# Patient Record
Sex: FEMALE | Race: WHITE | Employment: OTHER | URBAN - METROPOLITAN AREA
[De-identification: names, ages, dates, MRNs, and addresses within clinical notes are randomized per-mention and may not be internally consistent; named-entity substitution may affect disease eponyms.]

---

## 2018-05-21 ENCOUNTER — HOSPITAL ENCOUNTER (OUTPATIENT)
Dept: MAMMOGRAPHY | Age: 73
Discharge: HOME | End: 2018-05-21
Attending: FAMILY MEDICINE
Payer: MEDICARE

## 2018-05-21 DIAGNOSIS — Z78.0 MENOPAUSE: ICD-10-CM

## 2018-05-21 DIAGNOSIS — Z13.820 SCREENING FOR OSTEOPOROSIS: ICD-10-CM

## 2018-05-21 PROCEDURE — 77080 DXA BONE DENSITY AXIAL: CPT

## 2018-10-01 RX ORDER — TRAZODONE HYDROCHLORIDE 50 MG/1
TABLET ORAL
Qty: 180 TAB | Refills: 1 | Status: SHIPPED | OUTPATIENT
Start: 2018-10-01 | End: 2018-12-26 | Stop reason: SDUPTHER

## 2018-12-07 RX ORDER — GLIPIZIDE 5 MG/1
TABLET ORAL 2 TIMES DAILY
COMMUNITY
End: 2018-12-07 | Stop reason: SDUPTHER

## 2018-12-07 NOTE — TELEPHONE ENCOUNTER
Patient called again regarding medication refills. It seems refill request was sent to Dr. JAY Man [facility] but has not been approved and signed yet. Patient will not have medication as of tomorrow and wants to get it filled.     She has appt scheduled for 12/26/18 with Dr. JAY Man [facility]

## 2018-12-10 RX ORDER — GLIPIZIDE 5 MG/1
5 TABLET ORAL 2 TIMES DAILY
Qty: 60 TAB | Refills: 0 | Status: SHIPPED | OUTPATIENT
Start: 2018-12-10 | End: 2018-12-26 | Stop reason: SDUPTHER

## 2018-12-26 ENCOUNTER — OFFICE VISIT (OUTPATIENT)
Dept: FAMILY MEDICINE CLINIC | Age: 73
End: 2018-12-26

## 2018-12-26 VITALS
WEIGHT: 199 LBS | BODY MASS INDEX: 36.62 KG/M2 | TEMPERATURE: 98.7 F | HEIGHT: 62 IN | HEART RATE: 60 BPM | OXYGEN SATURATION: 96 % | RESPIRATION RATE: 16 BRPM | SYSTOLIC BLOOD PRESSURE: 120 MMHG | DIASTOLIC BLOOD PRESSURE: 60 MMHG

## 2018-12-26 DIAGNOSIS — E11.9 TYPE 2 DIABETES MELLITUS WITHOUT COMPLICATION, WITHOUT LONG-TERM CURRENT USE OF INSULIN (HCC): Primary | ICD-10-CM

## 2018-12-26 RX ORDER — LEVOTHYROXINE SODIUM 100 UG/1
100 TABLET ORAL
Qty: 90 TAB | Refills: 3 | Status: SHIPPED | OUTPATIENT
Start: 2018-12-26 | End: 2019-10-08 | Stop reason: SDUPTHER

## 2018-12-26 RX ORDER — SIMVASTATIN 10 MG/1
10 TABLET, FILM COATED ORAL
Qty: 90 TAB | Refills: 3 | Status: SHIPPED | OUTPATIENT
Start: 2018-12-26 | End: 2019-10-08 | Stop reason: SDUPTHER

## 2018-12-26 RX ORDER — LOSARTAN POTASSIUM 50 MG/1
TABLET ORAL
Refills: 1 | COMMUNITY
Start: 2018-10-31 | End: 2018-12-26 | Stop reason: SDUPTHER

## 2018-12-26 RX ORDER — SERTRALINE HYDROCHLORIDE 100 MG/1
TABLET, FILM COATED ORAL
Refills: 1 | COMMUNITY
Start: 2018-12-04 | End: 2018-12-26 | Stop reason: SDUPTHER

## 2018-12-26 RX ORDER — LEVOTHYROXINE SODIUM 100 UG/1
TABLET ORAL
COMMUNITY
Start: 2018-01-16 | End: 2018-12-26 | Stop reason: SDUPTHER

## 2018-12-26 RX ORDER — LOSARTAN POTASSIUM 50 MG/1
TABLET ORAL
Qty: 90 TAB | Refills: 3 | Status: SHIPPED | OUTPATIENT
Start: 2018-12-26 | End: 2019-10-08 | Stop reason: SDUPTHER

## 2018-12-26 RX ORDER — SERTRALINE HYDROCHLORIDE 50 MG/1
50 TABLET, FILM COATED ORAL
Qty: 90 TAB | Refills: 3 | Status: SHIPPED | OUTPATIENT
Start: 2018-12-26 | End: 2019-03-24 | Stop reason: SDUPTHER

## 2018-12-26 RX ORDER — GLIPIZIDE 5 MG/1
5 TABLET ORAL 2 TIMES DAILY
Qty: 180 TAB | Refills: 3 | Status: SHIPPED | OUTPATIENT
Start: 2018-12-26 | End: 2019-02-13 | Stop reason: CLARIF

## 2018-12-26 RX ORDER — TRAZODONE HYDROCHLORIDE 100 MG/1
100 TABLET ORAL
Qty: 90 TAB | Refills: 3 | Status: SHIPPED | OUTPATIENT
Start: 2018-12-26 | End: 2019-03-22 | Stop reason: SDUPTHER

## 2018-12-26 RX ORDER — ATENOLOL 25 MG/1
25 TABLET ORAL
Qty: 90 TAB | Refills: 3 | Status: SHIPPED | OUTPATIENT
Start: 2018-12-26 | End: 2019-10-08 | Stop reason: SDUPTHER

## 2018-12-26 RX ORDER — CYCLOBENZAPRINE HCL 10 MG
10 TABLET ORAL
COMMUNITY
Start: 2018-02-21 | End: 2018-12-26

## 2018-12-26 RX ORDER — ASPIRIN 81 MG/1
81 TABLET ORAL DAILY
COMMUNITY

## 2018-12-26 NOTE — PROGRESS NOTES
Identified pt with two pt identifiers(name and ). Chief Complaint   Patient presents with    Follow-up     diabetes        Health Maintenance Due   Topic    Hepatitis C Screening     LIPID PANEL Q1     FOOT EXAM Q1     MICROALBUMIN Q1     EYE EXAM RETINAL OR DILATED     DTaP/Tdap/Td series (1 - Tdap)    Shingrix Vaccine Age 50> (1 of 2)    BREAST CANCER SCRN MAMMOGRAM     FOBT Q 1 YEAR AGE 50-75     GLAUCOMA SCREENING Q2Y     Pneumococcal 65+ Low/Medium Risk (1 of 2 - PCV13)    HEMOGLOBIN A1C Q6M     MEDICARE YEARLY EXAM     Influenza Age 5 to Adult        Wt Readings from Last 3 Encounters:   18 199 lb (90.3 kg)   14 210 lb (95.3 kg)   13 209 lb (94.8 kg)     Temp Readings from Last 3 Encounters:   18 98.7 °F (37.1 °C) (Oral)   14 98.3 °F (36.8 °C)   13 98.8 °F (37.1 °C)     BP Readings from Last 3 Encounters:   18 120/60   14 108/53   13 128/60     Pulse Readings from Last 3 Encounters:   18 60   14 72   13 67         Learning Assessment:  :     No flowsheet data found. Depression Screening:  :     No flowsheet data found. Fall Risk Assessment:  :     No flowsheet data found. Abuse Screening:  :     No flowsheet data found. Coordination of Care Questionnaire:  :     1) Have you been to an emergency room, urgent care clinic since your last visit? no   Hospitalized since your last visit? no             2) Have you seen or consulted any other health care providers outside of [address] [place] [address] since your last visit? no  (Include any pap smears or colon screenings in this section.)    3) Do you have an Advance Directive on file? no  Are you interested in receiving information about Advance Directives? no    Patient is accompanied by self I have received verbal consent from Justine Narvaez to discuss any/all medical information while they are present in the room.     Reviewed record in preparation for visit and have obtained necessary documentation. Medication reconciliation up to date and corrected with patient at this time.

## 2019-02-09 LAB
BUN SERPL-MCNC: 13 MG/DL (ref 8–27)
BUN/CREAT SERPL: 13 (ref 12–28)
CALCIUM SERPL-MCNC: 9.7 MG/DL (ref 8.7–10.3)
CHLORIDE SERPL-SCNC: 100 MMOL/L (ref 96–106)
CO2 SERPL-SCNC: 23 MMOL/L (ref 20–29)
CREAT SERPL-MCNC: 1 MG/DL (ref 0.57–1)
EST. AVERAGE GLUCOSE BLD GHB EST-MCNC: 255 MG/DL
GLUCOSE SERPL-MCNC: 260 MG/DL (ref 65–99)
HBA1C MFR BLD: 10.5 % (ref 4.8–5.6)
POTASSIUM SERPL-SCNC: 4.4 MMOL/L (ref 3.5–5.2)
SODIUM SERPL-SCNC: 138 MMOL/L (ref 134–144)

## 2019-02-13 ENCOUNTER — OFFICE VISIT (OUTPATIENT)
Dept: FAMILY MEDICINE CLINIC | Age: 74
End: 2019-02-13

## 2019-02-13 VITALS
RESPIRATION RATE: 16 BRPM | HEIGHT: 62 IN | TEMPERATURE: 98.6 F | BODY MASS INDEX: 36.34 KG/M2 | OXYGEN SATURATION: 98 % | WEIGHT: 197.5 LBS | SYSTOLIC BLOOD PRESSURE: 109 MMHG | HEART RATE: 59 BPM | DIASTOLIC BLOOD PRESSURE: 68 MMHG

## 2019-02-13 DIAGNOSIS — E11.9 TYPE 2 DIABETES MELLITUS WITHOUT COMPLICATION, WITHOUT LONG-TERM CURRENT USE OF INSULIN (HCC): Primary | ICD-10-CM

## 2019-02-13 DIAGNOSIS — R42 DIZZINESS: ICD-10-CM

## 2019-02-13 DIAGNOSIS — Z29.8 ALTITUDE SICKNESS PROPHYLAXIS: ICD-10-CM

## 2019-02-13 PROBLEM — E66.01 SEVERE OBESITY (HCC): Status: ACTIVE | Noted: 2019-02-13

## 2019-02-13 RX ORDER — GLIPIZIDE 10 MG/1
10 TABLET, FILM COATED, EXTENDED RELEASE ORAL 2 TIMES DAILY
Qty: 180 TAB | Refills: 1 | Status: SHIPPED | OUTPATIENT
Start: 2019-02-13 | End: 2019-08-12 | Stop reason: SDUPTHER

## 2019-02-13 RX ORDER — MECLIZINE HCL 12.5 MG 12.5 MG/1
25 TABLET ORAL
Qty: 20 TAB | Refills: 0 | Status: SHIPPED | OUTPATIENT
Start: 2019-02-13 | End: 2019-02-23

## 2019-02-13 RX ORDER — ACETAZOLAMIDE 125 MG/1
125 TABLET ORAL 2 TIMES DAILY
Qty: 30 TAB | Refills: 0 | Status: SHIPPED | OUTPATIENT
Start: 2019-02-13 | End: 2019-03-15

## 2019-02-13 NOTE — PATIENT INSTRUCTIONS
Learning About Diabetes Food Guidelines  Your Care Instructions    Meal planning is important to manage diabetes. It helps keep your blood sugar at a target level (which you set with your doctor). You don't have to eat special foods. You can eat what your family eats, including sweets once in a while. But you do have to pay attention to how often you eat and how much you eat of certain foods. You may want to work with a dietitian or a certified diabetes educator (CDE) to help you plan meals and snacks. A dietitian or CDE can also help you lose weight if that is one of your goals. What should you know about eating carbs? Managing the amount of carbohydrate (carbs) you eat is an important part of healthy meals when you have diabetes. Carbohydrate is found in many foods. · Learn which foods have carbs. And learn the amounts of carbs in different foods. ? Bread, cereal, pasta, and rice have about 15 grams of carbs in a serving. A serving is 1 slice of bread (1 ounce), ½ cup of cooked cereal, or 1/3 cup of cooked pasta or rice. ? Fruits have 15 grams of carbs in a serving. A serving is 1 small fresh fruit, such as an apple or orange; ½ of a banana; ½ cup of cooked or canned fruit; ½ cup of fruit juice; 1 cup of melon or raspberries; or 2 tablespoons of dried fruit. ? Milk and no-sugar-added yogurt have 15 grams of carbs in a serving. A serving is 1 cup of milk or 2/3 cup of no-sugar-added yogurt. ? Starchy vegetables have 15 grams of carbs in a serving. A serving is ½ cup of mashed potatoes or sweet potato; 1 cup winter squash; ½ of a small baked potato; ½ cup of cooked beans; or ½ cup cooked corn or green peas. · Learn how much carbs to eat each day and at each meal. A dietitian or CDE can teach you how to keep track of the amount of carbs you eat. This is called carbohydrate counting. · If you are not sure how to count carbohydrate grams, use the Plate Method to plan meals.  It is a good, quick way to make sure that you have a balanced meal. It also helps you spread carbs throughout the day. ? Divide your plate by types of foods. Put non-starchy vegetables on half the plate, meat or other protein food on one-quarter of the plate, and a grain or starchy vegetable in the final quarter of the plate. To this you can add a small piece of fruit and 1 cup of milk or yogurt, depending on how many carbs you are supposed to eat at a meal.  · Try to eat about the same amount of carbs at each meal. Do not \"save up\" your daily allowance of carbs to eat at one meal.  · Proteins have very little or no carbs per serving. Examples of proteins are beef, chicken, turkey, fish, eggs, tofu, cheese, cottage cheese, and peanut butter. A serving size of meat is 3 ounces, which is about the size of a deck of cards. Examples of meat substitute serving sizes (equal to 1 ounce of meat) are 1/4 cup of cottage cheese, 1 egg, 1 tablespoon of peanut butter, and ½ cup of tofu. How can you eat out and still eat healthy? · Learn to estimate the serving sizes of foods that have carbohydrate. If you measure food at home, it will be easier to estimate the amount in a serving of restaurant food. · If the meal you order has too much carbohydrate (such as potatoes, corn, or baked beans), ask to have a low-carbohydrate food instead. Ask for a salad or green vegetables. · If you use insulin, check your blood sugar before and after eating out to help you plan how much to eat in the future. · If you eat more carbohydrate at a meal than you had planned, take a walk or do other exercise. This will help lower your blood sugar. What else should you know? · Limit saturated fat, such as the fat from meat and dairy products. This is a healthy choice because people who have diabetes are at higher risk of heart disease. So choose lean cuts of meat and nonfat or low-fat dairy products.  Use olive or canola oil instead of butter or shortening when cooking. · Don't skip meals. Your blood sugar may drop too low if you skip meals and take insulin or certain medicines for diabetes. · Check with your doctor before you drink alcohol. Alcohol can cause your blood sugar to drop too low. Alcohol can also cause a bad reaction if you take certain diabetes medicines. Follow-up care is a key part of your treatment and safety. Be sure to make and go to all appointments, and call your doctor if you are having problems. It's also a good idea to know your test results and keep a list of the medicines you take. Where can you learn more? Go to http://zachery-tate.info/. Enter I960 in the search box to learn more about \"Learning About Diabetes Food Guidelines. \"  Current as of: July 25, 2018  Content Version: 11.9  © 2710-8616 [employer]. Care instructions adapted under license by [employer] (which disclaims liability or warranty for this information). If you have questions about a medical condition or this instruction, always ask your healthcare professional. [address] any warranty or liability for your use of this information. Learning About Meal Planning for Diabetes  Why plan your meals? Meal planning can be a key part of managing diabetes. Planning meals and snacks with the right balance of carbohydrate, protein, and fat can help you keep your blood sugar at the target level you set with your doctor. You don't have to eat special foods. You can eat what your family eats, including sweets once in a while. But you do have to pay attention to how often you eat and how much you eat of certain foods. You may want to work with a dietitian or a certified diabetes educator. He or she can give you tips and meal ideas and can answer your questions about meal planning. This health professional can also help you reach a healthy weight if that is one of your goals. What plan is right for you?   Your dietitian or diabetes educator may suggest that you start with the plate format or carbohydrate counting. The plate format  The plate format is a simple way to help you manage how you eat. You plan meals by learning how much space each food should take on a plate. Using the plate format helps you spread carbohydrate throughout the day. It can make it easier to keep your blood sugar level within your target range. It also helps you see if you're eating healthy portion sizes. To use the plate format, you put non-starchy vegetables on half your plate. Add meat or meat substitutes on one-quarter of the plate. Put a grain or starchy vegetable (such as brown rice or a potato) on the final quarter of the plate. You can add a small piece of fruit and some low-fat or fat-free milk or yogurt, depending on your carbohydrate goal for each meal.  Here are some tips for using the plate format:  · Make sure that you are not using an oversized plate. A 9-inch plate is best. Many restaurants use larger plates. · Get used to using the plate format at home. Then you can use it when you eat out. · Write down your questions about using the plate format. Talk to your doctor, a dietitian, or a diabetes educator about your concerns. Carbohydrate counting  With carbohydrate counting, you plan meals based on the amount of carbohydrate in each food. Carbohydrate raises blood sugar higher and more quickly than any other nutrient. It is found in desserts, breads and cereals, and fruit. It's also found in starchy vegetables such as potatoes and corn, grains such as rice and pasta, and milk and yogurt. Spreading carbohydrate throughout the day helps keep your blood sugar levels within your target range. Your daily amount depends on several things, including your weight, how active you are, which diabetes medicines you take, and what your goals are for your blood sugar levels.  A registered dietitian or diabetes educator can help you plan how much carbohydrate to include in each meal and snack. A guideline for your daily amount of carbohydrate is:  · 45 to 60 grams at each meal. That's about the same as 3 to 4 carbohydrate servings. · 15 to 20 grams at each snack. That's about the same as 1 carbohydrate serving. The Nutrition Facts label on packaged foods tells you how much carbohydrate is in a serving of the food. First, look at the serving size on the food label. Is that the amount you eat in a serving? All of the nutrition information on a food label is based on that serving size. So if you eat more or less than that, you'll need to adjust the other numbers. Total carbohydrate is the next thing you need to look for on the label. If you count carbohydrate servings, one serving of carbohydrate is 15 grams. For foods that don't come with labels, such as fresh fruits and vegetables, you'll need a guide that lists carbohydrate in these foods. Ask your doctor, dietitian, or diabetes educator about books or other nutrition guides you can use. If you take insulin, you need to know how many grams of carbohydrate are in a meal. This lets you know how much rapid-acting insulin to take before you eat. If you use an insulin pump, you get a constant rate of insulin during the day. So the pump must be programmed at meals to give you extra insulin to cover the rise in blood sugar after meals. When you know how much carbohydrate you will eat, you can take the right amount of insulin. Or, if you always use the same amount of insulin, you need to make sure that you eat the same amount of carbohydrate at meals. If you need more help to understand carbohydrate counting and food labels, ask your doctor, dietitian, or diabetes educator. How do you get started with meal planning? Here are some tips to get started:  · Plan your meals a week at a time. Don't forget to include snacks too. · Use cookbooks or online recipes to plan several main meals.  Plan some quick meals for busy nights. You also can double some recipes that freeze well. Then you can save half for other busy nights when you don't have time to cook. · Make sure you have the ingredients you need for your recipes. If you're running low on basic items, put these items on your shopping list too. · List foods that you use to make breakfasts, lunches, and snacks. List plenty of fruits and vegetables. · Post this list on the refrigerator. Add to it as you think of more things you need. · Take the list to the store to do your weekly shopping. Follow-up care is a key part of your treatment and safety. Be sure to make and go to all appointments, and call your doctor if you are having problems. It's also a good idea to know your test results and keep a list of the medicines you take. Where can you learn more? Go to http://zachery-tate.info/. Shayy Peters in the search box to learn more about \"Learning About Meal Planning for Diabetes. \"  Current as of: July 25, 2018  Content Version: 11.9  © 8895-6949 [employer]. Care instructions adapted under license by [employer] (which disclaims liability or warranty for this information). If you have questions about a medical condition or this instruction, always ask your healthcare professional. [address] any warranty or liability for your use of this information.

## 2019-02-13 NOTE — PROGRESS NOTES
Assessment/Plan:     Diagnoses and all orders for this visit:    1. Type 2 diabetes mellitus without complication, without long-term current use of insulin (HCC)  -     glipiZIDE SR (GLUCOTROL XL) 10 mg CR tablet; Take 1 Tab by mouth two (2) times a day. - Worsening, refill of glipizide at correct dose, additional med names given to check with insurance to see what is covered. A1c is due again at end of March and she will return then. 2. Altitude sickness prophylaxis  -     acetaZOLAMIDE (DIAMOX) 125 mg tablet; Take 1 Tab by mouth two (2) times a day for 30 days. 3. Dizziness  -     meclizine (ANTIVERT) 12.5 mg tablet; Take 2 Tabs by mouth three (3) times daily as needed for up to 10 days. Follow-up Disposition:  Return in about 1 month (around 3/13/2019) for Follow Up. Discussed expected course/resolution/complications of diagnosis in detail with patient.    Medication risks/benefits/costs/interactions/alternatives discussed with patient.    Pt was given after visit summary which includes diagnoses, current medications & vitals. Pt expressed understanding with the diagnosis and plan        Subjective:      Asya Mckenzie is a 72 y.o. female who presents for had concerns including Follow-up (Diabetes ( running high) ). Here today for diabetes. Had some mix up and miscommunication about her glipizide dose. She is supposed to be taking 20mg a day of glipizide and was only taking 10mg daily. Last A1c was 10.5. She had been on 2 different cruises. States she has lost about 20lbs intentionally. Is unable to take metformin due to severe stomach pain. Has taken Janumet but was unable to tolerate that. Traveling to [facility] in a couple of weeks and it was recommended that she get Diamox for prevention of altitude sickness. She also need meclizine to prevent dizziness from the cruise boat.     Current Outpatient Medications   Medication Sig Dispense Refill    glipiZIDE SR (GLUCOTROL XL) 10 mg CR tablet Take 1 Tab by mouth two (2) times a day. 180 Tab 1    acetaZOLAMIDE (DIAMOX) 125 mg tablet Take 1 Tab by mouth two (2) times a day for 30 days. 30 Tab 0    meclizine (ANTIVERT) 12.5 mg tablet Take 2 Tabs by mouth three (3) times daily as needed for up to 10 days. 20 Tab 0    aspirin delayed-release 81 mg tablet Take  by mouth daily.  losartan (COZAAR) 50 mg tablet TAKE 1 TABLET BY MOUTH EVERY DAY. 90 Tab 3    levothyroxine (SYNTHROID) 100 mcg tablet Take 1 Tab by mouth Daily (before breakfast). 90 Tab 3    traZODone (DESYREL) 100 mg tablet Take 1 Tab by mouth nightly. 90 Tab 3    sertraline (ZOLOFT) 50 mg tablet Take 1 Tab by mouth nightly. 90 Tab 3    atenolol (TENORMIN) 25 mg tablet Take 1 Tab by mouth nightly. 90 Tab 3    simvastatin (ZOCOR) 10 mg tablet Take 1 Tab by mouth nightly. 90 Tab 3    mometasone-formoterol (DULERA) 200-5 mcg/actuation HFA inhaler Take 2 Puffs by inhalation two (2) times a day.  fluticasone (FLONASE) 50 mcg/actuation nasal spray 2 Sprays by Both Nostrils route daily.  albuterol (PROAIR HFA) 90 mcg/actuation inhaler Take 2 Puffs by inhalation every four (4) hours as needed for Wheezing.  Cetirizine (ZYRTEC) 10 mg cap Take 1 Tab by mouth daily.  multivitamins-minerals-lutein (CENTRUM SILVER) tab Take 2 Tabs by mouth daily.  montelukast (SINGULAIR) 10 mg tablet Take 10 mg by mouth nightly.  GUAIFENESIN (MUCINEX PO) Take 1 Tab by mouth two (2) times a day.  glucosamine-chondroit-vit c-mn (GLUCOSAMINE CHONDROITIN MAXSTR) 500-400 mg capsule Take 1 Cap by mouth daily.  OMEPRAZOLE MAGNESIUM (PRILOSEC OTC PO) Take 1 Tab by mouth daily.  CALCIUM CARB/VIT D3/MINERALS (CALCIUM-VITAMIN D PO) Take 1 Tab by mouth daily. Allergies   Allergen Reactions    Latex Rash    Prednisone Anaphylaxis     Throat swelling. Can take methylpredisone po or IV without problems.     Augmentin [Amoxicillin-Pot Clavulanate] Nausea and Vomiting    Biaxin [Clarithromycin] Nausea and Vomiting    Parafon Forte Dsc [Chlorzoxazone] Nausea and Vomiting       ROS:   Review of Systems   Constitutional: Negative for fever. Respiratory: Negative for cough and shortness of breath. Cardiovascular: Negative for chest pain, palpitations and leg swelling. Genitourinary: Negative for dysuria, frequency and urgency. Neurological: Negative for dizziness. Endo/Heme/Allergies: Negative for polydipsia. Objective:     Visit Vitals  /68 (BP 1 Location: Right arm, BP Patient Position: Sitting)   Pulse (!) 59   Temp 98.6 °F (37 °C) (Oral)   Resp 16   Ht 5' 2\" (1.575 m)   Wt 197 lb 8 oz (89.6 kg)   SpO2 98%   BMI 36.12 kg/m²       Vitals and Nurse Documentation reviewed. Physical Exam   Constitutional: She is well-developed, well-nourished, and in no distress. No distress. HENT:   Head: Normocephalic and atraumatic. Cardiovascular: Normal rate, regular rhythm and normal heart sounds. Exam reveals no gallop and no friction rub. No murmur heard. Pulmonary/Chest: Effort normal and breath sounds normal. No respiratory distress. She has no wheezes. She has no rales. Neurological: She is alert. Skin: She is not diaphoretic.    Psychiatric: Affect normal.       Results for orders placed or performed in visit on 57/99/01   METABOLIC PANEL, BASIC   Result Value Ref Range    Glucose 260 (H) 65 - 99 mg/dL    BUN 13 8 - 27 mg/dL    Creatinine 1.00 0.57 - 1.00 mg/dL    GFR est non-AA 57 (L) >59 mL/min/1.73    GFR est AA 65 >59 mL/min/1.73    BUN/Creatinine ratio 13 12 - 28    Sodium 138 134 - 144 mmol/L    Potassium 4.4 3.5 - 5.2 mmol/L    Chloride 100 96 - 106 mmol/L    CO2 23 20 - 29 mmol/L    Calcium 9.7 8.7 - 10.3 mg/dL   HEMOGLOBIN A1C WITH EAG   Result Value Ref Range    Hemoglobin A1c 10.5 (H) 4.8 - 5.6 %    Estimated average glucose 255 mg/dL

## 2019-02-13 NOTE — PROGRESS NOTES
Carlos Aragon is a 72 y.o. female      Chief Complaint   Patient presents with    Follow-up     Diabetes ( running high)          1. Have you been to the ER, urgent care clinic since your last visit? Hospitalized since your last visit? No      2. Have you seen or consulted any other health care providers outside of the [address] [place] [address] since your last visit? Include any pap smears or colon screening.   No

## 2019-03-24 RX ORDER — TRAZODONE HYDROCHLORIDE 100 MG/1
100 TABLET ORAL
Qty: 90 TAB | Refills: 1 | Status: SHIPPED | OUTPATIENT
Start: 2019-03-24 | End: 2019-09-20 | Stop reason: SDUPTHER

## 2019-03-24 RX ORDER — SERTRALINE HYDROCHLORIDE 50 MG/1
50 TABLET, FILM COATED ORAL DAILY
Qty: 90 TAB | Refills: 1 | Status: SHIPPED | OUTPATIENT
Start: 2019-03-24 | End: 2019-09-04 | Stop reason: SDUPTHER

## 2019-08-12 DIAGNOSIS — E11.9 TYPE 2 DIABETES MELLITUS WITHOUT COMPLICATION, WITHOUT LONG-TERM CURRENT USE OF INSULIN (HCC): ICD-10-CM

## 2019-08-12 RX ORDER — GLIPIZIDE 10 MG/1
TABLET, FILM COATED, EXTENDED RELEASE ORAL
Qty: 180 TAB | Refills: 1 | Status: SHIPPED | OUTPATIENT
Start: 2019-08-12 | End: 2020-02-14 | Stop reason: SDUPTHER

## 2019-09-04 RX ORDER — SERTRALINE HYDROCHLORIDE 50 MG/1
TABLET, FILM COATED ORAL
Qty: 90 TAB | Refills: 1 | Status: SHIPPED | OUTPATIENT
Start: 2019-09-04 | End: 2020-09-04 | Stop reason: SDUPTHER

## 2019-09-20 RX ORDER — TRAZODONE HYDROCHLORIDE 100 MG/1
TABLET ORAL
Qty: 90 TAB | Refills: 1 | Status: SHIPPED | OUTPATIENT
Start: 2019-09-20 | End: 2020-03-24 | Stop reason: SDUPTHER

## 2019-10-08 ENCOUNTER — OFFICE VISIT (OUTPATIENT)
Dept: FAMILY MEDICINE CLINIC | Age: 74
End: 2019-10-08

## 2019-10-08 VITALS
TEMPERATURE: 98.1 F | WEIGHT: 201 LBS | SYSTOLIC BLOOD PRESSURE: 135 MMHG | RESPIRATION RATE: 17 BRPM | HEART RATE: 64 BPM | BODY MASS INDEX: 36.99 KG/M2 | DIASTOLIC BLOOD PRESSURE: 75 MMHG | OXYGEN SATURATION: 94 % | HEIGHT: 62 IN

## 2019-10-08 DIAGNOSIS — Z23 ENCOUNTER FOR IMMUNIZATION: ICD-10-CM

## 2019-10-08 DIAGNOSIS — Z13.39 SCREENING FOR ALCOHOLISM: ICD-10-CM

## 2019-10-08 DIAGNOSIS — Z13.31 SCREENING FOR DEPRESSION: ICD-10-CM

## 2019-10-08 DIAGNOSIS — E03.9 ACQUIRED HYPOTHYROIDISM: ICD-10-CM

## 2019-10-08 DIAGNOSIS — E66.01 SEVERE OBESITY (HCC): ICD-10-CM

## 2019-10-08 DIAGNOSIS — E11.21 TYPE 2 DIABETES WITH NEPHROPATHY (HCC): ICD-10-CM

## 2019-10-08 DIAGNOSIS — Z00.00 MEDICARE ANNUAL WELLNESS VISIT, SUBSEQUENT: Primary | ICD-10-CM

## 2019-10-08 DIAGNOSIS — I10 ESSENTIAL HYPERTENSION: ICD-10-CM

## 2019-10-08 DIAGNOSIS — E11.9 ENCOUNTER FOR DIABETIC FOOT EXAM (HCC): ICD-10-CM

## 2019-10-08 RX ORDER — SIMVASTATIN 10 MG/1
10 TABLET, FILM COATED ORAL
Qty: 90 TAB | Refills: 3 | Status: SHIPPED | OUTPATIENT
Start: 2019-10-08 | End: 2020-09-14 | Stop reason: SDUPTHER

## 2019-10-08 RX ORDER — LEVOTHYROXINE SODIUM 100 UG/1
100 TABLET ORAL
Qty: 90 TAB | Refills: 3 | Status: SHIPPED | OUTPATIENT
Start: 2019-10-08 | End: 2020-11-23

## 2019-10-08 RX ORDER — ATENOLOL 25 MG/1
25 TABLET ORAL
Qty: 90 TAB | Refills: 3 | Status: SHIPPED | OUTPATIENT
Start: 2019-10-08 | End: 2020-05-30

## 2019-10-08 RX ORDER — LOSARTAN POTASSIUM 50 MG/1
TABLET ORAL
Qty: 90 TAB | Refills: 1 | Status: SHIPPED | OUTPATIENT
Start: 2019-10-08 | End: 2020-09-04 | Stop reason: SDUPTHER

## 2019-10-08 NOTE — PROGRESS NOTES
This is the Subsequent Medicare Annual Wellness Exam, performed 12 months or more after the Initial AWV or the last Subsequent AWV    I have reviewed the patient's medical history in detail and updated the computerized patient record. History   Here today for medicare wellness. Had mammogram in May 2019. Colonoscopy is due in 2022. Needs Pneumo 13. Needs shingrix. Just had a steroid injection in the back and feels blood sugar is very high. Sees dentist every 6 months. Last vision exam was this year. Diabetes  Last A1c was in Feb 2019 was 10.5. Checks BG at home and gets 200's. Takes 10mg glipizide BID. Unable to take metformin due to messing up her stomach very bad and     HTN  BP is 135/75 today. Does not check BP at home. Takes losartan 50mg and atenolol 25mg as directed with no reported side effects. Denies CP, SOB, palpitations, or leg swelling. Review of Systems   Constitutional: Negative for chills, fever and weight loss. Eyes: Negative for blurred vision. Respiratory: Negative for cough and shortness of breath. Cardiovascular: Negative for chest pain, palpitations and leg swelling. Gastrointestinal: Negative for constipation, nausea and vomiting. Genitourinary: Negative for dysuria and frequency. Musculoskeletal: Negative for joint pain. Skin: Negative for rash. Neurological: Negative for dizziness and headaches. Endo/Heme/Allergies: Negative for polydipsia. Psychiatric/Behavioral: Negative for substance abuse and suicidal ideas. The patient does not have insomnia.         Past Medical History:   Diagnosis Date    Anxiety     Arthritis     Asthma     Chronic pain     back,right hip    Depression     Diabetes (HCC)     type 2    GERD (gastroesophageal reflux disease)     Hypercholesteremia     Hypertension     Hypothyroid     Nausea & vomiting     Reflux     Sleep apnea     wears dental appliance- Somnident    Thyroid disease     Unspecified adverse effect of anesthesia     delayed awakening      Past Surgical History:   Procedure Laterality Date    HX COLONOSCOPY  2012    normal repeat 2022, diverticulosis of sigmoid colon, Dr. Ghada Santo HX HEENT  1/08/14    bilateral cataract surgery1/8/14    HX KNEE REPLACEMENT      right    HX OTHER SURGICAL  2007    cardiac cath- No CAD found    NEUROLOGICAL PROCEDURE UNLISTED  1999    vascular brain surgery     Current Outpatient Medications   Medication Sig Dispense Refill    folic acid/multivit-min/lutein (CENTRUM SILVER PO) Take  by mouth.  varicella-zoster recombinant, PF, (SHINGRIX, PF,) 50 mcg/0.5 mL susr injection 0.5mL by IntraMUSCular route once now and then repeat in 2-6 months 0.5 mL 1    simvastatin (ZOCOR) 10 mg tablet Take 1 Tab by mouth nightly. 90 Tab 3    losartan (COZAAR) 50 mg tablet TAKE 1 TABLET BY MOUTH EVERY DAY. 90 Tab 1    levothyroxine (SYNTHROID) 100 mcg tablet Take 1 Tab by mouth Daily (before breakfast). 90 Tab 3    atenolol (TENORMIN) 25 mg tablet Take 1 Tab by mouth nightly. 90 Tab 3    glucose blood VI test strips (ASCENSIA AUTODISC VI, ONE TOUCH ULTRA TEST VI) strip E11.6  Test fasting blood glucose daily 100 Strip 3    traZODone (DESYREL) 100 mg tablet TAKE 1 TABLET BY MOUTH EVERY DAY AT NIGHT 90 Tab 1    sertraline (ZOLOFT) 50 mg tablet TAKE 1 TABLET BY MOUTH EVERY DAY 90 Tab 1    glipiZIDE SR (GLUCOTROL XL) 10 mg CR tablet TAKE 1 TABLET BY MOUTH TWICE A  Tab 1    aspirin delayed-release 81 mg tablet Take  by mouth daily.  mometasone-formoterol (DULERA) 200-5 mcg/actuation HFA inhaler Take 2 Puffs by inhalation two (2) times a day.  fluticasone (FLONASE) 50 mcg/actuation nasal spray 2 Sprays by Both Nostrils route daily.  albuterol (PROAIR HFA) 90 mcg/actuation inhaler Take 2 Puffs by inhalation every four (4) hours as needed for Wheezing.  Cetirizine (ZYRTEC) 10 mg cap Take 1 Tab by mouth daily.       multivitamins-minerals-lutein (CENTRUM SILVER) tab Take 2 Tabs by mouth daily.  montelukast (SINGULAIR) 10 mg tablet Take 10 mg by mouth nightly.  GUAIFENESIN (MUCINEX PO) Take 1 Tab by mouth two (2) times a day.  glucosamine-chondroit-vit c-mn (GLUCOSAMINE CHONDROITIN MAXSTR) 500-400 mg capsule Take 1 Cap by mouth daily.  OMEPRAZOLE MAGNESIUM (PRILOSEC OTC PO) Take 1 Tab by mouth daily.  CALCIUM CARB/VIT D3/MINERALS (CALCIUM-VITAMIN D PO) Take 1 Tab by mouth daily. Allergies   Allergen Reactions    Latex Rash    Prednisone Anaphylaxis     Throat swelling. Can take methylpredisone po or IV without problems.     Augmentin [Amoxicillin-Pot Clavulanate] Nausea and Vomiting    Biaxin [Clarithromycin] Nausea and Vomiting    Metformin Diarrhea    Parafon Forte Dsc [Chlorzoxazone] Nausea and Vomiting     Family History   Problem Relation Age of Onset    Elevated Lipids Mother     Dementia Mother     Osteoporosis Mother     Lung Disease Father         copd    Delayed Awakening Father     Post-op Nausea/Vomiting Father     Hypertension Sister     Diabetes Sister         type 2    Breast Cancer Maternal Aunt      Social History     Tobacco Use    Smoking status: Never Smoker    Smokeless tobacco: Never Used   Substance Use Topics    Alcohol use: No     Frequency: Never     Comment: 2x year     Patient Active Problem List   Diagnosis Code    Dry cough R05    Pneumonia, organism unspecified(486) J18.9    Diabetes mellitus ([facility] [address] E11.9    Unspecified essential hypertension I10    Fatigue R53.83    Hypothyroidism E03.9    Obstructive sleep apnea (adult) (pediatric) G47.33    Esophageal reflux K21.9    Severe obesity (HCC) E66.01    Type 2 diabetes with nephropathy (HCC) E11.21       Depression Risk Factor Screening:     3 most recent PHQ Screens 10/8/2019   Little interest or pleasure in doing things Not at all   Feeling down, depressed, irritable, or hopeless Not at all   Total Score PHQ 2 0     Alcohol Risk Factor Screening: You do not drink alcohol or very rarely. Functional Ability and Level of Safety:   Hearing Loss  Hearing is good. Activities of Daily Living  The home contains: handrails and grab bars  Patient does total self care    Fall Risk  Fall Risk Assessment, last 12 mths 10/8/2019   Able to walk? Yes   Fall in past 12 months? No       Abuse Screen  Patient is not abused    Cognitive Screening   Evaluation of Cognitive Function:  Has your family/caregiver stated any concerns about your memory: no  Normal, Clock Drawing test    Physical Exam   Constitutional: She is well-developed, well-nourished, and in no distress. HENT:   Head: Normocephalic and atraumatic. Right Ear: Tympanic membrane normal.   Left Ear: Tympanic membrane normal.   Nose: Nose normal.   Mouth/Throat: Oropharynx is clear and moist. Normal dentition. Eyes: Pupils are equal, round, and reactive to light. EOM are normal. Right eye exhibits no discharge. Left eye exhibits no discharge. Right conjunctiva is not injected. Left conjunctiva is not injected. Neck: Neck supple. Carotid bruit is not present. No thyroid mass and no thyromegaly present. Cardiovascular: Normal rate, regular rhythm, S1 normal and S2 normal. Exam reveals no gallop and no friction rub. No murmur heard. Pulses:       Dorsalis pedis pulses are 2+ on the right side, and 2+ on the left side. Posterior tibial pulses are 2+ on the right side, and 2+ on the left side. Pulmonary/Chest: Breath sounds normal.   Abdominal: Normal appearance and bowel sounds are normal. She exhibits no distension and no mass. There is no hepatosplenomegaly. There is no tenderness. Musculoskeletal: Normal range of motion. Lymphadenopathy:     She has no cervical adenopathy. Neurological: She is alert. She has normal sensation and normal strength. Gait normal. Gait normal.   Skin: Skin is warm, dry and intact. No rash noted.    Psychiatric: Mood and affect normal. Diabetic foot exam performed by Ricarda Rodriguez NP     Measurement  Response Nurse Comment Physician Comment   Monofilament  R - 6/6  L - 6/6     Pulse DP R - present  L - present     Pulse TP R - present  L - present     Structural deformity R - None  L - None     Skin Integrity / Deformity R - None  L - None        Reviewed by:           Patient Care Team   Patient Care Team:  Sharmin Perry MD as PCP - General (Family Practice)    Assessment/Plan   Education and counseling provided:  Are appropriate based on today's review and evaluation  Pneumococcal Vaccine  Influenza Vaccine  Screening Mammography    Diagnoses and all orders for this visit:    1. Medicare annual wellness visit, subsequent  -     varicella-zoster recombinant, PF, (SHINGRIX, PF,) 50 mcg/0.5 mL susr injection; 0.5mL by IntraMUSCular route once now and then repeat in 2-6 months    2. Type 2 diabetes with nephropathy (HCC)  -     HEMOGLOBIN A1C WITH EAG  -     CBC WITH AUTOMATED DIFF  -     LIPID PANEL  -     simvastatin (ZOCOR) 10 mg tablet; Take 1 Tab by mouth nightly.  -     glucose blood VI test strips (ASCENSIA AUTODISC VI, ONE TOUCH ULTRA TEST VI) strip; E11.6  Test fasting blood glucose daily  - Presumed stable, labs today, continue current therapy, follow up based on labs    3. Severe obesity ([address]  - Work on diet and exercise with healthy food choices and increasing activity    4. Acquired hypothyroidism  -     TSH 3RD GENERATION  -     levothyroxine (SYNTHROID) 100 mcg tablet; Take 1 Tab by mouth Daily (before breakfast). - Presumed stable, labs today    5. Screening for alcoholism  -     MD ANNUAL ALCOHOL SCREEN 15 MIN    6. Screening for depression  -     DEPRESSION SCREEN ANNUAL    7. Encounter for immunization  -     pneumococcal 13 jatin conj dip (PREVNAR 13, PF,) 0.5 mL syrg injection; 0.5 mL by IntraMUSCular route once for 1 dose.   -     ADMIN INFLUENZA VIRUS VAC  -     INFLUENZA VACCINE INACTIVATED (IIV), SUBUNIT, ADJUVANTED, IM    8. Essential hypertension  -     MICROALBUMIN, UR, RAND W/ MICROALB/CREAT RATIO  -     METABOLIC PANEL, COMPREHENSIVE  -     losartan (COZAAR) 50 mg tablet; TAKE 1 TABLET BY MOUTH EVERY DAY.  -     atenolol (TENORMIN) 25 mg tablet; Take 1 Tab by mouth nightly. - Presumed stable, labs today, refills today    9.  Encounter for diabetic foot exam [facility])        Health Maintenance Due   Topic Date Due    Hepatitis C Screening  1945    LIPID PANEL Q1  1945    FOOT EXAM Q1  08/16/1957    MICROALBUMIN Q1  08/16/1957    Shingrix Vaccine Age 50> (1 of 2) 08/16/1997    Pneumococcal 65+ years (1 of 2 - PCV13) 08/16/2012    HEMOGLOBIN A1C Q6M  08/08/2019

## 2019-10-08 NOTE — PROGRESS NOTES
King Ramos is a 69 y.o. female    Chief Complaint   Patient presents with    Complete Physical     1. Have you been to the ER, urgent care clinic since your last visit? Hospitalized since your last visit? No    2. Have you seen or consulted any other health care providers outside of the [address] [place] [address] since your last visit? Include any pap smears or colon screening. No       Last mammogram 5/20/2019  Last Colonoscopy 2017  Last physical 12/26/2018  Last influenza 12/2018    Visit Vitals  /75 (BP 1 Location: Right arm, BP Patient Position: Sitting)   Pulse 64   Temp 98.1 °F (36.7 °C) (Oral)   Resp 17   Ht 5' 2\" (1.575 m)   Wt 201 lb (91.2 kg)   SpO2 94%   BMI 36.76 kg/m²     Immunization/s administered 10/8/2019 by David Mane LPN with patient's consent. Patient tolerated procedure well. No reactions noted.

## 2019-10-08 NOTE — PATIENT INSTRUCTIONS
Medicare Wellness Visit, Female The best way to live healthy is to have a lifestyle where you eat a well-balanced diet, exercise regularly, limit alcohol use, and quit all forms of tobacco/nicotine, if applicable. Regular preventive services are another way to keep healthy. Preventive services (vaccines, screening tests, monitoring & exams) can help personalize your care plan, which helps you manage your own care. Screening tests can find health problems at the earliest stages, when they are easiest to treat. [facility] follows the current, evidence-based guidelines published by the [facility] ([facility]) when recommending preventive services for our patients. Because we follow these guidelines, sometimes recommendations change over time as research supports it. (For example, mammograms used to be recommended annually. Even though Medicare will still pay for an annual mammogram, the newer guidelines recommend a mammogram every two years for women of average risk.) Of course, you and your doctor may decide to screen more often for some diseases, based on your risk and your health status. Preventive services for you include: - Medicare offers their members a free annual wellness visit, which is time for you and your primary care provider to discuss and plan for your preventive service needs. Take advantage of this benefit every year! 
-All adults over the age of 72 should receive the recommended pneumonia vaccines. Current USPSTF guidelines recommend a series of two vaccines for the best pneumonia protection.  
-All adults should have a flu vaccine yearly and a tetanus vaccine every 10 years. All adults age 61 and older should receive a shingles vaccine once in their lifetime.   
-A bone mass density test is recommended when a woman turns 65 to screen for osteoporosis. This test is only recommended one time, as a screening. Some providers will use this same test as a disease monitoring tool if you already have osteoporosis. -All adults age 38-68 who are overweight should have a diabetes screening test once every three years.  
-Other screening tests and preventive services for persons with diabetes include: an eye exam to screen for diabetic retinopathy, a kidney function test, a foot exam, and stricter control over your cholesterol.  
-Cardiovascular screening for adults with routine risk involves an electrocardiogram (ECG) at intervals determined by your doctor.  
-Colorectal cancer screenings should be done for adults age 54-65 with no increased risk factors for colorectal cancer. There are a number of acceptable methods of screening for this type of cancer. Each test has its own benefits and drawbacks. Discuss with your doctor what is most appropriate for you during your annual wellness visit. The different tests include: colonoscopy (considered the best screening method), a fecal occult blood test, a fecal DNA test, and sigmoidoscopy. -Breast cancer screenings are recommended every other year for women of normal risk, age 54-69. 
-Cervical cancer screenings for women over age 72 are only recommended with certain risk factors.  
-All adults born between [facility] should be screened once for Hepatitis C. Here is a list of your current Health Maintenance items (your personalized list of preventive services) with a due date: 
Health Maintenance Due Topic Date Due  
 Hepatitis C Test  1945  Cholesterol Test   1945  Diabetic Foot Care  08/16/1957  Albumin Urine Test  08/16/1957  
 DTaP/Tdap/Td  (1 - Tdap) 08/16/1968  Shingles Vaccine (1 of 2) 08/16/1997  Pneumococcal Vaccine (1 of 2 - PCV13) 08/16/2012 [facility] Annual Well Visit  03/15/2018  Flu Vaccine  08/01/2019  Hemoglobin A1C    08/08/2019

## 2019-11-05 LAB
ALBUMIN SERPL-MCNC: 4.4 G/DL (ref 3.5–4.8)
ALBUMIN/CREAT UR: 18 MG/G CREAT (ref 0–30)
ALBUMIN/GLOB SERPL: 1.9 {RATIO} (ref 1.2–2.2)
ALP SERPL-CCNC: 81 IU/L (ref 39–117)
ALT SERPL-CCNC: 43 IU/L (ref 0–32)
AST SERPL-CCNC: 26 IU/L (ref 0–40)
BASOPHILS # BLD AUTO: 0.1 X10E3/UL (ref 0–0.2)
BASOPHILS NFR BLD AUTO: 1 %
BILIRUB SERPL-MCNC: 0.5 MG/DL (ref 0–1.2)
BUN SERPL-MCNC: 11 MG/DL (ref 8–27)
BUN/CREAT SERPL: 11 (ref 12–28)
CALCIUM SERPL-MCNC: 10.3 MG/DL (ref 8.7–10.3)
CHLORIDE SERPL-SCNC: 102 MMOL/L (ref 96–106)
CHOLEST SERPL-MCNC: 172 MG/DL (ref 100–199)
CO2 SERPL-SCNC: 23 MMOL/L (ref 20–29)
CREAT SERPL-MCNC: 0.99 MG/DL (ref 0.57–1)
CREAT UR-MCNC: 214.7 MG/DL
EOSINOPHIL # BLD AUTO: 0.3 X10E3/UL (ref 0–0.4)
EOSINOPHIL NFR BLD AUTO: 3 %
ERYTHROCYTE [DISTWIDTH] IN BLOOD BY AUTOMATED COUNT: 12.3 % (ref 12.3–15.4)
EST. AVERAGE GLUCOSE BLD GHB EST-MCNC: 252 MG/DL
GLOBULIN SER CALC-MCNC: 2.3 G/DL (ref 1.5–4.5)
GLUCOSE SERPL-MCNC: 200 MG/DL (ref 65–99)
HBA1C MFR BLD: 10.4 % (ref 4.8–5.6)
HCT VFR BLD AUTO: 45.4 % (ref 34–46.6)
HDLC SERPL-MCNC: 59 MG/DL
HGB BLD-MCNC: 14.7 G/DL (ref 11.1–15.9)
IMM GRANULOCYTES # BLD AUTO: 0 X10E3/UL (ref 0–0.1)
IMM GRANULOCYTES NFR BLD AUTO: 0 %
LDLC SERPL CALC-MCNC: 89 MG/DL (ref 0–99)
LYMPHOCYTES # BLD AUTO: 3.1 X10E3/UL (ref 0.7–3.1)
LYMPHOCYTES NFR BLD AUTO: 39 %
MCH RBC QN AUTO: 28.6 PG (ref 26.6–33)
MCHC RBC AUTO-ENTMCNC: 32.4 G/DL (ref 31.5–35.7)
MCV RBC AUTO: 88 FL (ref 79–97)
MICROALBUMIN UR-MCNC: 38.7 UG/ML
MONOCYTES # BLD AUTO: 0.6 X10E3/UL (ref 0.1–0.9)
MONOCYTES NFR BLD AUTO: 8 %
NEUTROPHILS # BLD AUTO: 3.8 X10E3/UL (ref 1.4–7)
NEUTROPHILS NFR BLD AUTO: 49 %
PLATELET # BLD AUTO: 275 X10E3/UL (ref 150–450)
POTASSIUM SERPL-SCNC: 4.2 MMOL/L (ref 3.5–5.2)
PROT SERPL-MCNC: 6.7 G/DL (ref 6–8.5)
RBC # BLD AUTO: 5.14 X10E6/UL (ref 3.77–5.28)
SODIUM SERPL-SCNC: 143 MMOL/L (ref 134–144)
TRIGL SERPL-MCNC: 118 MG/DL (ref 0–149)
TSH SERPL DL<=0.005 MIU/L-ACNC: 3.57 UIU/ML (ref 0.45–4.5)
VLDLC SERPL CALC-MCNC: 24 MG/DL (ref 5–40)
WBC # BLD AUTO: 7.9 X10E3/UL (ref 3.4–10.8)

## 2019-11-05 NOTE — PROGRESS NOTES
Called pt, she did not answer, left v/m asking pt to call office and scheduled an appt for follow up.

## 2020-02-14 DIAGNOSIS — E11.9 TYPE 2 DIABETES MELLITUS WITHOUT COMPLICATION, WITHOUT LONG-TERM CURRENT USE OF INSULIN (HCC): ICD-10-CM

## 2020-02-14 NOTE — TELEPHONE ENCOUNTER
Pt states that she is out of the Rx and pharmacy stated they have been sending refill.       No encounters in pt file for refill     Would like Rx to filled at [facility] pharmacy on [address]

## 2020-02-15 RX ORDER — GLIPIZIDE 10 MG/1
TABLET, FILM COATED, EXTENDED RELEASE ORAL
Qty: 180 TAB | Refills: 1 | Status: SHIPPED | OUTPATIENT
Start: 2020-02-15 | End: 2020-05-30

## 2020-03-25 RX ORDER — TRAZODONE HYDROCHLORIDE 100 MG/1
TABLET ORAL
Qty: 90 TAB | Refills: 1 | Status: SHIPPED | OUTPATIENT
Start: 2020-03-25 | End: 2020-09-14 | Stop reason: SDUPTHER

## 2020-05-04 ENCOUNTER — OFFICE VISIT (OUTPATIENT)
Dept: PRIMARY CARE CLINIC | Age: 75
End: 2020-05-04

## 2020-05-04 VITALS — RESPIRATION RATE: 16 BRPM | OXYGEN SATURATION: 95 % | HEART RATE: 53 BPM | TEMPERATURE: 98.2 F

## 2020-05-04 DIAGNOSIS — R50.9 FEBRILE ILLNESS: Primary | ICD-10-CM

## 2020-05-05 ENCOUNTER — TELEPHONE (OUTPATIENT)
Dept: FAMILY MEDICINE CLINIC | Age: 75
End: 2020-05-05

## 2020-05-05 NOTE — PROGRESS NOTES
Spoke with pt, notified of positive covid. Informed pt that she needs to self quarantine for 2weeks.  Pt verbalized understanding

## 2020-05-05 NOTE — TELEPHONE ENCOUNTER
----- Message from Carlos Tarango sent at 5/5/2020 11:47 AM EDT -----  Regarding: Dr Ascencion Lopez: [phone]  General Message/Vendor Calls    Caller's first and last name:Les/      Reason for call: call to state wife had the Covid-19 test. Will get results on Wednesday. Would like to have something recommended for the aches his wife is experiencing. Please advise.       Callback required yes/no and why:yes      Best contact number(s):[phone]      Details to clarify the request:      Carlos Tarango

## 2020-05-06 ENCOUNTER — VIRTUAL VISIT (OUTPATIENT)
Dept: FAMILY MEDICINE CLINIC | Age: 75
End: 2020-05-06

## 2020-05-06 DIAGNOSIS — U07.1 COVID-19: Primary | ICD-10-CM

## 2020-05-06 LAB
SARS-COV-2, NAA: DETECTED
SPECIMEN STATUS REPORT, ROLRST: NORMAL

## 2020-05-06 RX ORDER — AZITHROMYCIN 250 MG/1
TABLET, FILM COATED ORAL
Qty: 6 TAB | Refills: 0 | Status: ON HOLD | OUTPATIENT
Start: 2020-05-06 | End: 2020-05-11

## 2020-05-06 RX ORDER — HYDROXYCHLOROQUINE SULFATE 200 MG/1
TABLET, FILM COATED ORAL
Qty: 12 TAB | Refills: 0 | Status: ON HOLD | OUTPATIENT
Start: 2020-05-06 | End: 2020-05-11

## 2020-05-06 NOTE — PROGRESS NOTES
Brian Hager is a 69 y.o. female evaluated via telephone on 5/6/2020. Consent:  She and/or health care decision maker is aware that she may receive a bill for this telephone service, depending on her insurance coverage, and has provided verbal consent to proceed: Yes      Documentation:  I communicated with the patient and/or health care decision maker about COVID + test.   Details of this discussion including any medical advice provided:     Patient is asthmatic  Found to be + for COVID on test done 48 hours ago. No current rx for COVID  Running fever and has cough. Aches a lot but no dyspnea   also positive. Assessment and Plan:    1. COVID-19  I have advised that given age, history of asthma, and ongoing symptoms, I would advise treatment. We discussed risk of meds which I believe to be low with recent study that shows no deaths attributable to cardiac interaction of the meds. Knows to quarantine. FU in one week. To ER if dyspneic and getting worse.  also to be treated  - azithromycin (ZITHROMAX) 250 mg tablet; Take 2 tablets today, then take 1 tablet daily  Dispense: 6 Tab; Refill: 0  - hydroxychloroquine (PLAQUENIL) 200 mg tablet; Take two by mouth twice a day today, then one by mouth twice daily. Dispense: 12 Tab; Refill: 0      Follow-up and Dispositions    · Return in about 1 week (around 5/13/2020) for Medication follow up, Asthma/COPD follow up. I affirm this is a Patient Initiated Episode with a Patient who has not had a related appointment within my department in the past 7 days or scheduled within the next 24 hours. Total Time: minutes: 11-20 minutes    Note: not billable if this call serves to triage the patient into an appointment for the relevant concern    The patient was at home and I was in office for this phone visit.     Wanda Willis MD

## 2020-05-10 ENCOUNTER — APPOINTMENT (OUTPATIENT)
Dept: GENERAL RADIOLOGY | Age: 75
DRG: 177 | End: 2020-05-10
Attending: EMERGENCY MEDICINE
Payer: MEDICARE

## 2020-05-10 ENCOUNTER — HOSPITAL ENCOUNTER (INPATIENT)
Age: 75
LOS: 20 days | Discharge: HOME HEALTH | DRG: 177 | End: 2020-05-30
Attending: EMERGENCY MEDICINE | Admitting: INTERNAL MEDICINE
Payer: MEDICARE

## 2020-05-10 DIAGNOSIS — R09.02 HYPOXIA: ICD-10-CM

## 2020-05-10 DIAGNOSIS — U07.1 ACUTE RESPIRATORY DISEASE DUE TO COVID-19 VIRUS: Primary | ICD-10-CM

## 2020-05-10 DIAGNOSIS — Z71.89 DNR (DO NOT RESUSCITATE) DISCUSSION: ICD-10-CM

## 2020-05-10 DIAGNOSIS — Z71.89 ADVANCED CARE PLANNING/COUNSELING DISCUSSION: ICD-10-CM

## 2020-05-10 DIAGNOSIS — E87.6 HYPOKALEMIA: ICD-10-CM

## 2020-05-10 DIAGNOSIS — J96.01 ACUTE RESPIRATORY FAILURE WITH HYPOXIA (HCC): ICD-10-CM

## 2020-05-10 DIAGNOSIS — R06.03 RESPIRATORY DISTRESS: ICD-10-CM

## 2020-05-10 DIAGNOSIS — Z71.89 GOALS OF CARE, COUNSELING/DISCUSSION: ICD-10-CM

## 2020-05-10 DIAGNOSIS — J06.9 ACUTE RESPIRATORY DISEASE DUE TO COVID-19 VIRUS: Primary | ICD-10-CM

## 2020-05-10 PROBLEM — J96.91 RESPIRATORY FAILURE WITH HYPOXIA (HCC): Status: ACTIVE | Noted: 2020-05-10

## 2020-05-10 LAB
ALBUMIN SERPL-MCNC: 2.8 G/DL (ref 3.5–5)
ALBUMIN/GLOB SERPL: 0.7 {RATIO} (ref 1.1–2.2)
ALP SERPL-CCNC: 91 U/L (ref 45–117)
ALT SERPL-CCNC: 40 U/L (ref 12–78)
ANION GAP SERPL CALC-SCNC: 12 MMOL/L (ref 5–15)
ARTERIAL PATENCY WRIST A: YES
AST SERPL-CCNC: 43 U/L (ref 15–37)
BASE EXCESS BLDA CALC-SCNC: 4.6 MMOL/L
BASOPHILS # BLD: 0 K/UL (ref 0–0.1)
BASOPHILS NFR BLD: 0 % (ref 0–1)
BDY SITE: ABNORMAL
BILIRUB SERPL-MCNC: 0.6 MG/DL (ref 0.2–1)
BREATHS.SPONTANEOUS ON VENT: 10
BUN SERPL-MCNC: 13 MG/DL (ref 6–20)
BUN/CREAT SERPL: 14 (ref 12–20)
CALCIUM SERPL-MCNC: 8.7 MG/DL (ref 8.5–10.1)
CHLORIDE SERPL-SCNC: 101 MMOL/L (ref 97–108)
CO2 SERPL-SCNC: 21 MMOL/L (ref 21–32)
COMMENT, HOLDF: NORMAL
CREAT SERPL-MCNC: 0.93 MG/DL (ref 0.55–1.02)
DIFFERENTIAL METHOD BLD: ABNORMAL
EOSINOPHIL # BLD: 0 K/UL (ref 0–0.4)
EOSINOPHIL NFR BLD: 0 % (ref 0–7)
ERYTHROCYTE [DISTWIDTH] IN BLOOD BY AUTOMATED COUNT: 12.4 % (ref 11.5–14.5)
GAS FLOW.O2 O2 DELIVERY SYS: 4 L/MIN
GLOBULIN SER CALC-MCNC: 4.3 G/DL (ref 2–4)
GLUCOSE SERPL-MCNC: 289 MG/DL (ref 65–100)
HCO3 BLDA-SCNC: 19 MMOL/L (ref 22–26)
HCT VFR BLD AUTO: 40.9 % (ref 35–47)
HGB BLD-MCNC: 14 G/DL (ref 11.5–16)
IMM GRANULOCYTES # BLD AUTO: 0.1 K/UL (ref 0–0.04)
IMM GRANULOCYTES NFR BLD AUTO: 1 % (ref 0–0.5)
INR PPP: 1 (ref 0.9–1.1)
LACTATE SERPL-SCNC: 1.8 MMOL/L (ref 0.4–2)
LYMPHOCYTES # BLD: 1.1 K/UL (ref 0.8–3.5)
LYMPHOCYTES NFR BLD: 12 % (ref 12–49)
MAGNESIUM SERPL-MCNC: 1.6 MG/DL (ref 1.6–2.4)
MCH RBC QN AUTO: 28.7 PG (ref 26–34)
MCHC RBC AUTO-ENTMCNC: 34.2 G/DL (ref 30–36.5)
MCV RBC AUTO: 83.8 FL (ref 80–99)
MONOCYTES # BLD: 0.6 K/UL (ref 0–1)
MONOCYTES NFR BLD: 7 % (ref 5–13)
NEUTS SEG # BLD: 7.4 K/UL (ref 1.8–8)
NEUTS SEG NFR BLD: 80 % (ref 32–75)
NRBC # BLD: 0 K/UL (ref 0–0.01)
NRBC BLD-RTO: 0 PER 100 WBC
PCO2 BLDA: 31 MMHG (ref 35–45)
PH BLDA: 7.41 [PH] (ref 7.35–7.45)
PLATELET # BLD AUTO: 239 K/UL (ref 150–400)
PMV BLD AUTO: 10.7 FL (ref 8.9–12.9)
PO2 BLDA: 64 MMHG (ref 80–100)
POTASSIUM SERPL-SCNC: 2.7 MMOL/L (ref 3.5–5.1)
PROCALCITONIN SERPL-MCNC: 0.13 NG/ML
PROT SERPL-MCNC: 7.1 G/DL (ref 6.4–8.2)
PROTHROMBIN TIME: 10.6 SEC (ref 9–11.1)
RBC # BLD AUTO: 4.88 M/UL (ref 3.8–5.2)
SAMPLES BEING HELD,HOLD: NORMAL
SAO2 % BLD: 93 % (ref 92–97)
SAO2% DEVICE SAO2% SENSOR NAME: ABNORMAL
SODIUM SERPL-SCNC: 134 MMOL/L (ref 136–145)
SPECIMEN SITE: ABNORMAL
TROPONIN I SERPL-MCNC: <0.05 NG/ML
WBC # BLD AUTO: 9.1 K/UL (ref 3.6–11)

## 2020-05-10 PROCEDURE — 36600 WITHDRAWAL OF ARTERIAL BLOOD: CPT

## 2020-05-10 PROCEDURE — 84484 ASSAY OF TROPONIN QUANT: CPT

## 2020-05-10 PROCEDURE — 84145 PROCALCITONIN (PCT): CPT

## 2020-05-10 PROCEDURE — 93005 ELECTROCARDIOGRAM TRACING: CPT

## 2020-05-10 PROCEDURE — 83735 ASSAY OF MAGNESIUM: CPT

## 2020-05-10 PROCEDURE — 71045 X-RAY EXAM CHEST 1 VIEW: CPT

## 2020-05-10 PROCEDURE — 85610 PROTHROMBIN TIME: CPT

## 2020-05-10 PROCEDURE — 83605 ASSAY OF LACTIC ACID: CPT

## 2020-05-10 PROCEDURE — 74011250637 HC RX REV CODE- 250/637: Performed by: EMERGENCY MEDICINE

## 2020-05-10 PROCEDURE — 36415 COLL VENOUS BLD VENIPUNCTURE: CPT

## 2020-05-10 PROCEDURE — 82803 BLOOD GASES ANY COMBINATION: CPT

## 2020-05-10 PROCEDURE — 85025 COMPLETE CBC W/AUTO DIFF WBC: CPT

## 2020-05-10 PROCEDURE — 65660000000 HC RM CCU STEPDOWN

## 2020-05-10 PROCEDURE — 99285 EMERGENCY DEPT VISIT HI MDM: CPT

## 2020-05-10 PROCEDURE — 74011250636 HC RX REV CODE- 250/636: Performed by: EMERGENCY MEDICINE

## 2020-05-10 PROCEDURE — 87040 BLOOD CULTURE FOR BACTERIA: CPT

## 2020-05-10 PROCEDURE — 80053 COMPREHEN METABOLIC PANEL: CPT

## 2020-05-10 RX ORDER — ZINC SULFATE 50(220)MG
1 CAPSULE ORAL DAILY
Status: DISCONTINUED | OUTPATIENT
Start: 2020-05-10 | End: 2020-05-11

## 2020-05-10 RX ORDER — POTASSIUM CHLORIDE 7.45 MG/ML
10 INJECTION INTRAVENOUS
Status: DISCONTINUED | OUTPATIENT
Start: 2020-05-10 | End: 2020-05-11

## 2020-05-10 RX ORDER — ASCORBIC ACID 500 MG
500 TABLET ORAL 2 TIMES DAILY
Status: DISCONTINUED | OUTPATIENT
Start: 2020-05-10 | End: 2020-05-11

## 2020-05-10 RX ORDER — ACETAMINOPHEN 325 MG/1
650 TABLET ORAL
Status: DISCONTINUED | OUTPATIENT
Start: 2020-05-10 | End: 2020-05-11

## 2020-05-10 RX ORDER — POTASSIUM CHLORIDE 750 MG/1
40 TABLET, FILM COATED, EXTENDED RELEASE ORAL
Status: COMPLETED | OUTPATIENT
Start: 2020-05-10 | End: 2020-05-10

## 2020-05-10 RX ORDER — SODIUM CHLORIDE 0.9 % (FLUSH) 0.9 %
5-10 SYRINGE (ML) INJECTION AS NEEDED
Status: DISCONTINUED | OUTPATIENT
Start: 2020-05-10 | End: 2020-05-30 | Stop reason: HOSPADM

## 2020-05-10 RX ADMIN — SODIUM CHLORIDE 1000 ML: 900 INJECTION, SOLUTION INTRAVENOUS at 23:21

## 2020-05-10 RX ADMIN — SODIUM CHLORIDE 1000 ML: 900 INJECTION, SOLUTION INTRAVENOUS at 23:16

## 2020-05-10 RX ADMIN — POTASSIUM CHLORIDE 40 MEQ: 750 TABLET, FILM COATED, EXTENDED RELEASE ORAL at 23:16

## 2020-05-10 RX ADMIN — SODIUM CHLORIDE 586 ML: 900 INJECTION, SOLUTION INTRAVENOUS at 22:48

## 2020-05-10 RX ADMIN — POTASSIUM CHLORIDE 10 MEQ: 10 INJECTION, SOLUTION INTRAVENOUS at 23:16

## 2020-05-11 LAB
ALBUMIN SERPL-MCNC: 2.4 G/DL (ref 3.5–5)
ALBUMIN/GLOB SERPL: 0.6 {RATIO} (ref 1.1–2.2)
ALP SERPL-CCNC: 79 U/L (ref 45–117)
ALT SERPL-CCNC: 35 U/L (ref 12–78)
ANION GAP SERPL CALC-SCNC: 9 MMOL/L (ref 5–15)
AST SERPL-CCNC: 37 U/L (ref 15–37)
ATRIAL RATE: 72 BPM
B PERT DNA SPEC QL NAA+PROBE: NOT DETECTED
BASOPHILS # BLD: 0 K/UL (ref 0–0.1)
BASOPHILS NFR BLD: 0 % (ref 0–1)
BILIRUB SERPL-MCNC: 0.4 MG/DL (ref 0.2–1)
BORDETELLA PARAPERTUSSIS PCR, BORPAR: NOT DETECTED
BUN SERPL-MCNC: 10 MG/DL (ref 6–20)
BUN/CREAT SERPL: 12 (ref 12–20)
C PNEUM DNA SPEC QL NAA+PROBE: NOT DETECTED
CALCIUM SERPL-MCNC: 7.4 MG/DL (ref 8.5–10.1)
CALCULATED P AXIS, ECG09: 85 DEGREES
CALCULATED R AXIS, ECG10: -56 DEGREES
CALCULATED T AXIS, ECG11: -72 DEGREES
CHLORIDE SERPL-SCNC: 111 MMOL/L (ref 97–108)
CO2 SERPL-SCNC: 20 MMOL/L (ref 21–32)
CREAT SERPL-MCNC: 0.81 MG/DL (ref 0.55–1.02)
CRP SERPL HS-MCNC: >9.5 MG/L
D DIMER PPP FEU-MCNC: 0.68 MG/L FEU (ref 0–0.65)
DIAGNOSIS, 93000: NORMAL
DIFFERENTIAL METHOD BLD: ABNORMAL
EOSINOPHIL # BLD: 0 K/UL (ref 0–0.4)
EOSINOPHIL NFR BLD: 0 % (ref 0–7)
ERYTHROCYTE [DISTWIDTH] IN BLOOD BY AUTOMATED COUNT: 12.4 % (ref 11.5–14.5)
EST. AVERAGE GLUCOSE BLD GHB EST-MCNC: 329 MG/DL
FERRITIN SERPL-MCNC: 328 NG/ML (ref 8–252)
FLUAV H1 2009 PAND RNA SPEC QL NAA+PROBE: NOT DETECTED
FLUAV H1 RNA SPEC QL NAA+PROBE: NOT DETECTED
FLUAV H3 RNA SPEC QL NAA+PROBE: NOT DETECTED
FLUAV SUBTYP SPEC NAA+PROBE: NOT DETECTED
FLUBV RNA SPEC QL NAA+PROBE: NOT DETECTED
GLOBULIN SER CALC-MCNC: 3.8 G/DL (ref 2–4)
GLUCOSE BLD STRIP.AUTO-MCNC: 174 MG/DL (ref 65–100)
GLUCOSE BLD STRIP.AUTO-MCNC: 183 MG/DL (ref 65–100)
GLUCOSE BLD STRIP.AUTO-MCNC: 196 MG/DL (ref 65–100)
GLUCOSE BLD STRIP.AUTO-MCNC: 216 MG/DL (ref 65–100)
GLUCOSE BLD STRIP.AUTO-MCNC: 269 MG/DL (ref 65–100)
GLUCOSE SERPL-MCNC: 192 MG/DL (ref 65–100)
HADV DNA SPEC QL NAA+PROBE: NOT DETECTED
HBA1C MFR BLD: 13.1 % (ref 4–5.6)
HCOV 229E RNA SPEC QL NAA+PROBE: NOT DETECTED
HCOV HKU1 RNA SPEC QL NAA+PROBE: NOT DETECTED
HCOV NL63 RNA SPEC QL NAA+PROBE: NOT DETECTED
HCOV OC43 RNA SPEC QL NAA+PROBE: NOT DETECTED
HCT VFR BLD AUTO: 38.9 % (ref 35–47)
HGB BLD-MCNC: 13.1 G/DL (ref 11.5–16)
HMPV RNA SPEC QL NAA+PROBE: NOT DETECTED
HPIV1 RNA SPEC QL NAA+PROBE: NOT DETECTED
HPIV2 RNA SPEC QL NAA+PROBE: NOT DETECTED
HPIV3 RNA SPEC QL NAA+PROBE: NOT DETECTED
HPIV4 RNA SPEC QL NAA+PROBE: NOT DETECTED
IMM GRANULOCYTES # BLD AUTO: 0.1 K/UL (ref 0–0.04)
IMM GRANULOCYTES NFR BLD AUTO: 1 % (ref 0–0.5)
INR PPP: 1 (ref 0.9–1.1)
LDH SERPL L TO P-CCNC: 441 U/L (ref 81–246)
LYMPHOCYTES # BLD: 1.5 K/UL (ref 0.8–3.5)
LYMPHOCYTES NFR BLD: 21 % (ref 12–49)
M PNEUMO DNA SPEC QL NAA+PROBE: NOT DETECTED
MCH RBC QN AUTO: 28.5 PG (ref 26–34)
MCHC RBC AUTO-ENTMCNC: 33.7 G/DL (ref 30–36.5)
MCV RBC AUTO: 84.7 FL (ref 80–99)
MONOCYTES # BLD: 0.4 K/UL (ref 0–1)
MONOCYTES NFR BLD: 6 % (ref 5–13)
NEUTS SEG # BLD: 5.2 K/UL (ref 1.8–8)
NEUTS SEG NFR BLD: 72 % (ref 32–75)
NRBC # BLD: 0 K/UL (ref 0–0.01)
NRBC BLD-RTO: 0 PER 100 WBC
P-R INTERVAL, ECG05: 136 MS
PLATELET # BLD AUTO: 236 K/UL (ref 150–400)
PMV BLD AUTO: 10.7 FL (ref 8.9–12.9)
POTASSIUM SERPL-SCNC: 3.1 MMOL/L (ref 3.5–5.1)
PROT SERPL-MCNC: 6.2 G/DL (ref 6.4–8.2)
PROTHROMBIN TIME: 10.8 SEC (ref 9–11.1)
Q-T INTERVAL, ECG07: 452 MS
QRS DURATION, ECG06: 98 MS
QTC CALCULATION (BEZET), ECG08: 494 MS
RBC # BLD AUTO: 4.59 M/UL (ref 3.8–5.2)
RSV RNA SPEC QL NAA+PROBE: NOT DETECTED
RV+EV RNA SPEC QL NAA+PROBE: NOT DETECTED
SERVICE CMNT-IMP: ABNORMAL
SODIUM SERPL-SCNC: 140 MMOL/L (ref 136–145)
VENTRICULAR RATE, ECG03: 72 BPM
WBC # BLD AUTO: 7.2 K/UL (ref 3.6–11)

## 2020-05-11 PROCEDURE — 85379 FIBRIN DEGRADATION QUANT: CPT

## 2020-05-11 PROCEDURE — 65660000000 HC RM CCU STEPDOWN

## 2020-05-11 PROCEDURE — 83036 HEMOGLOBIN GLYCOSYLATED A1C: CPT

## 2020-05-11 PROCEDURE — 74011636637 HC RX REV CODE- 636/637: Performed by: FAMILY MEDICINE

## 2020-05-11 PROCEDURE — 85610 PROTHROMBIN TIME: CPT

## 2020-05-11 PROCEDURE — 94640 AIRWAY INHALATION TREATMENT: CPT

## 2020-05-11 PROCEDURE — 83615 LACTATE (LD) (LDH) ENZYME: CPT

## 2020-05-11 PROCEDURE — 74011250637 HC RX REV CODE- 250/637: Performed by: STUDENT IN AN ORGANIZED HEALTH CARE EDUCATION/TRAINING PROGRAM

## 2020-05-11 PROCEDURE — 85025 COMPLETE CBC W/AUTO DIFF WBC: CPT

## 2020-05-11 PROCEDURE — 74011250636 HC RX REV CODE- 250/636: Performed by: EMERGENCY MEDICINE

## 2020-05-11 PROCEDURE — C9113 INJ PANTOPRAZOLE SODIUM, VIA: HCPCS | Performed by: STUDENT IN AN ORGANIZED HEALTH CARE EDUCATION/TRAINING PROGRAM

## 2020-05-11 PROCEDURE — 36415 COLL VENOUS BLD VENIPUNCTURE: CPT

## 2020-05-11 PROCEDURE — 82728 ASSAY OF FERRITIN: CPT

## 2020-05-11 PROCEDURE — 86141 C-REACTIVE PROTEIN HS: CPT

## 2020-05-11 PROCEDURE — 74011000250 HC RX REV CODE- 250: Performed by: STUDENT IN AN ORGANIZED HEALTH CARE EDUCATION/TRAINING PROGRAM

## 2020-05-11 PROCEDURE — 74011000258 HC RX REV CODE- 258: Performed by: INTERNAL MEDICINE

## 2020-05-11 PROCEDURE — 74011250636 HC RX REV CODE- 250/636: Performed by: STUDENT IN AN ORGANIZED HEALTH CARE EDUCATION/TRAINING PROGRAM

## 2020-05-11 PROCEDURE — 83520 IMMUNOASSAY QUANT NOS NONAB: CPT

## 2020-05-11 PROCEDURE — 77030027138 HC INCENT SPIROMETER -A

## 2020-05-11 PROCEDURE — 77010033678 HC OXYGEN DAILY

## 2020-05-11 PROCEDURE — 80053 COMPREHEN METABOLIC PANEL: CPT

## 2020-05-11 PROCEDURE — 74011250636 HC RX REV CODE- 250/636: Performed by: INTERNAL MEDICINE

## 2020-05-11 PROCEDURE — 74011250637 HC RX REV CODE- 250/637: Performed by: INTERNAL MEDICINE

## 2020-05-11 PROCEDURE — 82962 GLUCOSE BLOOD TEST: CPT

## 2020-05-11 PROCEDURE — 0100U RESPIRATORY PANEL,PCR,NASOPHARYNGEAL: CPT

## 2020-05-11 RX ORDER — ATORVASTATIN CALCIUM 20 MG/1
10 TABLET, FILM COATED ORAL DAILY
Status: DISCONTINUED | OUTPATIENT
Start: 2020-05-11 | End: 2020-05-14

## 2020-05-11 RX ORDER — ASPIRIN 81 MG/1
81 TABLET ORAL DAILY
Status: DISCONTINUED | OUTPATIENT
Start: 2020-05-11 | End: 2020-05-30 | Stop reason: HOSPADM

## 2020-05-11 RX ORDER — ATENOLOL 50 MG/1
25 TABLET ORAL
Status: DISCONTINUED | OUTPATIENT
Start: 2020-05-11 | End: 2020-05-20

## 2020-05-11 RX ORDER — SODIUM CHLORIDE 9 MG/ML
75 INJECTION, SOLUTION INTRAVENOUS CONTINUOUS
Status: DISCONTINUED | OUTPATIENT
Start: 2020-05-11 | End: 2020-05-13

## 2020-05-11 RX ORDER — ALBUTEROL SULFATE 90 UG/1
2 AEROSOL, METERED RESPIRATORY (INHALATION)
Status: DISCONTINUED | OUTPATIENT
Start: 2020-05-11 | End: 2020-05-30 | Stop reason: HOSPADM

## 2020-05-11 RX ORDER — ASCORBIC ACID 500 MG
500 TABLET ORAL 2 TIMES DAILY
Status: DISCONTINUED | OUTPATIENT
Start: 2020-05-11 | End: 2020-05-30 | Stop reason: HOSPADM

## 2020-05-11 RX ORDER — ALBUTEROL SULFATE 0.83 MG/ML
2.5 SOLUTION RESPIRATORY (INHALATION)
Status: DISCONTINUED | OUTPATIENT
Start: 2020-05-11 | End: 2020-05-11

## 2020-05-11 RX ORDER — FLUTICASONE PROPIONATE 50 MCG
2 SPRAY, SUSPENSION (ML) NASAL
Status: DISCONTINUED | OUTPATIENT
Start: 2020-05-11 | End: 2020-05-30 | Stop reason: HOSPADM

## 2020-05-11 RX ORDER — SODIUM CHLORIDE 0.9 % (FLUSH) 0.9 %
5-40 SYRINGE (ML) INJECTION AS NEEDED
Status: DISCONTINUED | OUTPATIENT
Start: 2020-05-11 | End: 2020-05-30 | Stop reason: HOSPADM

## 2020-05-11 RX ORDER — DEXTROSE MONOHYDRATE 100 MG/ML
0-250 INJECTION, SOLUTION INTRAVENOUS AS NEEDED
Status: DISCONTINUED | OUTPATIENT
Start: 2020-05-11 | End: 2020-05-19

## 2020-05-11 RX ORDER — ENOXAPARIN SODIUM 100 MG/ML
40 INJECTION SUBCUTANEOUS EVERY 24 HOURS
Status: DISCONTINUED | OUTPATIENT
Start: 2020-05-11 | End: 2020-05-30 | Stop reason: HOSPADM

## 2020-05-11 RX ORDER — ZINC SULFATE 50(220)MG
1 CAPSULE ORAL DAILY
Status: DISCONTINUED | OUTPATIENT
Start: 2020-05-11 | End: 2020-05-15

## 2020-05-11 RX ORDER — GUAIFENESIN 600 MG/1
600 TABLET, EXTENDED RELEASE ORAL 2 TIMES DAILY
COMMUNITY

## 2020-05-11 RX ORDER — POTASSIUM CHLORIDE 750 MG/1
30 TABLET, FILM COATED, EXTENDED RELEASE ORAL
Status: COMPLETED | OUTPATIENT
Start: 2020-05-11 | End: 2020-05-11

## 2020-05-11 RX ORDER — INSULIN LISPRO 100 [IU]/ML
INJECTION, SOLUTION INTRAVENOUS; SUBCUTANEOUS
Status: DISCONTINUED | OUTPATIENT
Start: 2020-05-11 | End: 2020-05-19

## 2020-05-11 RX ORDER — IPRATROPIUM BROMIDE AND ALBUTEROL SULFATE 2.5; .5 MG/3ML; MG/3ML
3 SOLUTION RESPIRATORY (INHALATION)
Status: DISCONTINUED | OUTPATIENT
Start: 2020-05-11 | End: 2020-05-11

## 2020-05-11 RX ORDER — ACETAMINOPHEN 325 MG/1
650 TABLET ORAL
Status: DISCONTINUED | OUTPATIENT
Start: 2020-05-11 | End: 2020-05-12

## 2020-05-11 RX ORDER — MAGNESIUM SULFATE 100 %
4 CRYSTALS MISCELLANEOUS AS NEEDED
Status: DISCONTINUED | OUTPATIENT
Start: 2020-05-11 | End: 2020-05-11 | Stop reason: SDUPTHER

## 2020-05-11 RX ORDER — THERA TABS 400 MCG
1 TAB ORAL DAILY
Status: DISCONTINUED | OUTPATIENT
Start: 2020-05-11 | End: 2020-05-30 | Stop reason: HOSPADM

## 2020-05-11 RX ORDER — FERROUS SULFATE, DRIED 160(50) MG
1 TABLET, EXTENDED RELEASE ORAL DAILY
COMMUNITY

## 2020-05-11 RX ORDER — SERTRALINE HYDROCHLORIDE 50 MG/1
50 TABLET, FILM COATED ORAL DAILY
Status: DISCONTINUED | OUTPATIENT
Start: 2020-05-11 | End: 2020-05-30 | Stop reason: HOSPADM

## 2020-05-11 RX ORDER — MAGNESIUM SULFATE 100 %
4 CRYSTALS MISCELLANEOUS AS NEEDED
Status: DISCONTINUED | OUTPATIENT
Start: 2020-05-11 | End: 2020-05-19

## 2020-05-11 RX ORDER — DEXTROSE MONOHYDRATE 100 MG/ML
0-250 INJECTION, SOLUTION INTRAVENOUS AS NEEDED
Status: DISCONTINUED | OUTPATIENT
Start: 2020-05-11 | End: 2020-05-11 | Stop reason: SDUPTHER

## 2020-05-11 RX ORDER — TRAZODONE HYDROCHLORIDE 100 MG/1
100 TABLET ORAL
Status: DISCONTINUED | OUTPATIENT
Start: 2020-05-11 | End: 2020-05-30 | Stop reason: HOSPADM

## 2020-05-11 RX ORDER — OMEPRAZOLE 20 MG/1
20 CAPSULE, DELAYED RELEASE ORAL DAILY
COMMUNITY

## 2020-05-11 RX ORDER — CETIRIZINE HCL 10 MG
10 TABLET ORAL DAILY
Status: DISCONTINUED | OUTPATIENT
Start: 2020-05-11 | End: 2020-05-30 | Stop reason: HOSPADM

## 2020-05-11 RX ORDER — POTASSIUM CHLORIDE 750 MG/1
40 TABLET, FILM COATED, EXTENDED RELEASE ORAL
Status: COMPLETED | OUTPATIENT
Start: 2020-05-11 | End: 2020-05-11

## 2020-05-11 RX ORDER — FERROUS SULFATE, DRIED 160(50) MG
1 TABLET, EXTENDED RELEASE ORAL DAILY
Status: DISCONTINUED | OUTPATIENT
Start: 2020-05-11 | End: 2020-05-30 | Stop reason: HOSPADM

## 2020-05-11 RX ORDER — PANTOPRAZOLE SODIUM 40 MG/1
40 TABLET, DELAYED RELEASE ORAL
Status: DISCONTINUED | OUTPATIENT
Start: 2020-05-12 | End: 2020-05-30 | Stop reason: HOSPADM

## 2020-05-11 RX ORDER — MONTELUKAST SODIUM 10 MG/1
10 TABLET ORAL
Status: DISCONTINUED | OUTPATIENT
Start: 2020-05-11 | End: 2020-05-30 | Stop reason: HOSPADM

## 2020-05-11 RX ORDER — SODIUM CHLORIDE 0.9 % (FLUSH) 0.9 %
5-40 SYRINGE (ML) INJECTION EVERY 8 HOURS
Status: DISCONTINUED | OUTPATIENT
Start: 2020-05-11 | End: 2020-05-30 | Stop reason: HOSPADM

## 2020-05-11 RX ORDER — LEVOTHYROXINE SODIUM 100 UG/1
100 TABLET ORAL
Status: DISCONTINUED | OUTPATIENT
Start: 2020-05-11 | End: 2020-05-30 | Stop reason: HOSPADM

## 2020-05-11 RX ORDER — FLUTICASONE FUROATE AND VILANTEROL 200; 25 UG/1; UG/1
1 POWDER RESPIRATORY (INHALATION) DAILY
Status: DISCONTINUED | OUTPATIENT
Start: 2020-05-11 | End: 2020-05-30 | Stop reason: HOSPADM

## 2020-05-11 RX ORDER — INSULIN LISPRO 100 [IU]/ML
INJECTION, SOLUTION INTRAVENOUS; SUBCUTANEOUS EVERY 6 HOURS
Status: DISCONTINUED | OUTPATIENT
Start: 2020-05-11 | End: 2020-05-11

## 2020-05-11 RX ORDER — INSULIN LISPRO 100 [IU]/ML
INJECTION, SOLUTION INTRAVENOUS; SUBCUTANEOUS
Status: DISCONTINUED | OUTPATIENT
Start: 2020-05-11 | End: 2020-05-11 | Stop reason: SDUPTHER

## 2020-05-11 RX ORDER — INSULIN LISPRO 100 [IU]/ML
4 INJECTION, SOLUTION INTRAVENOUS; SUBCUTANEOUS
Status: DISCONTINUED | OUTPATIENT
Start: 2020-05-11 | End: 2020-05-13

## 2020-05-11 RX ORDER — INSULIN GLARGINE 100 [IU]/ML
0.2 INJECTION, SOLUTION SUBCUTANEOUS DAILY
Status: DISCONTINUED | OUTPATIENT
Start: 2020-05-11 | End: 2020-05-11

## 2020-05-11 RX ORDER — LOSARTAN POTASSIUM 50 MG/1
50 TABLET ORAL DAILY
Status: DISCONTINUED | OUTPATIENT
Start: 2020-05-11 | End: 2020-05-30 | Stop reason: HOSPADM

## 2020-05-11 RX ORDER — ONDANSETRON 2 MG/ML
4 INJECTION INTRAMUSCULAR; INTRAVENOUS
Status: DISCONTINUED | OUTPATIENT
Start: 2020-05-11 | End: 2020-05-30 | Stop reason: HOSPADM

## 2020-05-11 RX ADMIN — INSULIN LISPRO 2 UNITS: 100 INJECTION, SOLUTION INTRAVENOUS; SUBCUTANEOUS at 07:47

## 2020-05-11 RX ADMIN — IPRATROPIUM BROMIDE AND ALBUTEROL 1 PUFF: 20; 100 SPRAY, METERED RESPIRATORY (INHALATION) at 20:27

## 2020-05-11 RX ADMIN — INSULIN LISPRO 4 UNITS: 100 INJECTION, SOLUTION INTRAVENOUS; SUBCUTANEOUS at 07:47

## 2020-05-11 RX ADMIN — ATENOLOL 25 MG: 50 TABLET ORAL at 21:11

## 2020-05-11 RX ADMIN — LOSARTAN POTASSIUM 50 MG: 50 TABLET, FILM COATED ORAL at 08:10

## 2020-05-11 RX ADMIN — POTASSIUM CHLORIDE 30 MEQ: 750 TABLET, FILM COATED, EXTENDED RELEASE ORAL at 02:06

## 2020-05-11 RX ADMIN — THERA TABS 1 TABLET: TAB at 08:10

## 2020-05-11 RX ADMIN — INSULIN LISPRO 4 UNITS: 100 INJECTION, SOLUTION INTRAVENOUS; SUBCUTANEOUS at 11:26

## 2020-05-11 RX ADMIN — ZINC SULFATE 220 MG (50 MG) CAPSULE 1 CAPSULE: CAPSULE at 21:11

## 2020-05-11 RX ADMIN — OXYCODONE HYDROCHLORIDE AND ACETAMINOPHEN 500 MG: 500 TABLET ORAL at 21:11

## 2020-05-11 RX ADMIN — SODIUM CHLORIDE 40 MG: 9 INJECTION INTRAMUSCULAR; INTRAVENOUS; SUBCUTANEOUS at 08:11

## 2020-05-11 RX ADMIN — ATORVASTATIN CALCIUM 10 MG: 20 TABLET, FILM COATED ORAL at 08:10

## 2020-05-11 RX ADMIN — TRAZODONE HYDROCHLORIDE 100 MG: 100 TABLET ORAL at 02:05

## 2020-05-11 RX ADMIN — OYSTER SHELL CALCIUM WITH VITAMIN D 1 TABLET: 500; 200 TABLET, FILM COATED ORAL at 08:10

## 2020-05-11 RX ADMIN — ASPIRIN 81 MG: 81 TABLET, COATED ORAL at 08:10

## 2020-05-11 RX ADMIN — ACETAMINOPHEN 650 MG: 325 TABLET ORAL at 16:28

## 2020-05-11 RX ADMIN — Medication 10 ML: at 06:00

## 2020-05-11 RX ADMIN — ENOXAPARIN SODIUM 40 MG: 40 INJECTION SUBCUTANEOUS at 01:12

## 2020-05-11 RX ADMIN — IPRATROPIUM BROMIDE AND ALBUTEROL 1 PUFF: 20; 100 SPRAY, METERED RESPIRATORY (INHALATION) at 15:16

## 2020-05-11 RX ADMIN — MONTELUKAST SODIUM 10 MG: 10 TABLET, FILM COATED ORAL at 21:11

## 2020-05-11 RX ADMIN — OXYCODONE HYDROCHLORIDE AND ACETAMINOPHEN 500 MG: 500 TABLET ORAL at 02:07

## 2020-05-11 RX ADMIN — AZITHROMYCIN 500 MG: 500 INJECTION, POWDER, LYOPHILIZED, FOR SOLUTION INTRAVENOUS at 01:10

## 2020-05-11 RX ADMIN — INSULIN LISPRO 3 UNITS: 100 INJECTION, SOLUTION INTRAVENOUS; SUBCUTANEOUS at 11:27

## 2020-05-11 RX ADMIN — OXYCODONE HYDROCHLORIDE AND ACETAMINOPHEN 500 MG: 500 TABLET ORAL at 08:10

## 2020-05-11 RX ADMIN — TRAZODONE HYDROCHLORIDE 100 MG: 100 TABLET ORAL at 21:11

## 2020-05-11 RX ADMIN — INSULIN LISPRO 4 UNITS: 100 INJECTION, SOLUTION INTRAVENOUS; SUBCUTANEOUS at 16:28

## 2020-05-11 RX ADMIN — CEFTRIAXONE SODIUM 1 G: 1 INJECTION, POWDER, FOR SOLUTION INTRAMUSCULAR; INTRAVENOUS at 00:53

## 2020-05-11 RX ADMIN — IPRATROPIUM BROMIDE AND ALBUTEROL 1 PUFF: 20; 100 SPRAY, METERED RESPIRATORY (INHALATION) at 01:30

## 2020-05-11 RX ADMIN — ACETAMINOPHEN 650 MG: 325 TABLET ORAL at 21:12

## 2020-05-11 RX ADMIN — SERTRALINE HYDROCHLORIDE 50 MG: 50 TABLET ORAL at 08:10

## 2020-05-11 RX ADMIN — ATENOLOL 25 MG: 50 TABLET ORAL at 02:05

## 2020-05-11 RX ADMIN — ZINC SULFATE 220 MG (50 MG) CAPSULE 1 CAPSULE: CAPSULE at 02:07

## 2020-05-11 RX ADMIN — Medication 10 ML: at 14:00

## 2020-05-11 RX ADMIN — SODIUM CHLORIDE 75 ML/HR: 900 INJECTION, SOLUTION INTRAVENOUS at 00:54

## 2020-05-11 RX ADMIN — Medication 10 ML: at 01:00

## 2020-05-11 RX ADMIN — MONTELUKAST SODIUM 10 MG: 10 TABLET, FILM COATED ORAL at 02:04

## 2020-05-11 RX ADMIN — LEVOTHYROXINE SODIUM 100 MCG: 0.1 TABLET ORAL at 08:10

## 2020-05-11 RX ADMIN — IPRATROPIUM BROMIDE AND ALBUTEROL 1 PUFF: 20; 100 SPRAY, METERED RESPIRATORY (INHALATION) at 07:47

## 2020-05-11 RX ADMIN — INSULIN LISPRO 2 UNITS: 100 INJECTION, SOLUTION INTRAVENOUS; SUBCUTANEOUS at 16:29

## 2020-05-11 RX ADMIN — INSULIN LISPRO 3 UNITS: 100 INJECTION, SOLUTION INTRAVENOUS; SUBCUTANEOUS at 02:05

## 2020-05-11 RX ADMIN — CETIRIZINE HYDROCHLORIDE 10 MG: 10 TABLET, FILM COATED ORAL at 08:10

## 2020-05-11 RX ADMIN — Medication 10 ML: at 21:11

## 2020-05-11 RX ADMIN — ENOXAPARIN SODIUM 40 MG: 40 INJECTION SUBCUTANEOUS at 21:00

## 2020-05-11 RX ADMIN — POTASSIUM CHLORIDE 40 MEQ: 750 TABLET, FILM COATED, EXTENDED RELEASE ORAL at 08:10

## 2020-05-11 NOTE — ED NOTES
TRANSFER - OUT REPORT:    Verbal report given to Dinora Francis RN(name) on Darren Low  being transferred to [facility]unit) for routine progression of care       Report consisted of patients Situation, Background, Assessment and   Recommendations(SBAR). Information from the following report(s) SBAR, Kardex, ED Summary, MAR, Recent Results and Cardiac Rhythm NSR was reviewed with the receiving nurse. Lines:   Peripheral IV 05/10/20 Left Antecubital (Active)   Site Assessment Clean, dry, & intact 5/10/2020 10:36 PM   Phlebitis Assessment 0 5/10/2020 10:36 PM   Infiltration Assessment 0 5/10/2020 10:36 PM   Dressing Status Clean, dry, & intact 5/10/2020 10:36 PM   Hub Color/Line Status Pink 5/10/2020 10:36 PM   Action Taken Dressing reinforced 5/10/2020 10:36 PM        Opportunity for questions and clarification was provided.       Patient transported with:   Monitor  O2 @ 5 liters  Registered Nurse

## 2020-05-11 NOTE — PROGRESS NOTES
Problem: Falls - Risk of  Goal: *Absence of Falls  Description: Document Clydene Bone Fall Risk and appropriate interventions in the flowsheet.   Outcome: Progressing Towards Goal  Note: Fall Risk Interventions:  Mobility Interventions: Assess mobility with egress test, Communicate number of staff needed for ambulation/transfer, Patient to call before getting OOB, Utilize walker, cane, or other assistive device, Utilize gait belt for transfers/ambulation         Medication Interventions: Assess postural VS orthostatic hypotension, Evaluate medications/consider consulting pharmacy, Patient to call before getting OOB, Teach patient to arise slowly, Utilize gait belt for transfers/ambulation

## 2020-05-11 NOTE — PROGRESS NOTES
[facility]   Senior Resident Admission Note    CC: worsening SOB     HPI:  Johanne Boles is a 69 y.o. female who presents to the ER complaining of worsening shortness of breath. Pt tested positive for COVID-19 on 5/4/20 and has been self-isolating at home with  who is also positive. Pt reports fatigue, myalgias, and HA started on Friday 5/1. Diarrhea started on Monday. Fever has been intermittent. Pt is now s/p 5 day course of azithromycin and plaquenil. Pt reports one of her children brought her pulse ox and had readings in the 60s today and with worsening shortness of breath decided to come to ED.        5/11/2020    Vital Signs  Blood pressure 128/54, pulse 72, temperature 100.2 °F (37.9 °C), resp. rate 20, height 5' 4\" (1.626 m), weight 190 lb (86.2 kg), SpO2 93 %. Physical Exam  Constitutional:       General: She is not in acute distress. Appearance: Normal appearance. She is not toxic-appearing. Cardiovascular:      Rate and Rhythm: Normal rate and regular rhythm. Pulmonary:      Effort: Tachypnea present. No accessory muscle usage, respiratory distress or retractions. Breath sounds: Examination of the right-upper field reveals rales. Examination of the right-middle field reveals rales. Rales present. Abdominal:      General: Abdomen is flat. Palpations: Abdomen is soft. Tenderness: There is no abdominal tenderness. Skin:     General: Skin is warm and dry. Capillary Refill: Capillary refill takes less than 2 seconds. Neurological:      General: No focal deficit present. Mental Status: She is alert and oriented to person, place, and time.        Recent Labs     05/10/20  2209   WBC 9.1   HGB 14.0   HCT 40.9      *   K 2.7*      CO2 21   *   BUN 13   CREA 0.93   CA 8.7   MG 1.6   ALB 2.8*   TBILI 0.6   SGOT 43*   ALT 40   INR 1.0     Imaging  Xr Chest Port    Result Date: 5/10/2020  IMPRESSION: Opacification in the lower lobes bilaterally, left greater than right. A/P: Pt is a 69 y.o. female admitted + for COVID-19 with worsening sxs     AHRF: 2/2 to COVID-19, s/p azithromycin and plaquenil outpt. Procal 0.13 will hold off for now adding abx, if sxs worsen would consider.   - supplemental O2 as needed   - f/u COVID labs   - D-dimer pending, will start prophylactic Lovenox for now   - zinc and Vit C therapy  - pulm consult, possible discussion about IL-6 inhibitor therapy? Hypokalemia: likely 2/2 diarrhea   - replete prn     I agree with remaining assessment and plan as documented in Dr. Jennifer Rebolledo note.       Pt discussed with Dr. Vanessa Curran (on-call attending physician)    Geovanna Benitez MD  Family Medicine Resident

## 2020-05-11 NOTE — PROGRESS NOTES
Nutrition Assessment:    RECOMMENDATIONS/INTERVENTION(S):   1. Continue CHO consistent diet. 2. Recommend Glucerna x1/d (220 kcal, 10 gm protein) and Gelatein x1/d (90 kcal, 20 gm protein) to promote adequate PO intake. 3. Monitor BG, PO intakes, GI function, labs, and weight trends. ASSESSMENT:   5/11: 68 y/o F admitted with respiratory failure with hypoxia. MST screen >2: recently lost weight without trying, unsure weight loss. PMH - DM, primary hypertension, obesity, depression. COVID-19 +. Charting remotely, spoke with pt over phone. Pt reports poor appetite 2/2 not feeling well and was able to consume <50% breakfast per report. Pt has been experiencing poor appetite/ intake x1 week. Hgb A1c 13.1 (5/11). BG/, 269, 183, 192; on insulin regimen. Pt typically with good appetite, consuming x3 meals/d without frequent snacking.  lb. BMI 32.87 c/w obesity. Pt reports  lb, experiencing recent 10 lb weight loss. Pt denies n/v. C/o diarrhea. LBM 5/11 - loose. Skin without PI. Pt agreeable to trial ONS to promote adequate PO intakes. Labs - K+ 3.1 L, Ca 7.4 L, Hgb A1c 13.1 (5/11). Meds -  NS 75 ml/hr, ascorbic acid, calcium-vitamin D, insulin lispro, levothyroxine, pantoprazole, multivitamin, zinc sulfate, KCl. Diet Order: Consistent carb  % Eaten:  No data found. Pertinent Medications: [x] Reviewed    Labs: [x] Reviewed    Anthropometrics: Height: 5' 4\" (162.6 cm) Weight: 86.7 kg (191 lb 2.2 oz)    IBW (%IBW):   ( ) UBW (%UBW):   (  %)      BMI: Body mass index is 32.81 kg/m². This BMI is indicative of:   [] Underweight    [] Normal    [] Overweight    [x]  Obesity    []  Extreme Obesity (BMI>40)  Estimated Nutrition Needs (Based on): 9050 Kcals/day(REE 1362 x 1.3) , 85 g(69 - 87 gm (0.8 - 1.0 gm/kg)) Protein  Carbohydrate:  At Least 130 g/day  Fluids: 1771 mL/day (1 ml/kcal)    Last BM: 5/11 - loose   [x]Active     []Hyperactive  []Hypoactive       [] Absent   BS  Skin: No PI [] Intact   [] Incision  [] Breakdown   [] DTI   [] Tears/Excoriation/Abrasion  []Edema [] Other:    Wt Readings from Last 30 Encounters:   05/11/20 86.7 kg (191 lb 2.2 oz)   10/08/19 91.2 kg (201 lb)   02/13/19 89.6 kg (197 lb 8 oz)   12/26/18 90.3 kg (199 lb)   01/20/14 95.3 kg (210 lb)   12/20/13 94.8 kg (209 lb)   11/20/13 95.3 kg (210 lb)   11/13/11 84.8 kg (187 lb)      NUTRITION DIAGNOSES:   Problem:  Inadequate energy intake     Etiology: related to decreased ability to consume sufficient energy     Signs/Symptoms: as evidenced by pt reports poor appetite with <50% meal intake and untintentional 10 lb weight loss      NUTRITION INTERVENTIONS:  Meals/Snacks: General/healthful diet   Supplements: Commercial supplement              GOAL:   PO intakes >50% + ONS while maintaining BG <180 mg/dL within 3-5 days     Cultural, Hindu, or Ethnic Dietary Needs: None     EDUCATION & DISCHARGE NEEDS:    [x] None Identified   [] Identified and Education Provided/Documented   [] Identified and Pt declined/was not appropriate      [x] Interdisciplinary Care Plan Reviewed/Documented    [x] Discharge Needs:    CHO consistent diet    [] No Nutrition Related Discharge Needs    NUTRITION RISK:   Pt Is At Nutrition Risk  [x]     No Nutrition Risk Identified  []       PT SEEN FOR:    []  MD Consult: []Calorie Count      []Diabetic Diet Education        []Diet Education     []Electrolyte Management     []General Nutrition Management and Supplements     []Management of Tube Feeding     []TPN Recommendations    [x]  RN Referral:  [x]MST score >=2     []Enteral/Parenteral Nutrition PTA     []Pregnant: Gestational DM or Multigestation                 [] Pressure Ulcer    []  Low BMI      []  Length of Stay       [] Dysphagia Diet         [] Ventilator  []  Follow-up     Previous Recommendations:   [] Implemented          [] Not Implemented          [x] Not Applicable    Previous Goal:   [] Met              [] Progressing Towards Goal              [] Not Progressing Towards Goal   [x] Not Applicable            Eben Amin, [address] [facility]  Pager [phone]  Phone [phone]

## 2020-05-11 NOTE — PROGRESS NOTES
[facility]   Daily Progress Note    Date: 5/11/2020  PCP: Jacoby Muniz MD     [address] [facility] Res[facility] Attending Addendum:  I saw and evaluated the patient on the day of the encounter with Dr. Guerline Charles, performing the key elements of the service. I discussed the findings, assessment and plan with the resident and agree with the resident's findings and plan as documented in the resident's note. Continue to monitor QTC, <500 this AM.  Will touch base with Pulm with regards to Actemra. Will readdress code status with patient today. Larisa Foster MD, FAAFP, CAQSM, RMSK      Assessment/Plan:     Valery Patten is a 69 y.o. female with history of HTN, type 2DM, GERD, hypothyroidism, depression, asthma, and hyperlipidemia who is admitted for [facility] 2/2 COVID 23.    24 hour event: No acute events. Care transferred from hospitalist service     [facility] 2/2 COVID 19: COVID+ on 5/4, worsening SOB on admission and new O2 requirement (RA baseline), CXR with opacification in lower lobes b/l L>R, ABG pH 7.41, pCO2 31, pO2 64, O2 sat 93, bicarb 19, LA 1.8, S/p outpatient tx of azithro and plaquenil ending 5/10  - RVP, D dimer, ferritin, CRP, IL6, LD  - Blood culture  - CTX and azithro started by hospitalist on admission, low threshold to DC Abx as procal 0.13  - Consult pulm, appreciate rec's  - Start Zinc and Vit C  - Supplemental O2 as needed. Currently on 4L NC    Hypokalemia: likely 2/2 diarrhea (pt reports episodes for >1week). K 2.7 on admission.  Receiving a total of 70meq this morning  - Will replete prn    Type 2 DM: HgA1C 10.4 in 11/2019  - Hold home glipizide  - Repeat HgA1C  - Lantus 17U daily (0.2U/kg) and Lispro 4U TID   - ISS    Asthma  - Continue home Ventolin, patient has brought in her home inhaler  - Continue Singulair 10mg qhs    HTN  - Continue home BP meds of atenolol 25mg qhs, losartan 50mg daily    Hypothyroid: TSH 3.570 in 11/2019  - Continue home synthroid 100mcg daily    Depression  - Continue Zoloft 50mg dialy and trazadone 100mg qhs to help with sleep    GERD  - Will substitute home prilosec for protonix daily    Hyperlipidemia: Lipid panel , , HDL 59, LDL 89 in 11/2019  - Will substitute home Zocor 10mg daily with lipitor 10mg daily      FEN/GI: Diabetic diet  Activity: Ambulate with assistance  DVT prophylaxis: Lovenox  GI prophylaxis: Protonix daily  Disposition: Home    CODE STATUS: Full code     Pt discussed with Dr. Elodia Jernigan, attending physician    Primo Ness, DO   Family Medicine Resident        Subjective  Patient appears comfortable on 4L NC upon entering room. No new complaints at this time. Reports SOB improved with NC. Inpatient Medications  Current Facility-Administered Medications   Medication Dose Route Frequency    sodium chloride (NS) flush 5-10 mL  5-10 mL IntraVENous PRN    sodium chloride 0.9 % bolus infusion 586 mL  586 mL IntraVENous ONCE    potassium chloride 10 mEq in 100 ml IVPB  10 mEq IntraVENous Q1H    acetaminophen (TYLENOL) tablet 650 mg  650 mg Oral Q4H PRN    ascorbic acid (vitamin C) (VITAMIN C) tablet 500 mg  500 mg Oral BID    zinc sulfate (ZINCATE) 220 (50) mg capsule 1 Cap  1 Cap Oral DAILY     Current Outpatient Medications   Medication Sig    azithromycin (ZITHROMAX) 250 mg tablet Take 2 tablets today, then take 1 tablet daily    hydroxychloroquine (PLAQUENIL) 200 mg tablet Take two by mouth twice a day today, then one by mouth twice daily.  traZODone (DESYREL) 100 mg tablet TAKE 1 TABLET BY MOUTH EVERY DAY AT NIGHT    glipiZIDE SR (GLUCOTROL XL) 10 mg CR tablet TAKE 1 TABLET BY MOUTH TWICE A DAY    folic acid/multivit-min/lutein (CENTRUM SILVER PO) Take  by mouth.  varicella-zoster recombinant, PF, (SHINGRIX, PF,) 50 mcg/0.5 mL susr injection 0.5mL by IntraMUSCular route once now and then repeat in 2-6 months    simvastatin (ZOCOR) 10 mg tablet Take 1 Tab by mouth nightly.     losartan (COZAAR) 50 mg tablet TAKE 1 TABLET BY MOUTH EVERY DAY.  levothyroxine (SYNTHROID) 100 mcg tablet Take 1 Tab by mouth Daily (before breakfast).  atenolol (TENORMIN) 25 mg tablet Take 1 Tab by mouth nightly.  glucose blood VI test strips (ASCENSIA AUTODISC VI, ONE TOUCH ULTRA TEST VI) strip E11.6  Test fasting blood glucose daily    sertraline (ZOLOFT) 50 mg tablet TAKE 1 TABLET BY MOUTH EVERY DAY    aspirin delayed-release 81 mg tablet Take  by mouth daily.  mometasone-formoterol (DULERA) 200-5 mcg/actuation HFA inhaler Take 2 Puffs by inhalation two (2) times a day.  fluticasone (FLONASE) 50 mcg/actuation nasal spray 2 Sprays by Both Nostrils route daily.  albuterol (PROAIR HFA) 90 mcg/actuation inhaler Take 2 Puffs by inhalation every four (4) hours as needed for Wheezing.  Cetirizine (ZYRTEC) 10 mg cap Take 1 Tab by mouth daily.  multivitamins-minerals-lutein (CENTRUM SILVER) tab Take 2 Tabs by mouth daily.  montelukast (SINGULAIR) 10 mg tablet Take 10 mg by mouth nightly.  GUAIFENESIN (MUCINEX PO) Take 1 Tab by mouth two (2) times a day.  glucosamine-chondroit-vit c-mn (GLUCOSAMINE CHONDROITIN MAXSTR) 500-400 mg capsule Take 1 Cap by mouth daily.  OMEPRAZOLE MAGNESIUM (PRILOSEC OTC PO) Take 1 Tab by mouth daily.  CALCIUM CARB/VIT D3/MINERALS (CALCIUM-VITAMIN D PO) Take 1 Tab by mouth daily. Allergies  Allergies   Allergen Reactions    Latex Rash    Prednisone Anaphylaxis     Throat swelling. Can take methylpredisone po or IV without problems.     Augmentin [Amoxicillin-Pot Clavulanate] Nausea and Vomiting    Biaxin [Clarithromycin] Nausea and Vomiting    Metformin Diarrhea    Parafon Forte Dsc [Chlorzoxazone] Nausea and Vomiting       Objective  Vitals:  Visit Vitals  /54   Pulse 72   Temp 100.2 °F (37.9 °C)   Resp 20   Ht 5' 4\" (1.626 m)   Wt 190 lb (86.2 kg)   SpO2 93%   BMI 32.61 kg/m²      Temp (24hrs), Av.1 °F (37.8 °C), Min:99.9 °F (37.7 °C), Max:100.2 °F (37.9 °C)     O2 Flow Rate (L/min): 4 l/min   O2 Device: Nasal cannula    I/O:  No intake or output data in the 24 hours ending 05/11/20 0010  Last 3 shifts:    No intake/output data recorded. Physical Exam  Constitutional:       General: She is not in acute distress. Appearance: Normal appearance. HENT:      Head: Normocephalic and atraumatic. Cardiovascular:      Rate and Rhythm: Normal rate and regular rhythm. Heart sounds: No murmur. No gallop. Pulmonary:      Breath sounds: No wheezing. Comments: Tachypneic, slight increased work of breathing, no accessory muscle use/ intercostal retractions  + Basilar crackles R>L  Abdominal:      General: Abdomen is flat. There is no distension. Skin:     General: Skin is warm and dry. Neurological:      General: No focal deficit present. Mental Status: She is alert.    Psychiatric:         Mood and Affect: Mood normal.         Behavior: Behavior normal.     PE per Dr. Jt Ornelas, PGY2       Labs and Data:    Recent Labs     05/10/20  2209   WBC 9.1   HGB 14.0   HCT 40.9        Recent Labs     05/10/20  2209   *   K 2.7*      CO2 21   *   BUN 13   CREA 0.93   CA 8.7   MG 1.6   ALB 2.8*   SGOT 43*   ALT 40   INR 1.0       Imaging/procedures:   CXR- opacification in the lower lobes b/l, L>R      Signed by: Dale Gamez DO  Resident, Family Medicine  05/11/20

## 2020-05-11 NOTE — PROGRESS NOTES
Assisted as Preceptor to Primary RN Samuel Jones    0400: Lab specimen collection, pt with incontinent of episode of diarrhea. Assisted pt to clean up, with continued with 3 more diarrhea occurences. Provided pt with ginger ale per request. No other needs at this time. Primary nurse made aware.

## 2020-05-11 NOTE — PROGRESS NOTES
Goals of Care discussion:    I had an extensive discussion with the pt about her current wishes and goals of care. Mrs. Leisa Jenkins is Tamsen Maha whom she has been  to for 38 years (in October). She was born in [place], [place] and Mariel Arechiga is a [place] native. Although the pt has been at home since March, Mariel Arechiga has been leaving the home to go to the store and unfortunately contracted COVID. As of right now he is doing well at home. The pt remains very hopeful for a full resolution of her symptoms. When discussing the possibility of worsening of her clinical status she wishes that all restorative measures be taken. Although not happy about the idea of a ventilator, she would like that intervention if it is required. We discussed the poor prognosis that comes along with mechanical ventilation and she expressed understanding. In regards to cardiac arrest, she would wish to pursue CPR and full ACLS measures. Throughout the discussion she is aware that she is not near CPR or mechanical ventilation but that she could be quite rapidly. She is open to re-visitation of this topic if her clinical status worsens. I offered reassurance and support that no matter what happens we will help take care of her and at the same time respect her autonomy.      Willem Madsen MD

## 2020-05-11 NOTE — PROGRESS NOTES
[facility]: MED RECONCILIATION    Information obtained from: Patient over the phone (ICU room 14). Patient had list of her meds. Allergies: Latex; Prednisone; Augmentin [amoxicillin-pot clavulanate]; Biaxin [clarithromycin]; Metformin; and Parafon forte dsc [chlorzoxazone]    Comments:   Last time patient took her meds at home was yesterday morning. Patient finished 5 days course of Zithromax and Hydroxychloroquine yesterday. Prior to Admission Medications:     Medication Documentation Review Audit       Reviewed by David Harris PHARMD (Pharmacist) on 05/11/20 at 0752      Medication Sig Documenting Provider Last Dose Status Taking? albuterol (PROAIR HFA) 90 mcg/actuation inhaler Take 2 Puffs by inhalation every four (4) hours as needed for Wheezing. Provider, Historical  Active Yes              aspirin delayed-release 81 mg tablet Take 81 mg by mouth daily. Provider, Historical  Active Yes   atenolol (TENORMIN) 25 mg tablet Take 1 Tab by mouth nightly. Anthony Bender NP  Active Yes   calcium-vitamin D (OYSTER SHELL) 500 mg(1,250mg) -200 unit per tablet Take 1 Tab by mouth daily. Provider, Historical  Active Yes   Cetirizine (ZYRTEC) 10 mg cap Take 1 Tab by mouth every evening. Provider, Historical  Active Yes              fluticasone (FLONASE) 50 mcg/actuation nasal spray 2 Sprays by Both Nostrils route daily as needed for Rhinitis or Allergies. Provider, Historical  Active Yes              glipiZIDE SR (GLUCOTROL XL) 10 mg CR tablet TAKE 1 TABLET BY MOUTH TWICE A DAY Bernadette Bender NP  Active Yes   glucosamine-chondroit-vit c-mn (GLUCOSAMINE CHONDROITIN MAXSTR) 500-400 mg capsule Take 1 Cap by mouth daily. Other, MD [place]  Active Yes              guaiFENesin ER (Mucinex) 600 mg ER tablet Take 600 mg by mouth two (2) times a day. Provider, Historical  Active Yes   levothyroxine (SYNTHROID) 100 mcg tablet Take 1 Tab by mouth Daily (before breakfast).  Carrie Scherer NP  Active Yes losartan (COZAAR) 50 mg tablet TAKE 1 TABLET BY MOUTH EVERY DAY. Erin Bender NP  Active Yes   multivitamins-minerals-lutein (CENTRUM SILVER) tab Take 1 Tab by mouth daily. Provider, Historical  Active Yes   omeprazole (PRILOSEC) 20 mg capsule Take 20 mg by mouth daily. Provider, Historical  Active Yes   sertraline (ZOLOFT) 50 mg tablet TAKE 1 TABLET BY MOUTH EVERY DAY Bernadette Bender NP  Active Yes   simvastatin (ZOCOR) 10 mg tablet Take 1 Tab by mouth nightly.  Erin Bender NP  Active Yes   traZODone (DESYREL) 100 mg tablet TAKE 1 TABLET BY MOUTH EVERY DAY AT NIGHT Erin Bender NP  Active Yes   varicella-zoster recombinant, PF, (SHINGRIX, PF,) 50 mcg/0.5 mL susr injection 0.5mL by IntraMUSCular route once now and then repeat in 2-6 months Erin Bender NP Never had it Active No                    [address] [facility] [place], PHARMD   Contact: [phone]

## 2020-05-11 NOTE — PROGRESS NOTES
Bedside and Verbal shift change report given to [address] [place] [address] (oncoming nurse) by Guido Zhang RN (offgoing nurse). Report included the following information SBAR, Kardex, ED Summary, Procedure Summary, Intake/Output, MAR, Recent Results, Med Rec Status, Cardiac Rhythm NSR and Alarm Parameters . Primary Nurse Fer Sigala and Elder Stanley [facility] RN performed a dual skin assessment on this patient No impairment noted  Anoop score is 19  0700- Received patient resting in bed, easily awoken. AAOx4, able to make needs known. Patient has known history of sleep apnea- noted to drop oxygen saturations 88%-89%, but rebounding well to 92%. Respirations even easy unlabored. No SOB noted at rest, strong non productive cough. Lung sounds clear diminished at bases. Abdomen soft nontender. Episodes of incontinence throughout night shift. No edema noted, +pulses noted. Bed locked in lowest position, call bell in reach. NS @ 75 running per order. 18- Dr. Ata Lagunas verbally aware of pulmonary consult. Blood Glucose 183.  0800- Incontinence of urine and bowel noted- changed/cleaned up in bed, tolerated well, O2 saturation did drop to 85%, but rebounded well.  0900- VSS.  0930- Update provided to  and family practice called into room and spoke to patient. 1000- VSS at this time. No changes noted. Pt tolerating nasal cannula. 1035- Pt encouraged by Dr. Ata Lagunas to try to move and sleep prone or on her side. 1120- Pt incontient of urine and stool- changed, pt having incontinence with coughing. Tolerating PO well, drinking fluids. Pt moving in bed, wants to try proning after lunch. Low grade 99.0, will provide with incentive. 1135- Noted when patient is laying sidelying her oxygen saturations are 95% on 6L pm via nasal cannula, tolerating well. 1200- VSS, no changes noted. 1240- Pt set up with lunch, drinking glucerna supplement drink and tolerated well.  1300- No changes noted. VSS.  Patient had all of supplement, was not in the mood for rest of lunch. 1400- Pt still tolerating nasal cannula; No changes noted at this time. 1420- Pt tolerating nasal cannula. 1500- No changes noted. Patient laying on left side, oxygen saturations noted to be 95% on 5L nasal cannula. 1600- Patient's temperature 101- to provide with tylenol. 1630- Pt cleaned up in bed- incontinent of urine and stool, new gown and pad provided. Encouraging use of incentive spirometer and drinking fluids. 1640- Pt tolerating PO fluids well, sitting up utilizing incentive. 1700- VSS. 1755- Pt set up with dinner, provided with gelatin supplement, soup, new water, diet ginger ale. Tolerating PO better, has more of an appetite than earlier. Temp 99.5  1800- VSS. No changes noted. 65- Patient's  provided with update. Pt remains tolerating dinner well. 1900- Bedside and Verbal shift change report given to Martine Goetz RN (oncoming nurse) by Keyla Moise (offgoing nurse). Report included the following information SBAR, Kardex, ED Summary, Procedure Summary, Intake/Output, MAR, Recent Results, Cardiac Rhythm NSR and Alarm Parameters .

## 2020-05-11 NOTE — H&P
[facility] [place] [facility]  HISTORY AND PHYSICAL    Name:  Betsey Topete  MR#:  [id number]  :  1945  ACCOUNT #:  [de-identified]    DATE OF SERVICE:  05/10/2020    PRIMARY CARE PHYSICIAN:  Dr. Dayo Harrington. CHIEF COMPLAINT:  Shortness of breath. HISTORY OF PRESENT ILLNESS: 74-year-old female with a past medical history significant for type 2 diabetes mellitus, hypothyroidism, obstructive sleep apnea, Asthma, Arthritis, diabetic peripheral neuropathy, primary hypertension, gastroesophageal reflux disease and recently diagnosed COVID-19 infection on 2020 via her primary care physician's office, was brought to the emergency room from home for an approximately 24-hour history of worsening shortness of breath and low oxygen saturation. Patient stated that on being diagnosed with the infection on 2020, was given a prescription for Zithromax and hydroxychloroquine by her primary care physician which she completed on this day of emergency room presentation of 05/10/2020. However, patient stated that on awakening on the morning of emergency room presentation, felt  persistently short of breath with oxygen saturation recorded in the 70s and 80s only. Patient denied associated headaches, dizziness, visual deficits, chest pains, palpitations, sore throat, cough, fevers, chills, abdominal pain, bladder or bowel irregularities. PAST MEDICAL HISTORY:  1. Type 2 diabetes mellitus. 2.  Hypothyroidism. 3.  Primary hypertension. 4.  Diabetic peripheral neuropathy. 5.  Obstructive sleep apnea. 6.  Obesity. 7.  Asthma. 8.  Depression. 9.  Chronic pain. PAST SURGICAL HISTORY:  1. Knee replacement. 2.  Bilateral cataract surgery. 3.  Left heart catheterization. 4.  Colonoscopy. HOME MEDICATIONS:  1. Albuterol inhaler 2 puffs every 4 hours as-needed for wheezing. 2.  Aspirin delayed-release 81 mg p.o. daily. 3.  Atenolol 25 mg 1 tablet p.o. nightly.   4.  Azithromycin 250 mg tablets. Completed course. 5.  Calcium carbonate vitamin D3 one tablet p.o. daily. 6.  Zyrtec 10 mg p.o. daily. 7.  Flonase 2 sprays to both nostrils daily. 8.  Centrum Silver 1 tablet p.o. daily. 9.  Glipizide slow-release 10 mg 1 tablet p.o. twice a day. 10.  Glucosamine chondroitin 1 capsule p.o. daily. 11.  Mucinex 1 tablet p.o. twice a day. 12.  Hydroxychloroquine 200 mg tablets. Course completed. 13.  Levothyroxine 100 mcg p.o. daily. 14.  Losartan 50 mg 1 tablet p.o. every day. 15.  Dulera 2 puffs inhaler twice a day. 16.  Montelukast 10 mg p.o. nightly. 17.  Multivitamins with minerals 2 tablets p.o. daily. 18.  Omeprazole 1 tablet p.o. daily. 19.  Zoloft 50 mg 1 tablet p.o. every day. 20.  Simvastatin 10 mg p.o. nightly. 21.  Trazodone 100 mg p.o. every day at night. ALLERGIES:  1. LASIX. 2.  PREDNISONE. 3.  AUGMENTIN. 4.  Biaxin. 5.  METFORMIN. 6.  CHLORZOXAZONE. SOCIAL HISTORY:  Significant for being . Lives at home with her . Is independent of activities and instrumental activities of daily living. Is a never smoker. Denied alcohol use disorder. Denied any recent travels or sick contacts. FAMILY HISTORY:  Reviewed from the old records and positive for breast cancer in the maternal aunt, dementia in the mother, type 2 diabetes mellitus in a sister, primary hypertension in a sister, mother with osteoporosis. REVIEW OF SYSTEMS:  A complete system review conducted essentially negative, otherwise as outlined in the history of the presenting illness. PHYSICAL EXAMINATION:  GENERAL:  The patient was awake, alert, appeared mildly distressed, however, was able to speak in complete sentences. VITAL SIGNS:  Blood pressure of 140/66, heart rate 73, respiratory rate 18, temperature of 99.9 degrees Fahrenheit, saturating 91-94% on 4 liters nasal cannula oxygen. HEAD:  Normocephalic. Pupils equal and reactive to light without any nystagmus.   ENT:  Ear, nose, throat clear. NECK:  No jugular venous distention or carotid bruits. CARDIOVASCULAR:  S1, S2 present. RESPIRATORY:  Lungs with decreased air entry bilaterally, more pronounced at both bases with the left breath sounds being diminished more that the right breath sounds. No crepitations or rhonchi appreciated. GI:  Bowel sounds present. The abdomen is soft, nontender. No rebound, no guarding. GENITOURINARY:  No suprapubic or flank tenderness. MUSCULOSKELETAL:  No asymmetry of the extremities. About a 2+ bipedal edema. SKIN:  No rashes, petechiae, ecchymoses. CNS:  The patient was awake, alert; oriented to time, date, place, person. LABORATORY DATA/RADIOGRAPHIC FINDINGS:      Chest x-ray with opacification in the lower lobes bilaterally, left greater than right. 12-lead EKG personally reviewed with sinus rhythm at 74 per minute. BMP with sodium 134, potassium 2.7, magnesium 1.6, Chloride 101, Bicarb 21, BUN 13, Cr 0.93, Calcium 8.7, Albumin 2.8, Glucose 289. Trop <0.05. Procalcitonin 0.13     CBC with a WBC of 9.1, hemoglobin 14, hematocrit 40.9, platelet count of 864, MCV of 83.8, 80% polys, 12% lymphocytes. Arterial blood gas with a pH of 7.41, pCO2 of 31, a PaO2 of 64, bicarb of 19, saturation of 93% and that was done on FiO2 of 4 liters nasal cannula. ASSESSMENT/PLAN:  1. Respiratory:  Acute hypoxemic respiratory failure secondary to bilateral COVID-19 positive pneumonia. History of asthma and sleep apnea. FiO2 to maintain saturation at least 94%, metered dose inhaler bronchodilators as needed, vitamin C, zinc, atorvastatin. For followup imaging and pulmonary consult. 2.  Infectious disease:  Recently diagnosed SARS-CoV-2 infection on 05/04/2020. No definite sepsis. Maximum temperature of 99.9 degrees Fahrenheit, left shift, no lymphopenia, completed a course of oral Zithromax and hydroxychloroquine in a week leading up to the emergency room presentation.  Empiric Ceftriaxone and IV Azithromycin. As-needed infectious disease consult. 3.  Endocrine: Uncontrolled  Type 2 diabetes mellitus with complications of Hyperglycemia. A1c check. Accu-Chek monitoring. Basal and sliding scale insulin. Diabetic teaching. Hypothyroidism. Continue Synthroid. 4. Electrolytes: Probable multifactorial Hyponatremia present on admission. Severe Hypokalemia with a normomagnesemia. Repletion with follow up level. 5.  CNS:  Diabetic peripheral neuropathy. 6.  Gastroenterology:  History of gastroesophageal reflux disease. 7.  Musculoskeletal:  Arthritis. 8.  Analgesia:  Chronic back pain. 9.  Psychiatry:  Depression without any current behavioral disturbances. 10.  Prophylaxis for deep venous thrombosis with Lovenox. 11.  Directives:  Full code with an extremely guarded prognosis. At increased risk of decompensation. Discussed with patient. In summary, a 79-year-old female with a past medical history significant for primary hypertension, hypothyroidism, type 2 diabetes mellitus, sleep apnea, asthma, gastroesophageal reflux disease, chronic back pain, arthritis and depression with a recently diagnosed COVID-19 infection, is being admitted to the inpatient level for acute hypoxemic respiratory failure secondary to COVID-19 pneumonia necessitating ongoing close monitoring and management  with respiratory therapies, anti-infective therapies, low threshold for intubation and pulmonary consult. The entire admission plans discussed in detail with the patient. All questions and concerns were addressed. Patient's functional status prior to admission significant for  patient being able to ambulate unassisted. Total time for admission 50 minutes, of which at least 50% of the time was spent in plan of care and counseling of patient.       MD TRINA [email or web address]  D:  05/10/2020 23:00  T:  05/11/2020 0:42  JOB #:  [id number]

## 2020-05-11 NOTE — CONSULTS
[facility]  Pulmonary, Critical Care, and Sleep Medicine    Initial Patient Consult    Name: Lamonte West MRN: [id number]   : 1945 Hospital: [facility]   Date: 2020        IMPRESSION:   · COVID-19 pneumonia with respiratory failure  · Mild intermittent asthma  · Chronic back pain  · DM  · HTN      RECOMMENDATIONS:   · Cont w supplemental oxygen. Adjust as needed  · Follow inflammatory markers; at risk for cytokine storm  · Encouraged pt to lie prone at least 4 hours per day  · Has completed hydroxychloroquine/azithro  · Agree with empiric abx, may be able to de-escalate in a few days  · Scheduled combivent MDI     Subjective: This patient has been seen and evaluated at the request of Dr. Madeline Thompson for [place] 19 pneumonia and resp failure. Patient is a 69 y.o. female diagnosed one week ago with Covid 19. Pt with fevers, diarrhea, cough, and sob. She has been isolating at home but has had work done within the home and believes that she may have gotten an infection from one of the workers there.  has also become infected. Pt states that her symptoms have remained stable to slightly improved over the past few days but has noted that her oxygen sats have been falling and presented to ED for evaluation. Fevers seem to have resolved. Main symptoms are now diarrhea, sob, and cough. Pt w h/o asthma which by description appears to be mild intermittent and is followed by Dr Tena Ashley in [place]. Controlled with montelukast and (rare) albuterol MDI.     Never smoker    Currently on 6 lpm w sats of 90%      Past Medical History:   Diagnosis Date    Anxiety     Arthritis     Asthma     Chronic pain     back,right hip    Depression     Diabetes ([facility] [address]     type 2    GERD (gastroesophageal reflux disease)     Hypercholesteremia     Hypertension     Hypothyroid     Nausea & vomiting     Reflux     Sleep apnea     wears dental appliance- Somnident    Thyroid disease     Unspecified adverse effect of anesthesia     delayed awakening      Past Surgical History:   Procedure Laterality Date    HX COLONOSCOPY  2012    normal repeat 2022, diverticulosis of sigmoid colon, Dr. Orourke Gone HX HEENT  1/08/14    bilateral cataract surgery1/8/14    HX KNEE REPLACEMENT      right    HX OTHER SURGICAL  2007    cardiac cath- No CAD found    NEUROLOGICAL PROCEDURE UNLISTED  1999    vascular brain surgery      Prior to Admission medications    Medication Sig Start Date End Date Taking? Authorizing Provider   guaiFENesin ER (Mucinex) 600 mg ER tablet Take 600 mg by mouth two (2) times a day. Yes Provider, Historical   omeprazole (PRILOSEC) 20 mg capsule Take 20 mg by mouth daily. Yes Provider, Historical   calcium-vitamin D (OYSTER SHELL) 500 mg(1,250mg) -200 unit per tablet Take 1 Tab by mouth daily. Yes Provider, Historical   traZODone (DESYREL) 100 mg tablet TAKE 1 TABLET BY MOUTH EVERY DAY AT NIGHT 3/25/20  Yes Balbir Bender NP   glipiZIDE SR (GLUCOTROL XL) 10 mg CR tablet TAKE 1 TABLET BY MOUTH TWICE A DAY 2/15/20  Yes Balbir Bender NP   simvastatin (ZOCOR) 10 mg tablet Take 1 Tab by mouth nightly. 10/8/19  Yes Balbir Bender NP   losartan (COZAAR) 50 mg tablet TAKE 1 TABLET BY MOUTH EVERY DAY. 10/8/19  Yes Balbir Bender NP   levothyroxine (SYNTHROID) 100 mcg tablet Take 1 Tab by mouth Daily (before breakfast). 10/8/19  Yes Balbir Bender NP   atenolol (TENORMIN) 25 mg tablet Take 1 Tab by mouth nightly. 10/8/19  Yes Balbir Bender NP   sertraline (ZOLOFT) 50 mg tablet TAKE 1 TABLET BY MOUTH EVERY DAY 9/4/19  Yes Balbir Bender NP   aspirin delayed-release 81 mg tablet Take 81 mg by mouth daily. Yes Provider, Historical   fluticasone (FLONASE) 50 mcg/actuation nasal spray 2 Sprays by Both Nostrils route daily as needed for Rhinitis or Allergies.    Yes Provider, Historical   albuterol (PROAIR HFA) 90 mcg/actuation inhaler Take 2 Puffs by inhalation every four (4) hours as needed for Wheezing. Yes Provider, Historical   Cetirizine (ZYRTEC) 10 mg cap Take 1 Tab by mouth every evening. Yes Provider, Historical   multivitamins-minerals-lutein (CENTRUM SILVER) tab Take 1 Tab by mouth daily. Yes Provider, Historical   glucosamine-chondroit-vit c-mn (GLUCOSAMINE CHONDROITIN MAXSTR) 500-400 mg capsule Take 1 Cap by mouth daily. Yes Other, MD [place]   varicella-zoster recombinant, PF, (SHINGRIX, PF,) 50 mcg/0.5 mL susr injection 0.5mL by IntraMUSCular route once now and then repeat in 2-6 months 10/8/19   Anisa Dopp, NP     Allergies   Allergen Reactions    Latex Rash    Prednisone Anaphylaxis     Throat swelling. Can take methylpredisone po or IV without problems.     Augmentin [Amoxicillin-Pot Clavulanate] Nausea and Vomiting    Biaxin [Clarithromycin] Nausea and Vomiting    Metformin Diarrhea    Parafon Forte Dsc [Chlorzoxazone] Nausea and Vomiting      Social History     Tobacco Use    Smoking status: Never Smoker    Smokeless tobacco: Never Used   Substance Use Topics    Alcohol use: No     Frequency: Never     Comment: 2x year      Family History   Problem Relation Age of Onset    Elevated Lipids Mother     Dementia Mother     Osteoporosis Mother     Lung Disease Father         copd    Delayed Awakening Father     Post-op Nausea/Vomiting Father     Hypertension Sister     Diabetes Sister         type 2    Breast Cancer Maternal Aunt         Current Facility-Administered Medications   Medication Dose Route Frequency    sodium chloride (NS) flush 5-40 mL  5-40 mL IntraVENous Q8H    0.9% sodium chloride infusion  75 mL/hr IntraVENous CONTINUOUS    enoxaparin (LOVENOX) injection 40 mg  40 mg SubCUTAneous Q24H    zinc sulfate (ZINCATE) 220 (50) mg capsule 1 Cap  1 Cap Oral DAILY    ascorbic acid (vitamin C) (VITAMIN C) tablet 500 mg  500 mg Oral BID    aspirin delayed-release tablet 81 mg  81 mg Oral DAILY  atenoloL (TENORMIN) tablet 25 mg  25 mg Oral QHS    calcium-vitamin D (OS-TRISTEN) 500 mg-200 unit tablet  1 Tab Oral DAILY    cetirizine (ZYRTEC) tablet 10 mg  10 mg Oral DAILY    therapeutic multivitamin (THERAGRAN) tablet 1 Tab  1 Tab Oral DAILY    levothyroxine (SYNTHROID) tablet 100 mcg  100 mcg Oral ACB    losartan (COZAAR) tablet 50 mg  50 mg Oral DAILY    montelukast (SINGULAIR) tablet 10 mg  10 mg Oral QHS    sertraline (ZOLOFT) tablet 50 mg  50 mg Oral DAILY    traZODone (DESYREL) tablet 100 mg  100 mg Oral QHS    insulin lispro (HUMALOG) injection 4 Units  4 Units SubCUTAneous TIDAC    pantoprazole (PROTONIX) 40 mg in 0.9% sodium chloride 10 mL injection  40 mg IntraVENous DAILY    atorvastatin (LIPITOR) tablet 10 mg  10 mg Oral DAILY    ipratropium-albuterol (COMBIVENT RESPIMAT) 20 mcg-100 mcg inhalation spray  1 Puff Inhalation Q6H RT    fluticasone-vilanterol (BREO ELLIPTA) 200mcg-25mcg/puff  1 Puff Inhalation DAILY    insulin lispro (HUMALOG) injection   SubCUTAneous AC&HS       Review of Systems:  A comprehensive review of systems was negative except for that written in the HPI. Objective:   Vital Signs:    Visit Vitals  /64   Pulse 83   Temp 98.5 °F (36.9 °C)   Resp 18   Ht 5' 4\" (1.626 m)   Wt 86.7 kg (191 lb 2.2 oz)   SpO2 (!) 88%   BMI 32.81 kg/m²       O2 Device: Nasal cannula   O2 Flow Rate (L/min): 5 l/min   Temp (24hrs), Av.3 °F (37.4 °C), Min:98.5 °F (36.9 °C), Max:100.2 °F (37.9 °C)       Intake/Output:   Last shift:      701 - 1900  In: 270 [P.O.:120; I.V.:150]  Out: -   Last 3 shifts: 1901 -  07  In: 857.5 [I.V.:857.5]  Out: -     Intake/Output Summary (Last 24 hours) at 2020 0944  Last data filed at 2020 0900  Gross per 24 hour   Intake 1127.5 ml   Output    Net 1127.5 ml      Physical Exam:   General:  Alert, cooperative, mild distress, appears stated age. Head:  Normocephalic, without obvious abnormality, atraumatic. Eyes:  Conjunctivae/corneas clear. PERRL, EOMs intact. Nose: Nares normal. Septum midline. Mucosa normal. No drainage or sinus tenderness. Throat: Lips, mucosa, and tongue normal. Teeth and gums normal.   Neck: Supple, symmetrical, trachea midline, no adenopathy, thyroid: no enlargment/tenderness/nodules, no carotid bruit and no JVD. Back:   Symmetric, no curvature. ROM normal.   Lungs:   Clear anteriorly, post basilar crackles   Chest wall:  No tenderness or deformity. Heart:  Regular rate and rhythm, S1, S2 normal, no murmur, click, rub or gallop. Abdomen:   Soft, non-tender. Bowel sounds normal. No masses,  No organomegaly. Extremities: Extremities normal, atraumatic, no cyanosis or edema. Pulses: 2+ and symmetric all extremities.    Skin: Skin color, texture, turgor normal. No rashes or lesions   Lymph nodes: Cervical, supraclavicular, and axillary nodes normal.   Neurologic: Grossly nonfocal     Data review:     Recent Results (from the past 24 hour(s))   BLOOD GAS, ARTERIAL    Collection Time: 05/10/20 10:07 PM   Result Value Ref Range    pH 7.41 7.35 - 7.45      PCO2 31 (L) 35.0 - 45.0 mmHg    PO2 64 (L) 80 - 100 mmHg    O2 SAT 93 92 - 97 %    BICARBONATE 19 (L) 22 - 26 mmol/L    BASE EXCESS 4.6 mmol/L    O2 METHOD NASAL O2      O2 FLOW RATE 4 L/min    SPONTANEOUS RATE 10      Sample source ARTERIAL      SITE RIGHT RADIAL      ANGÉLICA'S TEST YES     PROCALCITONIN    Collection Time: 05/10/20 10:09 PM   Result Value Ref Range    Procalcitonin 0.13 ng/mL   CBC WITH AUTOMATED DIFF    Collection Time: 05/10/20 10:09 PM   Result Value Ref Range    WBC 9.1 3.6 - 11.0 K/uL    RBC 4.88 3.80 - 5.20 M/uL    HGB 14.0 11.5 - 16.0 g/dL    HCT 40.9 35.0 - 47.0 %    MCV 83.8 80.0 - 99.0 FL    MCH 28.7 26.0 - 34.0 PG    MCHC 34.2 30.0 - 36.5 g/dL    RDW 12.4 11.5 - 14.5 %    PLATELET 887 380 - 481 K/uL    MPV 10.7 8.9 - 12.9 FL    NRBC 0.0 0  WBC    ABSOLUTE NRBC 0.00 0.00 - 0.01 K/uL    NEUTROPHILS 80 (H) 32 - 75 %    LYMPHOCYTES 12 12 - 49 %    MONOCYTES 7 5 - 13 %    EOSINOPHILS 0 0 - 7 %    BASOPHILS 0 0 - 1 %    IMMATURE GRANULOCYTES 1 (H) 0.0 - 0.5 %    ABS. NEUTROPHILS 7.4 1.8 - 8.0 K/UL    ABS. LYMPHOCYTES 1.1 0.8 - 3.5 K/UL    ABS. MONOCYTES 0.6 0.0 - 1.0 K/UL    ABS. EOSINOPHILS 0.0 0.0 - 0.4 K/UL    ABS. BASOPHILS 0.0 0.0 - 0.1 K/UL    ABS. IMM. GRANS. 0.1 (H) 0.00 - 0.04 K/UL    DF AUTOMATED     METABOLIC PANEL, COMPREHENSIVE    Collection Time: 05/10/20 10:09 PM   Result Value Ref Range    Sodium 134 (L) 136 - 145 mmol/L    Potassium 2.7 (LL) 3.5 - 5.1 mmol/L    Chloride 101 97 - 108 mmol/L    CO2 21 21 - 32 mmol/L    Anion gap 12 5 - 15 mmol/L    Glucose 289 (H) 65 - 100 mg/dL    BUN 13 6 - 20 MG/DL    Creatinine 0.93 0.55 - 1.02 MG/DL    BUN/Creatinine ratio 14 12 - 20      GFR est AA >60 >60 ml/min/1.73m2    GFR est non-AA 59 (L) >60 ml/min/1.73m2    Calcium 8.7 8.5 - 10.1 MG/DL    Bilirubin, total 0.6 0.2 - 1.0 MG/DL    ALT (SGPT) 40 12 - 78 U/L    AST (SGOT) 43 (H) 15 - 37 U/L    Alk. phosphatase 91 45 - 117 U/L    Protein, total 7.1 6.4 - 8.2 g/dL    Albumin 2.8 (L) 3.5 - 5.0 g/dL    Globulin 4.3 (H) 2.0 - 4.0 g/dL    A-G Ratio 0.7 (L) 1.1 - 2.2     MAGNESIUM    Collection Time: 05/10/20 10:09 PM   Result Value Ref Range    Magnesium 1.6 1.6 - 2.4 mg/dL   PROTHROMBIN TIME + INR    Collection Time: 05/10/20 10:09 PM   Result Value Ref Range    INR 1.0 0.9 - 1.1      Prothrombin time 10.6 9.0 - 11.1 sec   TROPONIN I    Collection Time: 05/10/20 10:09 PM   Result Value Ref Range    Troponin-I, Qt. <0.05 <0.05 ng/mL   SAMPLES BEING HELD    Collection Time: 05/10/20 10:09 PM   Result Value Ref Range    SAMPLES BEING HELD 1sst,1grey on ice     COMMENT        Add-on orders for these samples will be processed based on acceptable specimen integrity and analyte stability, which may vary by analyte.    CULTURE, BLOOD    Collection Time: 05/10/20 10:09 PM   Result Value Ref Range    Special Requests: NO SPECIAL REQUESTS      Culture result: NO GROWTH AFTER 6 HOURS     LACTIC ACID    Collection Time: 05/10/20 10:09 PM   Result Value Ref Range    Lactic acid 1.8 0.4 - 2.0 MMOL/L   EKG, 12 LEAD, INITIAL    Collection Time: 05/10/20 10:16 PM   Result Value Ref Range    Ventricular Rate 72 BPM    Atrial Rate 72 BPM    P-R Interval 136 ms    QRS Duration 98 ms    Q-T Interval 452 ms    QTC Calculation (Bezet) 494 ms    Calculated P Axis 85 degrees    Calculated R Axis -56 degrees    Calculated T Axis -72 degrees    Diagnosis       Normal sinus rhythm with sinus arrhythmia  Left anterior fascicular block  Left ventricular hypertrophy with repolarization abnormality  Anteroseptal infarct (cited on or before 20-DEC-2013)  Abnormal ECG  When compared with ECG of 20-DEC-2013 10:39,  Significant changes have occurred     FERRITIN    Collection Time: 05/11/20 12:50 AM   Result Value Ref Range    Ferritin 328 (H) 8 - 252 NG/ML   LD    Collection Time: 05/11/20 12:50 AM   Result Value Ref Range     (H) 81 - 246 U/L   CRP, HIGH SENSITIVITY    Collection Time: 05/11/20 12:50 AM   Result Value Ref Range    CRP, High sensitivity >9.5 mg/L   D DIMER    Collection Time: 05/11/20 12:51 AM   Result Value Ref Range    D-dimer 0.68 (H) 0.00 - 0.65 mg/L FEU   GLUCOSE, POC    Collection Time: 05/11/20  1:16 AM   Result Value Ref Range    Glucose (POC) 269 (H) 65 - 100 mg/dL    Performed by Junior Esquivel    HEMOGLOBIN A1C WITH EAG    Collection Time: 05/11/20  2:31 AM   Result Value Ref Range    Hemoglobin A1c 13.1 (H) 4.0 - 5.6 %    Est. average glucose 329 mg/dL   PROTHROMBIN TIME + INR    Collection Time: 05/11/20  4:04 AM   Result Value Ref Range    INR 1.0 0.9 - 1.1      Prothrombin time 10.8 9.0 - 31.1 sec   METABOLIC PANEL, COMPREHENSIVE    Collection Time: 05/11/20  4:04 AM   Result Value Ref Range    Sodium 140 136 - 145 mmol/L    Potassium 3.1 (L) 3.5 - 5.1 mmol/L    Chloride 111 (H) 97 - 108 mmol/L    CO2 20 (L) 21 - 32 mmol/L    Anion gap 9 5 - 15 mmol/L    Glucose 192 (H) 65 - 100 mg/dL    BUN 10 6 - 20 MG/DL    Creatinine 0.81 0.55 - 1.02 MG/DL    BUN/Creatinine ratio 12 12 - 20      GFR est AA >60 >60 ml/min/1.73m2    GFR est non-AA >60 >60 ml/min/1.73m2    Calcium 7.4 (L) 8.5 - 10.1 MG/DL    Bilirubin, total 0.4 0.2 - 1.0 MG/DL    ALT (SGPT) 35 12 - 78 U/L    AST (SGOT) 37 15 - 37 U/L    Alk. phosphatase 79 45 - 117 U/L    Protein, total 6.2 (L) 6.4 - 8.2 g/dL    Albumin 2.4 (L) 3.5 - 5.0 g/dL    Globulin 3.8 2.0 - 4.0 g/dL    A-G Ratio 0.6 (L) 1.1 - 2.2     CBC WITH AUTOMATED DIFF    Collection Time: 05/11/20  4:04 AM   Result Value Ref Range    WBC 7.2 3.6 - 11.0 K/uL    RBC 4.59 3.80 - 5.20 M/uL    HGB 13.1 11.5 - 16.0 g/dL    HCT 38.9 35.0 - 47.0 %    MCV 84.7 80.0 - 99.0 FL    MCH 28.5 26.0 - 34.0 PG    MCHC 33.7 30.0 - 36.5 g/dL    RDW 12.4 11.5 - 14.5 %    PLATELET 312 561 - 110 K/uL    MPV 10.7 8.9 - 12.9 FL    NRBC 0.0 0  WBC    ABSOLUTE NRBC 0.00 0.00 - 0.01 K/uL    NEUTROPHILS 72 32 - 75 %    LYMPHOCYTES 21 12 - 49 %    MONOCYTES 6 5 - 13 %    EOSINOPHILS 0 0 - 7 %    BASOPHILS 0 0 - 1 %    IMMATURE GRANULOCYTES 1 (H) 0.0 - 0.5 %    ABS. NEUTROPHILS 5.2 1.8 - 8.0 K/UL    ABS. LYMPHOCYTES 1.5 0.8 - 3.5 K/UL    ABS. MONOCYTES 0.4 0.0 - 1.0 K/UL    ABS. EOSINOPHILS 0.0 0.0 - 0.4 K/UL    ABS. BASOPHILS 0.0 0.0 - 0.1 K/UL    ABS. IMM.  GRANS. 0.1 (H) 0.00 - 0.04 K/UL    DF AUTOMATED     GLUCOSE, POC    Collection Time: 05/11/20  7:40 AM   Result Value Ref Range    Glucose (POC) 183 (H) 65 - 100 mg/dL    Performed by Norma Nava        Imaging:  I have personally reviewed the patients radiographs and have reviewed the reports:  Mild patchy infiltrates in lung bases        Jennifer Nicole MD

## 2020-05-11 NOTE — PROGRESS NOTES
[facility] Dosing Services: IV to PO Conversion - td    The pharmacist has determined that this patient meets P & T approved criteria for conversion from IV to oral therapy for the following medication:Pantoprazole    The pharmacist has written the following order for the patient: Patient taking PO medications, diabetic diet ordered. Pantoprazole adjusted to 40 mg PO ACB. The pharmacist will continue to monitor the patient's status and advise the physician if conversion back to IV therapy is recommended.     Signed DEBRA Mccullough Contact information:  [phone]

## 2020-05-11 NOTE — PROGRESS NOTES
Problem: Falls - Risk of  Goal: *Absence of Falls  Description: Document Christopher Carmen Fall Risk and appropriate interventions in the flowsheet.   Outcome: Progressing Towards Goal  Variance Patient Condition  Impact: Moderate  Note: Fall Risk Interventions:  Mobility Interventions: Assess mobility with egress test, Communicate number of staff needed for ambulation/transfer, Patient to call before getting OOB, Utilize walker, cane, or other assistive device         Medication Interventions: Assess postural VS orthostatic hypotension, Evaluate medications/consider consulting pharmacy, Patient to call before getting OOB, Teach patient to arise slowly, Utilize gait belt for transfers/ambulation                   Problem: Patient Education: Go to Patient Education Activity  Goal: Patient/Family Education  Outcome: Progressing Towards Goal     Problem: Patient Education: Go to Patient Education Activity  Goal: Patient/Family Education  Outcome: Progressing Towards Goal     Problem: Pneumonia: Day 1  Goal: Off Pathway (Use only if patient is Off Pathway)  Outcome: Progressing Towards Goal  Goal: Activity/Safety  Outcome: Progressing Towards Goal  Goal: Consults, if ordered  Outcome: Progressing Towards Goal  Goal: Diagnostic Test/Procedures  Outcome: Progressing Towards Goal  Goal: Nutrition/Diet  Outcome: Progressing Towards Goal  Goal: Medications  Outcome: Progressing Towards Goal  Goal: Respiratory  Outcome: Progressing Towards Goal  Goal: Treatments/Interventions/Procedures  Outcome: Progressing Towards Goal  Goal: Psychosocial  Outcome: Progressing Towards Goal  Goal: *Oxygen saturation within defined limits  Outcome: Progressing Towards Goal  Goal: *Influenza vaccine administered (October-March)  Outcome: Progressing Towards Goal  Goal: *Pneumoccocal vaccine administered  Outcome: Progressing Towards Goal  Goal: *Hemodynamically stable  Outcome: Progressing Towards Goal  Goal: *Demonstrates progressive activity  Outcome: Progressing Towards Goal  Goal: *Tolerating diet  Outcome: Progressing Towards Goal     Problem: Diabetes Self-Management  Goal: *Disease process and treatment process  Description: Define diabetes and identify own type of diabetes; list 3 options for treating diabetes. Outcome: Progressing Towards Goal  Goal: *Incorporating nutritional management into lifestyle  Description: Describe effect of type, amount and timing of food on blood glucose; list 3 methods for planning meals. Outcome: Progressing Towards Goal  Goal: *Incorporating physical activity into lifestyle  Description: State effect of exercise on blood glucose levels. Outcome: Progressing Towards Goal  Goal: *Developing strategies to promote health/change behavior  Description: Define the ABC's of diabetes; identify appropriate screenings, schedule and personal plan for screenings. Outcome: Progressing Towards Goal  Goal: *Using medications safely  Description: State effect of diabetes medications on diabetes; name diabetes medication taking, action and side effects. Outcome: Progressing Towards Goal  Goal: *Monitoring blood glucose, interpreting and using results  Description: Identify recommended blood glucose targets  and personal targets. Outcome: Progressing Towards Goal  Goal: *Prevention, detection, treatment of acute complications  Description: List symptoms of hyper- and hypoglycemia; describe how to treat low blood sugar and actions for lowering  high blood glucose level. Outcome: Progressing Towards Goal  Goal: *Prevention, detection and treatment of chronic complications  Description: Define the natural course of diabetes and describe the relationship of blood glucose levels to long term complications of diabetes.   Outcome: Progressing Towards Goal  Goal: *Developing strategies to address psychosocial issues  Description: Describe feelings about living with diabetes; identify support needed and support network  Outcome: Progressing Towards Goal  Goal: *Insulin pump training  Outcome: Progressing Towards Goal  Goal: *Sick day guidelines  Outcome: Progressing Towards Goal  Goal: *Patient Specific Goal (EDIT GOAL, INSERT TEXT)  Outcome: Progressing Towards Goal     Problem: Patient Education: Go to Patient Education Activity  Goal: Patient/Family Education  Outcome: Progressing Towards Goal     Problem: Risk for Spread of Infection  Goal: Prevent transmission of infectious organism to others  Description: Prevent the transmission of infectious organisms to other patients, staff members, and visitors.   Outcome: Progressing Towards Goal     Problem: Patient Education:  Go to Education Activity  Goal: Patient/Family Education  Outcome: Progressing Towards Goal

## 2020-05-11 NOTE — ED PROVIDER NOTES
42-year-old female presenting to the ER with worsening shortness of breath and confirmed COVID-19 positive. Patient symptoms started earlier in the week with headache and myalgias with weakness. Patient went to a drive-through clinic and was swabbed and is positive for [address]. Patient's physician started them on azithromycin and hydroxychloroquine. Patient finished prescriptions today. Patient states that throughout the day today has been having worsening shortness of breath and checking her oxygen at home lowest got was in the 60s. Patient oxygen saturation 86% on room air here requiring submental oxygen. Patient also reports episodes of diarrhea but no abdominal pain no dysuria. No chest pain. Patient is feeling fatigued and lightheaded.            Past Medical History:   Diagnosis Date    Anxiety     Arthritis     Asthma     Chronic pain     back,right hip    Depression     Diabetes (HCC)     type 2    GERD (gastroesophageal reflux disease)     Hypercholesteremia     Hypertension     Hypothyroid     Nausea & vomiting     Reflux     Sleep apnea     wears dental appliance- Somnident    Thyroid disease     Unspecified adverse effect of anesthesia     delayed awakening       Past Surgical History:   Procedure Laterality Date    HX COLONOSCOPY  2012    normal repeat 2022, diverticulosis of sigmoid colon, Dr. Bean Shah    HX HEENT  1/08/14    bilateral cataract surgery1/8/14    HX KNEE REPLACEMENT      right    HX OTHER SURGICAL  2007    cardiac cath- No CAD found    NEUROLOGICAL PROCEDURE UNLISTED  1999    vascular brain surgery         Family History:   Problem Relation Age of Onset    Elevated Lipids Mother     Dementia Mother     Osteoporosis Mother     Lung Disease Father         copd    Delayed Awakening Father     Post-op Nausea/Vomiting Father     Hypertension Sister     Diabetes Sister         type 2    Breast Cancer Maternal Aunt        Social History     Socioeconomic History    Marital status:      Spouse name: Not on file    Number of children: Not on file    Years of education: Not on file    Highest education level: Not on file   Occupational History    Not on file   Social Needs    Financial resource strain: Not on file    Food insecurity     Worry: Not on file     Inability: Not on file    Transportation needs     Medical: Not on file     Non-medical: Not on file   Tobacco Use    Smoking status: Never Smoker    Smokeless tobacco: Never Used   Substance and Sexual Activity    Alcohol use: No     Frequency: Never     Comment: 2x year    Drug use: No    Sexual activity: Yes     Partners: Male   Lifestyle    Physical activity     Days per week: Not on file     Minutes per session: Not on file    Stress: Not on file   Relationships    Social connections     Talks on phone: Not on file     Gets together: Not on file     Attends Orthodoxy service: Not on file     Active member of club or organization: Not on file     Attends meetings of clubs or organizations: Not on file     Relationship status: Not on file    Intimate partner violence     Fear of current or ex partner: Not on file     Emotionally abused: Not on file     Physically abused: Not on file     Forced sexual activity: Not on file   Other Topics Concern    Not on file   Social History Narrative    Not on file         ALLERGIES: Latex; Prednisone; Augmentin [amoxicillin-pot clavulanate]; Biaxin [clarithromycin]; Metformin; and Parafon forte dsc [chlorzoxazone]    Review of Systems   Constitutional: Positive for appetite change, chills, fatigue and fever. HENT: Negative for congestion and sore throat. Eyes: Negative for pain. Respiratory: Positive for cough and shortness of breath. Cardiovascular: Negative for chest pain. Gastrointestinal: Positive for diarrhea. Negative for abdominal pain, nausea and vomiting. Genitourinary: Negative for dysuria and flank pain.    Musculoskeletal: Negative for back pain and neck pain. Skin: Negative for rash. Neurological: Negative for dizziness and headaches. Vitals:    05/10/20 2111 05/10/20 2113   BP: 140/66    Pulse: 73    Resp: 18    Temp: 99.9 °F (37.7 °C)    SpO2: (!) 86% 94%   Weight: 86.2 kg (190 lb)    Height: 5' 4\" (1.626 m)             Physical Exam  Constitutional:       Appearance: She is well-developed. She is ill-appearing. Interventions: Nasal cannula in place. HENT:      Head: Normocephalic. Nose: Nose normal.      Mouth/Throat:      Pharynx: Oropharynx is clear. Eyes:      Conjunctiva/sclera: Conjunctivae normal.   Neck:      Musculoskeletal: Normal range of motion and neck supple. Cardiovascular:      Rate and Rhythm: Normal rate and regular rhythm. Pulmonary:      Effort: Pulmonary effort is normal. Tachypnea present. Breath sounds: Rhonchi present. Abdominal:      General: Bowel sounds are normal.      Palpations: Abdomen is soft. Tenderness: There is no abdominal tenderness. Musculoskeletal: Normal range of motion. Skin:     General: Skin is warm. Capillary Refill: Capillary refill takes less than 2 seconds. Findings: No rash. Neurological:      Mental Status: She is alert and oriented to person, place, and time. Comments: No gross motor or sensory deficits          MDM  Number of Diagnoses or Management Options  Acute respiratory disease due to COVID-19 virus:   Hypokalemia:   Hypoxia:   Respiratory distress:   Diagnosis management comments: Jud Naranjo was evaluated in the Emergency Department on 5/10/2020 for the symptoms described in the history of present illness. He/she was evaluated in the context of the global COVID-19 pandemic, which necessitated consideration that the patient might be at risk for infection with the SARS-CoV-2 virus that causes COVID-19.  Institutional protocols and algorithms that pertain to the evaluation of patients at risk for COVID-19 are in a state of rapid change based on information released by regulatory bodies including the CDC and federal and state organizations. These policies and algorithms were followed during the patient's care in the ED. Surrogate Decision Maker (Who do you want to make decisions for you in the event you are not able to?): Pt's   [facility] Info: [phone]  Ventilation (Do you want to be intubated and mechanically ventilated?):  Yes  CPR (Do you want chest compressions and electricity in an attempt to restart your heart?): Yes         Amount and/or Complexity of Data Reviewed  Clinical lab tests: reviewed  Tests in the radiology section of CPT®: reviewed      ED Course as of May 10 2302   Sun May 10, 2020   2126 O2 Sat (%)(!): 86 % [ZD]   2245 EKG: Normal sinus rhythm rate of 72 bpm with LVH criteria and left anterior fascicular block. Q wave in lead V1 lead II lead III and aVF. No ST elevation or depressions.   EKG interpreted by Regis Mohamud MD      [ZD]   5347 Potassium(!!): 2.7 [ZD]      ED Course User Index  [ZD] Kina Armendariz MD       CRITICAL CARE (ASAP ONLY)  Performed by: Kina Armendariz MD  Authorized by: Kina Armendariz MD     Critical care provider statement:     Critical care time was exclusive of:  Separately billable procedures and treating other patients    Critical care was necessary to treat or prevent imminent or life-threatening deterioration of the following conditions:  Respiratory failure    Critical care was time spent personally by me on the following activities:  Blood draw for specimens, development of treatment plan with patient or surrogate, discussions with consultants, evaluation of patient's response to treatment, examination of patient, interpretation of cardiac output measurements, obtaining history from patient or surrogate, review of old charts, re-evaluation of patient's condition, pulse oximetry, ordering and review of radiographic studies, ordering and review of laboratory studies and ordering and performing treatments and interventions    I assumed direction of critical care for this patient from another provider in my specialty: no            Hospitalist Perfect Serve for Admission  9:42 PM    ED Room Number: ER09/09  Patient Name and age:  Martha Triplett 69 y.o.  female  Working Diagnosis:   1. Acute respiratory disease due to COVID-19 virus    2. Hypoxia    3.  Respiratory distress        COVID-19 Suspicion:  yes    Code Status:  Full Code  Readmission: no  Isolation Requirements:  yes  Recommended Level of Care:  step down  Department:[facility] ED - [phone]  Other:

## 2020-05-11 NOTE — PROGRESS NOTES
Spiritual Care Assessment/Progress Note  [address]      NAME: Kathie Goff      MRN: [id number]  AGE: 69 y.o. SEX: female  Nondenominational Affiliation: Sabianist   Language: English     5/11/2020     Total Time (in minutes): 5     Spiritual Assessment begun in [facility] 3 INTENSIVE CARE through conversation with:         [x]Patient        [] Family    [] Friend(s)        Reason for Consult: Initial/Spiritual assessment, critical care     Spiritual beliefs: (Please include comment if needed)     [] Identifies with a juan tradition:         [] Supported by a juan community:            [] Claims no spiritual orientation:           [] Seeking spiritual identity:                [] Adheres to an individual form of spirituality:           [x] Not able to assess:                           Identified resources for coping:      [] Prayer                               [] Music                  [] Guided Imagery     [] Family/friends                 [] Pet visits     [] Devotional reading                         [x] Unknown     [] Other:                                             Interventions offered during this visit: (See comments for more details)    Patient Interventions: Other (comment)(Unable to assess)           Plan of Care:     [] Support spiritual and/or cultural needs    [] Support AMD and/or advance care planning process      [] Support grieving process   [] Coordinate Rites and/or Rituals    [] Coordination with community clergy   [] No spiritual needs identified at this time   [] Detailed Plan of Care below (See Comments)  [] Make referral to Music Therapy  [] Make referral to Pet Therapy     [] Make referral to Addiction services  [] Make referral to [facility]  [] Make referral to Spiritual Care Partner  [] No future visits requested        [x] Follow up visits as needed     Comments:  visited Mrs. Judy Aragon for a spiritual care initial spiritual assessment in the ICU. Mrs. Judy Aragon was lying in bed and appeared to be resting comfortably. Collaborated with her RN who confirmed that Mrs. Alvarez Rosa would be able to speak to the  once awake.  will follow up as able and/or needed  [employer]. Ines Coffman.      Paging Service: [phone])

## 2020-05-11 NOTE — PROGRESS NOTES
Reason for Admission:   +COVID 19,worsening SOB with acute respiratory failure,acute heart failure,hypokalemia,diarrhea                   RUR Score:          16%           Plan for utilizing home health: To be determined       PCP: First and Last name:  Dr Azael Almendarez and Dr Kaitlynn Smith   Name of Practice: [address] [place] [address]   Are you a current patient: Yes/No: yes   Approximate date of last visit: 5/6/20                    Current Advanced Directive/Advance Care Plan:                        Full code,no AMD on file  Transition of Care Plan:                    Pt was admitted to [facility] with worsening SOB. At home,pt is not on oxygen @baseline. Currently,pt is on 5 to 6 liters oxygen. Past medical history includes asthma,diabetes,GERD,HTN,depression,and hyperlipidemia.  is Chino Beal. His numbers are [phone] and [phone]. I attempted to contact pt by phone to complete the ACP discussion and to complete the initial assessment but pt.'s phone is ringing busy. I will follow-up in the am to complete both assessments.     Gian Tejada

## 2020-05-11 NOTE — PROGRESS NOTES
Verbal shift change report given to Naga Loera RN (oncoming nurse) by Rodo Pina RN (ER nurse). Report included the following information SBAR, Kardex, ED Summary, Intake/Output, MAR and Cardiac Rhythm sinus rhythm. Primary Nurse Aminta Neely and Elayne Meng RN performed a dual skin assessment on this patient No impairment noted. Anoop score is 20. Patient arrived on the unit after MN. Patient is  Alert, oriented x4. On 5L NC. Patient was swabbed for COVID on Monday, May 4 with positive results coming in on May 5. Patient reports increasing shortness of breath and dyspnea with exertion. Patient states that she has had limited movement due to feeling weak as well as a decreased appetite. Patient reports occasional use of a cane when ambulating. Patient states that she lives with her  who is active in her care. She also reports a strong social support with family and Judaism support. Patient has a strong nonproductive cough. + pulses. Cap refill <3 seconds. No edema noted. Patient has + warmth, movement, sensation, and pulses to all extremities. Sinus rhythm on telemetry. Blood sugar on arrival to ICU was 269 and patient was covered with 3 units of lispro. Patient is swallowing without difficulty and has a diabetic diet ordered. Over night, patient had an episode of multiple loose stools and was cleaned and changed. Morning labs sent. Patient resting quietly in between nursing tasks. Verbal shift change report given to Rodrigo Lynch RN (oncoming nurse) by Naga Loera RN (offgoing nurse). Report included the following information SBAR, Kardex, ED Summary, Intake/Output, MAR, Recent Results and Cardiac Rhythm sinus rhythm.

## 2020-05-11 NOTE — ACP (ADVANCE CARE PLANNING)
Advance Care Planning     Advance Care Planning Activator (Inpatient)  Conversation Note      Date of ACP Conversation: 05/12/20    Conversation Conducted with:   Miguel James with capacity to make decisions  ACP Activator: Ruben Miller             \"Who would you like to name as your primary health care decision-maker? \"    Name: Chino Beal Relationship: Phone number: [phone]  Roberta Waldron this person be reached easily ? yes  \"Who would you like to name as your back-up decision maker? \"   Reji Quintanilla Relationship: daughter Phone number: [phone]  Roberta Waldron this person be reached easily? \" yes      Care Preferences    Ventilation: \"If you were in your present state of health and suddenly became very ill and were unable to breathe on your own, what would your preference be about the use of a ventilator (breathing machine) if it were available to you? \"  Pt stated she would want to be intubated    If patient would desire the use of a ventilator (breathing machine), answer \"yes/no: Pt answered \"Yes\"  \"If your health worsens and it becomes clear that your chance of recovery is unlikely, what would your preference be about the use of a ventilator (breathing machine) if it were available to you? \"     Would the patient desire the use of a ventilator (breathing machine)? {YES/NO:YES      Resuscitation  \"CPR works best to restart the heart when there is a sudden event, like a heart attack, in someone who is otherwise healthy. Unfortunately, CPR does not typically restart the heart for people who have serious health conditions or who are very sick. \"    \"In the event your heart stopped as a result of an underlying serious health condition, would you want attempts to be made to restart your heart (answer \"yes\" for attempt to resuscitate) or would you prefer a natural death (answer \"no\" for do not attempt to resuscitate)? \" {Yes/No-Pt states \"Yes\"        [] Yes  [x] No   Educated Patient / Skip Smith regarding differences between Advance Directives and portable DNR orders.     Length of ACP Conversation in minutes:      Conversation Outcomes:  [x] ACP discussion completed  [] Existing advance directive reviewed with patient; no changes to patient's previously recorded wishes     [] New Advance Directive completed   [] Portable Do Not Rescitate prepared for Provider review and signature  [] POLST/POST/MOLST/MOST prepared for Provider review and signature      Follow-up plan:    [] Schedule follow-up conversation to continue planning  [x] Referred individual to Provider for additional questions/concerns   [] Advised patient/agent/surrogate to review completed ACP document and update if needed with changes in condition, patient preferences or care setting     [x] This note routed to one or more involved healthcare providers     Liseth Miller

## 2020-05-12 ENCOUNTER — APPOINTMENT (OUTPATIENT)
Dept: GENERAL RADIOLOGY | Age: 75
DRG: 177 | End: 2020-05-12
Attending: STUDENT IN AN ORGANIZED HEALTH CARE EDUCATION/TRAINING PROGRAM
Payer: MEDICARE

## 2020-05-12 LAB
ALBUMIN SERPL-MCNC: 2.1 G/DL (ref 3.5–5)
ALBUMIN/GLOB SERPL: 0.6 {RATIO} (ref 1.1–2.2)
ALP SERPL-CCNC: 71 U/L (ref 45–117)
ALT SERPL-CCNC: 29 U/L (ref 12–78)
ANION GAP SERPL CALC-SCNC: 9 MMOL/L (ref 5–15)
AST SERPL-CCNC: 29 U/L (ref 15–37)
BACTERIA SPEC CULT: NORMAL
BACTERIA SPEC CULT: NORMAL
BASOPHILS # BLD: 0 K/UL (ref 0–0.1)
BASOPHILS NFR BLD: 0 % (ref 0–1)
BILIRUB SERPL-MCNC: 0.6 MG/DL (ref 0.2–1)
BUN SERPL-MCNC: 8 MG/DL (ref 6–20)
BUN/CREAT SERPL: 11 (ref 12–20)
CALCIUM SERPL-MCNC: 8 MG/DL (ref 8.5–10.1)
CHLORIDE SERPL-SCNC: 112 MMOL/L (ref 97–108)
CO2 SERPL-SCNC: 20 MMOL/L (ref 21–32)
COHGB MFR BLD: 3.4 % (ref 1–2)
CREAT SERPL-MCNC: 0.76 MG/DL (ref 0.55–1.02)
CRP SERPL-MCNC: 19.6 MG/DL (ref 0–0.6)
D DIMER PPP FEU-MCNC: 1.81 MG/L FEU (ref 0–0.65)
DIFFERENTIAL METHOD BLD: ABNORMAL
EOSINOPHIL # BLD: 0.1 K/UL (ref 0–0.4)
EOSINOPHIL NFR BLD: 1 % (ref 0–7)
ERYTHROCYTE [DISTWIDTH] IN BLOOD BY AUTOMATED COUNT: 12.6 % (ref 11.5–14.5)
FERRITIN SERPL-MCNC: 282 NG/ML (ref 26–388)
GLOBULIN SER CALC-MCNC: 3.6 G/DL (ref 2–4)
GLUCOSE BLD STRIP.AUTO-MCNC: 138 MG/DL (ref 65–100)
GLUCOSE BLD STRIP.AUTO-MCNC: 149 MG/DL (ref 65–100)
GLUCOSE BLD STRIP.AUTO-MCNC: 171 MG/DL (ref 65–100)
GLUCOSE BLD STRIP.AUTO-MCNC: 200 MG/DL (ref 65–100)
GLUCOSE SERPL-MCNC: 227 MG/DL (ref 65–100)
HCT VFR BLD AUTO: 36 % (ref 35–47)
HGB BLD OXIMETRY-MCNC: 12.7 G/DL (ref 14–17)
HGB BLD-MCNC: 11.8 G/DL (ref 11.5–16)
IMM GRANULOCYTES # BLD AUTO: 0.1 K/UL (ref 0–0.04)
IMM GRANULOCYTES NFR BLD AUTO: 1 % (ref 0–0.5)
INR PPP: 1.1 (ref 0.9–1.1)
LDH SERPL L TO P-CCNC: 390 U/L (ref 81–246)
LYMPHOCYTES # BLD: 0.8 K/UL (ref 0.8–3.5)
LYMPHOCYTES NFR BLD: 12 % (ref 12–49)
MCH RBC QN AUTO: 28.5 PG (ref 26–34)
MCHC RBC AUTO-ENTMCNC: 32.8 G/DL (ref 30–36.5)
MCV RBC AUTO: 87 FL (ref 80–99)
METHGB MFR BLD: 0.3 % (ref 0–1.4)
MONOCYTES # BLD: 0.5 K/UL (ref 0–1)
MONOCYTES NFR BLD: 7 % (ref 5–13)
NEUTS SEG # BLD: 5.1 K/UL (ref 1.8–8)
NEUTS SEG NFR BLD: 79 % (ref 32–75)
NRBC # BLD: 0 K/UL (ref 0–0.01)
NRBC BLD-RTO: 0 PER 100 WBC
OXYHGB MFR BLD: 95.3 % (ref 94–97)
PLATELET # BLD AUTO: 233 K/UL (ref 150–400)
PMV BLD AUTO: 10.6 FL (ref 8.9–12.9)
POTASSIUM SERPL-SCNC: 3.4 MMOL/L (ref 3.5–5.1)
PROCALCITONIN SERPL-MCNC: 0.29 NG/ML
PROT SERPL-MCNC: 5.7 G/DL (ref 6.4–8.2)
PROTHROMBIN TIME: 11.4 SEC (ref 9–11.1)
RBC # BLD AUTO: 4.14 M/UL (ref 3.8–5.2)
RBC MORPH BLD: ABNORMAL
SAO2 % BLD: 99 % (ref 95–99)
SERVICE CMNT-IMP: ABNORMAL
SERVICE CMNT-IMP: NORMAL
SODIUM SERPL-SCNC: 141 MMOL/L (ref 136–145)
WBC # BLD AUTO: 6.6 K/UL (ref 3.6–11)

## 2020-05-12 PROCEDURE — 74011000258 HC RX REV CODE- 258: Performed by: STUDENT IN AN ORGANIZED HEALTH CARE EDUCATION/TRAINING PROGRAM

## 2020-05-12 PROCEDURE — 74011250637 HC RX REV CODE- 250/637: Performed by: STUDENT IN AN ORGANIZED HEALTH CARE EDUCATION/TRAINING PROGRAM

## 2020-05-12 PROCEDURE — 65660000000 HC RM CCU STEPDOWN

## 2020-05-12 PROCEDURE — 74011250636 HC RX REV CODE- 250/636: Performed by: INTERNAL MEDICINE

## 2020-05-12 PROCEDURE — 71045 X-RAY EXAM CHEST 1 VIEW: CPT

## 2020-05-12 PROCEDURE — 94640 AIRWAY INHALATION TREATMENT: CPT

## 2020-05-12 PROCEDURE — 83615 LACTATE (LD) (LDH) ENZYME: CPT

## 2020-05-12 PROCEDURE — 86140 C-REACTIVE PROTEIN: CPT

## 2020-05-12 PROCEDURE — 85379 FIBRIN DEGRADATION QUANT: CPT

## 2020-05-12 PROCEDURE — 74011250637 HC RX REV CODE- 250/637: Performed by: INTERNAL MEDICINE

## 2020-05-12 PROCEDURE — 80053 COMPREHEN METABOLIC PANEL: CPT

## 2020-05-12 PROCEDURE — 77030038269 HC DRN EXT URIN PURWCK BARD -A

## 2020-05-12 PROCEDURE — 82962 GLUCOSE BLOOD TEST: CPT

## 2020-05-12 PROCEDURE — 74011636637 HC RX REV CODE- 636/637: Performed by: FAMILY MEDICINE

## 2020-05-12 PROCEDURE — 85610 PROTHROMBIN TIME: CPT

## 2020-05-12 PROCEDURE — 82375 ASSAY CARBOXYHB QUANT: CPT

## 2020-05-12 PROCEDURE — 74011636637 HC RX REV CODE- 636/637: Performed by: STUDENT IN AN ORGANIZED HEALTH CARE EDUCATION/TRAINING PROGRAM

## 2020-05-12 PROCEDURE — 36415 COLL VENOUS BLD VENIPUNCTURE: CPT

## 2020-05-12 PROCEDURE — 82728 ASSAY OF FERRITIN: CPT

## 2020-05-12 PROCEDURE — 74011250636 HC RX REV CODE- 250/636: Performed by: STUDENT IN AN ORGANIZED HEALTH CARE EDUCATION/TRAINING PROGRAM

## 2020-05-12 PROCEDURE — 85025 COMPLETE CBC W/AUTO DIFF WBC: CPT

## 2020-05-12 PROCEDURE — 84145 PROCALCITONIN (PCT): CPT

## 2020-05-12 PROCEDURE — 77030012794 HC KT NSL CANN/HGH TRAN -A

## 2020-05-12 PROCEDURE — 77010033678 HC OXYGEN DAILY

## 2020-05-12 RX ORDER — POTASSIUM CHLORIDE 750 MG/1
20 TABLET, FILM COATED, EXTENDED RELEASE ORAL
Status: COMPLETED | OUTPATIENT
Start: 2020-05-12 | End: 2020-05-12

## 2020-05-12 RX ORDER — FUROSEMIDE 10 MG/ML
20 INJECTION INTRAMUSCULAR; INTRAVENOUS ONCE
Status: COMPLETED | OUTPATIENT
Start: 2020-05-12 | End: 2020-05-12

## 2020-05-12 RX ORDER — ACETAMINOPHEN 325 MG/1
650 TABLET ORAL
Status: DISCONTINUED | OUTPATIENT
Start: 2020-05-12 | End: 2020-05-30 | Stop reason: HOSPADM

## 2020-05-12 RX ORDER — INSULIN GLARGINE 100 [IU]/ML
12 INJECTION, SOLUTION SUBCUTANEOUS DAILY
Status: DISCONTINUED | OUTPATIENT
Start: 2020-05-12 | End: 2020-05-13

## 2020-05-12 RX ADMIN — FUROSEMIDE 20 MG: 10 INJECTION, SOLUTION INTRAMUSCULAR; INTRAVENOUS at 10:37

## 2020-05-12 RX ADMIN — FLUTICASONE FUROATE AND VILANTEROL TRIFENATATE 1 PUFF: 200; 25 POWDER RESPIRATORY (INHALATION) at 10:09

## 2020-05-12 RX ADMIN — ZINC SULFATE 220 MG (50 MG) CAPSULE 1 CAPSULE: CAPSULE at 21:09

## 2020-05-12 RX ADMIN — CETIRIZINE HYDROCHLORIDE 10 MG: 10 TABLET, FILM COATED ORAL at 08:01

## 2020-05-12 RX ADMIN — INSULIN GLARGINE 12 UNITS: 100 INJECTION, SOLUTION SUBCUTANEOUS at 10:36

## 2020-05-12 RX ADMIN — DOXYCYCLINE 100 MG: 100 INJECTION, POWDER, LYOPHILIZED, FOR SOLUTION INTRAVENOUS at 10:00

## 2020-05-12 RX ADMIN — INSULIN LISPRO 3 UNITS: 100 INJECTION, SOLUTION INTRAVENOUS; SUBCUTANEOUS at 12:30

## 2020-05-12 RX ADMIN — OYSTER SHELL CALCIUM WITH VITAMIN D 1 TABLET: 500; 200 TABLET, FILM COATED ORAL at 08:02

## 2020-05-12 RX ADMIN — Medication 10 ML: at 05:06

## 2020-05-12 RX ADMIN — ACETAMINOPHEN 650 MG: 325 TABLET ORAL at 13:06

## 2020-05-12 RX ADMIN — MONTELUKAST SODIUM 10 MG: 10 TABLET, FILM COATED ORAL at 21:09

## 2020-05-12 RX ADMIN — IPRATROPIUM BROMIDE AND ALBUTEROL 1 PUFF: 20; 100 SPRAY, METERED RESPIRATORY (INHALATION) at 20:11

## 2020-05-12 RX ADMIN — LOSARTAN POTASSIUM 50 MG: 50 TABLET, FILM COATED ORAL at 08:02

## 2020-05-12 RX ADMIN — IPRATROPIUM BROMIDE AND ALBUTEROL 1 PUFF: 20; 100 SPRAY, METERED RESPIRATORY (INHALATION) at 13:15

## 2020-05-12 RX ADMIN — IPRATROPIUM BROMIDE AND ALBUTEROL 1 PUFF: 20; 100 SPRAY, METERED RESPIRATORY (INHALATION) at 02:00

## 2020-05-12 RX ADMIN — Medication 10 ML: at 22:00

## 2020-05-12 RX ADMIN — DOXYCYCLINE 100 MG: 100 INJECTION, POWDER, LYOPHILIZED, FOR SOLUTION INTRAVENOUS at 21:51

## 2020-05-12 RX ADMIN — ASPIRIN 81 MG: 81 TABLET, COATED ORAL at 08:03

## 2020-05-12 RX ADMIN — OXYCODONE HYDROCHLORIDE AND ACETAMINOPHEN 500 MG: 500 TABLET ORAL at 21:09

## 2020-05-12 RX ADMIN — INSULIN LISPRO 2 UNITS: 100 INJECTION, SOLUTION INTRAVENOUS; SUBCUTANEOUS at 17:13

## 2020-05-12 RX ADMIN — OXYCODONE HYDROCHLORIDE AND ACETAMINOPHEN 500 MG: 500 TABLET ORAL at 08:03

## 2020-05-12 RX ADMIN — Medication 10 ML: at 13:13

## 2020-05-12 RX ADMIN — SERTRALINE HYDROCHLORIDE 50 MG: 50 TABLET ORAL at 08:02

## 2020-05-12 RX ADMIN — ACETAMINOPHEN 650 MG: 325 TABLET ORAL at 21:13

## 2020-05-12 RX ADMIN — IPRATROPIUM BROMIDE AND ALBUTEROL 1 PUFF: 20; 100 SPRAY, METERED RESPIRATORY (INHALATION) at 07:59

## 2020-05-12 RX ADMIN — LEVOTHYROXINE SODIUM 100 MCG: 0.1 TABLET ORAL at 08:02

## 2020-05-12 RX ADMIN — POTASSIUM CHLORIDE 20 MEQ: 750 TABLET, FILM COATED, EXTENDED RELEASE ORAL at 08:02

## 2020-05-12 RX ADMIN — ENOXAPARIN SODIUM 40 MG: 40 INJECTION SUBCUTANEOUS at 21:12

## 2020-05-12 RX ADMIN — PANTOPRAZOLE SODIUM 40 MG: 40 TABLET, DELAYED RELEASE ORAL at 08:03

## 2020-05-12 RX ADMIN — THERA TABS 1 TABLET: TAB at 08:02

## 2020-05-12 RX ADMIN — TRAZODONE HYDROCHLORIDE 100 MG: 100 TABLET ORAL at 21:09

## 2020-05-12 RX ADMIN — ATORVASTATIN CALCIUM 10 MG: 20 TABLET, FILM COATED ORAL at 08:03

## 2020-05-12 RX ADMIN — ATENOLOL 25 MG: 50 TABLET ORAL at 21:09

## 2020-05-12 NOTE — PROGRESS NOTES
Bedside and Verbal shift change report given to Paresh Gonzales (oncoming nurse) by Roly Platt RN (offgoing nurse). Report included the following information SBAR, Kardex, ED Summary and Recent Results. 2100 - Full assessment complete. No complaints of pain at this time. Temp 101.3 PRN tylenol given   Neuro: A+Ox4   Resp - 5LNC, coarse and diminished   GI/: Incontinent   IVF: NS @ 75    0000 - Reassessment complete. No changes noted    0300 - Reassessment complete. No complaints at this time. Bedside and Verbal shift change report given to Krystal Marcos (oncoming nurse) by Yahaira Muniz RN (offgoing nurse). Report included the following information SBAR, Kardex, ED Summary and Recent Results.

## 2020-05-12 NOTE — PROGRESS NOTES
Investigational therapy documentation and INOPulse protocol for RT    -- Request received for investigational therapy with INOPulse under Intermediate Emergency Access Program with IRB approval: # [id number]  -- Criteria met: COVID positive pneumonia with hypoxia on supplemental O2 between 2-10 L/min by nasal cannula: on 6 L/min  -- Signed informed consent on chart verified- obtained by Dr. Sameer Foster for RT-MIAH placed- per protocol   -- Treatment with INOPulse at 20 ppm (125 mcg dose programmed) for 8-24 hours/day for 3-14 days as indicated   -- Goals of therapy: minimum of 3 days, approved for use for 14 days: do not discontinue until 72 hours of therapy are complete (antiviral effect)   -- After starting Miah from Day 0, start weaning down O2 on EVERY SHIFT as tolerated for SpO2 90% on 2 L/min nasal cannula O2  -- Stop Miah when at least 72 hours of treatment are complete and patient is on 2 L/min of O2   -- Advise patient to rest/sleep in prone position as much as able   -- Since the connecting tubing is short, it is OK for patient to switch to longer only O2 NC to use the bathroom etc. But otherwise should be using Miah as much as able   -- MetHb measurement daily  -- Optimize volume status with diuretics as indicated guided by daily NT ProBNP  -- Patient will be followed by [facility] ([phone]) while on Mami Escoto MD

## 2020-05-12 NOTE — PROGRESS NOTES
MIAH STARTED @111 [facility]  DEVICE ID #[id number]  LOT# [id number]  CARTRIDGE ID #[id number]  [facility] [place]

## 2020-05-12 NOTE — PROGRESS NOTES
Assisted patient for primary RN- patient cleaned up in bed, placed purwick on patient, new bedpads placed, and new gown provided. Pt then sat up and assisted with food tray, tolerating well. [address] repositioned after patient had episode of urine that did not suction with purwick, also urinated well in bedpan. Linen changed in bed, pericare provided, and lotion placed to perineum, purwick repositioned.  Primary RN made aware

## 2020-05-12 NOTE — PROGRESS NOTES
Spiritual Care Assessment/Progress Note  [address]      NAME: Jud Naranjo      MRN: [id number]  AGE: 69 y.o. SEX: female  Mandaen Affiliation: Confucianist   Language: English     5/12/2020     Total Time (in minutes): 10     Spiritual Assessment begun in [facility] INTENSIVE CARE through conversation with:         [x]Patient        [] Family    [] Friend(s)        Reason for Consult: Initial/Spiritual assessment, critical care     Spiritual beliefs: (Please include comment if needed)     [] Identifies with a juan tradition:         [] Supported by a juan community:            [] Claims no spiritual orientation:           [] Seeking spiritual identity:                [] Adheres to an individual form of spirituality:           [x] Not able to assess:                           Identified resources for coping:      [] Prayer                               [] Music                  [] Guided Imagery     [] Family/friends                 [] Pet visits     [] Devotional reading                         [x] Unknown     [] Other:                                              Interventions offered during this visit: (See comments for more details)    Patient Interventions: Other (comment)(Unable to assess)           Plan of Care:     [] Support spiritual and/or cultural needs    [] Support AMD and/or advance care planning process      [] Support grieving process   [] Coordinate Rites and/or Rituals    [] Coordination with community clergy   [] No spiritual needs identified at this time   [] Detailed Plan of Care below (See Comments)  [] Make referral to Music Therapy  [] Make referral to Pet Therapy     [] Make referral to Addiction services  [] Make referral to [facility]  [] Make referral to Spiritual Care Partner  [] No future visits requested        [x] Follow up visits as needed     Comments:  visited Mrs. Ashley Lao for an initial spiritual assessment in the ICU. Mrs. Ashley Lao is on droplet plus precautions and was lying in bed and appeared to be resting comfortably. Chaplains will follow up as able and/or needed  [employer]. Barney Modi.      Paging Service: [phone])

## 2020-05-12 NOTE — PROGRESS NOTES
Readmission risk score is 16%. making pt low risk in the future for a future readmission. Pt is Covid 19 positive.  also tested positive and is in [facility]. I I talked with pt today to complete the ACP discussion. It is in yesterday's progress note section. 2.As pt became quite dyspneic during our discussion,I contacted her  Polina Rivero with pt.'s consent to complete the admission questions . 3. Pt lives with her  in their two story home. 4.Pt has a few steps into their home . 5.Pt stays on the first floor. She has recliners and a master bedroom downstairs. 6.Pt typically cooks and is independent in all aspects. 7.Outpatient pulmonologist is Dr Dean Mary. 6.  is hopeful for pt to return home with home health if needed. 9.At this time,it is too soon to know if pt will benefit from home health or reab. 10.I will continue to follow pt for discharge needs. 11. states pt is a retired  who has [employer]blue shield and medicare. I will request [facility] nurse to research this for pt as she is listed as self-pay and is not. 12.Pt has her ear plugs.  had asked. 13.I will continue to follow pt for discharge needs.     Malorie Hurtado

## 2020-05-12 NOTE — PROGRESS NOTES
[address] [facility] RESIDENCY PROGRAM  PROGRESS NOTE     5/12/2020  PCP: Marvin Parson MD     [id number] [facility] Medicine Residency Attending Addendum:  I saw and evaluated the patient on the day of the encounter with Dr. Marleny Kaur, performing the key elements of the service. I discussed the findings, assessment and plan with the resident and agree with the resident's findings and plan as documented in the resident's note. Started Doxy and will check procalcitonin. BCx NTD.   Caitlin and proning started. Appreciate Pulmonology and Palliative assistance and recommendations. Proning as well. Seema Kaplan MD, FAAFP, CAQSM, RMSK      Assessment/Plan:     Jud Naranjo is a 69 y.o. female with history of HTN, type 2DM, GERD, hypothyroidism, depression, asthma, and hyperlipidemia who is admitted for AHRF 2/2 COVID 19.     Overnight events: Increased WoB, required 7L NC. No more diarrhea     AHRF 2/2 COVID 19: COVID+ on 5/4, worsening SOB on admission and new O2 requirement (RA baseline), CXR with opacification in lower lobes b/l L>R, ABG pH 7.41, pCO2 31, pO2 64, O2 sat 93, bicarb 19, LA 1.8, S/p outpatient tx of azithro and plaquenil ending 5/10  - Follow COVID inflammatory markers, Worsened from yesterday  - Blood culture NG to date  - Adding Doxy for empiric coverage. No azithro or levaquin due to prolonged qtc. - Pulmonology following, appreciate rec's     - Will discuss use of Actemra and concern for Abx  - Zinc, Vit C, Lipitor  - Supplemental O2 as needed.      Hypokalemia: IMPROVED. Likely 2/2 diarrhea (pt reports episodes for >1week). K 2.7 on admission.   - Repletion prn     Type 2 DM: HgA1C 10.4 in 11/2019.  This admission A1C 13.1.   - Hold home glipizide  - Lantus 12U and Lispro 4U TID   - ISS     Asthma  - Continue home Ventolin, patient has brought in her home inhaler  - Continue Singulair 10mg qhs     HTN  - Continue home BP meds of atenolol 25mg qhs, losartan 50mg daily     Hypothyroid: TSH 3.570 in 2019  - Continue home synthroid 100mcg daily     Depression  - Continue Zoloft 50mg dialy and trazadone 100mg qhs to help with sleep     GERD  - Will substitute home prilosec for protonix daily     Hyperlipidemia: Lipid panel , , HDL 59, LDL 89 in 2019  - Will substitute home Zocor 10mg daily with lipitor 10mg daily      Alexandra Levins discussed with Dr. Rebecca Meier. Subjective:   Pt was seen through door window. Febrile. Hemodynamically stable. With labored breathing.      Objective:   Physical examination  Patient Vitals for the past 24 hrs:   Temp Pulse Resp BP SpO2   20 0500  69 27 (!) 108/39 92 %   20 0400  70 28 140/70 93 %   20 0300 (!) 102 °F (38.9 °C) 75 30 132/57 95 %   20 0200  77 (!) 35 133/41 98 %   20 0100  74 30 127/48 97 %   20 0001  68 29  94 %   20 0000    134/54    20 2300 99.5 °F (37.5 °C) 69 (!) 38 120/56 91 %   20 2254  68      20 2208  72 19 108/49 (!) 87 %   20 2100 (!) 101.3 °F (38.5 °C) 78 (!) 32 128/61 97 %   20 2000  73 29 123/46 97 %   20 1900  80 (!) 40 122/54 90 %   20 1800  76 (!) 35 (!) 102/17 91 %   20 1756 99.5 °F (37.5 °C)       20 1700  82 21 139/68 93 %   20 1600 (!) 101 °F (38.3 °C) 76 29 148/57 95 %   20 1545     94 %   20 1500  80 29 116/47 94 %   20 1400  82 30 117/56 90 %   20 1300  84 27 120/57 90 %   20 1200  70 23 134/55 90 %   20 1140 99 °F (37.2 °C) 73 24 149/70 95 %   20 1100 99 °F (37.2 °C) 73 (!) 38 149/70 92 %   20 1000  79 24 142/58 90 %   20 0900  83 18 140/64 (!) 88 %   20 0800 98.5 °F (36.9 °C) 74 25 156/80 (!) 88 %   20 0743 98.5 °F (36.9 °C)       20 0700  67 25 137/59 90 %   20 0600  62 (!) 31 125/67 91 %      Temp (24hrs), Av.8 °F (37.7 °C), Min:98.5 °F (36.9 °C), Max:102 °F (38.9 °C)     O2 Flow Rate (L/min): 5 l/min   O2 Device: Nasal cannula    Date 05/11/20 0700 - 05/12/20 0659 05/12/20 0700 - 05/13/20 0659   Shift 4988-04141859 1900-0659 24 Hour Total 7826-0069 4293-1495 24 Hour Total   INTAKE   P.O. 970  970        P. O. 970  970      I. V.(mL/kg/hr) 900(0.9) 75 975        Volume (0.9% sodium chloride infusion) 900 75 975      Shift Total(mL/kg) 1870(21.6) 75(0.9) 1945(22.4)      OUTPUT   Urine(mL/kg/hr)           Urine Occurrence(s) 3 x 1 x 4 x      Stool           Stool Occurrence(s) 3 x  3 x      Shift Total(mL/kg)         NET 1870 75 1945      Weight (kg) 86.7 86.7 86.7 86.7 86.7 86.7     Pt is currently a COVID infected pt. I did not go in the room to decrease exposure and conserve PE. The attending physician will exam in the pt. Talked on the phone. General:   Alert, cooperative, mild respiratory distress     Data Review:     CBC:  Recent Labs     05/12/20  0431 05/11/20  0404 05/10/20  2209   WBC 6.6 7.2 9.1   HGB 11.8 13.1 14.0   HCT 36.0 38.9 40.9    474 455     Metabolic Panel:  Recent Labs     05/12/20  0431 05/12/20  0326 05/11/20  0404 05/10/20  2209   NA  --  141 140 134*   K  --  3.4* 3.1* 2.7*   CL  --  112* 111* 101   CO2  --  20* 20* 21   BUN  --  8 10 13   CREA  --  0.76 0.81 0.93   GLU  --  227* 192* 289*   CA  --  8.0* 7.4* 8.7   MG  --   --   --  1.6   ALB  --  2.1* 2.4* 2.8*   TBILI  --  0.6 0.4 0.6   SGOT  --  29 37 43*   ALT  --  29 35 40   INR 1.1  --  1.0 1.0     Micro:  Lab Results   Component Value Date/Time    Culture result: MRSA NOT PRESENT AT 13 HOURS 05/11/2020 12:35 AM    Culture result: NO GROWTH AFTER 6 HOURS 05/10/2020 10:09 PM    Culture result: NO GROWTH 2 DAYS 12/20/2013 11:12 AM     Imaging:  Xr Chest Port    Result Date: 5/10/2020  EXAM: XR CHEST PORT INDICATION: sob, +COVID COMPARISON: 2013 FINDINGS: A portable AP radiograph of the chest was obtained at 2145 hours. The patient is on a cardiac monitor.  There is opacification in the lower lobes bilaterally, left greater than right. The cardiac and mediastinal contours and pulmonary vascularity are normal.  The bones and soft tissues are grossly within normal limits. IMPRESSION: Opacification in the lower lobes bilaterally, left greater than right.     Medications reviewed  Current Facility-Administered Medications   Medication Dose Route Frequency    sodium chloride (NS) flush 5-40 mL  5-40 mL IntraVENous Q8H    sodium chloride (NS) flush 5-40 mL  5-40 mL IntraVENous PRN    0.9% sodium chloride infusion  75 mL/hr IntraVENous CONTINUOUS    acetaminophen (TYLENOL) tablet 650 mg  650 mg Oral Q4H PRN    ondansetron (ZOFRAN) injection 4 mg  4 mg IntraVENous Q4H PRN    enoxaparin (LOVENOX) injection 40 mg  40 mg SubCUTAneous Q24H    zinc sulfate (ZINCATE) 220 (50) mg capsule 1 Cap  1 Cap Oral DAILY    ascorbic acid (vitamin C) (VITAMIN C) tablet 500 mg  500 mg Oral BID    aspirin delayed-release tablet 81 mg  81 mg Oral DAILY    atenoloL (TENORMIN) tablet 25 mg  25 mg Oral QHS    calcium-vitamin D (OS-TRISTEN) 500 mg-200 unit tablet  1 Tab Oral DAILY    cetirizine (ZYRTEC) tablet 10 mg  10 mg Oral DAILY    fluticasone propionate (FLONASE) 50 mcg/actuation nasal spray 2 Spray  2 Spray Both Nostrils DAILY PRN    therapeutic multivitamin (THERAGRAN) tablet 1 Tab  1 Tab Oral DAILY    levothyroxine (SYNTHROID) tablet 100 mcg  100 mcg Oral ACB    losartan (COZAAR) tablet 50 mg  50 mg Oral DAILY    montelukast (SINGULAIR) tablet 10 mg  10 mg Oral QHS    sertraline (ZOLOFT) tablet 50 mg  50 mg Oral DAILY    traZODone (DESYREL) tablet 100 mg  100 mg Oral QHS    glucose chewable tablet 16 g  4 Tab Oral PRN    glucagon (GLUCAGEN) injection 1 mg  1 mg IntraMUSCular PRN    dextrose 10% infusion 0-250 mL  0-250 mL IntraVENous PRN    insulin lispro (HUMALOG) injection 4 Units  4 Units SubCUTAneous TIDAC    atorvastatin (LIPITOR) tablet 10 mg  10 mg Oral DAILY    ipratropium-albuterol (COMBIVENT RESPIMAT) 20 mcg-100 mcg inhalation spray  1 Puff Inhalation Q6H RT    fluticasone-vilanterol (BREO ELLIPTA) 200mcg-25mcg/puff  1 Puff Inhalation DAILY    albuterol (PROVENTIL HFA, VENTOLIN HFA, PROAIR HFA) inhaler 2 Puff  2 Puff Inhalation Q4H PRN    insulin lispro (HUMALOG) injection   SubCUTAneous AC&HS    pantoprazole (PROTONIX) tablet 40 mg  40 mg Oral ACB    sodium chloride (NS) flush 5-10 mL  5-10 mL IntraVENous PRN         Signed:   Dorothy David MD   Resident, Family Medicine      Attending note: Attending note to follow. .. 98

## 2020-05-12 NOTE — PROGRESS NOTES
[address] Medicine Residency  Resident Note in Brief  ----------------------------------------    S: Patient is positive for COVID19. Chart reviewed and nursing communicated to with Dr. Uday Jenkins (PGY-3). Patient is doing well without complaint of SOB. O:  Visit Vitals  /54   Pulse 68   Temp 99.5 °F (37.5 °C)   Resp 29   Ht 5' 4\" (1.626 m)   Wt 191 lb 2.2 oz (86.7 kg)   SpO2 94%   BMI 32.81 kg/m²       PE: Deferred    A/P  Bill Armstrong is a 69 y.o. female with history of HTN, type 2DM, GERD, hypothyroidism, depression, asthma, and hyperlipidemia who is admitted for AHRF 2/2 COVID 19. Currently she is comfortable on 5L. No respiratory distress noted  -Bcx NG6H  -Continue Vit C and Zinc  -Continue supplemental O2 prn  -Will continue to monitor patient for signs of respiratory distress      Please see full daily progress note for complete plan.   Madiha Campbell, DO  9:17 AM

## 2020-05-12 NOTE — PROGRESS NOTES
Bedside and Verbal shift change report given to Pat Gautam RN (oncoming nurse) by Janae Rivers RN (offgoing nurse). Report included the following information SBAR, Kardex, MAR and Cardiac Rhythm NSR.     0800 - 0900: Pt resting quietly in bed. No complaints of pain. On 6 liters O2. Sats 90%. Call light in reach. Encouraged patient to call for assistance. Pt blood sugar was 149. Spoke with Dr. Urbano Mendes of [facility]. Orders to hold Lispro and to give Lantus. 1230: Blood sugar 200. Pt refused scheduled Lispro of 4 units. Corrective Insulin, 3 units of lispro given. 1630: Pt without complaints. Blood sugar 171. Pt refused schedule Lispro 4 units. Corrective insulin given. Bedside and Verbal shift change report given to SIERRA Zhu (oncoming nurse) by Pat Gautam RN (offgoing nurse). Report included the following information SBAR, Kardex, MAR and Cardiac Rhythm NSR.

## 2020-05-13 LAB
ALBUMIN SERPL-MCNC: 2 G/DL (ref 3.5–5)
ALBUMIN/GLOB SERPL: 0.5 {RATIO} (ref 1.1–2.2)
ALP SERPL-CCNC: 72 U/L (ref 45–117)
ALT SERPL-CCNC: 26 U/L (ref 12–78)
ANION GAP SERPL CALC-SCNC: 8 MMOL/L (ref 5–15)
AST SERPL-CCNC: 33 U/L (ref 15–37)
BASOPHILS # BLD: 0 K/UL (ref 0–0.1)
BASOPHILS NFR BLD: 0 % (ref 0–1)
BILIRUB SERPL-MCNC: 0.5 MG/DL (ref 0.2–1)
BNP SERPL-MCNC: 1223 PG/ML
BUN SERPL-MCNC: 11 MG/DL (ref 6–20)
BUN/CREAT SERPL: 15 (ref 12–20)
CALCIUM SERPL-MCNC: 8 MG/DL (ref 8.5–10.1)
CHLORIDE SERPL-SCNC: 109 MMOL/L (ref 97–108)
CO2 SERPL-SCNC: 25 MMOL/L (ref 21–32)
COHGB MFR BLD: 0.5 % (ref 1–2)
CREAT SERPL-MCNC: 0.74 MG/DL (ref 0.55–1.02)
CRP SERPL-MCNC: 27.6 MG/DL (ref 0–0.6)
D DIMER PPP FEU-MCNC: 2.3 MG/L FEU (ref 0–0.65)
DIFFERENTIAL METHOD BLD: ABNORMAL
EOSINOPHIL # BLD: 0.2 K/UL (ref 0–0.4)
EOSINOPHIL NFR BLD: 2 % (ref 0–7)
ERYTHROCYTE [DISTWIDTH] IN BLOOD BY AUTOMATED COUNT: 12.5 % (ref 11.5–14.5)
FERRITIN SERPL-MCNC: 317 NG/ML (ref 26–388)
GLOBULIN SER CALC-MCNC: 4 G/DL (ref 2–4)
GLUCOSE BLD STRIP.AUTO-MCNC: 111 MG/DL (ref 65–100)
GLUCOSE BLD STRIP.AUTO-MCNC: 187 MG/DL (ref 65–100)
GLUCOSE BLD STRIP.AUTO-MCNC: 196 MG/DL (ref 65–100)
GLUCOSE BLD STRIP.AUTO-MCNC: 205 MG/DL (ref 65–100)
GLUCOSE SERPL-MCNC: 98 MG/DL (ref 65–100)
HCT VFR BLD AUTO: 35 % (ref 35–47)
HGB BLD OXIMETRY-MCNC: 12 G/DL (ref 14–17)
HGB BLD-MCNC: 11.7 G/DL (ref 11.5–16)
IMM GRANULOCYTES # BLD AUTO: 0.1 K/UL (ref 0–0.04)
IMM GRANULOCYTES NFR BLD AUTO: 1 % (ref 0–0.5)
INR PPP: 1.1 (ref 0.9–1.1)
LDH SERPL L TO P-CCNC: 394 U/L (ref 81–246)
LYMPHOCYTES # BLD: 1.1 K/UL (ref 0.8–3.5)
LYMPHOCYTES NFR BLD: 14 % (ref 12–49)
MAGNESIUM SERPL-MCNC: 1.5 MG/DL (ref 1.6–2.4)
MCH RBC QN AUTO: 29 PG (ref 26–34)
MCHC RBC AUTO-ENTMCNC: 33.4 G/DL (ref 30–36.5)
MCV RBC AUTO: 86.8 FL (ref 80–99)
METHGB MFR BLD: 0.3 % (ref 0–1.4)
MONOCYTES # BLD: 0.6 K/UL (ref 0–1)
MONOCYTES NFR BLD: 7 % (ref 5–13)
NEUTS SEG # BLD: 5.9 K/UL (ref 1.8–8)
NEUTS SEG NFR BLD: 76 % (ref 32–75)
NRBC # BLD: 0 K/UL (ref 0–0.01)
NRBC BLD-RTO: 0 PER 100 WBC
OXYHGB MFR BLD: 96 % (ref 94–97)
PLATELET # BLD AUTO: 280 K/UL (ref 150–400)
PMV BLD AUTO: 10.2 FL (ref 8.9–12.9)
POTASSIUM SERPL-SCNC: 3.4 MMOL/L (ref 3.5–5.1)
PROT SERPL-MCNC: 6 G/DL (ref 6.4–8.2)
PROTHROMBIN TIME: 11.6 SEC (ref 9–11.1)
RBC # BLD AUTO: 4.03 M/UL (ref 3.8–5.2)
SAO2 % BLD: 97 % (ref 95–99)
SERVICE CMNT-IMP: ABNORMAL
SODIUM SERPL-SCNC: 142 MMOL/L (ref 136–145)
WBC # BLD AUTO: 7.9 K/UL (ref 3.6–11)

## 2020-05-13 PROCEDURE — 83615 LACTATE (LD) (LDH) ENZYME: CPT

## 2020-05-13 PROCEDURE — 74011636637 HC RX REV CODE- 636/637: Performed by: FAMILY MEDICINE

## 2020-05-13 PROCEDURE — 74011250637 HC RX REV CODE- 250/637: Performed by: STUDENT IN AN ORGANIZED HEALTH CARE EDUCATION/TRAINING PROGRAM

## 2020-05-13 PROCEDURE — 83880 ASSAY OF NATRIURETIC PEPTIDE: CPT

## 2020-05-13 PROCEDURE — 74011250636 HC RX REV CODE- 250/636: Performed by: STUDENT IN AN ORGANIZED HEALTH CARE EDUCATION/TRAINING PROGRAM

## 2020-05-13 PROCEDURE — 82375 ASSAY CARBOXYHB QUANT: CPT

## 2020-05-13 PROCEDURE — 77010033711 HC HIGH FLOW OXYGEN

## 2020-05-13 PROCEDURE — 82962 GLUCOSE BLOOD TEST: CPT

## 2020-05-13 PROCEDURE — 77030038269 HC DRN EXT URIN PURWCK BARD -A

## 2020-05-13 PROCEDURE — 74011250636 HC RX REV CODE- 250/636: Performed by: INTERNAL MEDICINE

## 2020-05-13 PROCEDURE — 94664 DEMO&/EVAL PT USE INHALER: CPT

## 2020-05-13 PROCEDURE — 74011000258 HC RX REV CODE- 258: Performed by: STUDENT IN AN ORGANIZED HEALTH CARE EDUCATION/TRAINING PROGRAM

## 2020-05-13 PROCEDURE — 82728 ASSAY OF FERRITIN: CPT

## 2020-05-13 PROCEDURE — 80053 COMPREHEN METABOLIC PANEL: CPT

## 2020-05-13 PROCEDURE — 85379 FIBRIN DEGRADATION QUANT: CPT

## 2020-05-13 PROCEDURE — 83735 ASSAY OF MAGNESIUM: CPT

## 2020-05-13 PROCEDURE — 87040 BLOOD CULTURE FOR BACTERIA: CPT

## 2020-05-13 PROCEDURE — 74011250637 HC RX REV CODE- 250/637: Performed by: INTERNAL MEDICINE

## 2020-05-13 PROCEDURE — 85025 COMPLETE CBC W/AUTO DIFF WBC: CPT

## 2020-05-13 PROCEDURE — 77010033678 HC OXYGEN DAILY

## 2020-05-13 PROCEDURE — 74011636637 HC RX REV CODE- 636/637: Performed by: STUDENT IN AN ORGANIZED HEALTH CARE EDUCATION/TRAINING PROGRAM

## 2020-05-13 PROCEDURE — 85610 PROTHROMBIN TIME: CPT

## 2020-05-13 PROCEDURE — 36415 COLL VENOUS BLD VENIPUNCTURE: CPT

## 2020-05-13 PROCEDURE — 65660000000 HC RM CCU STEPDOWN

## 2020-05-13 PROCEDURE — 94640 AIRWAY INHALATION TREATMENT: CPT

## 2020-05-13 PROCEDURE — 86140 C-REACTIVE PROTEIN: CPT

## 2020-05-13 RX ORDER — POTASSIUM CHLORIDE 750 MG/1
20 TABLET, FILM COATED, EXTENDED RELEASE ORAL
Status: COMPLETED | OUTPATIENT
Start: 2020-05-13 | End: 2020-05-13

## 2020-05-13 RX ORDER — FUROSEMIDE 10 MG/ML
40 INJECTION INTRAMUSCULAR; INTRAVENOUS ONCE
Status: COMPLETED | OUTPATIENT
Start: 2020-05-13 | End: 2020-05-13

## 2020-05-13 RX ORDER — INSULIN GLARGINE 100 [IU]/ML
8 INJECTION, SOLUTION SUBCUTANEOUS DAILY
Status: DISCONTINUED | OUTPATIENT
Start: 2020-05-13 | End: 2020-05-15

## 2020-05-13 RX ADMIN — Medication 10 ML: at 21:03

## 2020-05-13 RX ADMIN — MONTELUKAST SODIUM 10 MG: 10 TABLET, FILM COATED ORAL at 21:02

## 2020-05-13 RX ADMIN — SODIUM CHLORIDE 75 ML/HR: 900 INJECTION, SOLUTION INTRAVENOUS at 00:25

## 2020-05-13 RX ADMIN — INSULIN LISPRO 3 UNITS: 100 INJECTION, SOLUTION INTRAVENOUS; SUBCUTANEOUS at 16:04

## 2020-05-13 RX ADMIN — PANTOPRAZOLE SODIUM 40 MG: 40 TABLET, DELAYED RELEASE ORAL at 06:33

## 2020-05-13 RX ADMIN — ATENOLOL 25 MG: 50 TABLET ORAL at 21:01

## 2020-05-13 RX ADMIN — IPRATROPIUM BROMIDE AND ALBUTEROL 1 PUFF: 20; 100 SPRAY, METERED RESPIRATORY (INHALATION) at 07:33

## 2020-05-13 RX ADMIN — IPRATROPIUM BROMIDE AND ALBUTEROL 1 PUFF: 20; 100 SPRAY, METERED RESPIRATORY (INHALATION) at 01:02

## 2020-05-13 RX ADMIN — POTASSIUM BICARBONATE 20 MEQ: 782 TABLET, EFFERVESCENT ORAL at 08:14

## 2020-05-13 RX ADMIN — OYSTER SHELL CALCIUM WITH VITAMIN D 1 TABLET: 500; 200 TABLET, FILM COATED ORAL at 08:15

## 2020-05-13 RX ADMIN — DOXYCYCLINE 100 MG: 100 INJECTION, POWDER, LYOPHILIZED, FOR SOLUTION INTRAVENOUS at 11:39

## 2020-05-13 RX ADMIN — IPRATROPIUM BROMIDE AND ALBUTEROL 1 PUFF: 20; 100 SPRAY, METERED RESPIRATORY (INHALATION) at 19:56

## 2020-05-13 RX ADMIN — Medication 10 ML: at 14:16

## 2020-05-13 RX ADMIN — ENOXAPARIN SODIUM 40 MG: 40 INJECTION SUBCUTANEOUS at 21:02

## 2020-05-13 RX ADMIN — IPRATROPIUM BROMIDE AND ALBUTEROL 1 PUFF: 20; 100 SPRAY, METERED RESPIRATORY (INHALATION) at 15:25

## 2020-05-13 RX ADMIN — THERA TABS 1 TABLET: TAB at 08:15

## 2020-05-13 RX ADMIN — ASPIRIN 81 MG: 81 TABLET, COATED ORAL at 08:14

## 2020-05-13 RX ADMIN — TRAZODONE HYDROCHLORIDE 100 MG: 100 TABLET ORAL at 21:03

## 2020-05-13 RX ADMIN — POTASSIUM CHLORIDE 20 MEQ: 750 TABLET, FILM COATED, EXTENDED RELEASE ORAL at 21:02

## 2020-05-13 RX ADMIN — OXYCODONE HYDROCHLORIDE AND ACETAMINOPHEN 500 MG: 500 TABLET ORAL at 21:01

## 2020-05-13 RX ADMIN — INSULIN GLARGINE 8 UNITS: 100 INJECTION, SOLUTION SUBCUTANEOUS at 08:16

## 2020-05-13 RX ADMIN — ZINC SULFATE 220 MG (50 MG) CAPSULE 1 CAPSULE: CAPSULE at 21:00

## 2020-05-13 RX ADMIN — Medication 10 ML: at 06:34

## 2020-05-13 RX ADMIN — POTASSIUM CHLORIDE 20 MEQ: 750 TABLET, FILM COATED, EXTENDED RELEASE ORAL at 21:00

## 2020-05-13 RX ADMIN — CETIRIZINE HYDROCHLORIDE 10 MG: 10 TABLET, FILM COATED ORAL at 08:14

## 2020-05-13 RX ADMIN — DOXYCYCLINE 100 MG: 100 INJECTION, POWDER, LYOPHILIZED, FOR SOLUTION INTRAVENOUS at 21:02

## 2020-05-13 RX ADMIN — INSULIN LISPRO 2 UNITS: 100 INJECTION, SOLUTION INTRAVENOUS; SUBCUTANEOUS at 11:03

## 2020-05-13 RX ADMIN — LEVOTHYROXINE SODIUM 100 MCG: 0.1 TABLET ORAL at 06:33

## 2020-05-13 RX ADMIN — SERTRALINE HYDROCHLORIDE 50 MG: 50 TABLET ORAL at 08:15

## 2020-05-13 RX ADMIN — FLUTICASONE FUROATE AND VILANTEROL TRIFENATATE 1 PUFF: 200; 25 POWDER RESPIRATORY (INHALATION) at 07:33

## 2020-05-13 RX ADMIN — FUROSEMIDE 40 MG: 10 INJECTION, SOLUTION INTRAMUSCULAR; INTRAVENOUS at 10:55

## 2020-05-13 RX ADMIN — OXYCODONE HYDROCHLORIDE AND ACETAMINOPHEN 500 MG: 500 TABLET ORAL at 08:14

## 2020-05-13 RX ADMIN — ATORVASTATIN CALCIUM 10 MG: 20 TABLET, FILM COATED ORAL at 08:15

## 2020-05-13 RX ADMIN — LOSARTAN POTASSIUM 50 MG: 50 TABLET, FILM COATED ORAL at 08:15

## 2020-05-13 NOTE — PROGRESS NOTES
[address] [facility] PROGRAM  PROGRESS NOTE     5/13/2020  PCP: Ruthie Villalpando MD     [address] [facility] Medicine Residency Attending Addendum:  I saw and evaluated the patient on the day of the encounter with Dr. Darvin Plaza, performing the jones elements of the service. I discussed the findings, assessment and plan with the resident and agree with the resident's findings and plan as documented in the resident's note. O2 demands increasing, though she appears comfortable at bedside. NO per Pulm and will continue abx for now. Tolerating PO, so MIVF may not be needed. Mariia Sargent MD, FAAFP, CAQSM, RMSK      Assessment/Plan:     Armando Peña is a 69 y.o. female with history of HTN, type 2DM, GERD, hypothyroidism, depression, asthma, and hyperlipidemia who is admitted for AHRF 2/2 COVID 19.     Overnight events: Increased WoB, required 8L NC. Decreased PO intake     AHRF 2/2 COVID 19: COVID+ on 5/4, worsening SOB on admission and new O2 requirement (RA baseline), CXR with opacification in lower lobes b/l L>R, ABG pH 7.41, pCO2 31, pO2 64, O2 sat 93, bicarb 19, LA 1.8, S/p outpatient tx of azithro and plaquenil ending 5/10  - Follow COVID inflammatory markers, Worsened from yesterday  - Blood culture NG to date  - Continue Doxy for empiric coverage (Started 5/12). No azithro or levaquin due to prolonged qtc. - Pulmonology following, appreciate rec's- Clinical trial of pulsed Caitlin  - Zinc, Vit C, Lipitor  - Supplemental O2 as needed.      Hypokalemia: IMPROVED. Likely 2/2 diarrhea (pt reports episodes for >1week). K 2.7 on admission.   - Repletion prn     Type 2 DM: HgA1C 10.4 in 11/2019. This admission A1C 13.1. Decreased PO intake.    - Hold home glipizide  - Lantus 8U and SSI ACHS (Stop scheduled mealtime lispro)     Asthma  - Continue home Ventolin, patient has brought in her home inhaler  - Continue Singulair 10mg qhs     HTN  - Continue home BP meds of atenolol 25mg qhs, losartan 50mg daily     Hypothyroid: TSH 3.570 in 2019  - Continue home synthroid 100mcg daily     Depression  - Continue Zoloft 50mg dialy and trazadone 100mg qhs to help with sleep     GERD  - Will substitute home prilosec for protonix daily     Hyperlipidemia: Lipid panel , , HDL 59, LDL 89 in 2019  - Will substitute home Zocor 10mg daily with lipitor 10mg daily      Rachna Arora discussed with Dr. Kathy Zaidi. Subjective:   Pt was seen through door window and spoke on the phone. Currently afebrile, but febrile overnight. Feels like her breathing is doing better. She has decreased PO intake but has enjoyed the chocolate mousse and bananas.       Objective:   Physical examination  Patient Vitals for the past 24 hrs:   Temp Pulse Resp BP SpO2   20 0600  76 (!) 35 140/53 95 %   20 0515  75 (!) 36 133/59 95 %   20 0430 99.8 °F (37.7 °C) 82 30 130/52 94 %   20 0300  78 14 117/51 95 %   20 0200  73 (!) 32 116/58 94 %   20 0100  71 25 113/52 94 %   20 0000 (!) 100.9 °F (38.3 °C) 70 18 113/49 95 %   20 2333  71      20 2300  72 27 99/79 (!) 89 %   20 2222  73 (!) 32 114/54 94 %   20 2100  86 27 137/62 90 %   20 2000 (!) 101.6 °F (38.7 °C) 84 (!) 35 133/64 97 %   20 1900  82 (!) 46 141/66 96 %   20 1800  68 30 128/62 95 %   20 1700 99.5 °F (37.5 °C) 75 23 105/55 94 %   20 1600  78 26 116/69 92 %   20 1502  88      05 1500  85 25 117/50 92 %   20 1442     93 %   20 1400  88 25 109/47 93 %   20 1300  80 25 (!) 110/34 98 %   20 1200  77 29 (!) 124/96 91 %   20 1009     94 %   20 1000  75 27 148/72 95 %   20 0900  72 18 142/73 95 %   20 0800 100 °F (37.8 °C) 79 (!) 33 127/50 92 %   20 0701  72      20 0700  72 (!) 33 130/56 94 %      Temp (24hrs), Av.4 °F (38 °C), Min:99.5 °F (37.5 °C), Max:101.6 °F (38.7 °C) O2 Flow Rate (L/min): 8 l/min   O2 Device: Nasal cannula    Date 05/12/20 0700 - 05/13/20 0659 05/13/20 0700 - 05/14/20 0659   Shift 0700-1859 1900-0659 24 Hour Total 4809-4248 5445-5132 24 Hour Total   INTAKE   P.O. 600 150 750        P. O. 600 150 750      I. V.(mL/kg/hr)  900 900        Volume (0.9% sodium chloride infusion)  900 900      Shift Total(mL/kg) 600(6.9) 1050(11.8) 1650(18.6)      OUTPUT   Urine(mL/kg/hr) 800(0.8) 300 1100        Urine Voided 650  650        Urine Occurrence(s) 2 x  2 x        Urine Output (mL) (External Female Catheter 05/12/20) 150 300 450      Shift Total(mL/kg) 800(9.2) 300(3.4) 1100(12.4)      NET -200 750 550      Weight (kg) 86.7 88.7 88.7 88.7 88.7 88.7     Pt is currently a COVID infected pt. I did not go in the room to decrease exposure and conserve PE. The attending physician will exam in the pt. Talked on the phone. General:   Alert, cooperative, mild respiratory distress     Data Review:     CBC:  Recent Labs     05/13/20 0429 05/12/20  0431 05/11/20  0404   WBC 7.9 6.6 7.2   HGB 11.7 11.8 13.1   HCT 35.0 36.0 38.9    596 828     Metabolic Panel:  Recent Labs     05/13/20  0429 05/12/20  0431 05/12/20  0326 05/11/20  0404 05/10/20  2209     --  141 140 134*   K 3.4*  --  3.4* 3.1* 2.7*   *  --  112* 111* 101   CO2 25  --  20* 20* 21   BUN 11  --  8 10 13   CREA 0.74  --  0.76 0.81 0.93   GLU 98  --  227* 192* 289*   CA 8.0*  --  8.0* 7.4* 8.7   MG  --   --   --   --  1.6   ALB 2.0*  --  2.1* 2.4* 2.8*   TBILI 0.5  --  0.6 0.4 0.6   SGOT 33  --  29 37 43*   ALT 26  --  29 35 40   INR 1.1 1.1  --  1.0 1.0     Micro:  Lab Results   Component Value Date/Time    Culture result: NO GROWTH AFTER 1 HOUR 05/13/2020 04:29 AM    Culture result:  05/11/2020 12:35 AM     MRSA NOT PRESENT. Apparent Staphylococus aureus (not MRSA noted).     Culture result:  05/11/2020 12:35 AM         Screening of patient nares for MRSA is for surveillance purposes and, if positive, to facilitate isolation considerations in high risk settings. It is not intended for automatic decolonization interventions per se as regimens are not sufficiently effective to warrant routine use. Imaging:  Xr Chest Port    Result Date: 5/10/2020  EXAM: XR CHEST PORT INDICATION: sob, +COVID COMPARISON: 2013 FINDINGS: A portable AP radiograph of the chest was obtained at 2145 hours. The patient is on a cardiac monitor. There is opacification in the lower lobes bilaterally, left greater than right. The cardiac and mediastinal contours and pulmonary vascularity are normal.  The bones and soft tissues are grossly within normal limits. IMPRESSION: Opacification in the lower lobes bilaterally, left greater than right.     Medications reviewed  Current Facility-Administered Medications   Medication Dose Route Frequency    insulin glargine (LANTUS) injection 8 Units  8 Units SubCUTAneous DAILY    potassium bicarb-citric acid (EFFER-K) tablet 20 mEq  20 mEq Oral BID    doxycycline (VIBRAMYCIN) 100 mg in 0.9% sodium chloride (MBP/ADV) 100 mL  100 mg IntraVENous Q12H    acetaminophen (TYLENOL) tablet 650 mg  650 mg Oral Q6H PRN    sodium chloride (NS) flush 5-40 mL  5-40 mL IntraVENous Q8H    sodium chloride (NS) flush 5-40 mL  5-40 mL IntraVENous PRN    0.9% sodium chloride infusion  75 mL/hr IntraVENous CONTINUOUS    ondansetron (ZOFRAN) injection 4 mg  4 mg IntraVENous Q4H PRN    enoxaparin (LOVENOX) injection 40 mg  40 mg SubCUTAneous Q24H    zinc sulfate (ZINCATE) 220 (50) mg capsule 1 Cap  1 Cap Oral DAILY    ascorbic acid (vitamin C) (VITAMIN C) tablet 500 mg  500 mg Oral BID    aspirin delayed-release tablet 81 mg  81 mg Oral DAILY    atenoloL (TENORMIN) tablet 25 mg  25 mg Oral QHS    calcium-vitamin D (OS-TRISTEN) 500 mg-200 unit tablet  1 Tab Oral DAILY    cetirizine (ZYRTEC) tablet 10 mg  10 mg Oral DAILY    fluticasone propionate (FLONASE) 50 mcg/actuation nasal spray 2 Spray  2 Spray Both Nostrils DAILY PRN    therapeutic multivitamin (THERAGRAN) tablet 1 Tab  1 Tab Oral DAILY    levothyroxine (SYNTHROID) tablet 100 mcg  100 mcg Oral ACB    losartan (COZAAR) tablet 50 mg  50 mg Oral DAILY    montelukast (SINGULAIR) tablet 10 mg  10 mg Oral QHS    sertraline (ZOLOFT) tablet 50 mg  50 mg Oral DAILY    traZODone (DESYREL) tablet 100 mg  100 mg Oral QHS    glucose chewable tablet 16 g  4 Tab Oral PRN    glucagon (GLUCAGEN) injection 1 mg  1 mg IntraMUSCular PRN    dextrose 10% infusion 0-250 mL  0-250 mL IntraVENous PRN    insulin lispro (HUMALOG) injection 4 Units  4 Units SubCUTAneous TIDAC    atorvastatin (LIPITOR) tablet 10 mg  10 mg Oral DAILY    ipratropium-albuterol (COMBIVENT RESPIMAT) 20 mcg-100 mcg inhalation spray  1 Puff Inhalation Q6H RT    fluticasone-vilanterol (BREO ELLIPTA) 200mcg-25mcg/puff  1 Puff Inhalation DAILY    albuterol (PROVENTIL HFA, VENTOLIN HFA, PROAIR HFA) inhaler 2 Puff  2 Puff Inhalation Q4H PRN    insulin lispro (HUMALOG) injection   SubCUTAneous AC&HS    pantoprazole (PROTONIX) tablet 40 mg  40 mg Oral ACB    sodium chloride (NS) flush 5-10 mL  5-10 mL IntraVENous PRN         Signed:   Robert Francis MD   Resident, Family Medicine      Attending note: Attending note to follow. ..

## 2020-05-13 NOTE — PROGRESS NOTES
Spiritual Care Assessment/Progress Note  [address]      NAME: Alexandra Jacobsen      MRN: [id number]  AGE: 69 y.o. SEX: female  [employer]ar[employer] Affiliation: Advent   Language: English     5/13/2020     Total Time (in minutes): 5     Spiritual Assessment begun in [facility] INTENSIVE CARE through conversation with:         [x]Patient        [] Family    [] Friend(s)        Reason for Consult: Initial/Spiritual assessment, patient floor     Spiritual beliefs: (Please include comment if needed)     [] Identifies with a juan tradition:         [] Supported by a juan community:            [] Claims no spiritual orientation:           [] Seeking spiritual identity:                [] Adheres to an individual form of spirituality:           [x] Not able to assess:                           Identified resources for coping:      [] Prayer                               [] Music                  [] Guided Imagery     [] Family/friends                 [] Pet visits     [] Devotional reading                         [x] Unknown     [] Other:                                                Interventions offered during this visit: (See comments for more details)    Patient Interventions: Other (comment)(Attempted)           Plan of Care:     [] Support spiritual and/or cultural needs    [] Support AMD and/or advance care planning process      [] Support grieving process   [] Coordinate Rites and/or Rituals    [] Coordination with community clergy   [] No spiritual needs identified at this time   [] Detailed Plan of Care below (See Comments)  [] Make referral to Music Therapy  [] Make referral to Pet Therapy     [] Make referral to Addiction services  [] Make referral to [facility]  [] Make referral to Spiritual Care Partner  [] No future visits requested        [x] Follow up visits as needed     Comments: attempted palliative care initial spiritual assessment in the ICU.   observed pt, Miss Juan Hickman sitting in her recliner and was sleeping. Her nurse was in another room. Spiritual care will continue to follow up as needed . Visited by: Mehul Murphy.   To page : [phone])

## 2020-05-13 NOTE — PROGRESS NOTES
1900: Bedside and Verbal shift change report given to Nicola Brown and Jacob Martin (oncoming nurse) by Morteza Helms (offgoing nurse). Report included the following information SBAR, Kardex, ED Summary, Intake/Output, MAR, Recent Results and Cardiac Rhythm NSR. Primary Nurse Jean Lin and Naeem Floyd RN, RN performed a dual skin assessment on this patient No impairment noted    2000: Shift  Assessment completed            Mental Status:  patient completely alert and oriented. Moves all extremities. Neuro intact. Respiratory: crackly, coarse lung sounds with exp wheezing noted. On 6l midflow nc, with labored breathing at times. Cardiac: bp stable. s1 s2, palpable pulses, skin is warm. GI/: continent, purwick in place draining clear yellow urine. Patient has active bowel sounds but is nauseated at times with poor appetite. IV Drips:    2100: patient has fever of 101.6-given 2 tylenols. Repositioned patient in bed. Patient refusing cold compresses at the moment. Both peripheral ivs infiltrated, placed a new #20g rt ac. Good blood return in iv.     2200: rechecked temp again. 101.8 patient resting in bed.     2300: patient resting in bed. 0000: Reassessment completed. No changes from previous assessment. Patient resting in bed. On 8l of midlow, sating 96%. 0100: resting in bed     0200: resting in bed     0300: resting in bed    0400: Reassessment completed. No changes from previous assessment. Gunner labs and sent for processing. Bathed patient and changed linens. Patient has no complaints currently. On 8 L NC sating well.     0500: patient resting in bed.     0600: Gave 0730 meds, patient provided water. Discussed care with a doctor this am, updated him on current assessment of patient. 0700: Bedside and Verbal shift change report given to [address] (oncoming nurse) by Jacob Martin and Nicola Brown RN (offgoing nurse).  Report included the following information SBAR, Kardex, ED Summary, Intake/Output, MAR, Cardiac Rhythm NSR and Alarm Parameters .

## 2020-05-13 NOTE — CONSULTS
[facility]  Pulmonary, Critical Care, and Sleep Medicine    Initial Patient Consult    Name: Armando Peña MRN: [id number]   : 1945 Hospital: [facility]   Date: 2020        IMPRESSION:   · COVID-19 pneumonia with respiratory failure:  Pt at risk for cytokine storm  · Mild intermittent asthma  · Chronic back pain  · DM  · HTN      RECOMMENDATIONS:   · Cont w supplemental oxygen. Needs gradually increasing  · iNOPulse started   · Will give more lasix today and stop IVF  · Follow inflammatory markers; at risk for cytokine storm. IL-6 pending. If her markers cont to trend up, will consider actemra. · Check quant gold  · Encouraged pt to lie prone at least 4 hours per day and to use inc tommy q 1 hr w/a  · OOB to chair  · Has completed hydroxychloroquine/azithro  · Agree with empiric abx, may be able to de-escalate in a few days  · Scheduled combivent MDI    Discussed the use of pulsed Caitlin with pt. She understands that this is an experimental therapy with possible benefit in COVID-19 but that its efficacy is unknown. She is agreeable to proceed with the device. Pt with worsening by some parameters. Needs to work on pulm toilet. At high risk for deterioration and will need cont'd close observation. 35 min spent in CC mgmt     Subjective: This patient has been seen and evaluated at the request of Dr. Brionna Burgos for [place] 19 pneumonia and resp failure. Patient is a 69 y.o. female diagnosed one week ago with Covid 19. Pt with fevers, diarrhea, cough, and sob. She has been isolating at home but has had work done within the home and believes that she may have gotten an infection from one of the workers there.  has also become infected. Pt states that her symptoms have remained stable to slightly improved over the past few days but has noted that her oxygen sats have been falling and presented to ED for evaluation. Fevers seem to have resolved. Main symptoms are now diarrhea, sob, and cough. Pt w h/o asthma which by description appears to be mild intermittent and is followed by Dr Leola Mix in [place]. Controlled with montelukast and (rare) albuterol MDI. Never smoker    Currently on 6 lpm w sats of 90%    Interval history:  Tm 101.6. Feels about the same perhaps slightly better though O2 need up to 9 lpm.  Not getting oob. Past Medical History:   Diagnosis Date    Anxiety     Arthritis     Asthma     Chronic pain     back,right hip    Depression     Diabetes ([address]     type 2    GERD (gastroesophageal reflux disease)     Hypercholesteremia     Hypertension     Hypothyroid     Nausea & vomiting     Reflux     Sleep apnea     wears dental appliance- Somnident    Thyroid disease     Unspecified adverse effect of anesthesia     delayed awakening      Past Surgical History:   Procedure Laterality Date    HX COLONOSCOPY  2012    normal repeat 2022, diverticulosis of sigmoid colon, Dr. Robins Ket HX HEENT  1/08/14    bilateral cataract surgery1/8/14    HX KNEE REPLACEMENT      right    HX OTHER SURGICAL  2007    cardiac cath- No CAD found    NEUROLOGICAL PROCEDURE UNLISTED  1999    vascular brain surgery      Prior to Admission medications    Medication Sig Start Date End Date Taking? Authorizing Provider   guaiFENesin ER (Mucinex) 600 mg ER tablet Take 600 mg by mouth two (2) times a day. Yes Provider, Historical   omeprazole (PRILOSEC) 20 mg capsule Take 20 mg by mouth daily. Yes Provider, Historical   calcium-vitamin D (OYSTER SHELL) 500 mg(1,250mg) -200 unit per tablet Take 1 Tab by mouth daily. Yes Provider, Historical   traZODone (DESYREL) 100 mg tablet TAKE 1 TABLET BY MOUTH EVERY DAY AT NIGHT 3/25/20  Yes Randi Bender, NP   glipiZIDE SR (GLUCOTROL XL) 10 mg CR tablet TAKE 1 TABLET BY MOUTH TWICE A DAY 2/15/20  Yes Randi Bender NP   simvastatin (ZOCOR) 10 mg tablet Take 1 Tab by mouth nightly.  10/8/19 Yes Steff Bender NP   losartan (COZAAR) 50 mg tablet TAKE 1 TABLET BY MOUTH EVERY DAY. 10/8/19  Yes Steff Bender NP   levothyroxine (SYNTHROID) 100 mcg tablet Take 1 Tab by mouth Daily (before breakfast). 10/8/19  Yes Steff Bender NP   atenolol (TENORMIN) 25 mg tablet Take 1 Tab by mouth nightly. 10/8/19  Yes Steff Bender NP   sertraline (ZOLOFT) 50 mg tablet TAKE 1 TABLET BY MOUTH EVERY DAY 9/4/19  Yes Steff Bender NP   aspirin delayed-release 81 mg tablet Take 81 mg by mouth daily. Yes Provider, Historical   fluticasone (FLONASE) 50 mcg/actuation nasal spray 2 Sprays by Both Nostrils route daily as needed for Rhinitis or Allergies. Yes Provider, Historical   albuterol (PROAIR HFA) 90 mcg/actuation inhaler Take 2 Puffs by inhalation every four (4) hours as needed for Wheezing. Yes Provider, Historical   Cetirizine (ZYRTEC) 10 mg cap Take 1 Tab by mouth every evening. Yes Provider, Historical   multivitamins-minerals-lutein (CENTRUM SILVER) tab Take 1 Tab by mouth daily. Yes Provider, Historical   glucosamine-chondroit-vit c-mn (GLUCOSAMINE CHONDROITIN MAXSTR) 500-400 mg capsule Take 1 Cap by mouth daily. Yes Other, MD [place]   varicella-zoster recombinant, PF, (SHINGRIX, PF,) 50 mcg/0.5 mL susr injection 0.5mL by IntraMUSCular route once now and then repeat in 2-6 months 10/8/19   Latisha Denton NP     Allergies   Allergen Reactions    Latex Rash    Prednisone Anaphylaxis     Throat swelling. Can take methylpredisone po or IV without problems.     Augmentin [Amoxicillin-Pot Clavulanate] Nausea and Vomiting    Biaxin [Clarithromycin] Nausea and Vomiting    Metformin Diarrhea    Parafon Forte Dsc [Chlorzoxazone] Nausea and Vomiting      Social History     Tobacco Use    Smoking status: Never Smoker    Smokeless tobacco: Never Used   Substance Use Topics    Alcohol use: No     Frequency: Never     Comment: 2x year      Family History   Problem Relation Age of Onset    Elevated Lipids Mother     Dementia Mother     Osteoporosis Mother     Lung Disease Father         copd    Delayed Awakening Father     Post-op Nausea/Vomiting Father     Hypertension Sister     Diabetes Sister         type 2    Breast Cancer Maternal Aunt         Current Facility-Administered Medications   Medication Dose Route Frequency    insulin glargine (LANTUS) injection 8 Units  8 Units SubCUTAneous DAILY    potassium bicarb-citric acid (EFFER-K) tablet 20 mEq  20 mEq Oral BID    doxycycline (VIBRAMYCIN) 100 mg in 0.9% sodium chloride (MBP/ADV) 100 mL  100 mg IntraVENous Q12H    sodium chloride (NS) flush 5-40 mL  5-40 mL IntraVENous Q8H    0.9% sodium chloride infusion  75 mL/hr IntraVENous CONTINUOUS    enoxaparin (LOVENOX) injection 40 mg  40 mg SubCUTAneous Q24H    zinc sulfate (ZINCATE) 220 (50) mg capsule 1 Cap  1 Cap Oral DAILY    ascorbic acid (vitamin C) (VITAMIN C) tablet 500 mg  500 mg Oral BID    aspirin delayed-release tablet 81 mg  81 mg Oral DAILY    atenoloL (TENORMIN) tablet 25 mg  25 mg Oral QHS    calcium-vitamin D (OS-TRISTEN) 500 mg-200 unit tablet  1 Tab Oral DAILY    cetirizine (ZYRTEC) tablet 10 mg  10 mg Oral DAILY    therapeutic multivitamin (THERAGRAN) tablet 1 Tab  1 Tab Oral DAILY    levothyroxine (SYNTHROID) tablet 100 mcg  100 mcg Oral ACB    losartan (COZAAR) tablet 50 mg  50 mg Oral DAILY    montelukast (SINGULAIR) tablet 10 mg  10 mg Oral QHS    sertraline (ZOLOFT) tablet 50 mg  50 mg Oral DAILY    traZODone (DESYREL) tablet 100 mg  100 mg Oral QHS    atorvastatin (LIPITOR) tablet 10 mg  10 mg Oral DAILY    ipratropium-albuterol (COMBIVENT RESPIMAT) 20 mcg-100 mcg inhalation spray  1 Puff Inhalation Q6H RT    fluticasone-vilanterol (BREO ELLIPTA) 200mcg-25mcg/puff  1 Puff Inhalation DAILY    insulin lispro (HUMALOG) injection   SubCUTAneous AC&HS    pantoprazole (PROTONIX) tablet 40 mg  40 mg Oral ACB       Review of Systems:  A comprehensive review of systems was negative except for that written in the HPI. Objective:   Vital Signs:    Visit Vitals  /57   Pulse 79   Temp 99.4 °F (37.4 °C)   Resp 30   Ht 5' 4\" (1.626 m)   Wt 88.7 kg (195 lb 8.8 oz)   SpO2 95%   BMI 33.57 kg/m²       O2 Device: Nasal cannula(Nitric)   O2 Flow Rate (L/min): 9 l/min   Temp (24hrs), Av.2 °F (37.9 °C), Min:99.4 °F (37.4 °C), Max:101.6 °F (38.7 °C)       Intake/Output:   Last shift:      No intake/output data recorded. Last 3 shifts:  1901 -  0700  In: 9758 [P.O.:750; I.V.:900]  Out: 1100 [Urine:1100]    Intake/Output Summary (Last 24 hours) at 2020 0836  Last data filed at 2020 0600  Gross per 24 hour   Intake 1650 ml   Output 1100 ml   Net 550 ml      Physical Exam:   General:  Alert, cooperative, mild distress, appears stated age. Head:  Normocephalic, without obvious abnormality, atraumatic. Eyes:  Conjunctivae/corneas clear. PERRL, EOMs intact. Nose: Nares normal. Septum midline. Mucosa normal. No drainage or sinus tenderness. Throat: Lips, mucosa, and tongue normal. Teeth and gums normal.   Neck: Supple, symmetrical, trachea midline, no adenopathy, thyroid: no enlargment/tenderness/nodules, no carotid bruit and no JVD. Back:   Symmetric, no curvature. ROM normal.   Lungs:   Clear anteriorly, post basilar crackles   Chest wall:  No tenderness or deformity. Heart:  Regular rate and rhythm, S1, S2 normal, no murmur, click, rub or gallop. Abdomen:   Soft, non-tender. Bowel sounds normal. No masses,  No organomegaly. Extremities: Extremities normal, atraumatic, no cyanosis or edema. Pulses: 2+ and symmetric all extremities.    Skin: Skin color, texture, turgor normal. No rashes or lesions   Lymph nodes: Cervical, supraclavicular, and axillary nodes normal.   Neurologic: Grossly nonfocal     Data review:     Recent Results (from the past 24 hour(s))   PROCALCITONIN    Collection Time: 20 10:55 AM   Result Value Ref Range    Procalcitonin 0.29 ng/mL   CARBOXY HEMOGLOBIN    Collection Time: 05/12/20 11:03 AM   Result Value Ref Range    Carboxy-Hgb 3.4 (H) 1 - 2 %    Methemoglobin 0.3 0 - 1.4 %    tHb 12.7 (L) 14 - 17 g/dL    Oxyhemoglobin 95.3 94 - 97 %    O2 SATURATION 99 95 - 99 %   GLUCOSE, POC    Collection Time: 05/12/20 11:37 AM   Result Value Ref Range    Glucose (POC) 200 (H) 65 - 100 mg/dL    Performed by Natalie Ramos    GLUCOSE, POC    Collection Time: 05/12/20  5:04 PM   Result Value Ref Range    Glucose (POC) 171 (H) 65 - 100 mg/dL    Performed by Natalie Ramos    GLUCOSE, POC    Collection Time: 05/12/20  9:10 PM   Result Value Ref Range    Glucose (POC) 138 (H) 65 - 100 mg/dL    Performed by Nohemy Ocampo    PROTHROMBIN TIME + INR    Collection Time: 05/13/20  4:29 AM   Result Value Ref Range    INR 1.1 0.9 - 1.1      Prothrombin time 11.6 (H) 9.0 - 11.1 sec   NT-PRO BNP    Collection Time: 05/13/20  4:29 AM   Result Value Ref Range    NT pro-BNP 1,223 (H) <125 PG/ML   LD    Collection Time: 05/13/20  4:29 AM   Result Value Ref Range     (H) 81 - 246 U/L   C REACTIVE PROTEIN, QT    Collection Time: 05/13/20  4:29 AM   Result Value Ref Range    C-Reactive protein 27.60 (H) 0.00 - 0.60 mg/dL   FERRITIN    Collection Time: 05/13/20  4:29 AM   Result Value Ref Range    Ferritin 317 26 - 388 NG/ML   D DIMER    Collection Time: 05/13/20  4:29 AM   Result Value Ref Range    D-dimer 2.30 (H) 0.00 - 0.65 mg/L FEU   CBC WITH AUTOMATED DIFF    Collection Time: 05/13/20  4:29 AM   Result Value Ref Range    WBC 7.9 3.6 - 11.0 K/uL    RBC 4.03 3.80 - 5.20 M/uL    HGB 11.7 11.5 - 16.0 g/dL    HCT 35.0 35.0 - 47.0 %    MCV 86.8 80.0 - 99.0 FL    MCH 29.0 26.0 - 34.0 PG    MCHC 33.4 30.0 - 36.5 g/dL    RDW 12.5 11.5 - 14.5 %    PLATELET 503 354 - 473 K/uL    MPV 10.2 8.9 - 12.9 FL    NRBC 0.0 0  WBC    ABSOLUTE NRBC 0.00 0.00 - 0.01 K/uL    NEUTROPHILS 76 (H) 32 - 75 %    LYMPHOCYTES 14 12 - 49 % MONOCYTES 7 5 - 13 %    EOSINOPHILS 2 0 - 7 %    BASOPHILS 0 0 - 1 %    IMMATURE GRANULOCYTES 1 (H) 0.0 - 0.5 %    ABS. NEUTROPHILS 5.9 1.8 - 8.0 K/UL    ABS. LYMPHOCYTES 1.1 0.8 - 3.5 K/UL    ABS. MONOCYTES 0.6 0.0 - 1.0 K/UL    ABS. EOSINOPHILS 0.2 0.0 - 0.4 K/UL    ABS. BASOPHILS 0.0 0.0 - 0.1 K/UL    ABS. IMM. GRANS. 0.1 (H) 0.00 - 0.04 K/UL    DF AUTOMATED     METABOLIC PANEL, COMPREHENSIVE    Collection Time: 05/13/20  4:29 AM   Result Value Ref Range    Sodium 142 136 - 145 mmol/L    Potassium 3.4 (L) 3.5 - 5.1 mmol/L    Chloride 109 (H) 97 - 108 mmol/L    CO2 25 21 - 32 mmol/L    Anion gap 8 5 - 15 mmol/L    Glucose 98 65 - 100 mg/dL    BUN 11 6 - 20 MG/DL    Creatinine 0.74 0.55 - 1.02 MG/DL    BUN/Creatinine ratio 15 12 - 20      GFR est AA >60 >60 ml/min/1.73m2    GFR est non-AA >60 >60 ml/min/1.73m2    Calcium 8.0 (L) 8.5 - 10.1 MG/DL    Bilirubin, total 0.5 0.2 - 1.0 MG/DL    ALT (SGPT) 26 12 - 78 U/L    AST (SGOT) 33 15 - 37 U/L    Alk.  phosphatase 72 45 - 117 U/L    Protein, total 6.0 (L) 6.4 - 8.2 g/dL    Albumin 2.0 (L) 3.5 - 5.0 g/dL    Globulin 4.0 2.0 - 4.0 g/dL    A-G Ratio 0.5 (L) 1.1 - 2.2     CULTURE, BLOOD    Collection Time: 05/13/20  4:29 AM   Result Value Ref Range    Special Requests: NO SPECIAL REQUESTS      Culture result: NO GROWTH AFTER 1 HOUR     MAGNESIUM    Collection Time: 05/13/20  4:29 AM   Result Value Ref Range    Magnesium 1.5 (L) 1.6 - 2.4 mg/dL   CARBOXY HEMOGLOBIN    Collection Time: 05/13/20  4:35 AM   Result Value Ref Range    Carboxy-Hgb 0.5 (L) 1 - 2 %    Methemoglobin 0.3 0 - 1.4 %    tHb 12 (L) 14 - 17 g/dL    Oxyhemoglobin 96 94 - 97 %    O2 SATURATION 97 95 - 99 %   GLUCOSE, POC    Collection Time: 05/13/20  8:18 AM   Result Value Ref Range    Glucose (POC) 111 (H) 65 - 100 mg/dL    Performed by Olive Melvin        Imaging:  I have personally reviewed the patients radiographs and have reviewed the reports:  Mild patchy infiltrates in lung bases        Jennifer Chantal Piña MD

## 2020-05-13 NOTE — PROGRESS NOTES
Patients Nitric changed @11:30. Patient on 10 lpm. Sating 92% HR of 81.   Lot  # [id number]  Cartridge Identifier [id number]  Cannula Lot # [id number]

## 2020-05-13 NOTE — PROGRESS NOTES
Nitric machine checked this morning two bars of medication left. Will changed when necessary. Patient on 9 lpm. Sating 95% HR 81. BS are diminished. Breo & Combivent given 1 puff each. Non-productive congested cough. Will check back.

## 2020-05-13 NOTE — PROGRESS NOTES
Nitric checked 4 bars of medication left. Running on 10 lpm Patient sating 93% HR of 91. BS diminished.

## 2020-05-13 NOTE — PROGRESS NOTES
0700: Verbal shift change report given to Sujatha Hodge RN (oncoming nurse) by Chase Nixon RN (offgoing nurse). Report included the following information SBAR, Kardex, Intake/Output and MAR.     0830: RN assessment completed. Dr. Tony Rose bedside to assess patient. 1000: Notified Dr. Tony Rsoe about increasing O2 to 10L.     1030: Patient up to the chair. 1130: Reassessment completed. 1400: Patient back to bed.    1600: Reassessment completed    1900: Bedside and Verbal shift change report given to [facility], RN (oncoming nurse) by Sujatha Hodge RN (offgoing nurse). Report included the following information SBAR, Kardex, Intake/Output and MAR.

## 2020-05-14 LAB
ALBUMIN SERPL-MCNC: 2.1 G/DL (ref 3.5–5)
ALBUMIN/GLOB SERPL: 0.5 {RATIO} (ref 1.1–2.2)
ALP SERPL-CCNC: 82 U/L (ref 45–117)
ALT SERPL-CCNC: 27 U/L (ref 12–78)
ANION GAP SERPL CALC-SCNC: 7 MMOL/L (ref 5–15)
ARTERIAL PATENCY WRIST A: YES
AST SERPL-CCNC: 35 U/L (ref 15–37)
BASE DEFICIT BLDA-SCNC: 0.3 MMOL/L
BASOPHILS # BLD: 0 K/UL (ref 0–0.1)
BASOPHILS NFR BLD: 0 % (ref 0–1)
BDY SITE: ABNORMAL
BILIRUB SERPL-MCNC: 0.6 MG/DL (ref 0.2–1)
BNP SERPL-MCNC: 491 PG/ML
BUN SERPL-MCNC: 12 MG/DL (ref 6–20)
BUN/CREAT SERPL: 17 (ref 12–20)
CALCIUM SERPL-MCNC: 8.3 MG/DL (ref 8.5–10.1)
CHLORIDE SERPL-SCNC: 106 MMOL/L (ref 97–108)
CO2 SERPL-SCNC: 24 MMOL/L (ref 21–32)
COMMENT, HOLDF: NORMAL
CREAT SERPL-MCNC: 0.71 MG/DL (ref 0.55–1.02)
CRP SERPL-MCNC: 22 MG/DL (ref 0–0.6)
D DIMER PPP FEU-MCNC: 2.59 MG/L FEU (ref 0–0.65)
DIFFERENTIAL METHOD BLD: ABNORMAL
EOSINOPHIL # BLD: 0.2 K/UL (ref 0–0.4)
EOSINOPHIL NFR BLD: 3 % (ref 0–7)
ERYTHROCYTE [DISTWIDTH] IN BLOOD BY AUTOMATED COUNT: 12.4 % (ref 11.5–14.5)
FERRITIN SERPL-MCNC: 343 NG/ML (ref 26–388)
GAS FLOW.O2 O2 DELIVERY SYS: 10 L/MIN
GLOBULIN SER CALC-MCNC: 4.3 G/DL (ref 2–4)
GLUCOSE BLD STRIP.AUTO-MCNC: 167 MG/DL (ref 65–100)
GLUCOSE BLD STRIP.AUTO-MCNC: 168 MG/DL (ref 65–100)
GLUCOSE BLD STRIP.AUTO-MCNC: 227 MG/DL (ref 65–100)
GLUCOSE BLD STRIP.AUTO-MCNC: 231 MG/DL (ref 65–100)
GLUCOSE BLD STRIP.AUTO-MCNC: 231 MG/DL (ref 65–100)
GLUCOSE SERPL-MCNC: 181 MG/DL (ref 65–100)
HCO3 BLDA-SCNC: 23 MMOL/L (ref 22–26)
HCT VFR BLD AUTO: 36.8 % (ref 35–47)
HGB BLD-MCNC: 12.1 G/DL (ref 11.5–16)
IMM GRANULOCYTES # BLD AUTO: 0.1 K/UL (ref 0–0.04)
IMM GRANULOCYTES NFR BLD AUTO: 2 % (ref 0–0.5)
INR PPP: 1.1 (ref 0.9–1.1)
LDH SERPL L TO P-CCNC: 424 U/L (ref 81–246)
LYMPHOCYTES # BLD: 1 K/UL (ref 0.8–3.5)
LYMPHOCYTES NFR BLD: 13 % (ref 12–49)
MAGNESIUM SERPL-MCNC: 1.6 MG/DL (ref 1.6–2.4)
MCH RBC QN AUTO: 28.3 PG (ref 26–34)
MCHC RBC AUTO-ENTMCNC: 32.9 G/DL (ref 30–36.5)
MCV RBC AUTO: 86.2 FL (ref 80–99)
MONOCYTES # BLD: 0.5 K/UL (ref 0–1)
MONOCYTES NFR BLD: 7 % (ref 5–13)
NEUTS SEG # BLD: 5.7 K/UL (ref 1.8–8)
NEUTS SEG NFR BLD: 75 % (ref 32–75)
NRBC # BLD: 0 K/UL (ref 0–0.01)
NRBC BLD-RTO: 0 PER 100 WBC
PCO2 BLDA: 33 MMHG (ref 35–45)
PH BLDA: 7.45 [PH] (ref 7.35–7.45)
PLATELET # BLD AUTO: 319 K/UL (ref 150–400)
PMV BLD AUTO: 10.5 FL (ref 8.9–12.9)
PO2 BLDA: 56 MMHG (ref 80–100)
POTASSIUM SERPL-SCNC: 3.3 MMOL/L (ref 3.5–5.1)
PROT SERPL-MCNC: 6.4 G/DL (ref 6.4–8.2)
PROTHROMBIN TIME: 11.4 SEC (ref 9–11.1)
RBC # BLD AUTO: 4.27 M/UL (ref 3.8–5.2)
SAMPLES BEING HELD,HOLD: NORMAL
SAO2 % BLD: 91 % (ref 92–97)
SAO2% DEVICE SAO2% SENSOR NAME: ABNORMAL
SERVICE CMNT-IMP: ABNORMAL
SODIUM SERPL-SCNC: 137 MMOL/L (ref 136–145)
SPECIMEN SITE: ABNORMAL
WBC # BLD AUTO: 7.6 K/UL (ref 3.6–11)

## 2020-05-14 PROCEDURE — 77030027138 HC INCENT SPIROMETER -A

## 2020-05-14 PROCEDURE — 86140 C-REACTIVE PROTEIN: CPT

## 2020-05-14 PROCEDURE — 77010033678 HC OXYGEN DAILY

## 2020-05-14 PROCEDURE — 74011250637 HC RX REV CODE- 250/637: Performed by: STUDENT IN AN ORGANIZED HEALTH CARE EDUCATION/TRAINING PROGRAM

## 2020-05-14 PROCEDURE — 82803 BLOOD GASES ANY COMBINATION: CPT

## 2020-05-14 PROCEDURE — 83615 LACTATE (LD) (LDH) ENZYME: CPT

## 2020-05-14 PROCEDURE — 74011250637 HC RX REV CODE- 250/637: Performed by: INTERNAL MEDICINE

## 2020-05-14 PROCEDURE — 74011250636 HC RX REV CODE- 250/636: Performed by: INTERNAL MEDICINE

## 2020-05-14 PROCEDURE — 74011636637 HC RX REV CODE- 636/637: Performed by: STUDENT IN AN ORGANIZED HEALTH CARE EDUCATION/TRAINING PROGRAM

## 2020-05-14 PROCEDURE — 74011636637 HC RX REV CODE- 636/637: Performed by: FAMILY MEDICINE

## 2020-05-14 PROCEDURE — 85610 PROTHROMBIN TIME: CPT

## 2020-05-14 PROCEDURE — 94640 AIRWAY INHALATION TREATMENT: CPT

## 2020-05-14 PROCEDURE — 74011250636 HC RX REV CODE- 250/636: Performed by: STUDENT IN AN ORGANIZED HEALTH CARE EDUCATION/TRAINING PROGRAM

## 2020-05-14 PROCEDURE — 36415 COLL VENOUS BLD VENIPUNCTURE: CPT

## 2020-05-14 PROCEDURE — 82375 ASSAY CARBOXYHB QUANT: CPT

## 2020-05-14 PROCEDURE — 86480 TB TEST CELL IMMUN MEASURE: CPT

## 2020-05-14 PROCEDURE — 85025 COMPLETE CBC W/AUTO DIFF WBC: CPT

## 2020-05-14 PROCEDURE — 65660000000 HC RM CCU STEPDOWN

## 2020-05-14 PROCEDURE — 36600 WITHDRAWAL OF ARTERIAL BLOOD: CPT

## 2020-05-14 PROCEDURE — 74011000258 HC RX REV CODE- 258: Performed by: STUDENT IN AN ORGANIZED HEALTH CARE EDUCATION/TRAINING PROGRAM

## 2020-05-14 PROCEDURE — 83735 ASSAY OF MAGNESIUM: CPT

## 2020-05-14 PROCEDURE — 82962 GLUCOSE BLOOD TEST: CPT

## 2020-05-14 PROCEDURE — 80053 COMPREHEN METABOLIC PANEL: CPT

## 2020-05-14 PROCEDURE — 83880 ASSAY OF NATRIURETIC PEPTIDE: CPT

## 2020-05-14 PROCEDURE — 85379 FIBRIN DEGRADATION QUANT: CPT

## 2020-05-14 PROCEDURE — 82728 ASSAY OF FERRITIN: CPT

## 2020-05-14 RX ORDER — POTASSIUM CHLORIDE 750 MG/1
20 TABLET, FILM COATED, EXTENDED RELEASE ORAL
Status: COMPLETED | OUTPATIENT
Start: 2020-05-14 | End: 2020-05-14

## 2020-05-14 RX ORDER — ATORVASTATIN CALCIUM 20 MG/1
20 TABLET, FILM COATED ORAL DAILY
Status: DISCONTINUED | OUTPATIENT
Start: 2020-05-14 | End: 2020-05-16

## 2020-05-14 RX ADMIN — SERTRALINE HYDROCHLORIDE 50 MG: 50 TABLET ORAL at 08:52

## 2020-05-14 RX ADMIN — CETIRIZINE HYDROCHLORIDE 10 MG: 10 TABLET, FILM COATED ORAL at 08:52

## 2020-05-14 RX ADMIN — POTASSIUM CHLORIDE 20 MEQ: 750 TABLET, FILM COATED, EXTENDED RELEASE ORAL at 08:52

## 2020-05-14 RX ADMIN — DOXYCYCLINE 100 MG: 100 INJECTION, POWDER, LYOPHILIZED, FOR SOLUTION INTRAVENOUS at 11:00

## 2020-05-14 RX ADMIN — PANTOPRAZOLE SODIUM 40 MG: 40 TABLET, DELAYED RELEASE ORAL at 08:52

## 2020-05-14 RX ADMIN — OXYCODONE HYDROCHLORIDE AND ACETAMINOPHEN 500 MG: 500 TABLET ORAL at 08:52

## 2020-05-14 RX ADMIN — INSULIN LISPRO 2 UNITS: 100 INJECTION, SOLUTION INTRAVENOUS; SUBCUTANEOUS at 11:08

## 2020-05-14 RX ADMIN — IPRATROPIUM BROMIDE AND ALBUTEROL 1 PUFF: 20; 100 SPRAY, METERED RESPIRATORY (INHALATION) at 01:09

## 2020-05-14 RX ADMIN — LOSARTAN POTASSIUM 50 MG: 50 TABLET, FILM COATED ORAL at 08:52

## 2020-05-14 RX ADMIN — INSULIN LISPRO 3 UNITS: 100 INJECTION, SOLUTION INTRAVENOUS; SUBCUTANEOUS at 18:01

## 2020-05-14 RX ADMIN — ACETAMINOPHEN 650 MG: 325 TABLET ORAL at 19:16

## 2020-05-14 RX ADMIN — MONTELUKAST SODIUM 10 MG: 10 TABLET, FILM COATED ORAL at 21:23

## 2020-05-14 RX ADMIN — ALBUTEROL SULFATE 2 PUFF: 90 AEROSOL, METERED RESPIRATORY (INHALATION) at 22:27

## 2020-05-14 RX ADMIN — INSULIN LISPRO 2 UNITS: 100 INJECTION, SOLUTION INTRAVENOUS; SUBCUTANEOUS at 21:24

## 2020-05-14 RX ADMIN — ENOXAPARIN SODIUM 40 MG: 40 INJECTION SUBCUTANEOUS at 21:21

## 2020-05-14 RX ADMIN — ZINC SULFATE 220 MG (50 MG) CAPSULE 1 CAPSULE: CAPSULE at 21:23

## 2020-05-14 RX ADMIN — INSULIN GLARGINE 8 UNITS: 100 INJECTION, SOLUTION SUBCUTANEOUS at 08:52

## 2020-05-14 RX ADMIN — ACETAMINOPHEN 650 MG: 325 TABLET ORAL at 04:40

## 2020-05-14 RX ADMIN — OYSTER SHELL CALCIUM WITH VITAMIN D 1 TABLET: 500; 200 TABLET, FILM COATED ORAL at 08:52

## 2020-05-14 RX ADMIN — DOXYCYCLINE 100 MG: 100 INJECTION, POWDER, LYOPHILIZED, FOR SOLUTION INTRAVENOUS at 21:23

## 2020-05-14 RX ADMIN — OXYCODONE HYDROCHLORIDE AND ACETAMINOPHEN 500 MG: 500 TABLET ORAL at 21:23

## 2020-05-14 RX ADMIN — TRAZODONE HYDROCHLORIDE 100 MG: 100 TABLET ORAL at 21:23

## 2020-05-14 RX ADMIN — ASPIRIN 81 MG: 81 TABLET, COATED ORAL at 08:52

## 2020-05-14 RX ADMIN — FLUTICASONE FUROATE AND VILANTEROL TRIFENATATE 1 PUFF: 200; 25 POWDER RESPIRATORY (INHALATION) at 08:00

## 2020-05-14 RX ADMIN — INSULIN LISPRO 2 UNITS: 100 INJECTION, SOLUTION INTRAVENOUS; SUBCUTANEOUS at 08:55

## 2020-05-14 RX ADMIN — THERA TABS 1 TABLET: TAB at 08:52

## 2020-05-14 RX ADMIN — Medication 10 ML: at 07:31

## 2020-05-14 RX ADMIN — ATORVASTATIN CALCIUM 20 MG: 20 TABLET, FILM COATED ORAL at 08:52

## 2020-05-14 RX ADMIN — Medication 10 ML: at 15:06

## 2020-05-14 RX ADMIN — LEVOTHYROXINE SODIUM 100 MCG: 0.1 TABLET ORAL at 08:52

## 2020-05-14 RX ADMIN — Medication 10 ML: at 21:21

## 2020-05-14 RX ADMIN — ATENOLOL 25 MG: 50 TABLET ORAL at 21:29

## 2020-05-14 NOTE — PROGRESS NOTES
[id number] PROGRAM  PROGRESS NOTE     5/14/2020  PCP: Bud Mohs, MD     [address] Medicine Residency Attending Addendum:  I saw and evaluated the patient on the day of the encounter with Dr. Ino Mondragon, performing the key elements of the service. I discussed the findings, assessment and plan with the resident and agree with the resident's findings and plan as documented in the resident's note. Consider diuresis. Caitlin per pulm. Agree to consider Actemra. Continue abx. Consider portable CXR if she worsens tomorrow. Macey Baker MD, FAAFP, CAQSM, RMSK      Assessment/Plan:     Patricia Dejesus is a 69 y.o. female with history of HTN, type 2DM, GERD, hypothyroidism, depression, asthma, and hyperlipidemia who is admitted for AHRF 2/2 COVID 19.     Overnight events: Required 10L NC. Moderate PO intake     AHRF 2/2 COVID 19: COVID+ on 5/4, worsening SOB on admission and new O2 requirement (RA baseline), CXR with opacification in lower lobes b/l L>R, ABG pH 7.41, pCO2 31, pO2 64, O2 sat 93, bicarb 19, LA 1.8, S/p outpatient tx of azithro and plaquenil ending 5/10  - Follow COVID inflammatory markers, Overall stable from yesterday  - Blood culture NG to date  - Continue Doxy for empiric coverage (Started 5/12). No azithro or levaquin due to prolonged qtc. - Pulmonology following, appreciate rec's- Clinical trial of pulsed Caitlin  - Zinc, Vit C, Lipitor  - Supplemental O2 as needed.       Type 2 DM: HgA1C 10.4 in 11/2019. This admission A1C 13.1. Decreased PO intake.    - Hold home glipizide  - Lantus 8U and SSI ACHS (Stop scheduled mealtime lispro)     Asthma  - Continue home Ventolin, patient has brought in her home inhaler  - Continue Singulair 10mg qhs     HTN  - Continue home BP meds of atenolol 25mg qhs, losartan 50mg daily     Hypothyroid: TSH 3.570 in 11/2019  - Continue home synthroid 100mcg daily     Depression  - Continue Zoloft 50mg dialy and trazadone 100mg qhs to help with sleep     GERD  - Will substitute home prilosec for protonix daily     Hypokalemia: IMPROVED. Likely 2/2 diarrhea prior to arrival and diuretics inpatient. K 2.7 on admission.   - Repletion prn    Hyperlipidemia: Lipid panel , , HDL 59, LDL 89 in 11/2019  - Increased home Zocor 10mg daily to lipitor 20mg daily for anti-inflammatory benefit      Lamonte West discussed with Dr. Lulu Gonzalez. Subjective:   Pt was seen through door window and spoke on the phone. Currently afebrile, but febrile overnight. \"My breathing feels better. \" She likes the Glucerna supplements. No other complaints.         Objective:   Physical examination  Patient Vitals for the past 24 hrs:   Temp Pulse Resp BP SpO2   05/14/20 1015  90 19 113/61 (!) 89 %   05/14/20 1000  84 26 109/56 91 %   05/14/20 0900  82 16 (!) 87/62 91 %   05/14/20 0800 98.1 °F (36.7 °C) 80 26 123/52 90 %   05/14/20 0701  80      05/14/20 0700  86 25 (!) 121/100 94 %   05/14/20 0403 (!) 100.7 °F (38.2 °C) 81 21 134/50 97 %   05/14/20 0300  75 29 132/58 93 %   05/14/20 0110     95 %   05/14/20 0100  81 19 (!) 133/94 92 %   05/14/20 0000 99.4 °F (37.4 °C) 77 23 126/61 90 %   05/13/20 2259  80      05/13/20 2200 99.4 °F (37.4 °C) 79 24 127/66 96 %   05/13/20 2045  88 21  94 %   05/13/20 2030  81 28  97 %   05/13/20 2015  82 28  97 %   05/13/20 2000  81 29 122/70 94 %   05/13/20 1958     94 %   05/13/20 1945  86 (!) 34  92 %   05/13/20 1930  86 22  92 %   05/13/20 1915  80 21  91 %   05/13/20 1845  78 27  95 %   05/13/20 1830  85 (!) 32  91 %   05/13/20 1815  74 (!) 33  94 %   05/13/20 1800  76 30 122/59 90 %   05/13/20 1700  77 29 135/55 94 %   05/13/20 1600 99.8 °F (37.7 °C) 94 (!) 32 121/71 91 %   05/13/20 1526     92 %   05/13/20 1500  85 (!) 35 129/60 92 %   05/13/20 1400  95 (!) 45 111/62 94 %   05/13/20 1300  91 29 113/59    05/13/20 1200  87 30 117/58 93 %   05/13/20 1128     93 % 20 1100 99 °F (37.2 °C) 81 27 137/58 91 %      Temp (24hrs), Av.4 °F (37.4 °C), Min:98.1 °F (36.7 °C), Max:100.7 °F (38.2 °C)     O2 Flow Rate (L/min): 10 l/min   O2 Device: Nasal cannula    Date 20 - 20 - 05/15/20 0659   Shift  24 Hour Total 7435-7637 7227-4307 24 Hour Total   INTAKE   I.V.(mL/kg/hr) 375(0.4)  375(0.2)        Volume (0.9% sodium chloride infusion) 75  75        Volume (doxycycline (VIBRAMYCIN) 100 mg in 0.9% sodium chloride (MBP/ADV) 100 mL) 300  300      Shift Total(mL/kg) 375(4.2)  375(4.2)      OUTPUT   Urine(mL/kg/hr) 800(0.8)  800(0.4)        Urine Voided 200  200        Urine Occurrence(s) 3 x 3 x 6 x        Urine Output (mL) ([REMOVED] External Female Catheter 20) 600  600      Shift Total(mL/kg) 800(9)  800(9)      NET -425  -425      Weight (kg) 88.7 88.7 88.7 88.7 88.7 88.7     Pt is currently a COVID infected pt. I did not go in the room to decrease exposure and conserve PE. The attending physician will exam in the pt. Talked on the phone. General:   Alert, cooperative, mild respiratory distress     Data Review:     CBC:  Recent Labs     20  0431   WBC 7.6 7.9 6.6   HGB 12.1 11.7 11.8   HCT 36.8 35.0 36.0    881 980     Metabolic Panel:  Recent Labs     20  04520  0429 20  0431 20  0326    142  --  141   K 3.3* 3.4*  --  3.4*    109*  --  112*   CO2 24 25  --  20*   BUN 12 11  --  8   CREA 0.71 0.74  --  0.76   * 98  --  227*   CA 8.3* 8.0*  --  8.0*   MG 1.6 1.5*  --   --    ALB 2.1* 2.0*  --  2.1*   TBILI 0.6 0.5  --  0.6   SGOT 35 33  --  29   ALT 27 26  --  29   INR 1.1 1.1 1.1  --      Micro:  Lab Results   Component Value Date/Time    Culture result: NO GROWTH 1 DAY 2020 04:29 AM    Culture result:  2020 12:35 AM     MRSA NOT PRESENT. Apparent Staphylococus aureus (not MRSA noted).     Culture result: 05/11/2020 12:35 AM         Screening of patient nares for MRSA is for surveillance purposes and, if positive, to facilitate isolation considerations in high risk settings. It is not intended for automatic decolonization interventions per se as regimens are not sufficiently effective to warrant routine use. Imaging:  Xr Chest Port    Result Date: 5/10/2020  EXAM: XR CHEST PORT INDICATION: sob, +COVID COMPARISON: 2013 FINDINGS: A portable AP radiograph of the chest was obtained at 2145 hours. The patient is on a cardiac monitor. There is opacification in the lower lobes bilaterally, left greater than right. The cardiac and mediastinal contours and pulmonary vascularity are normal.  The bones and soft tissues are grossly within normal limits. IMPRESSION: Opacification in the lower lobes bilaterally, left greater than right.     Medications reviewed  Current Facility-Administered Medications   Medication Dose Route Frequency    atorvastatin (LIPITOR) tablet 20 mg  20 mg Oral DAILY    insulin glargine (LANTUS) injection 8 Units  8 Units SubCUTAneous DAILY    doxycycline (VIBRAMYCIN) 100 mg in 0.9% sodium chloride (MBP/ADV) 100 mL  100 mg IntraVENous Q12H    acetaminophen (TYLENOL) tablet 650 mg  650 mg Oral Q6H PRN    sodium chloride (NS) flush 5-40 mL  5-40 mL IntraVENous Q8H    sodium chloride (NS) flush 5-40 mL  5-40 mL IntraVENous PRN    ondansetron (ZOFRAN) injection 4 mg  4 mg IntraVENous Q4H PRN    enoxaparin (LOVENOX) injection 40 mg  40 mg SubCUTAneous Q24H    zinc sulfate (ZINCATE) 220 (50) mg capsule 1 Cap  1 Cap Oral DAILY    ascorbic acid (vitamin C) (VITAMIN C) tablet 500 mg  500 mg Oral BID    aspirin delayed-release tablet 81 mg  81 mg Oral DAILY    atenoloL (TENORMIN) tablet 25 mg  25 mg Oral QHS    calcium-vitamin D (OS-TRISTEN) 500 mg-200 unit tablet  1 Tab Oral DAILY    cetirizine (ZYRTEC) tablet 10 mg  10 mg Oral DAILY    fluticasone propionate (FLONASE) 50 mcg/actuation nasal spray 2 Spray  2 Spray Both Nostrils DAILY PRN    therapeutic multivitamin (THERAGRAN) tablet 1 Tab  1 Tab Oral DAILY    levothyroxine (SYNTHROID) tablet 100 mcg  100 mcg Oral ACB    losartan (COZAAR) tablet 50 mg  50 mg Oral DAILY    montelukast (SINGULAIR) tablet 10 mg  10 mg Oral QHS    sertraline (ZOLOFT) tablet 50 mg  50 mg Oral DAILY    traZODone (DESYREL) tablet 100 mg  100 mg Oral QHS    glucose chewable tablet 16 g  4 Tab Oral PRN    glucagon (GLUCAGEN) injection 1 mg  1 mg IntraMUSCular PRN    dextrose 10% infusion 0-250 mL  0-250 mL IntraVENous PRN    ipratropium-albuterol (COMBIVENT RESPIMAT) 20 mcg-100 mcg inhalation spray  1 Puff Inhalation Q6H RT    fluticasone-vilanterol (BREO ELLIPTA) 200mcg-25mcg/puff  1 Puff Inhalation DAILY    albuterol (PROVENTIL HFA, VENTOLIN HFA, PROAIR HFA) inhaler 2 Puff  2 Puff Inhalation Q4H PRN    insulin lispro (HUMALOG) injection   SubCUTAneous AC&HS    pantoprazole (PROTONIX) tablet 40 mg  40 mg Oral ACB    sodium chloride (NS) flush 5-10 mL  5-10 mL IntraVENous PRN         Signed:   Rachel Rosa MD   Resident, Family Medicine      Attending note: Attending note to follow. ..

## 2020-05-14 NOTE — PROGRESS NOTES
[facility]  Pulmonary, Critical Care, and Sleep Medicine      Name: Ashok Pérez MRN: [id number]   : 1945 Hospital: [address] [facility]   Date: 2020        IMPRESSION:   · COVID-19 pneumonia with respiratory failure:  Pt at risk for cytokine storm  · Mild intermittent asthma  · Chronic back pain  · DM  · HTN      RECOMMENDATIONS:   · Cont w supplemental oxygen. On 10 lpm   · iNOPulse started   · Caitlin can increase risk for volume overload will cont with lasix. Follow I/o and bun/creat  · Follow inflammatory markers; at risk for cytokine storm. Inflammatory markers remain flat. Consider actemra w worening  · Check quant gold  · Encouraged pt to lie prone at least 4 hours per day and to use inc tommy q 1 hr w/a  · OOB to chair  · Has completed hydroxychloroquine/azithro  · Agree with empiric abx, may be able to de-escalate in a few days  · On prn albuterol    Discussed the use of pulsed Caitlin with pt. She understands that this is an experimental therapy with possible benefit in COVID-19 but that its efficacy is unknown. She is agreeable to proceed with the device. Subjective: This patient has been seen and evaluated at the request of Dr. King Rodríguez for [place] 19 pneumonia and resp failure. Patient is a 69 y.o. female diagnosed one week ago with Covid 19. Pt with fevers, diarrhea, cough, and sob. She has been isolating at home but has had work done within the home and believes that she may have gotten an infection from one of the workers there.  has also become infected. Pt states that her symptoms have remained stable to slightly improved over the past few days but has noted that her oxygen sats have been falling and presented to ED for evaluation. Fevers seem to have resolved. Main symptoms are now diarrhea, sob, and cough. Pt w h/o asthma which by description appears to be mild intermittent and is followed by Dr Diane Hebert in [place].   Controlled with montelukast and (rare) albuterol MDI. Never smoker    Currently on 6 lpm w sats of 90%    Interval history:  Tm 100.7  Pt alert. Feels slightly better. No worse. SOB at times. Tolerating Caitlin Pulse but oxygen needs at 10 lpm.  Will desat with exertion but then recovers      Past Medical History:   Diagnosis Date    Anxiety     Arthritis     Asthma     Chronic pain     back,right hip    Depression     Diabetes ([address]     type 2    GERD (gastroesophageal reflux disease)     Hypercholesteremia     Hypertension     Hypothyroid     Nausea & vomiting     Reflux     Sleep apnea     wears dental appliance- Somnident    Thyroid disease     Unspecified adverse effect of anesthesia     delayed awakening      Past Surgical History:   Procedure Laterality Date    HX COLONOSCOPY  2012    normal repeat 2022, diverticulosis of sigmoid colon, Dr. Isi Lee HX HEENT  1/08/14    bilateral cataract surgery1/8/14    HX KNEE REPLACEMENT      right    HX OTHER SURGICAL  2007    cardiac cath- No CAD found    NEUROLOGICAL PROCEDURE UNLISTED  1999    vascular brain surgery      Prior to Admission medications    Medication Sig Start Date End Date Taking? Authorizing Provider   guaiFENesin ER (Mucinex) 600 mg ER tablet Take 600 mg by mouth two (2) times a day. Yes Provider, Historical   omeprazole (PRILOSEC) 20 mg capsule Take 20 mg by mouth daily. Yes Provider, Historical   calcium-vitamin D (OYSTER SHELL) 500 mg(1,250mg) -200 unit per tablet Take 1 Tab by mouth daily. Yes Provider, Historical   traZODone (DESYREL) 100 mg tablet TAKE 1 TABLET BY MOUTH EVERY DAY AT NIGHT 3/25/20  Yes Bender, Lucia Perone, NP   glipiZIDE SR (GLUCOTROL XL) 10 mg CR tablet TAKE 1 TABLET BY MOUTH TWICE A DAY 2/15/20  Yes Bender, Lucia Perone, NP   simvastatin (ZOCOR) 10 mg tablet Take 1 Tab by mouth nightly. 10/8/19  Yes Bender, Lucia Perone, NP   losartan (COZAAR) 50 mg tablet TAKE 1 TABLET BY MOUTH EVERY DAY.  10/8/19  Yes Bender, Steff Coughlin NP   levothyroxine (SYNTHROID) 100 mcg tablet Take 1 Tab by mouth Daily (before breakfast). 10/8/19  Yes Steff Bender NP   atenolol (TENORMIN) 25 mg tablet Take 1 Tab by mouth nightly. 10/8/19  Yes Steff Bender NP   sertraline (ZOLOFT) 50 mg tablet TAKE 1 TABLET BY MOUTH EVERY DAY 9/4/19  Yes Steff Bender NP   aspirin delayed-release 81 mg tablet Take 81 mg by mouth daily. Yes Provider, Historical   fluticasone (FLONASE) 50 mcg/actuation nasal spray 2 Sprays by Both Nostrils route daily as needed for Rhinitis or Allergies. Yes Provider, Historical   albuterol (PROAIR HFA) 90 mcg/actuation inhaler Take 2 Puffs by inhalation every four (4) hours as needed for Wheezing. Yes Provider, Historical   Cetirizine (ZYRTEC) 10 mg cap Take 1 Tab by mouth every evening. Yes Provider, Historical   multivitamins-minerals-lutein (CENTRUM SILVER) tab Take 1 Tab by mouth daily. Yes Provider, Historical   glucosamine-chondroit-vit c-mn (GLUCOSAMINE CHONDROITIN MAXSTR) 500-400 mg capsule Take 1 Cap by mouth daily. Yes Other, MD [place]   varicella-zoster recombinant, PF, (SHINGRIX, PF,) 50 mcg/0.5 mL susr injection 0.5mL by IntraMUSCular route once now and then repeat in 2-6 months 10/8/19   Latisha Denton NP     Allergies   Allergen Reactions    Latex Rash    Prednisone Anaphylaxis     Throat swelling. Can take methylpredisone po or IV without problems.     Augmentin [Amoxicillin-Pot Clavulanate] Nausea and Vomiting    Biaxin [Clarithromycin] Nausea and Vomiting    Metformin Diarrhea    Parafon Forte Dsc [Chlorzoxazone] Nausea and Vomiting      Social History     Tobacco Use    Smoking status: Never Smoker    Smokeless tobacco: Never Used   Substance Use Topics    Alcohol use: No     Frequency: Never     Comment: 2x year      Family History   Problem Relation Age of Onset    Elevated Lipids Mother     Dementia Mother     Osteoporosis Mother     Lung Disease Father copd    Delayed Awakening Father     Post-op Nausea/Vomiting Father     Hypertension Sister     Diabetes Sister         type 2    Breast Cancer Maternal Aunt         Current Facility-Administered Medications   Medication Dose Route Frequency    atorvastatin (LIPITOR) tablet 20 mg  20 mg Oral DAILY    insulin glargine (LANTUS) injection 8 Units  8 Units SubCUTAneous DAILY    doxycycline (VIBRAMYCIN) 100 mg in 0.9% sodium chloride (MBP/ADV) 100 mL  100 mg IntraVENous Q12H    sodium chloride (NS) flush 5-40 mL  5-40 mL IntraVENous Q8H    enoxaparin (LOVENOX) injection 40 mg  40 mg SubCUTAneous Q24H    zinc sulfate (ZINCATE) 220 (50) mg capsule 1 Cap  1 Cap Oral DAILY    ascorbic acid (vitamin C) (VITAMIN C) tablet 500 mg  500 mg Oral BID    aspirin delayed-release tablet 81 mg  81 mg Oral DAILY    atenoloL (TENORMIN) tablet 25 mg  25 mg Oral QHS    calcium-vitamin D (OS-TRISTEN) 500 mg-200 unit tablet  1 Tab Oral DAILY    cetirizine (ZYRTEC) tablet 10 mg  10 mg Oral DAILY    therapeutic multivitamin (THERAGRAN) tablet 1 Tab  1 Tab Oral DAILY    levothyroxine (SYNTHROID) tablet 100 mcg  100 mcg Oral ACB    losartan (COZAAR) tablet 50 mg  50 mg Oral DAILY    montelukast (SINGULAIR) tablet 10 mg  10 mg Oral QHS    sertraline (ZOLOFT) tablet 50 mg  50 mg Oral DAILY    traZODone (DESYREL) tablet 100 mg  100 mg Oral QHS    ipratropium-albuterol (COMBIVENT RESPIMAT) 20 mcg-100 mcg inhalation spray  1 Puff Inhalation Q6H RT    fluticasone-vilanterol (BREO ELLIPTA) 200mcg-25mcg/puff  1 Puff Inhalation DAILY    insulin lispro (HUMALOG) injection   SubCUTAneous AC&HS    pantoprazole (PROTONIX) tablet 40 mg  40 mg Oral ACB       Review of Systems:  A comprehensive review of systems was negative except for that written in the HPI.     Objective:   Vital Signs:    Visit Vitals  /50 (BP 1 Location: Left arm, BP Patient Position: At rest)   Pulse 80   Temp (!) 100.7 °F (38.2 °C) Comment: tylenol given   Resp 21   Ht 5' 4\" (1.626 m)   Wt 88.7 kg (195 lb 8.8 oz)   SpO2 92%   BMI 33.57 kg/m²       O2 Device: Nasal cannula   O2 Flow Rate (L/min): 10 l/min   Temp (24hrs), Av.7 °F (37.6 °C), Min:99 °F (37.2 °C), Max:100.7 °F (38.2 °C)       Intake/Output:   Last shift:      No intake/output data recorded. Last 3 shifts:  1901 -  0700  In: 1350 [P.O.:150; I.V.:1200]  Out: 1100 [Urine:1100]    Intake/Output Summary (Last 24 hours) at 2020 0946  Last data filed at 2020 1530  Gross per 24 hour   Intake 300 ml   Output 600 ml   Net -300 ml      Physical Exam:   General:  Alert, cooperative, mild distress, appears stated age. Head:  Normocephalic, without obvious abnormality, atraumatic. Eyes:  Conjunctivae/corneas clear. PERRL, EOMs intact. Nose: Nares normal. Septum midline. Mucosa normal. No drainage or sinus tenderness. Throat: Lips, mucosa, and tongue normal. Teeth and gums normal.   Neck: Supple, symmetrical, trachea midline, no adenopathy, thyroid: no enlargment/tenderness/nodules, no carotid bruit and no JVD. Back:   Symmetric, no curvature. ROM normal.   Lungs:   Clear anteriorly, few post basilar crackles   Chest wall:  No tenderness or deformity. Heart:  Regular rate and rhythm, S1, S2 normal, no murmur, click, rub or gallop. Abdomen:   Soft, non-tender. Bowel sounds normal. No masses,  No organomegaly. Extremities: Extremities normal, atraumatic, no cyanosis or edema. Pulses: 2+ and symmetric all extremities.    Skin: Skin color, texture, turgor normal. No rashes or lesions   Lymph nodes: Cervical, supraclavicular, and axillary nodes normal.   Neurologic: Grossly nonfocal     Data review:     Recent Results (from the past 24 hour(s))   GLUCOSE, POC    Collection Time: 20 11:00 AM   Result Value Ref Range    Glucose (POC) 187 (H) 65 - 100 mg/dL    Performed by Branden Brady    GLUCOSE, POC    Collection Time: 20  3:09 PM   Result Value Ref Range    Glucose (POC) 205 (H) 65 - 100 mg/dL    Performed by Katherine Villeda POC    Collection Time: 05/13/20  9:30 PM   Result Value Ref Range    Glucose (POC) 196 (H) 65 - 100 mg/dL    Performed by Ekaterina [address]    Collection Time: 05/14/20  4:45 AM   Result Value Ref Range    SAMPLES BEING HELD 1PST     COMMENT        Add-on orders for these samples will be processed based on acceptable specimen integrity and analyte stability, which may vary by analyte. PROTHROMBIN TIME + INR    Collection Time: 05/14/20  4:54 AM   Result Value Ref Range    INR 1.1 0.9 - 1.1      Prothrombin time 11.4 (H) 9.0 - 11.1 sec   C REACTIVE PROTEIN, QT    Collection Time: 05/14/20  4:54 AM   Result Value Ref Range    C-Reactive protein 22.00 (H) 0.00 - 0.60 mg/dL   FERRITIN    Collection Time: 05/14/20  4:54 AM   Result Value Ref Range    Ferritin 343 26 - 388 NG/ML   D DIMER    Collection Time: 05/14/20  4:54 AM   Result Value Ref Range    D-dimer 2.59 (H) 0.00 - 0.65 mg/L FEU   MAGNESIUM    Collection Time: 05/14/20  4:54 AM   Result Value Ref Range    Magnesium 1.6 1.6 - 2.4 mg/dL   LD    Collection Time: 05/14/20  4:54 AM   Result Value Ref Range     (H) 81 - 246 U/L   CBC WITH AUTOMATED DIFF    Collection Time: 05/14/20  4:54 AM   Result Value Ref Range    WBC 7.6 3.6 - 11.0 K/uL    RBC 4.27 3.80 - 5.20 M/uL    HGB 12.1 11.5 - 16.0 g/dL    HCT 36.8 35.0 - 47.0 %    MCV 86.2 80.0 - 99.0 FL    MCH 28.3 26.0 - 34.0 PG    MCHC 32.9 30.0 - 36.5 g/dL    RDW 12.4 11.5 - 14.5 %    PLATELET 456 439 - 549 K/uL    MPV 10.5 8.9 - 12.9 FL    NRBC 0.0 0  WBC    ABSOLUTE NRBC 0.00 0.00 - 0.01 K/uL    NEUTROPHILS 75 32 - 75 %    LYMPHOCYTES 13 12 - 49 %    MONOCYTES 7 5 - 13 %    EOSINOPHILS 3 0 - 7 %    BASOPHILS 0 0 - 1 %    IMMATURE GRANULOCYTES 2 (H) 0.0 - 0.5 %    ABS. NEUTROPHILS 5.7 1.8 - 8.0 K/UL    ABS. LYMPHOCYTES 1.0 0.8 - 3.5 K/UL    ABS. MONOCYTES 0.5 0.0 - 1.0 K/UL    ABS. EOSINOPHILS 0.2 0.0 - 0.4 K/UL    ABS. BASOPHILS 0.0 0.0 - 0.1 K/UL    ABS. IMM. GRANS. 0.1 (H) 0.00 - 0.04 K/UL    DF AUTOMATED     METABOLIC PANEL, COMPREHENSIVE    Collection Time: 05/14/20  4:54 AM   Result Value Ref Range    Sodium 137 136 - 145 mmol/L    Potassium 3.3 (L) 3.5 - 5.1 mmol/L    Chloride 106 97 - 108 mmol/L    CO2 24 21 - 32 mmol/L    Anion gap 7 5 - 15 mmol/L    Glucose 181 (H) 65 - 100 mg/dL    BUN 12 6 - 20 MG/DL    Creatinine 0.71 0.55 - 1.02 MG/DL    BUN/Creatinine ratio 17 12 - 20      GFR est AA >60 >60 ml/min/1.73m2    GFR est non-AA >60 >60 ml/min/1.73m2    Calcium 8.3 (L) 8.5 - 10.1 MG/DL    Bilirubin, total 0.6 0.2 - 1.0 MG/DL    ALT (SGPT) 27 12 - 78 U/L    AST (SGOT) 35 15 - 37 U/L    Alk.  phosphatase 82 45 - 117 U/L    Protein, total 6.4 6.4 - 8.2 g/dL    Albumin 2.1 (L) 3.5 - 5.0 g/dL    Globulin 4.3 (H) 2.0 - 4.0 g/dL    A-G Ratio 0.5 (L) 1.1 - 2.2     NT-PRO BNP    Collection Time: 05/14/20  4:54 AM   Result Value Ref Range    NT pro- (H) <125 PG/ML   CARBOXY HEMOGLOBIN    Collection Time: 05/14/20  5:25 AM   Result Value Ref Range    Carboxy-Hgb 0.8 (L) 1 - 2 %    Methemoglobin 0.2 0 - 1.4 %    tHb 0.8 (LL) 14 - 17 g/dL    Oxyhemoglobin 89 (L) 94 - 97 %    O2 SATURATION 90 (L) 95 - 99 %   BLOOD GAS, ARTERIAL    Collection Time: 05/14/20  5:30 AM   Result Value Ref Range    pH 7.45 7.35 - 7.45      PCO2 33 (L) 35.0 - 45.0 mmHg    PO2 56 (L) 80 - 100 mmHg    O2 SAT 91 (L) 92 - 97 %    BICARBONATE 23 22 - 26 mmol/L    BASE DEFICIT 0.3 mmol/L    O2 METHOD NASAL O2      O2 FLOW RATE 10 L/min    Sample source ARTERIAL      SITE RIGHT RADIAL      ANGÉLICA'S TEST YES      Critical value read back CVRB MARK RN @8732    GLUCOSE, POC    Collection Time: 05/14/20  8:54 AM   Result Value Ref Range    Glucose (POC) 168 (H) 65 - 100 mg/dL    Performed by [place]        Imaging:  I have personally reviewed the patients radiographs and have reviewed the reports:  Mild patchy infiltrates in lung bases        Jennifer Mohr, MD

## 2020-05-14 NOTE — PROGRESS NOTES
[facility]:    Verbal report received from [address] (name) on Tiffanie Schneider  being received from Vania Vincent RN (unit) for routine progression of care      Report consisted of patients Situation, Background, Assessment and   Recommendations(SBAR). Information from the following report(s) SBAR, Kardex and Cardiac Rhythm NSR was reviewed with the receiving nurse. Opportunity for questions and clarification was provided. Assessment completed upon patients arrival to unit and care assumed. 1856 Patient arrived to unit, Temp 100.5 on midflow 10L.    1916 Tylenol given for temp. 1930 Bedside shift change report given to Katya Sarmiento RN (oncoming nurse) by Vania Vincent RN (offgoing nurse). Report included the following information SBAR, Kardex and Cardiac Rhythm NSR.

## 2020-05-14 NOTE — PROGRESS NOTES
Bedside and Verbal shift change report given to Brandon Bolanos (oncoming nurse) by Angi Sinclair (offgoing nurse).  Report included the following information SBAR, Kardex, Intake/Output, Recent Results and Cardiac Rhythm SR.

## 2020-05-14 NOTE — PROGRESS NOTES
On call DR on the floor checking on patient; notified him of patients respiratory status and fluctuation of saturations through the night,; RT emily ABG's for Dr to view; patient resting comfortably on the NC midflow at 10L    0630  Patients  called to check  In on his wife and see how she did overnight.    would appreciate update from the DR when possible during the day;  will pass on message to oncoming RN

## 2020-05-14 NOTE — PROGRESS NOTES
Readmission risk score is 16% making pt a moderate risk for a readmission. Plan:  1.pt is on 10 liters oxygen. 2.Per nurse's report,pt is demonstrating very little motivation . 3. is also Covid 19 positive. 4.Pt is receiving nitric oxide treatments. 5.I will follow-up with pt.'s  tomorrow.     Get Castaneda

## 2020-05-14 NOTE — PROGRESS NOTES
MIAH cartridge changed at 0655  DEVICE ID #[id number]  [id number]# [id number]  CARTRIDGE ID #[id number]  Cannula Lot # [id number]

## 2020-05-14 NOTE — PROGRESS NOTES
"[facility]  [facility] - Family Medicine/ Internal Medicine  Clinic Note      Subjective:       Patient ID: Sidney Guillen is a 72 y.o. male.    Chief Complaint: Establish Care    New patient is here today to establish care and for annual exam.  He is a retired  who recently moved from [place].  He notes he had labs drawn in April at [facility], including lipid panel, diabetic screening, and PSA.  All results were normal, lipids well-controlled with Lipitor.  He follows a heart healthy diet and exercises daily.  He has history of prostate resection, notes his Urologist in [place] had indicated no further specialty care or surveillance was needed.  He is at baseline health, no recent illness or fever.  He has high blood pressure, is well-controlled with current medication.    Past medical, surgical, family, social history reviewed.      Review of Systems   Constitutional: Negative for fatigue and fever.   HENT: Negative for congestion, rhinorrhea and sore throat.    Respiratory: Negative for cough and shortness of breath.    Cardiovascular: Negative for chest pain and palpitations.   Gastrointestinal: Negative for abdominal pain, diarrhea, nausea and vomiting.   Genitourinary: Negative for difficulty urinating, dysuria and hematuria.   Musculoskeletal: Positive for arthralgias (intermittent) and back pain. Negative for joint swelling.   Neurological: Negative for dizziness, syncope and headaches.   Psychiatric/Behavioral: Negative for dysphoric mood and sleep disturbance. The patient is not nervous/anxious.        Objective:      BP (!) 140/80 (BP Location: Left arm, Patient Position: Sitting, BP Method: Medium (Manual))   Pulse 67   Ht 5' 6" (1.676 m)   Wt 73.4 kg (161 lb 13.1 oz)   SpO2 97%   BMI 26.12 kg/m²   Physical Exam   Constitutional: He is oriented to person, place, and time. He appears well-developed and well-nourished. No distress.   HENT:   Head: Normocephalic " 0700- Bedside and Verbal shift change report given to Sheryle Dus, RN (oncoming nurse) by Jermaine Pierce RN (offgoing nurse). Report included the following information SBAR, Kardex, Intake/Output, MAR, Recent Results and Cardiac Rhythm NSR. Primary Nurse Clary Santa and Jermaine Pierce RN performed a dual skin assessment on this patient No impairment noted  Excoriation to groin, scattered bruising noted. Pt a&ox4, NAD noted. Pt is able to turn self in bed. NAD noted, remains on midflow. Pt able to sit up and take pills without issues. Pt skin warm and dry. Pt uses IS periodically, but needs encouragement. See flowsheet for full assessment. 1200- pt cleaned up, pads changed d/t incontinence of urine. Pt resting comfortably. NAD noted. 1520- pt cleaned, linens changed, pads changed d/t incontinence of urine. Lights off per pt preference. New covers placed on pt.    1650- pt resting comfortably. NAD noted. Remains stepdown after consulting with family practice. Family practice does not feel comfortable at this time downgrading pt to tele concerned pt is at high risk for decompensation at this time. Will consult pulmonary tomorrow for possible downgrade. 1820- TRANSFER - OUT REPORT:    Verbal report given to SI[facility]RA [facility] for routine progression of care       Report consisted of patients Situation, Background, Assessment and   Recommendations(SBAR). Information from the following report(s) SBAR, Kardex, Intake/Output, MAR, Recent Results and Cardiac Rhythm NSR was reviewed with the receiving nurse.     Lines:   Peripheral IV 05/12/20 Right Antecubital (Active)   Site Assessment Clean, dry, & intact 5/14/2020  4:00 PM   Phlebitis Assessment 0 5/14/2020  4:00 PM   Infiltration Assessment 0 5/14/2020  4:00 PM   Dressing Status Clean, dry, & intact 5/14/2020  4:00 PM   Dressing Type Transparent 5/14/2020  4:00 PM   Hub Color/Line Status Pink;Capped 5/14/2020 4:00 PM   Action Taken Open ports on tubing capped 5/14/2020  4:00 PM   Alcohol Cap Used Yes 5/14/2020  4:00 PM        Opportunity for questions and clarification was provided.       Patient transported with:   Registered Nurse  Tech and atraumatic.   Right Ear: Tympanic membrane and ear canal normal. Tympanic membrane is not erythematous. No middle ear effusion.   Left Ear: Tympanic membrane and ear canal normal. Tympanic membrane is not erythematous.  No middle ear effusion.   Nose: Nose normal. No mucosal edema or rhinorrhea.   Mouth/Throat: Oropharynx is clear and moist and mucous membranes are normal.   Eyes: Pupils are equal, round, and reactive to light. Conjunctivae are normal.   Neck: Normal range of motion. No thyromegaly present.   Cardiovascular: Normal rate and regular rhythm.   No murmur heard.  Pulmonary/Chest: Effort normal and breath sounds normal. No respiratory distress. He has no wheezes. He has no rales.   Abdominal: Soft. Bowel sounds are normal. He exhibits no distension. There is no tenderness. There is no guarding.   Musculoskeletal: Normal range of motion. He exhibits no edema.   Neurological: He is alert and oriented to person, place, and time. No cranial nerve deficit or sensory deficit. He exhibits normal muscle tone.   Skin: Skin is warm and dry. No rash noted.   Psychiatric: He has a normal mood and affect. His behavior is normal.   Nursing note and vitals reviewed.      Assessment:       1. Annual physical exam    2. Encounter for hepatitis C screening test for low risk patient    3. Encounter for abdominal aortic aneurysm (AAA) screening    4. Low vitamin D level    5. Benign hypertension    6. Mixed hyperlipidemia    7. Vitamin D deficiency         Plan:     1. Annual physical exam  2. Encounter for hepatitis C screening test for low risk patient  3. Encounter for abdominal aortic aneurysm (AAA) screening  4. Vitamin D deficiency   - health history reviewed, USPSTF preventive health recommendations based on age reviewed   - - wellness test results from April as ordered by prior [place]CP in [place] were reviewed and normal  - - US AAA Screening; Future  - - Hepatitis C antibody  - - Vitamin D  - routine health maintenance  counseling provided and healthy diet/exercise recommendations reviewed  - adult immunization counseling reviewed, will need PCV-13 as already has had PCV-23 in 2016    5. Benign hypertension  6. Mixed hyperlipidemia  - chronic condition, well-controlled, will continue current regimen and refill provided   - - amLODIPine (NORVASC) 5 MG tablet; Take 1 tablet (5 mg total) by mouth once daily.  Dispense: 90 tablet; Refill: 3  - - atorvastatin (LIPITOR) 10 MG tablet; Take 1 tablet (10 mg total) by mouth once daily.  Dispense: 90 tablet; Refill: 3     - Follow up in about 6 months (around 3/11/2020) for follow-up blood pressure.     Daniel Batres MD  9/11/2019          Medication List with Changes/Refills   New Medications    AMLODIPINE (NORVASC) 5 MG TABLET    Take 1 tablet (5 mg total) by mouth once daily.   Current Medications    CETIRIZINE (ZYRTEC) 10 MG TABLET    Take 10 mg by mouth once daily.    CHOLECALCIFEROL, VITAMIN D3, (VITAMIN D3) 2,000 UNIT CAP        DICLOFENAC POTASSIUM ORAL    Take 75 mg by mouth as needed.    Changed and/or Refilled Medications    Modified Medication Previous Medication    ATORVASTATIN (LIPITOR) 10 MG TABLET atorvastatin (LIPITOR) 10 MG tablet       Take 1 tablet (10 mg total) by mouth once daily.    Take 10 mg by mouth once daily.   Discontinued Medications    AMLODIPINE BESYLATE, BULK, MISC    5 mg by Misc.(Non-Drug; Combo Route) route once daily.

## 2020-05-14 NOTE — PROGRESS NOTES
Nutrition Assessment:    RECOMMENDATIONS/INTERVENTION(S):   1. Continue CHO consistent diet. 2. Recommend Glucerna TID (660 kcal, 30 gm protein) to promote adequate PO intake. 3. Monitor BG, PO intakes, GI function, labs, and weight trends. ASSESSMENT:   5/14: F/u. COVID-19, spoke with pt over phone. Pt reports appetite improving, able to consume 30% breakfast this AM. Meal intake per EMR shows 15-65% intake. Pt enjoys Glucerna shake, does not like Gelatein. Pt requesting Glucerna with every meal, will order to promote PO intakes. BG/, 196, 181, 167; on insulin regimen. Pt denies n/v. LBM 5/11. Labs - Meds - ascorbic acid, calcium-vitamin D, insulin glargine, insulin lispro, levothyroxine, pantoprazole, multivitamin, zinc sulfate, KCl.     5/11: 70 y/o F admitted with respiratory failure with hypoxia. MST screen >2: recently lost weight without trying, unsure weight loss. PMH - DM, primary hypertension, obesity, depression. COVID-19 +. Charting remotely, spoke with pt over phone. Pt reports poor appetite 2/2 not feeling well and was able to consume <50% breakfast per report. Pt has been experiencing poor appetite/ intake x1 week. Hgb A1c 13.1 (5/11). BG/, 269, 183, 192; on insulin regimen. Pt typically with good appetite, consuming x3 meals/d without frequent snacking.  lb. BMI 32.87 c/w obesity. Pt reports  lb, experiencing recent 10 lb weight loss. Pt denies n/v. C/o diarrhea. LBM 5/11 - loose. Skin without PI. Pt agreeable to trial ONS to promote adequate PO intakes. Labs - K+ 3.1 L, Ca 7.4 L, Hgb A1c 13.1 (5/11). Meds -  NS 75 ml/hr, ascorbic acid, calcium-vitamin D, insulin lispro, levothyroxine, pantoprazole, multivitamin, zinc sulfate, KCl.        Diet Order: Consistent carb  % Eaten:    Patient Vitals for the past 72 hrs:   % Diet Eaten   05/13/20 1300 30 %   05/13/20 0830 10 %   05/12/20 0900 15 %   05/11/20 1802 65 %       Pertinent Medications: [x] Reviewed    Labs: [x] Reviewed    Anthropometrics: Height: 5' 4\" (162.6 cm) Weight: 88.7 kg (195 lb 8.8 oz)    IBW (%IBW):   ( ) UBW (%UBW):   (  %)      BMI: Body mass index is 33.57 kg/m². This BMI is indicative of:   [] Underweight    [] Normal    [] Overweight    [x]  Obesity    []  Extreme Obesity (BMI>40)  Estimated Nutrition Needs (Based on): 0024 Kcals/day(REE 1362 x 1.3) , 85 g(69 - 87 gm (0.8 - 1.0 gm/kg)) Protein  Carbohydrate: At Least 130 g/day  Fluids: 1771 mL/day (1 ml/kcal)    Last BM: 5/11 - loose   [x]Active     []Hyperactive  []Hypoactive       [] Absent   BS  Skin: No PI    [] Intact   [] Incision  [] Breakdown   [] DTI   [] Tears/Excoriation/Abrasion  []Edema [] Other: Wt Readings from Last 30 Encounters:   05/13/20 88.7 kg (195 lb 8.8 oz)   10/08/19 91.2 kg (201 lb)   02/13/19 89.6 kg (197 lb 8 oz)   12/26/18 90.3 kg (199 lb)   01/20/14 95.3 kg (210 lb)   12/20/13 94.8 kg (209 lb)   11/20/13 95.3 kg (210 lb)   11/13/11 84.8 kg (187 lb)      NUTRITION DIAGNOSES:   Problem:  Inadequate energy intake     Etiology: related to decreased ability to consume sufficient energy     Signs/Symptoms: as evidenced by pt reports poor appetite with <50% meal intake and untintentional 10 lb weight loss       5/14: Nutrition Dx improving.      NUTRITION INTERVENTIONS:  Meals/Snacks: General/healthful diet   Supplements: Commercial supplement              GOAL:   PO intakes >50% + ONS while maintaining BG <160 mg/dL within 5-7 days     Cultural, Yarsanism, or Ethnic Dietary Needs: None     EDUCATION & DISCHARGE NEEDS:    [x] None Identified   [] Identified and Education Provided/Documented   [] Identified and Pt declined/was not appropriate      [x] Interdisciplinary Care Plan Reviewed/Documented    [x] Discharge Needs:    CHO consistent diet    [] No Nutrition Related Discharge Needs    NUTRITION RISK:   Pt Is At Nutrition Risk  [x]     No Nutrition Risk Identified  []       PT SEEN FOR:    []  MD Consult: []Calorie Count []Diabetic Diet Education        []Diet Education     []Electrolyte Management     []General Nutrition Management and Supplements     []Management of Tube Feeding     []TPN Recommendations    []  RN Referral:  []MST score >=2     []Enteral/Parenteral Nutrition PTA     []Pregnant: Gestational DM or Multigestation                 [] Pressure Ulcer    []  Low BMI      []  Length of Stay       [] Dysphagia Diet         [] Ventilator  []  Follow-up     Previous Recommendations:   [x] Implemented          [] Not Implemented          [] Not Applicable    Previous Goal:   [] Met              [x] Progressing Towards Goal              [] Not Progressing Towards Goal   [] Not Applicable            Ana Cristina Gonzalez  Pager [phone]  Phone [phone]

## 2020-05-14 NOTE — PROGRESS NOTES
MIAH cartridge changed at 1445  DEVICE ID #[id number]  [id number]# [id number]  CARTRIDGE ID #[id number]  Cannula Lot # [id number]

## 2020-05-15 ENCOUNTER — APPOINTMENT (OUTPATIENT)
Dept: GENERAL RADIOLOGY | Age: 75
DRG: 177 | End: 2020-05-15
Attending: STUDENT IN AN ORGANIZED HEALTH CARE EDUCATION/TRAINING PROGRAM
Payer: MEDICARE

## 2020-05-15 LAB
ALBUMIN SERPL-MCNC: 2 G/DL (ref 3.5–5)
ALBUMIN/GLOB SERPL: 0.4 {RATIO} (ref 1.1–2.2)
ALP SERPL-CCNC: 110 U/L (ref 45–117)
ALT SERPL-CCNC: 38 U/L (ref 12–78)
ANION GAP SERPL CALC-SCNC: 8 MMOL/L (ref 5–15)
AST SERPL-CCNC: 55 U/L (ref 15–37)
BASOPHILS # BLD: 0 K/UL (ref 0–0.1)
BASOPHILS NFR BLD: 0 % (ref 0–1)
BILIRUB SERPL-MCNC: 0.6 MG/DL (ref 0.2–1)
BNP SERPL-MCNC: 394 PG/ML
BUN SERPL-MCNC: 10 MG/DL (ref 6–20)
BUN/CREAT SERPL: 14 (ref 12–20)
CALCIUM SERPL-MCNC: 8.6 MG/DL (ref 8.5–10.1)
CHLORIDE SERPL-SCNC: 105 MMOL/L (ref 97–108)
CO2 SERPL-SCNC: 26 MMOL/L (ref 21–32)
COHGB MFR BLD: 0.8 % (ref 1–2)
COHGB MFR BLD: 1.9 % (ref 1–2)
CREAT SERPL-MCNC: 0.74 MG/DL (ref 0.55–1.02)
DIFFERENTIAL METHOD BLD: ABNORMAL
EOSINOPHIL # BLD: 0.3 K/UL (ref 0–0.4)
EOSINOPHIL NFR BLD: 4 % (ref 0–7)
ERYTHROCYTE [DISTWIDTH] IN BLOOD BY AUTOMATED COUNT: 12.4 % (ref 11.5–14.5)
GLOBULIN SER CALC-MCNC: 4.6 G/DL (ref 2–4)
GLUCOSE BLD STRIP.AUTO-MCNC: 176 MG/DL (ref 65–100)
GLUCOSE BLD STRIP.AUTO-MCNC: 197 MG/DL (ref 65–100)
GLUCOSE BLD STRIP.AUTO-MCNC: 273 MG/DL (ref 65–100)
GLUCOSE BLD STRIP.AUTO-MCNC: 354 MG/DL (ref 65–100)
GLUCOSE BLD STRIP.AUTO-MCNC: 361 MG/DL (ref 65–100)
GLUCOSE SERPL-MCNC: 169 MG/DL (ref 65–100)
HCT VFR BLD AUTO: 37.9 % (ref 35–47)
HGB BLD OXIMETRY-MCNC: 11.9 G/DL (ref 14–17)
HGB BLD OXIMETRY-MCNC: 12.7 G/DL (ref 14–17)
HGB BLD-MCNC: 12.4 G/DL (ref 11.5–16)
IMM GRANULOCYTES # BLD AUTO: 0.1 K/UL (ref 0–0.04)
IMM GRANULOCYTES NFR BLD AUTO: 2 % (ref 0–0.5)
INR PPP: 1.1 (ref 0.9–1.1)
LYMPHOCYTES # BLD: 1.5 K/UL (ref 0.8–3.5)
LYMPHOCYTES NFR BLD: 18 % (ref 12–49)
MAGNESIUM SERPL-MCNC: 1.4 MG/DL (ref 1.6–2.4)
MCH RBC QN AUTO: 28.6 PG (ref 26–34)
MCHC RBC AUTO-ENTMCNC: 32.7 G/DL (ref 30–36.5)
MCV RBC AUTO: 87.3 FL (ref 80–99)
METHGB MFR BLD: 0.2 % (ref 0–1.4)
METHGB MFR BLD: 0.2 % (ref 0–1.4)
MONOCYTES # BLD: 0.6 K/UL (ref 0–1)
MONOCYTES NFR BLD: 7 % (ref 5–13)
NEUTS SEG # BLD: 5.8 K/UL (ref 1.8–8)
NEUTS SEG NFR BLD: 69 % (ref 32–75)
NRBC # BLD: 0 K/UL (ref 0–0.01)
NRBC BLD-RTO: 0 PER 100 WBC
OXYHGB MFR BLD: 89 % (ref 94–97)
OXYHGB MFR BLD: 92.1 % (ref 94–97)
PHOSPHATE SERPL-MCNC: 1.8 MG/DL (ref 2.6–4.7)
PLATELET # BLD AUTO: 307 K/UL (ref 150–400)
PMV BLD AUTO: 10.4 FL (ref 8.9–12.9)
POTASSIUM SERPL-SCNC: 3.1 MMOL/L (ref 3.5–5.1)
PROT SERPL-MCNC: 6.6 G/DL (ref 6.4–8.2)
PROTHROMBIN TIME: 11.2 SEC (ref 9–11.1)
RBC # BLD AUTO: 4.34 M/UL (ref 3.8–5.2)
SAO2 % BLD: 90 % (ref 95–99)
SAO2 % BLD: 94 % (ref 95–99)
SERVICE CMNT-IMP: ABNORMAL
SODIUM SERPL-SCNC: 139 MMOL/L (ref 136–145)
WBC # BLD AUTO: 8.3 K/UL (ref 3.6–11)

## 2020-05-15 PROCEDURE — 74011000258 HC RX REV CODE- 258: Performed by: STUDENT IN AN ORGANIZED HEALTH CARE EDUCATION/TRAINING PROGRAM

## 2020-05-15 PROCEDURE — 94760 N-INVAS EAR/PLS OXIMETRY 1: CPT

## 2020-05-15 PROCEDURE — 83050 HGB METHEMOGLOBIN QUAN: CPT

## 2020-05-15 PROCEDURE — 74011250636 HC RX REV CODE- 250/636: Performed by: INTERNAL MEDICINE

## 2020-05-15 PROCEDURE — 74011250637 HC RX REV CODE- 250/637: Performed by: PHYSICIAN ASSISTANT

## 2020-05-15 PROCEDURE — 74011250636 HC RX REV CODE- 250/636: Performed by: STUDENT IN AN ORGANIZED HEALTH CARE EDUCATION/TRAINING PROGRAM

## 2020-05-15 PROCEDURE — 94640 AIRWAY INHALATION TREATMENT: CPT

## 2020-05-15 PROCEDURE — 80053 COMPREHEN METABOLIC PANEL: CPT

## 2020-05-15 PROCEDURE — 65660000000 HC RM CCU STEPDOWN

## 2020-05-15 PROCEDURE — 84100 ASSAY OF PHOSPHORUS: CPT

## 2020-05-15 PROCEDURE — 86480 TB TEST CELL IMMUN MEASURE: CPT

## 2020-05-15 PROCEDURE — 74011250637 HC RX REV CODE- 250/637: Performed by: INTERNAL MEDICINE

## 2020-05-15 PROCEDURE — 74011000250 HC RX REV CODE- 250: Performed by: FAMILY MEDICINE

## 2020-05-15 PROCEDURE — 71045 X-RAY EXAM CHEST 1 VIEW: CPT

## 2020-05-15 PROCEDURE — 74011250637 HC RX REV CODE- 250/637: Performed by: STUDENT IN AN ORGANIZED HEALTH CARE EDUCATION/TRAINING PROGRAM

## 2020-05-15 PROCEDURE — 77010033678 HC OXYGEN DAILY

## 2020-05-15 PROCEDURE — 74011000258 HC RX REV CODE- 258: Performed by: INTERNAL MEDICINE

## 2020-05-15 PROCEDURE — 85610 PROTHROMBIN TIME: CPT

## 2020-05-15 PROCEDURE — 36415 COLL VENOUS BLD VENIPUNCTURE: CPT

## 2020-05-15 PROCEDURE — 74011250636 HC RX REV CODE- 250/636: Performed by: FAMILY MEDICINE

## 2020-05-15 PROCEDURE — 82962 GLUCOSE BLOOD TEST: CPT

## 2020-05-15 PROCEDURE — 74011636637 HC RX REV CODE- 636/637: Performed by: FAMILY MEDICINE

## 2020-05-15 PROCEDURE — 83520 IMMUNOASSAY QUANT NOS NONAB: CPT

## 2020-05-15 PROCEDURE — 77010033711 HC HIGH FLOW OXYGEN

## 2020-05-15 PROCEDURE — 83880 ASSAY OF NATRIURETIC PEPTIDE: CPT

## 2020-05-15 PROCEDURE — 83735 ASSAY OF MAGNESIUM: CPT

## 2020-05-15 PROCEDURE — 74011636637 HC RX REV CODE- 636/637: Performed by: STUDENT IN AN ORGANIZED HEALTH CARE EDUCATION/TRAINING PROGRAM

## 2020-05-15 PROCEDURE — 85025 COMPLETE CBC W/AUTO DIFF WBC: CPT

## 2020-05-15 PROCEDURE — 94664 DEMO&/EVAL PT USE INHALER: CPT

## 2020-05-15 RX ORDER — INSULIN GLARGINE 100 [IU]/ML
10 INJECTION, SOLUTION SUBCUTANEOUS DAILY
Status: DISCONTINUED | OUTPATIENT
Start: 2020-05-15 | End: 2020-05-16

## 2020-05-15 RX ORDER — MAGNESIUM SULFATE 1 G/100ML
1 INJECTION INTRAVENOUS ONCE
Status: COMPLETED | OUTPATIENT
Start: 2020-05-15 | End: 2020-05-15

## 2020-05-15 RX ORDER — ZINC SULFATE 50(220)MG
1 CAPSULE ORAL EVERY 12 HOURS
Status: DISCONTINUED | OUTPATIENT
Start: 2020-05-15 | End: 2020-05-16

## 2020-05-15 RX ORDER — DIPHENHYDRAMINE HYDROCHLORIDE 50 MG/ML
50 INJECTION, SOLUTION INTRAMUSCULAR; INTRAVENOUS
Status: DISCONTINUED | OUTPATIENT
Start: 2020-05-15 | End: 2020-05-30 | Stop reason: HOSPADM

## 2020-05-15 RX ORDER — DIPHENHYDRAMINE HYDROCHLORIDE 50 MG/ML
50 INJECTION, SOLUTION INTRAMUSCULAR; INTRAVENOUS ONCE
Status: COMPLETED | OUTPATIENT
Start: 2020-05-15 | End: 2020-05-15

## 2020-05-15 RX ORDER — ACETAMINOPHEN 325 MG/1
650 TABLET ORAL ONCE
Status: COMPLETED | OUTPATIENT
Start: 2020-05-15 | End: 2020-05-15

## 2020-05-15 RX ADMIN — Medication 10 ML: at 22:05

## 2020-05-15 RX ADMIN — PANTOPRAZOLE SODIUM 40 MG: 40 TABLET, DELAYED RELEASE ORAL at 06:01

## 2020-05-15 RX ADMIN — IPRATROPIUM BROMIDE AND ALBUTEROL 1 PUFF: 20; 100 SPRAY, METERED RESPIRATORY (INHALATION) at 20:30

## 2020-05-15 RX ADMIN — OXYCODONE HYDROCHLORIDE AND ACETAMINOPHEN 500 MG: 500 TABLET ORAL at 21:00

## 2020-05-15 RX ADMIN — METHYLPREDNISOLONE SODIUM SUCCINATE 125 MG: 125 INJECTION, POWDER, FOR SOLUTION INTRAMUSCULAR; INTRAVENOUS at 16:40

## 2020-05-15 RX ADMIN — INSULIN GLARGINE 10 UNITS: 100 INJECTION, SOLUTION SUBCUTANEOUS at 09:16

## 2020-05-15 RX ADMIN — INSULIN LISPRO 5 UNITS: 100 INJECTION, SOLUTION INTRAVENOUS; SUBCUTANEOUS at 13:46

## 2020-05-15 RX ADMIN — DOXYCYCLINE 100 MG: 100 INJECTION, POWDER, LYOPHILIZED, FOR SOLUTION INTRAVENOUS at 23:12

## 2020-05-15 RX ADMIN — SERTRALINE HYDROCHLORIDE 50 MG: 50 TABLET ORAL at 09:04

## 2020-05-15 RX ADMIN — LOSARTAN POTASSIUM 50 MG: 50 TABLET, FILM COATED ORAL at 09:04

## 2020-05-15 RX ADMIN — FLUTICASONE FUROATE AND VILANTEROL TRIFENATATE 1 PUFF: 200; 25 POWDER RESPIRATORY (INHALATION) at 10:44

## 2020-05-15 RX ADMIN — OYSTER SHELL CALCIUM WITH VITAMIN D 1 TABLET: 500; 200 TABLET, FILM COATED ORAL at 09:05

## 2020-05-15 RX ADMIN — ACETAMINOPHEN 650 MG: 325 TABLET ORAL at 04:20

## 2020-05-15 RX ADMIN — INSULIN LISPRO 2 UNITS: 100 INJECTION, SOLUTION INTRAVENOUS; SUBCUTANEOUS at 09:17

## 2020-05-15 RX ADMIN — IPRATROPIUM BROMIDE AND ALBUTEROL 1 PUFF: 20; 100 SPRAY, METERED RESPIRATORY (INHALATION) at 14:30

## 2020-05-15 RX ADMIN — LEVOTHYROXINE SODIUM 100 MCG: 0.1 TABLET ORAL at 06:01

## 2020-05-15 RX ADMIN — IPRATROPIUM BROMIDE AND ALBUTEROL 1 PUFF: 20; 100 SPRAY, METERED RESPIRATORY (INHALATION) at 10:25

## 2020-05-15 RX ADMIN — DIPHENHYDRAMINE HYDROCHLORIDE 50 MG: 50 INJECTION, SOLUTION INTRAMUSCULAR; INTRAVENOUS at 16:48

## 2020-05-15 RX ADMIN — ATORVASTATIN CALCIUM 20 MG: 20 TABLET, FILM COATED ORAL at 09:04

## 2020-05-15 RX ADMIN — POTASSIUM BICARBONATE 20 MEQ: 782 TABLET, EFFERVESCENT ORAL at 09:03

## 2020-05-15 RX ADMIN — Medication 10 ML: at 09:24

## 2020-05-15 RX ADMIN — Medication 1 CAPSULE: at 10:30

## 2020-05-15 RX ADMIN — Medication 10 ML: at 13:47

## 2020-05-15 RX ADMIN — TOCILIZUMAB 400 MG: 20 INJECTION, SOLUTION, CONCENTRATE INTRAVENOUS at 17:35

## 2020-05-15 RX ADMIN — DOXYCYCLINE 100 MG: 100 INJECTION, POWDER, LYOPHILIZED, FOR SOLUTION INTRAVENOUS at 09:21

## 2020-05-15 RX ADMIN — ALBUTEROL SULFATE 2 PUFF: 90 AEROSOL, METERED RESPIRATORY (INHALATION) at 04:22

## 2020-05-15 RX ADMIN — POTASSIUM BICARBONATE 20 MEQ: 782 TABLET, EFFERVESCENT ORAL at 06:01

## 2020-05-15 RX ADMIN — INSULIN LISPRO 2 UNITS: 100 INJECTION, SOLUTION INTRAVENOUS; SUBCUTANEOUS at 17:46

## 2020-05-15 RX ADMIN — ATENOLOL 25 MG: 50 TABLET ORAL at 23:12

## 2020-05-15 RX ADMIN — SODIUM CHLORIDE: 900 INJECTION, SOLUTION INTRAVENOUS at 22:01

## 2020-05-15 RX ADMIN — ACETAMINOPHEN 650 MG: 325 TABLET ORAL at 16:40

## 2020-05-15 RX ADMIN — Medication 10 ML: at 06:01

## 2020-05-15 RX ADMIN — ENOXAPARIN SODIUM 40 MG: 40 INJECTION SUBCUTANEOUS at 20:29

## 2020-05-15 RX ADMIN — ASPIRIN 81 MG: 81 TABLET, COATED ORAL at 09:04

## 2020-05-15 RX ADMIN — THERA TABS 1 TABLET: TAB at 09:05

## 2020-05-15 RX ADMIN — TRAZODONE HYDROCHLORIDE 100 MG: 100 TABLET ORAL at 23:12

## 2020-05-15 RX ADMIN — MAGNESIUM SULFATE HEPTAHYDRATE 1 G: 1 INJECTION, SOLUTION INTRAVENOUS at 20:16

## 2020-05-15 RX ADMIN — Medication 1 CAPSULE: at 22:05

## 2020-05-15 RX ADMIN — OXYCODONE HYDROCHLORIDE AND ACETAMINOPHEN 500 MG: 500 TABLET ORAL at 09:05

## 2020-05-15 RX ADMIN — MONTELUKAST SODIUM 10 MG: 10 TABLET, FILM COATED ORAL at 22:05

## 2020-05-15 RX ADMIN — CETIRIZINE HYDROCHLORIDE 10 MG: 10 TABLET, FILM COATED ORAL at 10:30

## 2020-05-15 NOTE — PROGRESS NOTES
[address] [facility] RESIDENCY PROGRAM  PROGRESS NOTE     5/16/2020  PCP: Kelle Rodriguez MD       Assessment/Plan:     Brian Hager is a 69 y.o. female with history of HTN, type 2DM, GERD, hypothyroidism, depression, asthma, and hyperlipidemia who is admitted for AHRF 2/2 COVID 19.     Overnight events: ASHWINI. Increased Lantus to 13U daily this AM.       AHRF 2/2 COVID 19: COVID+ on 5/4, worsening SOB on admission and new O2 requirement (RA baseline). POA CXR with opacification in lower lobes b/l L>R, ABG pH 7.41, pCO2 31, pO2 64, O2 sat 93, bicarb 19, LA 1.8, S/p outpatient tx of azithro and plaquenil ending 5/10  - Follow COVID inflammatory markers      - Testing every other day  - Bcx NGTD  - Doxy for empiric coverage (Started 5/12). No azithro or levaquin due to prolonged qtc. - Pulmonology following, appreciate rec's- Clinical trial of pulsed Caitlin. Lasix to help compensate for NO  - Zinc, Vit C, Lipitor  - F/u quant gold and IL-6  - Repeat CXR: progression of extensive BL parenchymal disease greater on the  left than on the right.  - Supplemental O2 as needed.       Type 2 DM: HgA1C 10.4 in 11/2019. This admission A1C 13.1. Decreased PO intake. - Hold home glipizide  - Lantus 13U and SSI ACHS      Asthma  - Continue home Ventolin, patient has brought in her home inhaler  - Continue Singulair 10mg qhs     HTN  - Continue home BP meds of atenolol 25mg qhs, losartan 50mg daily     Hypothyroid: TSH 3.570 in 11/2019  - Continue home synthroid 100mcg daily     Depression  - Continue Zoloft 50mg dialy and trazadone 100mg qhs to help with sleep     GERD  - Will substitute home prilosec for protonix daily     Hypokalemia: RESOLVED.  Likely 2/2 diarrhea prior to arrival and diuretics inpatient. K 2.7 on admission.   - Repletion prn     Hyperlipidemia: Lipid panel , , HDL 59, LDL 89 in 11/2019  - Increased home Zocor 10mg daily to lipitor 20mg daily for anti-inflammatory benefit      Brian Hager discussed with Dr. Finesse Bello. Subjective:   Spoke to pt on the phone. States ROBISON has improved. No fever/chills/CP. Objective:   Physical examination  Patient Vitals for the past 24 hrs:   Temp Pulse Resp BP SpO2   20 0655  61      20 0559     90 %   20 0330 98.6 °F (37 °C) 63 23 142/53 90 %   20 0300  (!) 59 24 125/51 (!) 86 %   20 0230  (!) 57 20  (!) 87 %   20 0200  (!) 58 23 137/57 (!) 87 %   20 0130  64 27  93 %   20 0100  60 22 133/62 (!) 89 %   20 0000  70 24 130/60 (!) 87 %   05/15/20 2330 98.9 °F (37.2 °C) 68 23 140/63 93 %   05/15/20 2320     93 %   05/15/20 2312  67  140/67    05/15/20 2300  65 19 140/67 90 %   05/15/20 1910 98.9 °F (37.2 °C) 73 20 135/56 92 %   05/15/20 1746 99.8 °F (37.7 °C) 83 26 126/47 91 %   05/15/20 1658 100.2 °F (37.9 °C) 83 22 133/63 92 %   05/15/20 1500  87      05/15/20 1202 98.8 °F (37.1 °C) 80 27 99/58 90 %   05/15/20 0806 98.6 °F (37 °C) 80 22 104/69 93 %      Temp (24hrs), Av.1 °F (37.3 °C), Min:98.6 °F (37 °C), Max:100.2 °F (37.9 °C)     O2 Flow Rate (L/min): 15 l/min   O2 Device: Hi flow nasal cannula, Humidifier(midflow)    Date 05/15/20 07 - 20 0659 20 07 - 20 0659   Shift 0690-7438 4755-6332 24 Hour Total 2926-3816 5181-0583 24 Hour Total   INTAKE   P.O. 980  980        P. O. 980  980      I. V.(mL/kg/hr) 400(0.4) 100(0.1) 500(0.2)        Volume (doxycycline (VIBRAMYCIN) 100 mg in 0.9% sodium chloride (MBP/ADV) 100 mL) 400  400        Volume (tocilizumab (ACTEMRA) 696 mg in 0.9% sodium chloride 100 mL infusion) 0  0        Volume (magnesium sulfate 1 g/100 ml IVPB (premix or compounded))  100 100      Shift Total(mL/kg) 1380(15.9) 100(1.2) 1480(17)      OUTPUT   Urine(mL/kg/hr) 300(0.3) 150(0.1) 450(0.2)        Urine Voided 300 150 450      Stool           Stool Occurrence(s) 1 x  1 x      Shift Total(mL/kg) 300(3.5) 150(1.7) 450(5.2)      NET 3804 -16 7085 Weight (kg) 87 87 87 87 87 87     Pt is currently a COVID infected pt. I did not go in the room to decrease exposure and conserve PE. The attending physician will exam in the pt. Talked on the phone. General:   Alert, cooperative, mild respiratory distress     Data Review:     CBC:  Recent Labs     05/16/20  0335 05/15/20  0238 05/14/20  0454   WBC 7.8 8.3 7.6   HGB 11.0* 12.4 12.1   HCT 32.9* 37.9 36.8    842 411     Metabolic Panel:  Recent Labs     05/16/20  0335 05/15/20  0330 05/15/20  0238 05/14/20  0454     --  139 137   K 3.9  --  3.1* 3.3*     --  105 106   CO2 27  --  26 24   BUN 11  --  10 12   CREA 0.65  --  0.74 0.71   *  --  169* 181*   CA 8.4*  --  8.6 8.3*   MG  --   --  1.4* 1.6   PHOS  --   --  1.8*  --    ALB 1.8*  --  2.0* 2.1*   TBILI 0.4  --  0.6 0.6   SGOT 53*  --  55* 35   ALT 51  --  38 27   INR 1.1 1.1  --  1.1     Micro:  Lab Results   Component Value Date/Time    Culture result: NO GROWTH 3 DAYS 05/13/2020 04:29 AM    Culture result:  05/11/2020 12:35 AM     MRSA NOT PRESENT. Apparent Staphylococus aureus (not MRSA noted). Culture result:  05/11/2020 12:35 AM         Screening of patient nares for MRSA is for surveillance purposes and, if positive, to facilitate isolation considerations in high risk settings. It is not intended for automatic decolonization interventions per se as regimens are not sufficiently effective to warrant routine use. Imaging:  Xr Chest Port    Result Date: 5/10/2020  EXAM: XR CHEST PORT INDICATION: sob, +COVID COMPARISON: 2013 FINDINGS: A portable AP radiograph of the chest was obtained at 2145 hours. The patient is on a cardiac monitor. There is opacification in the lower lobes bilaterally, left greater than right. The cardiac and mediastinal contours and pulmonary vascularity are normal.  The bones and soft tissues are grossly within normal limits.      IMPRESSION: Opacification in the lower lobes bilaterally, left greater than right. CXR AP Port: 5/15/2020    FINDINGS:  AP portable upright view of the chest demonstrates a stable  cardiopericardial  silhouette. The lungs are adequately expanded. Increased peripheral parenchymal  opacities greater on the left than on the right. Increased interstitial opacity  as well. . The osseous structures are unremarkable. Patient is on a cardiac  monitor.      IMPRESSION  IMPRESSION:  Interval progression of extensive bilateral parenchymal disease greater on the  left than on the right.     Medications reviewed  Current Facility-Administered Medications   Medication Dose Route Frequency    insulin glargine (LANTUS) injection 10 Units  10 Units SubCUTAneous DAILY    zinc sulfate (ZINCATE) 220 (50) mg capsule 1 Cap  1 Cap Oral Q12H    diphenhydrAMINE (BENADRYL) injection 50 mg  50 mg IntraVENous Q4H PRN    atorvastatin (LIPITOR) tablet 20 mg  20 mg Oral DAILY    doxycycline (VIBRAMYCIN) 100 mg in 0.9% sodium chloride (MBP/ADV) 100 mL  100 mg IntraVENous Q12H    acetaminophen (TYLENOL) tablet 650 mg  650 mg Oral Q6H PRN    sodium chloride (NS) flush 5-40 mL  5-40 mL IntraVENous Q8H    sodium chloride (NS) flush 5-40 mL  5-40 mL IntraVENous PRN    ondansetron (ZOFRAN) injection 4 mg  4 mg IntraVENous Q4H PRN    enoxaparin (LOVENOX) injection 40 mg  40 mg SubCUTAneous Q24H    ascorbic acid (vitamin C) (VITAMIN C) tablet 500 mg  500 mg Oral BID    aspirin delayed-release tablet 81 mg  81 mg Oral DAILY    atenoloL (TENORMIN) tablet 25 mg  25 mg Oral QHS    calcium-vitamin D (OS-TRISTEN) 500 mg-200 unit tablet  1 Tab Oral DAILY    cetirizine (ZYRTEC) tablet 10 mg  10 mg Oral DAILY    fluticasone propionate (FLONASE) 50 mcg/actuation nasal spray 2 Spray  2 Spray Both Nostrils DAILY PRN    therapeutic multivitamin (THERAGRAN) tablet 1 Tab  1 Tab Oral DAILY    levothyroxine (SYNTHROID) tablet 100 mcg  100 mcg Oral ACB    losartan (COZAAR) tablet 50 mg  50 mg Oral DAILY    montelukast (SINGULAIR) tablet 10 mg  10 mg Oral QHS    sertraline (ZOLOFT) tablet 50 mg  50 mg Oral DAILY    traZODone (DESYREL) tablet 100 mg  100 mg Oral QHS    glucose chewable tablet 16 g  4 Tab Oral PRN    glucagon (GLUCAGEN) injection 1 mg  1 mg IntraMUSCular PRN    dextrose 10% infusion 0-250 mL  0-250 mL IntraVENous PRN    ipratropium-albuterol (COMBIVENT RESPIMAT) 20 mcg-100 mcg inhalation spray  1 Puff Inhalation Q6H RT    fluticasone-vilanterol (BREO ELLIPTA) 200mcg-25mcg/puff  1 Puff Inhalation DAILY    albuterol (PROVENTIL HFA, VENTOLIN HFA, PROAIR HFA) inhaler 2 Puff  2 Puff Inhalation Q4H PRN    insulin lispro (HUMALOG) injection   SubCUTAneous AC&HS    pantoprazole (PROTONIX) tablet 40 mg  40 mg Oral ACB    sodium chloride (NS) flush 5-10 mL  5-10 mL IntraVENous PRN         Signed:   Selena Whelan MD   Resident, Family Medicine      Attending note: Attending note to follow. ..

## 2020-05-15 NOTE — PROGRESS NOTES
1915- Bedside and Verbal shift change report given to Tabatha Cortes RN (oncoming nurse) by SIERRA Caro (offgoing nurse). Report included the following information SBAR and Kardex. 2000- Initial assessment performed. Pt on 10L midflow 02. See flowsheet for vitals. 2200- Pt turned to left side and midflow titrated to 15L to maintain 02 sast >90%. 2230- Pt refusing ordered combivent inhaler at this time, prn albuterol inhaler administered at this time for increased SOB. 0030- Pt lying on left side, refusing to lay prone at this time. Pt verbalized understand of importance of prone position. Will continue to monitor. 0230-Pt assisted to bedside commode. Daily weight obtained. Attempted new PIV access and AM labs x 2. RRT nurse contacted for further attempts. Encouraged patient to lay in prone position. Pt continues to refused. Pt agreeable to sleep on left side. 0730- Bedside and Verbal shift change report given to Steve Machado RN (oncoming nurse) by Tabatha Cortes RN (offgoing nurse). Report included the following information SBAR and Kardex.

## 2020-05-15 NOTE — PROGRESS NOTES
Due to [address] Pandemic, CM assessment completed via EMR review and collaboration with nursing staff. No contact with patient for this assessment. Care Management follow up    Patient admitted for respiratory failure, COVID19+. Hx of diabetes type 2, hypothyroid,LORENZO, asthma, arthritis, peripheral neuropathy, HTN, GERD.  is also COVID19+. RUR score 14%/low risk    Current status  Patient requiring high flow oxygen at 15L/min. Continues on IV antibiotics. Patient at high risk for decline. Transition of Care Plan  1. Monitor patient progress and response to treatment. 2. Patient on high flow oxygen, IV antibiotics. 3. Unable to determine discharge needs at this time due to patient not stable. 4. CM to monitor.     Gustavo Murphy, RN, MSN/Care manager

## 2020-05-15 NOTE — PROGRESS NOTES
[facility]  Pulmonary, Critical Care, and Sleep Medicine      Name: Piedad Rowan MRN: [id number]   : 1945 Hospital: [address] [facility]   Date: 5/15/2020        IMPRESSION:   · COVID-19 pneumonia with respiratory failure:  Pt at risk for cytokine storm  · Mild intermittent asthma  · Chronic back pain  · DM  · HTN      RECOMMENDATIONS:   · Cont w supplemental oxygen. On 15 lpm   · iNOPulse started   · Caitlin can increase risk for volume overload will cont with lasix as needed. Follow I/o and bun/creat. · Repeat CXR - consider diuresis if evidence of volume overload  · Follow inflammatory markers; at risk for cytokine storm. Inflammatory markers remain flat. Consider actemra w worening  · F/u quant gold and IL-6  · Encouraged pt to lie prone at least 4 hours per day and to use inc tommy q 1 hr w/a  · OOB to chair  · Has completed hydroxychloroquine/azithro  · Continue empiric abx  · On prn albuterol  · Continue zonc, viy C, and statin    Discussed the use of pulsed Caitlin with pt. She understands that this is an experimental therapy with possible benefit in COVID-19 but that its efficacy is unknown. She is agreeable to continue with the device. Subjective: This patient has been seen and evaluated at the request of Dr. Leigha Navarrete for [place] 19 pneumonia and resp failure. Patient is a 69 y.o. female diagnosed one week ago with Covid 19. Pt with fevers, diarrhea, cough, and sob. She has been isolating at home but has had work done within the home and believes that she may have gotten an infection from one of the workers there.  has also become infected. Pt states that her symptoms have remained stable to slightly improved over the past few days but has noted that her oxygen sats have been falling and presented to ED for evaluation. Fevers seem to have resolved. Main symptoms are now diarrhea, sob, and cough.       Pt w h/o asthma which by description appears to be mild intermittent and is followed by Dr Yasir Mckeon in [place]. Controlled with montelukast and (rare) albuterol MDI. Never smoker    Currently on 6 lpm w sats of 90%    Interval history:  Tmax 100.5  BP low but stable  Sats 93% on 15L mid-flow  proBNP 394 - better  K 3.1  Mag 1.4  Blood cxs ngtd  IL-6 and TB quant gold in process  450 ml UOP documented overnight    ROS: Pt alert. Feels about the same. Still with SOB, worse with activity. O2 needs increasing. Denies CP. Past Medical History:   Diagnosis Date    Anxiety     Arthritis     Asthma     Chronic pain     back,right hip    Depression     Diabetes ([facility] [address]     type 2    GERD (gastroesophageal reflux disease)     Hypercholesteremia     Hypertension     Hypothyroid     Nausea & vomiting     Reflux     Sleep apnea     wears dental appliance- Somnident    Thyroid disease     Unspecified adverse effect of anesthesia     delayed awakening      Past Surgical History:   Procedure Laterality Date    HX COLONOSCOPY  2012    normal repeat 2022, diverticulosis of sigmoid colon, Dr. Orourke Gone HX HEENT  1/08/14    bilateral cataract surgery1/8/14    HX KNEE REPLACEMENT      right    HX OTHER SURGICAL  2007    cardiac cath- No CAD found    NEUROLOGICAL PROCEDURE UNLISTED  1999    vascular brain surgery      Prior to Admission medications    Medication Sig Start Date End Date Taking? Authorizing Provider   guaiFENesin ER (Mucinex) 600 mg ER tablet Take 600 mg by mouth two (2) times a day. Yes Provider, Historical   omeprazole (PRILOSEC) 20 mg capsule Take 20 mg by mouth daily. Yes Provider, Historical   calcium-vitamin D (OYSTER SHELL) 500 mg(1,250mg) -200 unit per tablet Take 1 Tab by mouth daily.    Yes Provider, Historical   traZODone (DESYREL) 100 mg tablet TAKE 1 TABLET BY MOUTH EVERY DAY AT NIGHT 3/25/20  Yes Balbir Bender, NP   glipiZIDE SR (GLUCOTROL XL) 10 mg CR tablet TAKE 1 TABLET BY MOUTH TWICE A DAY 2/15/20  Yes Napoleon Naveed Sesay NP   simvastatin (ZOCOR) 10 mg tablet Take 1 Tab by mouth nightly. 10/8/19  Yes Naveed Bender NP   losartan (COZAAR) 50 mg tablet TAKE 1 TABLET BY MOUTH EVERY DAY. 10/8/19  Yes Naveed Bender NP   levothyroxine (SYNTHROID) 100 mcg tablet Take 1 Tab by mouth Daily (before breakfast). 10/8/19  Yes Naveed Bender NP   atenolol (TENORMIN) 25 mg tablet Take 1 Tab by mouth nightly. 10/8/19  Yes Naveed Bender NP   sertraline (ZOLOFT) 50 mg tablet TAKE 1 TABLET BY MOUTH EVERY DAY 9/4/19  Yes Naveed Bender NP   aspirin delayed-release 81 mg tablet Take 81 mg by mouth daily. Yes Provider, Historical   fluticasone (FLONASE) 50 mcg/actuation nasal spray 2 Sprays by Both Nostrils route daily as needed for Rhinitis or Allergies. Yes Provider, Historical   albuterol (PROAIR HFA) 90 mcg/actuation inhaler Take 2 Puffs by inhalation every four (4) hours as needed for Wheezing. Yes Provider, Historical   Cetirizine (ZYRTEC) 10 mg cap Take 1 Tab by mouth every evening. Yes Provider, Historical   multivitamins-minerals-lutein (CENTRUM SILVER) tab Take 1 Tab by mouth daily. Yes Provider, Historical   glucosamine-chondroit-vit c-mn (GLUCOSAMINE CHONDROITIN MAXSTR) 500-400 mg capsule Take 1 Cap by mouth daily. Yes Other, MD [place]   varicella-zoster recombinant, PF, (SHINGRIX, PF,) 50 mcg/0.5 mL susr injection 0.5mL by IntraMUSCular route once now and then repeat in 2-6 months 10/8/19   Marc Casper NP     Allergies   Allergen Reactions    Latex Rash    Prednisone Anaphylaxis     Throat swelling. Can take methylpredisone po or IV without problems.  Augmentin [Amoxicillin-Pot Clavulanate] Nausea and Vomiting    Biaxin [Clarithromycin] Nausea and Vomiting    Metformin Diarrhea    Parafon Forte Dsc [Chlorzoxazone] Nausea and Vomiting      Social History     Tobacco Use    Smoking status: Never Smoker    Smokeless tobacco: Never Used   Substance Use Topics    Alcohol use:  No Frequency: Never     Comment: 2x year      Family History   Problem Relation Age of Onset    Elevated Lipids Mother     Dementia Mother     Osteoporosis Mother     Lung Disease Father         copd    Delayed Awakening Father     Post-op Nausea/Vomiting Father     Hypertension Sister     Diabetes Sister         type 2    Breast Cancer Maternal Aunt         Current Facility-Administered Medications   Medication Dose Route Frequency    insulin glargine (LANTUS) injection 10 Units  10 Units SubCUTAneous DAILY    atorvastatin (LIPITOR) tablet 20 mg  20 mg Oral DAILY    doxycycline (VIBRAMYCIN) 100 mg in 0.9% sodium chloride (MBP/ADV) 100 mL  100 mg IntraVENous Q12H    sodium chloride (NS) flush 5-40 mL  5-40 mL IntraVENous Q8H    enoxaparin (LOVENOX) injection 40 mg  40 mg SubCUTAneous Q24H    zinc sulfate (ZINCATE) 220 (50) mg capsule 1 Cap  1 Cap Oral DAILY    ascorbic acid (vitamin C) (VITAMIN C) tablet 500 mg  500 mg Oral BID    aspirin delayed-release tablet 81 mg  81 mg Oral DAILY    atenoloL (TENORMIN) tablet 25 mg  25 mg Oral QHS    calcium-vitamin D (OS-TRISTEN) 500 mg-200 unit tablet  1 Tab Oral DAILY    cetirizine (ZYRTEC) tablet 10 mg  10 mg Oral DAILY    therapeutic multivitamin (THERAGRAN) tablet 1 Tab  1 Tab Oral DAILY    levothyroxine (SYNTHROID) tablet 100 mcg  100 mcg Oral ACB    losartan (COZAAR) tablet 50 mg  50 mg Oral DAILY    montelukast (SINGULAIR) tablet 10 mg  10 mg Oral QHS    sertraline (ZOLOFT) tablet 50 mg  50 mg Oral DAILY    traZODone (DESYREL) tablet 100 mg  100 mg Oral QHS    ipratropium-albuterol (COMBIVENT RESPIMAT) 20 mcg-100 mcg inhalation spray  1 Puff Inhalation Q6H RT    fluticasone-vilanterol (BREO ELLIPTA) 200mcg-25mcg/puff  1 Puff Inhalation DAILY    insulin lispro (HUMALOG) injection   SubCUTAneous AC&HS    pantoprazole (PROTONIX) tablet 40 mg  40 mg Oral ACB       Review of Systems:  A comprehensive review of systems was negative except for that written in the HPI. Objective:   Vital Signs:    Visit Vitals  /69 (BP 1 Location: Left arm, BP Patient Position: At rest)   Pulse 80   Temp 98.6 °F (37 °C)   Resp 22   Ht 5' 4\" (1.626 m)   Wt 87 kg (191 lb 11.2 oz)   SpO2 93%   BMI 32.91 kg/m²       O2 Device: Hi flow nasal cannula(midflow)   O2 Flow Rate (L/min): 15 l/min   Temp (24hrs), Av.3 °F (37.4 °C), Min:98.4 °F (36.9 °C), Max:100.5 °F (38.1 °C)       Intake/Output:   Last shift:      05/15 07 - 05/15 1900  In: 540 [P.O.:140; I.V.:400]  Out: 100 [Urine:100]  Last 3 shifts: 1901 - 05/15 0700  In: 480 [P.O.:480]  Out: 450 [Urine:450]    Intake/Output Summary (Last 24 hours) at 5/15/2020 0925  Last data filed at 5/15/2020 8357  Gross per 24 hour   Intake 1020 ml   Output 550 ml   Net 470 ml      Physical Exam:   General:  Alert, cooperative, mild distress, appears stated age. Head:  Normocephalic, without obvious abnormality, atraumatic. Eyes:  Conjunctivae/corneas clear. EOMs intact. Nose: Nares normal. Septum midline. Mucosa normal.    Throat: Lips, mucosa, and tongue normal.    Neck: Supple, symmetrical, trachea midline, no adenopathy. Lungs:   Diminished, + post basilar crackles   Chest wall:  No tenderness or deformity. Heart:  Regular rate and rhythm, S1, S2 normal, no murmur, click, rub or gallop. Abdomen:   Soft, non-tender. Bowel sounds normal. No masses,  No organomegaly. Extremities: Extremities normal, atraumatic, no cyanosis or edema. Pulses: 2+ and symmetric all extremities.    Skin: Skin color, texture, turgor normal. No rashes or lesions   Lymph nodes: Cervical, supraclavicular nodes normal.   Neurologic: Grossly nonfocal     Data review:     Recent Results (from the past 24 hour(s))   GLUCOSE, POC    Collection Time: 20 11:05 AM   Result Value Ref Range    Glucose (POC) 167 (H) 65 - 100 mg/dL    Performed by [address] [place] [address], [facility]    Collection Time: 20  3:08 PM   Result Value Ref Range Glucose (POC) 227 (H) 65 - 100 mg/dL    Performed by [address] [place] [address], POC    Collection Time: 05/14/20  6:00 PM   Result Value Ref Range    Glucose (POC) 231 (H) 65 - 100 mg/dL    Performed by Olivia Campos    GLUCOSE, POC    Collection Time: 05/14/20  9:18 PM   Result Value Ref Range    Glucose (POC) 231 (H) 65 - 100 mg/dL    Performed by Cathy Hernandez (PCA)    METABOLIC PANEL, COMPREHENSIVE    Collection Time: 05/15/20  2:38 AM   Result Value Ref Range    Sodium 139 136 - 145 mmol/L    Potassium 3.1 (L) 3.5 - 5.1 mmol/L    Chloride 105 97 - 108 mmol/L    CO2 26 21 - 32 mmol/L    Anion gap 8 5 - 15 mmol/L    Glucose 169 (H) 65 - 100 mg/dL    BUN 10 6 - 20 MG/DL    Creatinine 0.74 0.55 - 1.02 MG/DL    BUN/Creatinine ratio 14 12 - 20      GFR est AA >60 >60 ml/min/1.73m2    GFR est non-AA >60 >60 ml/min/1.73m2    Calcium 8.6 8.5 - 10.1 MG/DL    Bilirubin, total 0.6 0.2 - 1.0 MG/DL    ALT (SGPT) 38 12 - 78 U/L    AST (SGOT) 55 (H) 15 - 37 U/L    Alk. phosphatase 110 45 - 117 U/L    Protein, total 6.6 6.4 - 8.2 g/dL    Albumin 2.0 (L) 3.5 - 5.0 g/dL    Globulin 4.6 (H) 2.0 - 4.0 g/dL    A-G Ratio 0.4 (L) 1.1 - 2.2     CBC WITH AUTOMATED DIFF    Collection Time: 05/15/20  2:38 AM   Result Value Ref Range    WBC 8.3 3.6 - 11.0 K/uL    RBC 4.34 3.80 - 5.20 M/uL    HGB 12.4 11.5 - 16.0 g/dL    HCT 37.9 35.0 - 47.0 %    MCV 87.3 80.0 - 99.0 FL    MCH 28.6 26.0 - 34.0 PG    MCHC 32.7 30.0 - 36.5 g/dL    RDW 12.4 11.5 - 14.5 %    PLATELET 581 598 - 941 K/uL    MPV 10.4 8.9 - 12.9 FL    NRBC 0.0 0  WBC    ABSOLUTE NRBC 0.00 0.00 - 0.01 K/uL    NEUTROPHILS 69 32 - 75 %    LYMPHOCYTES 18 12 - 49 %    MONOCYTES 7 5 - 13 %    EOSINOPHILS 4 0 - 7 %    BASOPHILS 0 0 - 1 %    IMMATURE GRANULOCYTES 2 (H) 0.0 - 0.5 %    ABS. NEUTROPHILS 5.8 1.8 - 8.0 K/UL    ABS. LYMPHOCYTES 1.5 0.8 - 3.5 K/UL    ABS. MONOCYTES 0.6 0.0 - 1.0 K/UL    ABS. EOSINOPHILS 0.3 0.0 - 0.4 K/UL    ABS. BASOPHILS 0.0 0.0 - 0.1 K/UL    ABS. IMM. GRANS. 0.1 (H) 0.00 - 0.04 K/UL    DF AUTOMATED     NT-PRO BNP    Collection Time: 05/15/20  2:38 AM   Result Value Ref Range    NT pro- (H) <125 PG/ML   MAGNESIUM    Collection Time: 05/15/20  2:38 AM   Result Value Ref Range    Magnesium 1.4 (L) 1.6 - 2.4 mg/dL   PHOSPHORUS    Collection Time: 05/15/20  2:38 AM   Result Value Ref Range    Phosphorus 1.8 (L) 2.6 - 4.7 MG/DL   PROTHROMBIN TIME + INR    Collection Time: 05/15/20  3:30 AM   Result Value Ref Range    INR 1.1 0.9 - 1.1      Prothrombin time 11.2 (H) 9.0 - 11.1 sec   CARBOXY HEMOGLOBIN    Collection Time: 05/15/20  3:35 AM   Result Value Ref Range    Carboxy-Hgb 1.9 1 - 2 %    Methemoglobin 0.2 0 - 1.4 %    tHb 12.7 (L) 14 - 17 g/dL    Oxyhemoglobin 92.1 (L) 94 - 97 %    O2 SATURATION 94 (L) 95 - 99 %   GLUCOSE, POC    Collection Time: 05/15/20  8:08 AM   Result Value Ref Range    Glucose (POC) 176 (H) 65 - 100 mg/dL    Performed by [employer]        Imaging:  I have personally reviewed the patients radiographs and have reviewed the reports:  No new images this morning.         Deana Smithma

## 2020-05-15 NOTE — PROGRESS NOTES
Bedside and Verbal shift change report given to Abby Torres (oncoming nurse) by Cassandra Narvaez (offgoing nurse). Report included the following information SBAR, Kardex, ED Summary, Intake/Output, Recent Results, Med Rec Status and Cardiac Rhythm NSR.     2341: Notified Family practice of pt POC b/g 354. Dr. Roshan Loera 10 units lispro once. Pt reported she ate apple pie for dinner, thought it was sugarless as she's on a diabetic diet. Bedside and Verbal shift change report given to Arabella Morrison RN (oncoming nurse) by Martin Rahman RN (offgoing nurse). Report included the following information SBAR, Kardex, ED Summary, Intake/Output, Recent Results, Med Rec Status and Cardiac Rhythm NSR/SB.

## 2020-05-15 NOTE — PROGRESS NOTES
MIAH cartridge changes at 1225  DEVICE ID #[id number]  LOT# [id number]  CARTRIDGE ID [id number]  CANNULA LOT# [id number]  SPO2 95% ON 15L

## 2020-05-15 NOTE — ROUTINE PROCESS
Shift Summary 0730: Bedside and Verbal shift change report given to Kashmir Ibrahim RN (oncoming nurse) by Mulugeta Claros RN (offgoing nurse). Report included the following information SBAR, Kardex, Intake/Output, MAR, Accordion, Recent Results and Cardiac Rhythm NSR.  
 
1130: Patient up to recliner 1420: Transferred patient to SDU d/t concern of increasing oxygen need- on 15L midlfow with zinc and saturation 86-91%. Received orders for high flow from pul 
 
1500: María has been tolerating midflow well, has not needed to be on high flow. Currently 92% on 11L midflow 1640: Administered benedryl, solumedrol, and tyelenol pre-meds before scheduled Actemra. Explained purpose as preventatives from transfuion/allergic reaction to new medication. Performed medication education on Actemra- mechanism of action, purpose as experimental therapy in COVID, potential adverse reactions O2 increased to 15L midflow 1845: Patient tolerated Actemra well. Updated  on patient's day. Will try to have MD call  tomorrow. 1930: Bedside and Verbal shift change report given to Alexander Schuster RN (oncoming nurse) by Kashmir Ibrahim RN (offgoing nurse). Report included the following information SBAR, Kardex, Procedure Summary, Intake/Output, MAR, Accordion, Recent Results and Cardiac Rhythm NSR.

## 2020-05-15 NOTE — PROGRESS NOTES
[address] [facility] RESIDENCY PROGRAM  PROGRESS NOTE     5/15/2020  PCP: Mariana Alicia MD       Assessment/Plan:     Rowena Leavitt is a 69 y.o. female with history of HTN, type 2DM, GERD, hypothyroidism, depression, asthma, and hyperlipidemia who is admitted for AHRF 2/2 COVID 19.     Overnight events: Required 15L NC. Moderate PO intake     AHRF 2/2 COVID 19: COVID+ on 5/4, worsening SOB on admission and new O2 requirement (RA baseline). POA CXR with opacification in lower lobes b/l L>R, ABG pH 7.41, pCO2 31, pO2 64, O2 sat 93, bicarb 19, LA 1.8, S/p outpatient tx of azithro and plaquenil ending 5/10  - Follow COVID inflammatory markers      - Testing every other day  - Blood culture NG to date  - Continue Doxy for empiric coverage (Started 5/12). No azithro or levaquin due to prolonged qtc. - Pulmonology following, appreciate rec's- Clinical trial of pulsed Caitlin. Lasix to help compensate for NO  - Zinc, Vit C, Lipitor  - Repeat CXR today  - Supplemental O2 as needed.       Type 2 DM: HgA1C 10.4 in 11/2019. This admission A1C 13.1. Decreased PO intake. - Hold home glipizide  - Lantus 10U and SSI ACHS      Asthma  - Continue home Ventolin, patient has brought in her home inhaler  - Continue Singulair 10mg qhs     HTN  - Continue home BP meds of atenolol 25mg qhs, losartan 50mg daily     Hypothyroid: TSH 3.570 in 11/2019  - Continue home synthroid 100mcg daily     Depression  - Continue Zoloft 50mg dialy and trazadone 100mg qhs to help with sleep     GERD  - Will substitute home prilosec for protonix daily     Hypokalemia: IMPROVED. Likely 2/2 diarrhea prior to arrival and diuretics inpatient. K 2.7 on admission.   - Repletion prn    Hyperlipidemia: Lipid panel , , HDL 59, LDL 89 in 11/2019  - Increased home Zocor 10mg daily to lipitor 20mg daily for anti-inflammatory benefit      Rowena Leavitt discussed with Dr. Jayden Baez. Subjective:   Spoke to pt on the phone.  Maintains to have Munoz with minimal exertion. No other complaints.         Objective:   Physical examination  Patient Vitals for the past 24 hrs:   Temp Pulse Resp BP SpO2   05/15/20 0604 98.4 °F (36.9 °C)       05/15/20 0416 100.3 °F (37.9 °C) 76 22 119/65 93 %   05/15/20 0040  79 22  91 %   05/14/20 2317  75      05/14/20 2240     94 %   05/14/20 2232 99.1 °F (37.3 °C) 74 24 129/71 93 %   05/14/20 2129  74  129/74    05/14/20 1958 99 °F (37.2 °C) 90 22 106/63 93 %   05/14/20 1856 (!) 100.5 °F (38.1 °C) 83 24 127/69 93 %   05/14/20 1854  85      05/14/20 1815  82 24 135/60 92 %   05/14/20 1800  83 26  97 %   05/14/20 1745  80 (!) 33 141/62 95 %   05/14/20 1730  82 17 132/78 91 %   05/14/20 1715  84 (!) 32 130/59 97 %   05/14/20 1700  88 23 143/62 92 %   05/14/20 1652  84 27  96 %   05/14/20 1651  86 27  93 %   05/14/20 1650  86 26  93 %   05/14/20 1649  88 (!) 32  92 %   05/14/20 1648  87 25  93 %   05/14/20 1647  87 26  93 %   05/14/20 1646  82 27  95 %   05/14/20 1645  84 28 137/72 95 %   05/14/20 1644  87 25  95 %   05/14/20 1643  85 (!) 32  94 %   05/14/20 1642  85 28  95 %   05/14/20 1641  84 (!) 31  94 %   05/14/20 1640  85 29  93 %   05/14/20 1630  88 (!) 33 139/61 92 %   05/14/20 1615  88 (!) 31 127/52 91 %   05/14/20 1600  87 (!) 32 134/53 92 %   05/14/20 1545  85 (!) 34 128/63 91 %   05/14/20 1531  95      05/14/20 1530 99.4 °F (37.4 °C) 99 16 122/56 90 %   05/14/20 1529  97 26  90 %   05/14/20 1523  86 23  92 %   05/14/20 1515  89 29 105/58 92 %   05/14/20 1502     92 %   05/14/20 1500  92 (!) 31 120/49 90 %   05/14/20 1445    114/43    05/14/20 1430    125/52    05/14/20 1400  80 25 129/65 (!) 89 %   05/14/20 1345    137/52    05/14/20 1330  80 20 129/65 90 %   05/14/20 1315  82 30 138/53 92 %   05/14/20 1300  77 30 143/58 92 %   05/14/20 1245  82 17 127/77 91 %   05/14/20 1230  80 23 132/74 91 %   05/14/20 1215  81 27 135/63 90 % 20 1200  64 27 125/69 90 %   20 1145  75 28 140/67 90 %   20 1130  75 22 136/64 93 %   20 1115  85 18 130/50 (!) 88 %   20 1100  78 16 129/47 (!) 88 %   20 1045  83 16 126/46 (!) 86 %   20 1030  86 25 117/50 (!) 88 %   20 1015  90 19 113/61 (!) 89 %   20 1000  84 26 109/56 91 %   20 0900  82 16 (!) 87/62 91 %   20 0800 98.1 °F (36.7 °C) 80 26 123/52 90 %      Temp (24hrs), Av.3 °F (37.4 °C), Min:98.1 °F (36.7 °C), Max:100.5 °F (38.1 °C)     O2 Flow Rate (L/min): 15 l/min   O2 Device: Hi flow nasal cannula(midflow)    Date 20 07 - 05/15/20 0659 05/15/20 07 - 20 0659   Shift 1993-9324 9997-4182 24 Hour Total 4658-8073 0722-9016 24 Hour Total   INTAKE   P.O.  480 480        P. O.  480 480      Shift Total(mL/kg)  480(5.5) 480(5.5)      OUTPUT   Urine(mL/kg/hr)  450(0.4) 450(0.2)        Urine Voided  450 450      Shift Total(mL/kg)  450(5.2) 450(5.2)      NET  30 30      Weight (kg) 88 87 87 87 87 87     Pt is currently a COVID infected pt. I did not go in the room to decrease exposure and conserve PE. The attending physician will exam in the pt. Talked on the phone.     General:   Alert, cooperative, mild respiratory distress     Data Review:     CBC:  Recent Labs     05/15/20  0238 20  0454 20  0429   WBC 8.3 7.6 7.9   HGB 12.4 12.1 11.7   HCT 37.9 36.8 35.0    260 998     Metabolic Panel:  Recent Labs     05/15/20  0330 05/15/20  0238 20  0454 20  0429   NA  --  139 137 142   K  --  3.1* 3.3* 3.4*   CL  --  105 106 109*   CO2  --  26 24 25   BUN  --  10 12 11   CREA  --  0.74 0.71 0.74   GLU  --  169* 181* 98   CA  --  8.6 8.3* 8.0*   MG  --  1.4* 1.6 1.5*   PHOS  --  1.8*  --   --    ALB  --  2.0* 2.1* 2.0*   TBILI  --  0.6 0.6 0.5   SGOT  --  55* 35 33   ALT  --  38 27 26   INR 1.1  --  1.1 1.1     Micro:  Lab Results   Component Value Date/Time    Culture result: NO GROWTH 2 DAYS 05/13/2020 04:29 AM    Culture result:  05/11/2020 12:35 AM     MRSA NOT PRESENT. Apparent Staphylococus aureus (not MRSA noted). Culture result:  05/11/2020 12:35 AM         Screening of patient nares for MRSA is for surveillance purposes and, if positive, to facilitate isolation considerations in high risk settings. It is not intended for automatic decolonization interventions per se as regimens are not sufficiently effective to warrant routine use. Imaging:  Xr Chest Port    Result Date: 5/10/2020  EXAM: XR CHEST PORT INDICATION: sob, +COVID COMPARISON: 2013 FINDINGS: A portable AP radiograph of the chest was obtained at 2145 hours. The patient is on a cardiac monitor. There is opacification in the lower lobes bilaterally, left greater than right. The cardiac and mediastinal contours and pulmonary vascularity are normal.  The bones and soft tissues are grossly within normal limits. IMPRESSION: Opacification in the lower lobes bilaterally, left greater than right.     Medications reviewed  Current Facility-Administered Medications   Medication Dose Route Frequency    potassium bicarb-citric acid (EFFER-K) tablet 20 mEq  20 mEq Oral Q2H    atorvastatin (LIPITOR) tablet 20 mg  20 mg Oral DAILY    insulin glargine (LANTUS) injection 8 Units  8 Units SubCUTAneous DAILY    doxycycline (VIBRAMYCIN) 100 mg in 0.9% sodium chloride (MBP/ADV) 100 mL  100 mg IntraVENous Q12H    acetaminophen (TYLENOL) tablet 650 mg  650 mg Oral Q6H PRN    sodium chloride (NS) flush 5-40 mL  5-40 mL IntraVENous Q8H    sodium chloride (NS) flush 5-40 mL  5-40 mL IntraVENous PRN    ondansetron (ZOFRAN) injection 4 mg  4 mg IntraVENous Q4H PRN    enoxaparin (LOVENOX) injection 40 mg  40 mg SubCUTAneous Q24H    zinc sulfate (ZINCATE) 220 (50) mg capsule 1 Cap  1 Cap Oral DAILY    ascorbic acid (vitamin C) (VITAMIN C) tablet 500 mg  500 mg Oral BID    aspirin delayed-release tablet 81 mg  81 mg Oral DAILY    atenoloL (TENORMIN) tablet 25 mg  25 mg Oral QHS    calcium-vitamin D (OS-TRISTEN) 500 mg-200 unit tablet  1 Tab Oral DAILY    cetirizine (ZYRTEC) tablet 10 mg  10 mg Oral DAILY    fluticasone propionate (FLONASE) 50 mcg/actuation nasal spray 2 Spray  2 Spray Both Nostrils DAILY PRN    therapeutic multivitamin (THERAGRAN) tablet 1 Tab  1 Tab Oral DAILY    levothyroxine (SYNTHROID) tablet 100 mcg  100 mcg Oral ACB    losartan (COZAAR) tablet 50 mg  50 mg Oral DAILY    montelukast (SINGULAIR) tablet 10 mg  10 mg Oral QHS    sertraline (ZOLOFT) tablet 50 mg  50 mg Oral DAILY    traZODone (DESYREL) tablet 100 mg  100 mg Oral QHS    glucose chewable tablet 16 g  4 Tab Oral PRN    glucagon (GLUCAGEN) injection 1 mg  1 mg IntraMUSCular PRN    dextrose 10% infusion 0-250 mL  0-250 mL IntraVENous PRN    ipratropium-albuterol (COMBIVENT RESPIMAT) 20 mcg-100 mcg inhalation spray  1 Puff Inhalation Q6H RT    fluticasone-vilanterol (BREO ELLIPTA) 200mcg-25mcg/puff  1 Puff Inhalation DAILY    albuterol (PROVENTIL HFA, VENTOLIN HFA, PROAIR HFA) inhaler 2 Puff  2 Puff Inhalation Q4H PRN    insulin lispro (HUMALOG) injection   SubCUTAneous AC&HS    pantoprazole (PROTONIX) tablet 40 mg  40 mg Oral ACB    sodium chloride (NS) flush 5-10 mL  5-10 mL IntraVENous PRN         Signed:   Ángela Garcia MD   Resident, Family Medicine      Attending note: Attending note to follow. ..

## 2020-05-15 NOTE — PROGRESS NOTES
MIAH cartridge changed at 0305  DEVICE ID #[id number]  [id number]# [id number]  CARTRIDGE ID # [id number]  Cannula Lot # [id number]  Saturation 93% on 15L

## 2020-05-16 ENCOUNTER — APPOINTMENT (OUTPATIENT)
Dept: GENERAL RADIOLOGY | Age: 75
DRG: 177 | End: 2020-05-16
Attending: INTERNAL MEDICINE
Payer: MEDICARE

## 2020-05-16 PROBLEM — U07.1 COVID-19: Status: ACTIVE | Noted: 2020-05-16

## 2020-05-16 LAB
ALBUMIN SERPL-MCNC: 1.8 G/DL (ref 3.5–5)
ALBUMIN/GLOB SERPL: 0.4 {RATIO} (ref 1.1–2.2)
ALP SERPL-CCNC: 114 U/L (ref 45–117)
ALT SERPL-CCNC: 51 U/L (ref 12–78)
ANION GAP SERPL CALC-SCNC: 6 MMOL/L (ref 5–15)
AST SERPL-CCNC: 53 U/L (ref 15–37)
BACTERIA SPEC CULT: NORMAL
BASOPHILS # BLD: 0 K/UL (ref 0–0.1)
BASOPHILS NFR BLD: 0 % (ref 0–1)
BILIRUB SERPL-MCNC: 0.4 MG/DL (ref 0.2–1)
BUN SERPL-MCNC: 11 MG/DL (ref 6–20)
BUN/CREAT SERPL: 17 (ref 12–20)
CALCIUM SERPL-MCNC: 8.4 MG/DL (ref 8.5–10.1)
CHLORIDE SERPL-SCNC: 105 MMOL/L (ref 97–108)
CO2 SERPL-SCNC: 27 MMOL/L (ref 21–32)
COHGB MFR BLD: 1.3 % (ref 1–2)
CREAT SERPL-MCNC: 0.65 MG/DL (ref 0.55–1.02)
CRP SERPL-MCNC: 12.6 MG/DL (ref 0–0.6)
D DIMER PPP FEU-MCNC: 1.38 MG/L FEU (ref 0–0.65)
DIFFERENTIAL METHOD BLD: ABNORMAL
EOSINOPHIL # BLD: 0 K/UL (ref 0–0.4)
EOSINOPHIL NFR BLD: 0 % (ref 0–7)
ERYTHROCYTE [DISTWIDTH] IN BLOOD BY AUTOMATED COUNT: 12 % (ref 11.5–14.5)
FERRITIN SERPL-MCNC: 362 NG/ML (ref 8–252)
GLOBULIN SER CALC-MCNC: 4.3 G/DL (ref 2–4)
GLUCOSE BLD STRIP.AUTO-MCNC: 214 MG/DL (ref 65–100)
GLUCOSE BLD STRIP.AUTO-MCNC: 238 MG/DL (ref 65–100)
GLUCOSE BLD STRIP.AUTO-MCNC: 251 MG/DL (ref 65–100)
GLUCOSE BLD STRIP.AUTO-MCNC: 280 MG/DL (ref 65–100)
GLUCOSE SERPL-MCNC: 295 MG/DL (ref 65–100)
HCT VFR BLD AUTO: 32.9 % (ref 35–47)
HGB BLD OXIMETRY-MCNC: 11.5 G/DL (ref 14–17)
HGB BLD-MCNC: 11 G/DL (ref 11.5–16)
IL6 SERPL-MCNC: 78.7 PG/ML (ref 0–15.5)
IMM GRANULOCYTES # BLD AUTO: 0 K/UL (ref 0–0.04)
IMM GRANULOCYTES NFR BLD AUTO: 0 % (ref 0–0.5)
INR PPP: 1.1 (ref 0.9–1.1)
LDH SERPL L TO P-CCNC: 347 U/L (ref 81–246)
LYMPHOCYTES # BLD: 0.9 K/UL (ref 0.8–3.5)
LYMPHOCYTES NFR BLD: 11 % (ref 12–49)
MCH RBC QN AUTO: 28.7 PG (ref 26–34)
MCHC RBC AUTO-ENTMCNC: 33.4 G/DL (ref 30–36.5)
MCV RBC AUTO: 85.9 FL (ref 80–99)
METAMYELOCYTES NFR BLD MANUAL: 1 %
METHGB MFR BLD: 0.4 % (ref 0–1.4)
MONOCYTES # BLD: 0.3 K/UL (ref 0–1)
MONOCYTES NFR BLD: 4 % (ref 5–13)
NEUTS BAND NFR BLD MANUAL: 2 %
NEUTS SEG # BLD: 6.6 K/UL (ref 1.8–8)
NEUTS SEG NFR BLD: 82 % (ref 32–75)
NRBC # BLD: 0 K/UL (ref 0–0.01)
NRBC BLD-RTO: 0 PER 100 WBC
OXYHGB MFR BLD: 93 % (ref 94–97)
PLATELET # BLD AUTO: 301 K/UL (ref 150–400)
PMV BLD AUTO: 10.7 FL (ref 8.9–12.9)
POTASSIUM SERPL-SCNC: 3.9 MMOL/L (ref 3.5–5.1)
PROT SERPL-MCNC: 6.1 G/DL (ref 6.4–8.2)
PROTHROMBIN TIME: 11 SEC (ref 9–11.1)
RBC # BLD AUTO: 3.83 M/UL (ref 3.8–5.2)
RBC MORPH BLD: ABNORMAL
SAO2 % BLD: 95 % (ref 95–99)
SERVICE CMNT-IMP: ABNORMAL
SERVICE CMNT-IMP: NORMAL
SODIUM SERPL-SCNC: 138 MMOL/L (ref 136–145)
WBC # BLD AUTO: 7.8 K/UL (ref 3.6–11)

## 2020-05-16 PROCEDURE — 85025 COMPLETE CBC W/AUTO DIFF WBC: CPT

## 2020-05-16 PROCEDURE — 82962 GLUCOSE BLOOD TEST: CPT

## 2020-05-16 PROCEDURE — 77010033711 HC HIGH FLOW OXYGEN

## 2020-05-16 PROCEDURE — 94640 AIRWAY INHALATION TREATMENT: CPT

## 2020-05-16 PROCEDURE — 74011250637 HC RX REV CODE- 250/637: Performed by: STUDENT IN AN ORGANIZED HEALTH CARE EDUCATION/TRAINING PROGRAM

## 2020-05-16 PROCEDURE — 74011250637 HC RX REV CODE- 250/637: Performed by: INTERNAL MEDICINE

## 2020-05-16 PROCEDURE — 36415 COLL VENOUS BLD VENIPUNCTURE: CPT

## 2020-05-16 PROCEDURE — 82728 ASSAY OF FERRITIN: CPT

## 2020-05-16 PROCEDURE — 74011250637 HC RX REV CODE- 250/637: Performed by: PHYSICIAN ASSISTANT

## 2020-05-16 PROCEDURE — 74011636637 HC RX REV CODE- 636/637: Performed by: FAMILY MEDICINE

## 2020-05-16 PROCEDURE — 82375 ASSAY CARBOXYHB QUANT: CPT

## 2020-05-16 PROCEDURE — 74011000250 HC RX REV CODE- 250: Performed by: INTERNAL MEDICINE

## 2020-05-16 PROCEDURE — 85610 PROTHROMBIN TIME: CPT

## 2020-05-16 PROCEDURE — 65660000000 HC RM CCU STEPDOWN

## 2020-05-16 PROCEDURE — 83520 IMMUNOASSAY QUANT NOS NONAB: CPT

## 2020-05-16 PROCEDURE — 74011250636 HC RX REV CODE- 250/636: Performed by: STUDENT IN AN ORGANIZED HEALTH CARE EDUCATION/TRAINING PROGRAM

## 2020-05-16 PROCEDURE — 83615 LACTATE (LD) (LDH) ENZYME: CPT

## 2020-05-16 PROCEDURE — 71045 X-RAY EXAM CHEST 1 VIEW: CPT

## 2020-05-16 PROCEDURE — 74011250636 HC RX REV CODE- 250/636: Performed by: INTERNAL MEDICINE

## 2020-05-16 PROCEDURE — 80053 COMPREHEN METABOLIC PANEL: CPT

## 2020-05-16 PROCEDURE — 86140 C-REACTIVE PROTEIN: CPT

## 2020-05-16 PROCEDURE — 74011000258 HC RX REV CODE- 258: Performed by: STUDENT IN AN ORGANIZED HEALTH CARE EDUCATION/TRAINING PROGRAM

## 2020-05-16 PROCEDURE — 74011636637 HC RX REV CODE- 636/637: Performed by: STUDENT IN AN ORGANIZED HEALTH CARE EDUCATION/TRAINING PROGRAM

## 2020-05-16 PROCEDURE — 85379 FIBRIN DEGRADATION QUANT: CPT

## 2020-05-16 RX ORDER — ZINC SULFATE 50(220)MG
1 CAPSULE ORAL DAILY
Status: DISCONTINUED | OUTPATIENT
Start: 2020-05-17 | End: 2020-05-30 | Stop reason: HOSPADM

## 2020-05-16 RX ORDER — INSULIN GLARGINE 100 [IU]/ML
13 INJECTION, SOLUTION SUBCUTANEOUS DAILY
Status: DISCONTINUED | OUTPATIENT
Start: 2020-05-16 | End: 2020-05-17

## 2020-05-16 RX ORDER — GUAIFENESIN 600 MG/1
1200 TABLET, EXTENDED RELEASE ORAL 2 TIMES DAILY
Status: DISCONTINUED | OUTPATIENT
Start: 2020-05-16 | End: 2020-05-30 | Stop reason: HOSPADM

## 2020-05-16 RX ORDER — BUMETANIDE 0.25 MG/ML
1 INJECTION INTRAMUSCULAR; INTRAVENOUS ONCE
Status: COMPLETED | OUTPATIENT
Start: 2020-05-16 | End: 2020-05-16

## 2020-05-16 RX ORDER — FAMOTIDINE 20 MG/1
20 TABLET, FILM COATED ORAL 2 TIMES DAILY
Status: DISCONTINUED | OUTPATIENT
Start: 2020-05-16 | End: 2020-05-30 | Stop reason: HOSPADM

## 2020-05-16 RX ORDER — INSULIN LISPRO 100 [IU]/ML
10 INJECTION, SOLUTION INTRAVENOUS; SUBCUTANEOUS ONCE
Status: COMPLETED | OUTPATIENT
Start: 2020-05-16 | End: 2020-05-16

## 2020-05-16 RX ORDER — ATORVASTATIN CALCIUM 20 MG/1
40 TABLET, FILM COATED ORAL DAILY
Status: DISCONTINUED | OUTPATIENT
Start: 2020-05-17 | End: 2020-05-30 | Stop reason: HOSPADM

## 2020-05-16 RX ADMIN — INSULIN LISPRO 10 UNITS: 100 INJECTION, SOLUTION INTRAVENOUS; SUBCUTANEOUS at 00:09

## 2020-05-16 RX ADMIN — SERTRALINE HYDROCHLORIDE 50 MG: 50 TABLET ORAL at 10:42

## 2020-05-16 RX ADMIN — IPRATROPIUM BROMIDE AND ALBUTEROL 1 PUFF: 20; 100 SPRAY, METERED RESPIRATORY (INHALATION) at 03:27

## 2020-05-16 RX ADMIN — OYSTER SHELL CALCIUM WITH VITAMIN D 1 TABLET: 500; 200 TABLET, FILM COATED ORAL at 10:42

## 2020-05-16 RX ADMIN — INSULIN LISPRO 3 UNITS: 100 INJECTION, SOLUTION INTRAVENOUS; SUBCUTANEOUS at 23:15

## 2020-05-16 RX ADMIN — TRAZODONE HYDROCHLORIDE 100 MG: 100 TABLET ORAL at 21:25

## 2020-05-16 RX ADMIN — FAMOTIDINE 20 MG: 20 TABLET, FILM COATED ORAL at 18:10

## 2020-05-16 RX ADMIN — OXYCODONE HYDROCHLORIDE AND ACETAMINOPHEN 500 MG: 500 TABLET ORAL at 10:42

## 2020-05-16 RX ADMIN — IPRATROPIUM BROMIDE AND ALBUTEROL 1 PUFF: 20; 100 SPRAY, METERED RESPIRATORY (INHALATION) at 21:33

## 2020-05-16 RX ADMIN — INSULIN GLARGINE 13 UNITS: 100 INJECTION, SOLUTION SUBCUTANEOUS at 10:43

## 2020-05-16 RX ADMIN — OXYCODONE HYDROCHLORIDE AND ACETAMINOPHEN 500 MG: 500 TABLET ORAL at 21:25

## 2020-05-16 RX ADMIN — DOXYCYCLINE 100 MG: 100 INJECTION, POWDER, LYOPHILIZED, FOR SOLUTION INTRAVENOUS at 10:46

## 2020-05-16 RX ADMIN — INSULIN LISPRO 3 UNITS: 100 INJECTION, SOLUTION INTRAVENOUS; SUBCUTANEOUS at 13:52

## 2020-05-16 RX ADMIN — Medication 1 CAPSULE: at 10:42

## 2020-05-16 RX ADMIN — LEVOTHYROXINE SODIUM 100 MCG: 0.1 TABLET ORAL at 08:10

## 2020-05-16 RX ADMIN — INSULIN LISPRO 3 UNITS: 100 INJECTION, SOLUTION INTRAVENOUS; SUBCUTANEOUS at 18:11

## 2020-05-16 RX ADMIN — FLUTICASONE FUROATE AND VILANTEROL TRIFENATATE 1 PUFF: 200; 25 POWDER RESPIRATORY (INHALATION) at 10:17

## 2020-05-16 RX ADMIN — ENOXAPARIN SODIUM 40 MG: 40 INJECTION SUBCUTANEOUS at 21:32

## 2020-05-16 RX ADMIN — Medication 10 ML: at 21:32

## 2020-05-16 RX ADMIN — GUAIFENESIN 1200 MG: 600 TABLET, EXTENDED RELEASE ORAL at 18:10

## 2020-05-16 RX ADMIN — MONTELUKAST SODIUM 10 MG: 10 TABLET, FILM COATED ORAL at 21:31

## 2020-05-16 RX ADMIN — PANTOPRAZOLE SODIUM 40 MG: 40 TABLET, DELAYED RELEASE ORAL at 08:10

## 2020-05-16 RX ADMIN — ASPIRIN 81 MG: 81 TABLET, COATED ORAL at 10:42

## 2020-05-16 RX ADMIN — IPRATROPIUM BROMIDE AND ALBUTEROL 1 PUFF: 20; 100 SPRAY, METERED RESPIRATORY (INHALATION) at 08:10

## 2020-05-16 RX ADMIN — LOSARTAN POTASSIUM 50 MG: 50 TABLET, FILM COATED ORAL at 10:42

## 2020-05-16 RX ADMIN — IPRATROPIUM BROMIDE AND ALBUTEROL 1 PUFF: 20; 100 SPRAY, METERED RESPIRATORY (INHALATION) at 13:52

## 2020-05-16 RX ADMIN — ATENOLOL 25 MG: 50 TABLET ORAL at 07:00

## 2020-05-16 RX ADMIN — THERA TABS 1 TABLET: TAB at 10:42

## 2020-05-16 RX ADMIN — INSULIN LISPRO 5 UNITS: 100 INJECTION, SOLUTION INTRAVENOUS; SUBCUTANEOUS at 10:42

## 2020-05-16 RX ADMIN — IPRATROPIUM BROMIDE AND ALBUTEROL 1 PUFF: 20; 100 SPRAY, METERED RESPIRATORY (INHALATION) at 05:57

## 2020-05-16 RX ADMIN — Medication 10 ML: at 13:53

## 2020-05-16 RX ADMIN — DOXYCYCLINE 100 MG: 100 INJECTION, POWDER, LYOPHILIZED, FOR SOLUTION INTRAVENOUS at 21:25

## 2020-05-16 RX ADMIN — BUMETANIDE 1 MG: 0.25 INJECTION, SOLUTION INTRAMUSCULAR; INTRAVENOUS at 16:49

## 2020-05-16 RX ADMIN — ATORVASTATIN CALCIUM 20 MG: 20 TABLET, FILM COATED ORAL at 10:42

## 2020-05-16 NOTE — PROGRESS NOTES
Due to persistent low saturation of 83% on mid-flow cannula @ 15 l/m Patient placed on heated high flow nasal cannula. Settings 40L/M and fi02 100%. Saturation 88% - 90% currently. Nitric Oxide off at this time due to patient on high flow cannula.

## 2020-05-16 NOTE — PROGRESS NOTES
Bedside and Verbal shift change report given to Mary Gregory (oncoming nurse) by Brionna Fitzgerald RN (offgoing nurse). Report included the following information SBAR, Kardex, ED Summary, Intake/Output, Recent Results, Med Rec Status and Cardiac Rhythm NSR.    2000: Pt daughter Tasha Salgado called; updated daughter on pt status. Pt  has given permission for his daughter to call for updates. Bedside and Verbal shift change report given to Dominique Hitchcock RN (oncoming nurse) by Raquel Veras RN (offgoing nurse). Report included the following information SBAR, Kardex, ED Summary, Intake/Output, Recent Results, Med Rec Status and Cardiac Rhythm NSR.

## 2020-05-16 NOTE — PROGRESS NOTES
[facility]  Pulmonary, Critical Care, and Sleep Medicine      Name: Ailyn Hatfield MRN: [id number]   : 1945 Hospital: [facility]   Date: 2020        IMPRESSION:   · Acute hypoxemic respiratory failure  · COVID19 +. Started on Caitlin pulse . Given actemra 5/15  · Mild intermittent asthma  · Chronic back pain  · DM  · HTN      RECOMMENDATIONS:   · Supplemental O2 as needed to keep sats > 88%. Now on HFNC  · Will need to stop Caitlin pulse as O2 requirements > 10L  · Diurese   · Continue doxy. Check PCT   · CXR  · Trend inflammatory markers  · F/u quant gold and IL-6  · Need to check HIV and hepatitis panel as given actemra  · Continue combivent, breo, singulair and zyrtec  · Add mucinex  · Continue vit C, zinc  · lipitor dose should be 40mg, will increase  · Continue synthroid. Check TSH  · Add pepcid  · Encouraged pt to lie prone at least 4 hours per day and to use inc tommy q 1 hr w/a  · OOB to chair    Pt is critically ill and at high risk of decompensation  CCT: 30 minutes     Subjective: This patient has been seen and evaluated at the request of Dr. Meri Mccain for [place] 19 pneumonia and resp failure. Patient is a 69 y.o. female diagnosed one week ago with Covid 19. Pt with fevers, diarrhea, cough, and sob. She has been isolating at home but has had work done within the home and believes that she may have gotten an infection from one of the workers there.  has also become infected. Pt states that her symptoms have remained stable to slightly improved over the past few days but has noted that her oxygen sats have been falling and presented to ED for evaluation. Fevers seem to have resolved. Main symptoms are now diarrhea, sob, and cough. Pt w h/o asthma which by description appears to be mild intermittent and is followed by Dr Clarissa Miller in [address] [place] [address]. Controlled with montelukast and (rare) albuterol MDI. Never smoker      Interval history:   Tm 100.2  Now on HFNC - 40L/100%, sats in the low 90s. desats to the 70s w/ minimal movement  Caitlin has to be stopped w/ O2 requirements > 10L  She actually thinks she feels a little better today  Coughing up yellow sputum  Says she is able to pull 1250 on the IS but not consistently      Past Medical History:   Diagnosis Date    Anxiety     Arthritis     Asthma     Chronic pain     back,right hip    Depression     Diabetes (HCC)     type 2    GERD (gastroesophageal reflux disease)     Hypercholesteremia     Hypertension     Hypothyroid     Nausea & vomiting     Reflux     Sleep apnea     wears dental appliance- Somnident    Thyroid disease     Unspecified adverse effect of anesthesia     delayed awakening      Past Surgical History:   Procedure Laterality Date    HX COLONOSCOPY  2012    normal repeat 2022, diverticulosis of sigmoid colon, Dr. Hoda Melvin HX HEENT  1/08/14    bilateral cataract surgery1/8/14    HX KNEE REPLACEMENT      right    HX OTHER SURGICAL  2007    cardiac cath- No CAD found    NEUROLOGICAL PROCEDURE UNLISTED  1999    vascular brain surgery      Prior to Admission medications    Medication Sig Start Date End Date Taking? Authorizing Provider   guaiFENesin ER (Mucinex) 600 mg ER tablet Take 600 mg by mouth two (2) times a day. Yes Provider, Historical   omeprazole (PRILOSEC) 20 mg capsule Take 20 mg by mouth daily. Yes Provider, Historical   calcium-vitamin D (OYSTER SHELL) 500 mg(1,250mg) -200 unit per tablet Take 1 Tab by mouth daily. Yes Provider, Historical   traZODone (DESYREL) 100 mg tablet TAKE 1 TABLET BY MOUTH EVERY DAY AT NIGHT 3/25/20  Yes Cristobal Bender NP   glipiZIDE SR (GLUCOTROL XL) 10 mg CR tablet TAKE 1 TABLET BY MOUTH TWICE A DAY 2/15/20  Yes Cristobal Bender NP   simvastatin (ZOCOR) 10 mg tablet Take 1 Tab by mouth nightly. 10/8/19  Yes Cristobal Bender NP   losartan (COZAAR) 50 mg tablet TAKE 1 TABLET BY MOUTH EVERY DAY.  10/8/19  Yes Napoleon Chacorta Mcgraw NP   levothyroxine (SYNTHROID) 100 mcg tablet Take 1 Tab by mouth Daily (before breakfast). 10/8/19  Yes Chacorta Bender NP   atenolol (TENORMIN) 25 mg tablet Take 1 Tab by mouth nightly. 10/8/19  Yes Chacorta Bender NP   sertraline (ZOLOFT) 50 mg tablet TAKE 1 TABLET BY MOUTH EVERY DAY 9/4/19  Yes Chacorta Bender NP   aspirin delayed-release 81 mg tablet Take 81 mg by mouth daily. Yes Provider, Historical   fluticasone (FLONASE) 50 mcg/actuation nasal spray 2 Sprays by Both Nostrils route daily as needed for Rhinitis or Allergies. Yes Provider, Historical   albuterol (PROAIR HFA) 90 mcg/actuation inhaler Take 2 Puffs by inhalation every four (4) hours as needed for Wheezing. Yes Provider, Historical   Cetirizine (ZYRTEC) 10 mg cap Take 1 Tab by mouth every evening. Yes Provider, Historical   multivitamins-minerals-lutein (CENTRUM SILVER) tab Take 1 Tab by mouth daily. Yes Provider, Historical   glucosamine-chondroit-vit c-mn (GLUCOSAMINE CHONDROITIN MAXSTR) 500-400 mg capsule Take 1 Cap by mouth daily. Yes Other, MD [place]   varicella-zoster recombinant, PF, (SHINGRIX, PF,) 50 mcg/0.5 mL susr injection 0.5mL by IntraMUSCular route once now and then repeat in 2-6 months 10/8/19   Beau Upton NP     Allergies   Allergen Reactions    Latex Rash    Prednisone Anaphylaxis     Throat swelling. Can take methylpredisone po or IV without problems.     Augmentin [Amoxicillin-Pot Clavulanate] Nausea and Vomiting    Biaxin [Clarithromycin] Nausea and Vomiting    Metformin Diarrhea    Parafon Forte Dsc [Chlorzoxazone] Nausea and Vomiting      Social History     Tobacco Use    Smoking status: Never Smoker    Smokeless tobacco: Never Used   Substance Use Topics    Alcohol use: No     Frequency: Never     Comment: 2x year      Family History   Problem Relation Age of Onset    Elevated Lipids Mother     Dementia Mother     Osteoporosis Mother     Lung Disease Father copd    Delayed Awakening Father     Post-op Nausea/Vomiting Father     Hypertension Sister     Diabetes Sister         type 2    Breast Cancer Maternal Aunt         Current Facility-Administered Medications   Medication Dose Route Frequency    insulin glargine (LANTUS) injection 13 Units  13 Units SubCUTAneous DAILY    zinc sulfate (ZINCATE) 220 (50) mg capsule 1 Cap  1 Cap Oral Q12H    atorvastatin (LIPITOR) tablet 20 mg  20 mg Oral DAILY    doxycycline (VIBRAMYCIN) 100 mg in 0.9% sodium chloride (MBP/ADV) 100 mL  100 mg IntraVENous Q12H    sodium chloride (NS) flush 5-40 mL  5-40 mL IntraVENous Q8H    enoxaparin (LOVENOX) injection 40 mg  40 mg SubCUTAneous Q24H    ascorbic acid (vitamin C) (VITAMIN C) tablet 500 mg  500 mg Oral BID    aspirin delayed-release tablet 81 mg  81 mg Oral DAILY    atenoloL (TENORMIN) tablet 25 mg  25 mg Oral QHS    calcium-vitamin D (OS-TRISTEN) 500 mg-200 unit tablet  1 Tab Oral DAILY    cetirizine (ZYRTEC) tablet 10 mg  10 mg Oral DAILY    therapeutic multivitamin (THERAGRAN) tablet 1 Tab  1 Tab Oral DAILY    levothyroxine (SYNTHROID) tablet 100 mcg  100 mcg Oral ACB    losartan (COZAAR) tablet 50 mg  50 mg Oral DAILY    montelukast (SINGULAIR) tablet 10 mg  10 mg Oral QHS    sertraline (ZOLOFT) tablet 50 mg  50 mg Oral DAILY    traZODone (DESYREL) tablet 100 mg  100 mg Oral QHS    ipratropium-albuterol (COMBIVENT RESPIMAT) 20 mcg-100 mcg inhalation spray  1 Puff Inhalation Q6H RT    fluticasone-vilanterol (BREO ELLIPTA) 200mcg-25mcg/puff  1 Puff Inhalation DAILY    insulin lispro (HUMALOG) injection   SubCUTAneous AC&HS    pantoprazole (PROTONIX) tablet 40 mg  40 mg Oral ACB         Objective:   Vital Signs:    Visit Vitals  /50 (BP 1 Location: Left arm, BP Patient Position: At rest)   Pulse 79   Temp 98.9 °F (37.2 °C)   Resp 14   Ht 5' 4\" (1.626 m)   Wt 87 kg (191 lb 11.2 oz)   SpO2 91%   BMI 32.91 kg/m²       O2 Device: Hi flow nasal cannula, Heated O2 Flow Rate (L/min): 40 l/min   Temp (24hrs), Av.2 °F (37.3 °C), Min:98.6 °F (37 °C), Max:100.2 °F (37.9 °C)       Intake/Output:   Last shift:      701 - 1900  In: 100 [I.V.:100]  Out: -   Last 3 shifts: 1901 -  07  In: 6425 [P.O.:1460; I.V.:850]  Out: 900 [Urine:900]    Intake/Output Summary (Last 24 hours) at 2020 1534  Last data filed at 2020 1239  Gross per 24 hour   Intake 850 ml   Output 250 ml   Net 600 ml      Physical Exam:   General:  Alert, cooperative, mild distress, appears stated age. Head:     Eyes:     Nose:    Throat:    Neck:    Lungs:   coarse   Chest wall:     Heart:  RRR   Abdomen:      Extremities: No edema   Pulses:    Skin:    Lymph nodes:    Neurologic: Oriented x 3     Data review:     Recent Results (from the past 24 hour(s))   GLUCOSE, POC    Collection Time: 05/15/20  5:03 PM   Result Value Ref Range    Glucose (POC) 197 (H) 65 - 100 mg/dL    [facility]    GLUCOSE, POC    Collection Time: 05/15/20 11:25 PM   Result Value Ref Range    Glucose (POC) 361 (H) 65 - 100 mg/dL    Performed by [facility]    GLUCOSE, POC    Collection Time: 05/15/20 11:28 PM   Result Value Ref Range    Glucose (POC) 354 (H) 65 - 100 mg/dL    Performed by [facility]    METABOLIC PANEL, COMPREHENSIVE    Collection Time: 20  3:35 AM   Result Value Ref Range    Sodium 138 136 - 145 mmol/L    Potassium 3.9 3.5 - 5.1 mmol/L    Chloride 105 97 - 108 mmol/L    CO2 27 21 - 32 mmol/L    Anion gap 6 5 - 15 mmol/L    Glucose 295 (H) 65 - 100 mg/dL    BUN 11 6 - 20 MG/DL    Creatinine 0.65 0.55 - 1.02 MG/DL    BUN/Creatinine ratio 17 12 - 20      GFR est AA >60 >60 ml/min/1.73m2    GFR est non-AA >60 >60 ml/min/1.73m2    Calcium 8.4 (L) 8.5 - 10.1 MG/DL    Bilirubin, total 0.4 0.2 - 1.0 MG/DL    ALT (SGPT) 51 12 - 78 U/L    AST (SGOT) 53 (H) 15 - 37 U/L    Alk.  phosphatase 114 45 - 117 U/L    Protein, total 6.1 (L) 6.4 - 8.2 g/dL    Albumin 1.8 (L) 3.5 - 5.0 g/dL    Globulin 4.3 (H) 2.0 - 4.0 g/dL    A-G Ratio 0.4 (L) 1.1 - 2.2     CBC WITH AUTOMATED DIFF    Collection Time: 05/16/20  3:35 AM   Result Value Ref Range    WBC 7.8 3.6 - 11.0 K/uL    RBC 3.83 3.80 - 5.20 M/uL    HGB 11.0 (L) 11.5 - 16.0 g/dL    HCT 32.9 (L) 35.0 - 47.0 %    MCV 85.9 80.0 - 99.0 FL    MCH 28.7 26.0 - 34.0 PG    MCHC 33.4 30.0 - 36.5 g/dL    RDW 12.0 11.5 - 14.5 %    PLATELET 162 476 - 833 K/uL    MPV 10.7 8.9 - 12.9 FL    NRBC 0.0 0  WBC    ABSOLUTE NRBC 0.00 0.00 - 0.01 K/uL    NEUTROPHILS 82 (H) 32 - 75 %    BAND NEUTROPHILS 2 %    LYMPHOCYTES 11 (L) 12 - 49 %    MONOCYTES 4 (L) 5 - 13 %    EOSINOPHILS 0 0 - 7 %    BASOPHILS 0 0 - 1 %    METAMYELOCYTES 1 %    IMMATURE GRANULOCYTES 0 0.0 - 0.5 %    ABS. NEUTROPHILS 6.6 1.8 - 8.0 K/UL    ABS. LYMPHOCYTES 0.9 0.8 - 3.5 K/UL    ABS. MONOCYTES 0.3 0.0 - 1.0 K/UL    ABS. EOSINOPHILS 0.0 0.0 - 0.4 K/UL    ABS. BASOPHILS 0.0 0.0 - 0.1 K/UL    ABS. IMM.  GRANS. 0.0 0.00 - 0.04 K/UL    DF MANUAL      RBC COMMENTS NORMOCYTIC, NORMOCHROMIC     FERRITIN    Collection Time: 05/16/20  3:35 AM   Result Value Ref Range    Ferritin 362 (H) 8 - 252 NG/ML   LD    Collection Time: 05/16/20  3:35 AM   Result Value Ref Range     (H) 81 - 246 U/L   C REACTIVE PROTEIN, QT    Collection Time: 05/16/20  3:35 AM   Result Value Ref Range    C-Reactive protein 12.60 (H) 0.00 - 0.60 mg/dL   D DIMER    Collection Time: 05/16/20  3:35 AM   Result Value Ref Range    D-dimer 1.38 (H) 0.00 - 0.65 mg/L FEU   PROTHROMBIN TIME + INR    Collection Time: 05/16/20  3:35 AM   Result Value Ref Range    INR 1.1 0.9 - 1.1      Prothrombin time 11.0 9.0 - 11.1 sec   CARBOXY HEMOGLOBIN    Collection Time: 05/16/20  4:00 AM   Result Value Ref Range    Carboxy-Hgb 1.3 1 - 2 %    Methemoglobin 0.4 0 - 1.4 %    tHb 11.5 (L) 14 - 17 g/dL    Oxyhemoglobin 93 (L) 94 - 97 %    O2 SATURATION 95 95 - 99 %   GLUCOSE, POC    Collection Time: 05/16/20  8:23 AM   Result Value Ref Range    Glucose (POC) 280 (H) 65 - 100 mg/dL    Performed by Mandy Young, POC    Collection Time: 05/16/20 12:44 PM   Result Value Ref Range    Glucose (POC) 238 (H) 65 - 100 mg/dL    Performed by Eli Blake        Imaging:  I have personally reviewed the patients radiographs and have reviewed the reports:  No new images this morning.         Carl Hess MD

## 2020-05-16 NOTE — PROGRESS NOTES
MIAH cartridge changed at 0315   DEVICE ID #[id number]  [id number] # [id number]  Cartridge ID # [id number]  Cannula Lot # [id number]  Saturation 90% on 15L

## 2020-05-16 NOTE — PROGRESS NOTES
1130-Chart accessed for RRT RN auditing in this patient requiring increasing levels of oxygen to maintain adequate saturations. Discussed with primary RN.  Noman ESQUIVEL

## 2020-05-16 NOTE — PROGRESS NOTES
Bedside and Verbal shift change report given to [address] [place] [address] (oncoming nurse) by Kyree Valdes (offgoing nurse). Report included the following information SBAR, Kardex and MAR.     1350 Patient assisted to bedside commode to void and back to bed. Patient's oxygen saturation dropped down to 77%, after 2 minutes pulse ox increased to 82%. After 1 minute she increased to 85%. 1400 Patient was set up with lunch, pulse ox saturation 89%. 1744 Patient had portable chest x-ray completed. 1800 Spoke to patient's  Emily Green at phone number [phone] about updates. [phone] Patient assisted to bedside commode and back to bed. Patient's oxygen saturation decreased to 85% with movement. Patient's oxygen saturation increased to 91% after one minute.

## 2020-05-17 LAB
ALBUMIN SERPL-MCNC: 2.1 G/DL (ref 3.5–5)
ALBUMIN/GLOB SERPL: 0.6 {RATIO} (ref 1.1–2.2)
ALP SERPL-CCNC: 129 U/L (ref 45–117)
ALT SERPL-CCNC: 61 U/L (ref 12–78)
ANION GAP SERPL CALC-SCNC: 7 MMOL/L (ref 5–15)
AST SERPL-CCNC: 66 U/L (ref 15–37)
BASOPHILS # BLD: 0.1 K/UL (ref 0–0.1)
BASOPHILS NFR BLD: 1 % (ref 0–1)
BILIRUB SERPL-MCNC: 0.4 MG/DL (ref 0.2–1)
BUN SERPL-MCNC: 18 MG/DL (ref 6–20)
BUN/CREAT SERPL: 23 (ref 12–20)
CALCIUM SERPL-MCNC: 8.3 MG/DL (ref 8.5–10.1)
CHLORIDE SERPL-SCNC: 102 MMOL/L (ref 97–108)
CO2 SERPL-SCNC: 29 MMOL/L (ref 21–32)
CREAT SERPL-MCNC: 0.78 MG/DL (ref 0.55–1.02)
CRP SERPL-MCNC: 6.27 MG/DL (ref 0–0.6)
D DIMER PPP FEU-MCNC: 1.8 MG/L FEU (ref 0–0.65)
DIFFERENTIAL METHOD BLD: ABNORMAL
EOSINOPHIL # BLD: 0.2 K/UL (ref 0–0.4)
EOSINOPHIL NFR BLD: 2 % (ref 0–7)
ERYTHROCYTE [DISTWIDTH] IN BLOOD BY AUTOMATED COUNT: 12.5 % (ref 11.5–14.5)
FERRITIN SERPL-MCNC: 369 NG/ML (ref 26–388)
GLOBULIN SER CALC-MCNC: 3.3 G/DL (ref 2–4)
GLUCOSE BLD STRIP.AUTO-MCNC: 162 MG/DL (ref 65–100)
GLUCOSE BLD STRIP.AUTO-MCNC: 182 MG/DL (ref 65–100)
GLUCOSE BLD STRIP.AUTO-MCNC: 200 MG/DL (ref 65–100)
GLUCOSE BLD STRIP.AUTO-MCNC: 209 MG/DL (ref 65–100)
GLUCOSE SERPL-MCNC: 196 MG/DL (ref 65–100)
HAV IGM SER QL: NONREACTIVE
HBV CORE IGM SER QL: NONREACTIVE
HBV SURFACE AG SER QL: 0.22 INDEX
HBV SURFACE AG SER QL: NEGATIVE
HCT VFR BLD AUTO: 35.9 % (ref 35–47)
HCV AB SERPL QL IA: NONREACTIVE
HCV COMMENT,HCGAC: NORMAL
HGB BLD-MCNC: 12 G/DL (ref 11.5–16)
HIV 1+2 AB+HIV1 P24 AG SERPL QL IA: NONREACTIVE
HIV12 RESULT COMMENT, HHIVC: NORMAL
IL6 SERPL-MCNC: 40.9 PG/ML (ref 0–15.5)
IMM GRANULOCYTES # BLD AUTO: 0.2 K/UL (ref 0–0.04)
IMM GRANULOCYTES NFR BLD AUTO: 2 % (ref 0–0.5)
INR PPP: 1.1 (ref 0.9–1.1)
LDH SERPL L TO P-CCNC: 410 U/L (ref 81–246)
LYMPHOCYTES # BLD: 2.4 K/UL (ref 0.8–3.5)
LYMPHOCYTES NFR BLD: 23 % (ref 12–49)
M TB IFN-G BLD-IMP: NEGATIVE
MCH RBC QN AUTO: 29.3 PG (ref 26–34)
MCHC RBC AUTO-ENTMCNC: 33.4 G/DL (ref 30–36.5)
MCV RBC AUTO: 87.8 FL (ref 80–99)
MONOCYTES # BLD: 0.5 K/UL (ref 0–1)
MONOCYTES NFR BLD: 5 % (ref 5–13)
NEUTS SEG # BLD: 7.1 K/UL (ref 1.8–8)
NEUTS SEG NFR BLD: 67 % (ref 32–75)
NRBC # BLD: 0 K/UL (ref 0–0.01)
NRBC BLD-RTO: 0 PER 100 WBC
PLATELET # BLD AUTO: 275 K/UL (ref 150–400)
POTASSIUM SERPL-SCNC: 3.2 MMOL/L (ref 3.5–5.1)
PROCALCITONIN SERPL-MCNC: 0.17 NG/ML
PROT SERPL-MCNC: 5.4 G/DL (ref 6.4–8.2)
PROTHROMBIN TIME: 10.9 SEC (ref 9–11.1)
QUANTIFERON CRITERIA, QFI1T: NORMAL
QUANTIFERON MITOGEN VALUE: 0.58 IU/ML
QUANTIFERON NIL VALUE: 0.06 IU/ML
QUANTIFERON TB1 AG: 0.05 IU/ML
QUANTIFERON TB2 AG: 0.04 IU/ML
RBC # BLD AUTO: 4.09 M/UL (ref 3.8–5.2)
RBC MORPH BLD: ABNORMAL
SERVICE CMNT-IMP: ABNORMAL
SODIUM SERPL-SCNC: 138 MMOL/L (ref 136–145)
SP1: NORMAL
SP2: NORMAL
SP3: NORMAL
TSH SERPL DL<=0.05 MIU/L-ACNC: 5.26 UIU/ML (ref 0.36–3.74)
WBC # BLD AUTO: 10.5 K/UL (ref 3.6–11)

## 2020-05-17 PROCEDURE — 74011636637 HC RX REV CODE- 636/637: Performed by: FAMILY MEDICINE

## 2020-05-17 PROCEDURE — 82728 ASSAY OF FERRITIN: CPT

## 2020-05-17 PROCEDURE — 84145 PROCALCITONIN (PCT): CPT

## 2020-05-17 PROCEDURE — 74011636637 HC RX REV CODE- 636/637: Performed by: STUDENT IN AN ORGANIZED HEALTH CARE EDUCATION/TRAINING PROGRAM

## 2020-05-17 PROCEDURE — 80053 COMPREHEN METABOLIC PANEL: CPT

## 2020-05-17 PROCEDURE — 74011250636 HC RX REV CODE- 250/636: Performed by: INTERNAL MEDICINE

## 2020-05-17 PROCEDURE — 85025 COMPLETE CBC W/AUTO DIFF WBC: CPT

## 2020-05-17 PROCEDURE — 83615 LACTATE (LD) (LDH) ENZYME: CPT

## 2020-05-17 PROCEDURE — 94640 AIRWAY INHALATION TREATMENT: CPT

## 2020-05-17 PROCEDURE — 77010033711 HC HIGH FLOW OXYGEN

## 2020-05-17 PROCEDURE — 36415 COLL VENOUS BLD VENIPUNCTURE: CPT

## 2020-05-17 PROCEDURE — 74011000258 HC RX REV CODE- 258: Performed by: STUDENT IN AN ORGANIZED HEALTH CARE EDUCATION/TRAINING PROGRAM

## 2020-05-17 PROCEDURE — 86140 C-REACTIVE PROTEIN: CPT

## 2020-05-17 PROCEDURE — 87389 HIV-1 AG W/HIV-1&-2 AB AG IA: CPT

## 2020-05-17 PROCEDURE — 74011250637 HC RX REV CODE- 250/637: Performed by: STUDENT IN AN ORGANIZED HEALTH CARE EDUCATION/TRAINING PROGRAM

## 2020-05-17 PROCEDURE — 84443 ASSAY THYROID STIM HORMONE: CPT

## 2020-05-17 PROCEDURE — 80074 ACUTE HEPATITIS PANEL: CPT

## 2020-05-17 PROCEDURE — 74011250636 HC RX REV CODE- 250/636: Performed by: STUDENT IN AN ORGANIZED HEALTH CARE EDUCATION/TRAINING PROGRAM

## 2020-05-17 PROCEDURE — 74011000250 HC RX REV CODE- 250: Performed by: INTERNAL MEDICINE

## 2020-05-17 PROCEDURE — 74011250637 HC RX REV CODE- 250/637: Performed by: INTERNAL MEDICINE

## 2020-05-17 PROCEDURE — 65660000000 HC RM CCU STEPDOWN

## 2020-05-17 PROCEDURE — 85379 FIBRIN DEGRADATION QUANT: CPT

## 2020-05-17 PROCEDURE — 85610 PROTHROMBIN TIME: CPT

## 2020-05-17 PROCEDURE — 82962 GLUCOSE BLOOD TEST: CPT

## 2020-05-17 RX ORDER — INSULIN GLARGINE 100 [IU]/ML
18 INJECTION, SOLUTION SUBCUTANEOUS DAILY
Status: DISCONTINUED | OUTPATIENT
Start: 2020-05-17 | End: 2020-05-18

## 2020-05-17 RX ORDER — POTASSIUM CHLORIDE 7.45 MG/ML
10 INJECTION INTRAVENOUS
Status: DISCONTINUED | OUTPATIENT
Start: 2020-05-17 | End: 2020-05-17

## 2020-05-17 RX ORDER — BUMETANIDE 0.25 MG/ML
1 INJECTION INTRAMUSCULAR; INTRAVENOUS DAILY
Status: DISCONTINUED | OUTPATIENT
Start: 2020-05-17 | End: 2020-05-27

## 2020-05-17 RX ORDER — POTASSIUM CHLORIDE 750 MG/1
40 TABLET, FILM COATED, EXTENDED RELEASE ORAL
Status: COMPLETED | OUTPATIENT
Start: 2020-05-17 | End: 2020-05-17

## 2020-05-17 RX ADMIN — CETIRIZINE HYDROCHLORIDE 10 MG: 10 TABLET, FILM COATED ORAL at 09:16

## 2020-05-17 RX ADMIN — GUAIFENESIN 1200 MG: 600 TABLET, EXTENDED RELEASE ORAL at 09:10

## 2020-05-17 RX ADMIN — PANTOPRAZOLE SODIUM 40 MG: 40 TABLET, DELAYED RELEASE ORAL at 09:09

## 2020-05-17 RX ADMIN — INSULIN GLARGINE 18 UNITS: 100 INJECTION, SOLUTION SUBCUTANEOUS at 08:50

## 2020-05-17 RX ADMIN — ATORVASTATIN CALCIUM 40 MG: 20 TABLET, FILM COATED ORAL at 09:12

## 2020-05-17 RX ADMIN — OYSTER SHELL CALCIUM WITH VITAMIN D 1 TABLET: 500; 200 TABLET, FILM COATED ORAL at 09:14

## 2020-05-17 RX ADMIN — GUAIFENESIN 1200 MG: 600 TABLET, EXTENDED RELEASE ORAL at 18:17

## 2020-05-17 RX ADMIN — MONTELUKAST SODIUM 10 MG: 10 TABLET, FILM COATED ORAL at 23:32

## 2020-05-17 RX ADMIN — SERTRALINE HYDROCHLORIDE 50 MG: 50 TABLET ORAL at 09:10

## 2020-05-17 RX ADMIN — LEVOTHYROXINE SODIUM 100 MCG: 0.1 TABLET ORAL at 09:11

## 2020-05-17 RX ADMIN — THERA TABS 1 TABLET: TAB at 09:10

## 2020-05-17 RX ADMIN — LOSARTAN POTASSIUM 50 MG: 50 TABLET, FILM COATED ORAL at 09:13

## 2020-05-17 RX ADMIN — INSULIN LISPRO 2 UNITS: 100 INJECTION, SOLUTION INTRAVENOUS; SUBCUTANEOUS at 18:17

## 2020-05-17 RX ADMIN — DOXYCYCLINE 100 MG: 100 INJECTION, POWDER, LYOPHILIZED, FOR SOLUTION INTRAVENOUS at 23:33

## 2020-05-17 RX ADMIN — ENOXAPARIN SODIUM 40 MG: 40 INJECTION SUBCUTANEOUS at 21:02

## 2020-05-17 RX ADMIN — OXYCODONE HYDROCHLORIDE AND ACETAMINOPHEN 500 MG: 500 TABLET ORAL at 09:14

## 2020-05-17 RX ADMIN — FAMOTIDINE 20 MG: 20 TABLET, FILM COATED ORAL at 09:11

## 2020-05-17 RX ADMIN — POTASSIUM CHLORIDE 40 MEQ: 750 TABLET, FILM COATED, EXTENDED RELEASE ORAL at 21:00

## 2020-05-17 RX ADMIN — IPRATROPIUM BROMIDE AND ALBUTEROL 1 PUFF: 20; 100 SPRAY, METERED RESPIRATORY (INHALATION) at 04:05

## 2020-05-17 RX ADMIN — IPRATROPIUM BROMIDE AND ALBUTEROL 1 PUFF: 20; 100 SPRAY, METERED RESPIRATORY (INHALATION) at 08:41

## 2020-05-17 RX ADMIN — ATENOLOL 25 MG: 50 TABLET ORAL at 23:33

## 2020-05-17 RX ADMIN — IPRATROPIUM BROMIDE AND ALBUTEROL 1 PUFF: 20; 100 SPRAY, METERED RESPIRATORY (INHALATION) at 21:02

## 2020-05-17 RX ADMIN — DOXYCYCLINE 100 MG: 100 INJECTION, POWDER, LYOPHILIZED, FOR SOLUTION INTRAVENOUS at 10:38

## 2020-05-17 RX ADMIN — TRAZODONE HYDROCHLORIDE 100 MG: 100 TABLET ORAL at 23:32

## 2020-05-17 RX ADMIN — ASPIRIN 81 MG: 81 TABLET, COATED ORAL at 09:09

## 2020-05-17 RX ADMIN — FAMOTIDINE 20 MG: 20 TABLET, FILM COATED ORAL at 18:17

## 2020-05-17 RX ADMIN — Medication 10 ML: at 14:24

## 2020-05-17 RX ADMIN — INSULIN LISPRO 3 UNITS: 100 INJECTION, SOLUTION INTRAVENOUS; SUBCUTANEOUS at 12:23

## 2020-05-17 RX ADMIN — FLUTICASONE FUROATE AND VILANTEROL TRIFENATATE 1 PUFF: 200; 25 POWDER RESPIRATORY (INHALATION) at 08:41

## 2020-05-17 RX ADMIN — INSULIN LISPRO 2 UNITS: 100 INJECTION, SOLUTION INTRAVENOUS; SUBCUTANEOUS at 08:50

## 2020-05-17 RX ADMIN — ZINC SULFATE 220 MG (50 MG) CAPSULE 1 CAPSULE: CAPSULE at 09:15

## 2020-05-17 RX ADMIN — IPRATROPIUM BROMIDE AND ALBUTEROL 1 PUFF: 20; 100 SPRAY, METERED RESPIRATORY (INHALATION) at 13:34

## 2020-05-17 RX ADMIN — Medication 10 ML: at 07:12

## 2020-05-17 RX ADMIN — OXYCODONE HYDROCHLORIDE AND ACETAMINOPHEN 500 MG: 500 TABLET ORAL at 21:01

## 2020-05-17 RX ADMIN — Medication 10 ML: at 23:34

## 2020-05-17 NOTE — PROGRESS NOTES
Contacted pt's , Josselyn Christina ([phone]), and updated him on the pt's on current plan of care. Discussed his own continuing recovery of COVID. Encouraged him to remain strong to support his wife. Answered all questions. He expresses his appreciation of all our care.     Roger Lockett MD

## 2020-05-17 NOTE — PROGRESS NOTES
Shift Summary  0730: Bedside and Verbal shift change report given to Vanessa Robles, RN (oncoming nurse) by Jacqui Anand RN (offgoing nurse). Report included the following information SBAR, Kardex, Procedure Summary, Intake/Output, MAR, Accordion, Recent Results and Cardiac Rhythm NSR.     0800: Nightshift RNs report continued difficulty collecting scheduled labs d/t poor venous access- patient requiring multiple sticks every night to collect all required bloodwork. Discussed issue with FP who agreed to midline order. Called vascular access team- no availability for today; placed order for tomorrow. 0915: Finished collecting scheduled Am labs. New peripheral IV placed. 1400: Assisted patient to bedside commode, patient had a bowel movement. Desaturated to 85% during activity but recovered quickly to 90%. Patient ate over 50% of lunch and reports improved taste and appetite. 1420: Spoke to patient's  Mode Weaver on phone to give update. Informed of slowly improving oxygenation, improved recovery after activity.  encouraged and will continue to call daily for update. 1930: Bedside and Verbal shift change report given to Jacqui Anand, RN (oncoming nurse) by Vanessa Robles, RN (offgoing nurse). Report included the following information SBAR, Kardex, Procedure Summary, Intake/Output, MAR, Accordion, Recent Results and Cardiac Rhythm NSR.

## 2020-05-17 NOTE — ROUTINE PROCESS
5996-Chart accessed for review due to known patient with COVID requiring high levels of oxygen. Appears stable currently. Will follow.  Noman ESQUIVEL

## 2020-05-17 NOTE — PROGRESS NOTES
[facility] RESIDENCY PROGRAM  PROGRESS NOTE     5/16/2020  PCP: Meena Storey MD       Assessment/Plan:     Ryan Brown is a 69 y.o. female with history of HTN, type 2DM, GERD, hypothyroidism, depression, asthma, and hyperlipidemia who is admitted for AHRF 2/2 COVID 19.     Overnight events: ASHWINI. AHRF 2/2 COVID 19: COVID+ on 5/4, worsening SOB on admission and new O2 requirement (RA baseline). POA CXR with opacification in lower lobes b/l L>R, ABG pH 7.41, pCO2 31, pO2 64, O2 sat 93, bicarb 19, LA 1.8, S/p outpatient tx of azithro and plaquenil ending 5/10  - Follow COVID inflammatory markers      - Testing every other day  - Bcx NGTD  - Doxy for empiric coverage (Started 5/12). No azithro or levaquin due to prolonged qtc. - Pulmonology following, appreciate rec's-        -s/p Actemra. Quant-godl neg. F/u HIV, hepatitis panel.  - Proning, pt endorses compliance  - Zinc, Vit C, Lipitor, Mucinex  - Supplemental O2 as needed- If continues to desat on 40L high flow, may need mechanical ventilation       Type 2 DM: HgA1C 10.4 in 11/2019. This admission A1C 13.1. Decreased PO intake. - Hold home glipizide  - Lantus 18U and SSI ACHS      Asthma  - Continue home Ventolin, patient has brought in her home inhaler  - Continue Singulair 10mg qhs     HTN  - Continue home BP meds of atenolol 25mg qhs, losartan 50mg daily     Hypothyroid: TSH 3.570 in 11/2019  - Continue home synthroid 100mcg daily     Depression  - Continue Zoloft 50mg dialy and trazadone 100mg qhs to help with sleep     GERD  - Will substitute home prilosec for protonix daily     Hypokalemia: RESOLVED. Likely 2/2 diarrhea prior to arrival and diuretics inpatient. K 2.7 on admission.   - Repletion prn     Hyperlipidemia: Lipid panel , , HDL 59, LDL 89 in 11/2019  - Increased home Zocor 10mg daily to lipitor 20mg daily for anti-inflammatory benefit      Ryan Stager discussed with Dr. Pardeep Shaw.      Subjective:   Spoke to pt on the phone. Was SoB during discussion as had just returned to bed from bedside commode. Is eating well and proning as much as possible. Denies cp, n, v, c, d. Objective:   Physical examination  Patient Vitals for the past 24 hrs:   Temp Pulse Resp BP SpO2   20 1645 98.9 °F (37.2 °C) 79 15 112/54 90 %   20 1636     90 %   20 1454  79      20 1352     91 %   20 1239 98.9 °F (37.2 °C) 70 14 107/50 (!) 88 %   20 1018     92 %   20 0816     (!) 88 %   20 0812 98.8 °F (37.1 °C) 70 14 132/58 (!) 85 %   20 0810     (!) 83 %   20 0655  61      20 0559     90 %   20 0330 98.6 °F (37 °C) 63 23 142/53 90 %   20 0300  (!) 59 24 125/51 (!) 86 %   20 0230  (!) 57 20  (!) 87 %   20 0200  (!) 58 23 137/57 (!) 87 %   20 0130  64 27  93 %   20 0100  60 22 133/62 (!) 89 %   20 0000  70 24 130/60 (!) 87 %   05/15/20 2330 98.9 °F (37.2 °C) 68 23 140/63 93 %   05/15/20 2320     93 %   05/15/20 2312  67  140/67    05/15/20 2300  65 19 140/67 90 %      Temp (24hrs), Av.8 °F (37.1 °C), Min:98.6 °F (37 °C), Max:98.9 °F (37.2 °C)     O2 Flow Rate (L/min): 40 l/min   O2 Device: Hi flow nasal cannula, Heated    Date 05/15/20 1900 - 20 0659 20 0700 - 05/17/20 0659   Shift 2860-7641 24 Hour Total 0202-7242 8023-6274 24 Hour Total   INTAKE   P.O.  980 720  720     P. O.  980 720  720   I. V.(mL/kg/hr) 450 850 100(0.1)  100     Volume (potassium phosphate 15 mmol in 0.9% sodium chloride 250 mL infusion) 250 250        Volume (doxycycline (VIBRAMYCIN) 100 mg in 0.9% sodium chloride (MBP/ADV) 100 mL) 100 500 100  100     Volume (tocilizumab (ACTEMRA) 696 mg in 0.9% sodium chloride 100 mL infusion)  0        Volume (magnesium sulfate 1 g/100 ml IVPB (premix or compounded)) 100 100      Shift Total(mL/kg) 450(5.2) 1830(21) 820(9.4)  820(9.4)   OUTPUT   Urine(mL/kg/hr) 150 450 650(0.6) 200 850     Urine Voided 150 450 650 200 850     Urine Occurrence(s)   1 x  1 x   Stool          Stool Occurrence(s)  1 x      Shift Total(mL/kg) 150(1.7) 450(5.2) 650(7.5) 200(2.3) 850(9.8)    1380 170 -200 -30   Weight (kg) 87 87 87 87 87     Pt is currently a COVID infected pt. I did not go in the room to decrease exposure and conserve PE. The attending physician will exam in the pt. Talked on the phone. General:   Alert, cooperative, mild respiratory distress     Data Review:     CBC:  Recent Labs     05/16/20  0335 05/15/20  0238 05/14/20  0454   WBC 7.8 8.3 7.6   HGB 11.0* 12.4 12.1   HCT 32.9* 37.9 36.8    789 443     Metabolic Panel:  Recent Labs     05/16/20  0335 05/15/20  0330 05/15/20  0238 05/14/20  0454     --  139 137   K 3.9  --  3.1* 3.3*     --  105 106   CO2 27  --  26 24   BUN 11  --  10 12   CREA 0.65  --  0.74 0.71   *  --  169* 181*   CA 8.4*  --  8.6 8.3*   MG  --   --  1.4* 1.6   PHOS  --   --  1.8*  --    ALB 1.8*  --  2.0* 2.1*   TBILI 0.4  --  0.6 0.6   SGOT 53*  --  55* 35   ALT 51  --  38 27   INR 1.1 1.1  --  1.1     Micro:  Lab Results   Component Value Date/Time    Culture result: NO GROWTH 3 DAYS 05/13/2020 04:29 AM    Culture result:  05/11/2020 12:35 AM     MRSA NOT PRESENT. Apparent Staphylococus aureus (not MRSA noted). Culture result:  05/11/2020 12:35 AM         Screening of patient nares for MRSA is for surveillance purposes and, if positive, to facilitate isolation considerations in high risk settings. It is not intended for automatic decolonization interventions per se as regimens are not sufficiently effective to warrant routine use. Imaging:  Xr Chest Port    Result Date: 5/10/2020  EXAM: XR CHEST PORT INDICATION: sob, +COVID COMPARISON: 2013 FINDINGS: A portable AP radiograph of the chest was obtained at 2145 hours. The patient is on a cardiac monitor.  There is opacification in the lower lobes bilaterally, left greater than right. The cardiac and mediastinal contours and pulmonary vascularity are normal.  The bones and soft tissues are grossly within normal limits. IMPRESSION: Opacification in the lower lobes bilaterally, left greater than right. CXR AP Port: 5/15/2020    FINDINGS:  AP portable upright view of the chest demonstrates a stable  cardiopericardial  silhouette. The lungs are adequately expanded. Increased peripheral parenchymal  opacities greater on the left than on the right. Increased interstitial opacity  as well. . The osseous structures are unremarkable. Patient is on a cardiac  monitor.      IMPRESSION  IMPRESSION:  Interval progression of extensive bilateral parenchymal disease greater on the  left than on the right.     Medications reviewed  Current Facility-Administered Medications   Medication Dose Route Frequency    insulin glargine (LANTUS) injection 13 Units  13 Units SubCUTAneous DAILY    guaiFENesin ER (MUCINEX) tablet 1,200 mg  1,200 mg Oral BID    famotidine (PEPCID) tablet 20 mg  20 mg Oral BID    [START ON 5/17/2020] atorvastatin (LIPITOR) tablet 40 mg  40 mg Oral DAILY    [START ON 5/17/2020] zinc sulfate (ZINCATE) 220 (50) mg capsule 1 Cap  1 Cap Oral DAILY    diphenhydrAMINE (BENADRYL) injection 50 mg  50 mg IntraVENous Q4H PRN    doxycycline (VIBRAMYCIN) 100 mg in 0.9% sodium chloride (MBP/ADV) 100 mL  100 mg IntraVENous Q12H    acetaminophen (TYLENOL) tablet 650 mg  650 mg Oral Q6H PRN    sodium chloride (NS) flush 5-40 mL  5-40 mL IntraVENous Q8H    sodium chloride (NS) flush 5-40 mL  5-40 mL IntraVENous PRN    ondansetron (ZOFRAN) injection 4 mg  4 mg IntraVENous Q4H PRN    enoxaparin (LOVENOX) injection 40 mg  40 mg SubCUTAneous Q24H    ascorbic acid (vitamin C) (VITAMIN C) tablet 500 mg  500 mg Oral BID    aspirin delayed-release tablet 81 mg  81 mg Oral DAILY    atenoloL (TENORMIN) tablet 25 mg  25 mg Oral QHS    calcium-vitamin D (OS-TRISTEN) 500 mg-200 unit tablet  1 Tab Oral DAILY    cetirizine (ZYRTEC) tablet 10 mg  10 mg Oral DAILY    fluticasone propionate (FLONASE) 50 mcg/actuation nasal spray 2 Spray  2 Spray Both Nostrils DAILY PRN    therapeutic multivitamin (THERAGRAN) tablet 1 Tab  1 Tab Oral DAILY    levothyroxine (SYNTHROID) tablet 100 mcg  100 mcg Oral ACB    losartan (COZAAR) tablet 50 mg  50 mg Oral DAILY    montelukast (SINGULAIR) tablet 10 mg  10 mg Oral QHS    sertraline (ZOLOFT) tablet 50 mg  50 mg Oral DAILY    traZODone (DESYREL) tablet 100 mg  100 mg Oral QHS    glucose chewable tablet 16 g  4 Tab Oral PRN    glucagon (GLUCAGEN) injection 1 mg  1 mg IntraMUSCular PRN    dextrose 10% infusion 0-250 mL  0-250 mL IntraVENous PRN    ipratropium-albuterol (COMBIVENT RESPIMAT) 20 mcg-100 mcg inhalation spray  1 Puff Inhalation Q6H RT    fluticasone-vilanterol (BREO ELLIPTA) 200mcg-25mcg/puff  1 Puff Inhalation DAILY    albuterol (PROVENTIL HFA, VENTOLIN HFA, PROAIR HFA) inhaler 2 Puff  2 Puff Inhalation Q4H PRN    insulin lispro (HUMALOG) injection   SubCUTAneous AC&HS    pantoprazole (PROTONIX) tablet 40 mg  40 mg Oral ACB    sodium chloride (NS) flush 5-10 mL  5-10 mL IntraVENous PRN         Signed:   Wilfredo Kong MD   Resident, Family Medicine      Attending note: Attending note to follow. ..

## 2020-05-17 NOTE — PROGRESS NOTES
[facility]  Pulmonary, Critical Care, and Sleep Medicine      Name: Ariane Cruz MRN: [id number]   : 1945 Hospital: [address] [facility]   Date: 2020        IMPRESSION:   · Acute hypoxemic respiratory failure  · COVID19 +. Started on Caitlin pulse , had to stop this on  due to increased O2 requirements. Given actemra 5/15  · Mild intermittent asthma  · Chronic back pain  · DM  · HTN      RECOMMENDATIONS:   · Supplemental O2 as needed to keep sats > 88%. Now on HFNC  · Continue diuresis   · Continue doxy  · Trend inflammatory markers  · Consider steroids if she fails to improve. Would like to see BG better controlled before starting  · Continue combivent, breo, singulair, zyrtec, mucinex   · Continue vit C, zinc, lipitor  · Continue pepcid  · Encouraged pt to lie prone at least 4 hours per day and to use inc tommy q 1 hr w/a  · OOB to chair    Pt is critically ill and at high risk of decompensation  CCT: 30 minutes     Subjective: This patient has been seen and evaluated at the request of Dr. Fab[facility]. Patient is a 69 y.o. female diagnosed one week ago with Covid 19. Pt with fevers, diarrhea, cough, and sob. She has been isolating at home but has had work done within the home and believes that she may have gotten an infection from one of the workers there.  has also become infected. Pt states that her symptoms have remained stable to slightly improved over the past few days but has noted that her oxygen sats have been falling and presented to ED for evaluation. Fevers seem to have resolved. Main symptoms are now diarrhea, sob, and cough. Pt w h/o asthma which by description appears to be mild intermittent and is followed by Dr Catherine Coffey in [facility]. Controlled with montelukast and (rare) albuterol MDI. Never smoker      Interval history: Tm 99.1  Remains on HFNC 30L/90%.  May be recovering quicker w/ movement  She actually thinks she feels a little better today  Still coughing up yellow sputum  Says she is able to pull 1250 on the IS but not consistently      Past Medical History:   Diagnosis Date    Anxiety     Arthritis     Asthma     Chronic pain     back,right hip    Depression     Diabetes ([address]     type 2    GERD (gastroesophageal reflux disease)     Hypercholesteremia     Hypertension     Hypothyroid     Nausea & vomiting     Reflux     Sleep apnea     wears dental appliance- Somnident    Thyroid disease     Unspecified adverse effect of anesthesia     delayed awakening      Past Surgical History:   Procedure Laterality Date    HX COLONOSCOPY  2012    normal repeat 2022, diverticulosis of sigmoid colon, Dr. Lottie Smyth HX HEENT  1/08/14    bilateral cataract surgery1/8/14    HX KNEE REPLACEMENT      right    HX OTHER SURGICAL  2007    cardiac cath- No CAD found    NEUROLOGICAL PROCEDURE UNLISTED  1999    vascular brain surgery      Prior to Admission medications    Medication Sig Start Date End Date Taking? Authorizing Provider   guaiFENesin ER (Mucinex) 600 mg ER tablet Take 600 mg by mouth two (2) times a day. Yes Provider, Historical   omeprazole (PRILOSEC) 20 mg capsule Take 20 mg by mouth daily. Yes Provider, Historical   calcium-vitamin D (OYSTER SHELL) 500 mg(1,250mg) -200 unit per tablet Take 1 Tab by mouth daily. Yes Provider, Historical   traZODone (DESYREL) 100 mg tablet TAKE 1 TABLET BY MOUTH EVERY DAY AT NIGHT 3/25/20  Yes Claude Bender NP   glipiZIDE SR (GLUCOTROL XL) 10 mg CR tablet TAKE 1 TABLET BY MOUTH TWICE A DAY 2/15/20  Yes Claude Bender NP   simvastatin (ZOCOR) 10 mg tablet Take 1 Tab by mouth nightly. 10/8/19  Yes Claude Bender NP   losartan (COZAAR) 50 mg tablet TAKE 1 TABLET BY MOUTH EVERY DAY. 10/8/19  Yes Claude Bender NP   levothyroxine (SYNTHROID) 100 mcg tablet Take 1 Tab by mouth Daily (before breakfast).  10/8/19  Yes Napoleon Ramona Cloud NP   atenolol (TENORMIN) 25 mg tablet Take 1 Tab by mouth nightly. 10/8/19  Yes Ramona Bender NP   sertraline (ZOLOFT) 50 mg tablet TAKE 1 TABLET BY MOUTH EVERY DAY 9/4/19  Yes Ramona Bender NP   aspirin delayed-release 81 mg tablet Take 81 mg by mouth daily. Yes Provider, Historical   fluticasone (FLONASE) 50 mcg/actuation nasal spray 2 Sprays by Both Nostrils route daily as needed for Rhinitis or Allergies. Yes Provider, Historical   albuterol (PROAIR HFA) 90 mcg/actuation inhaler Take 2 Puffs by inhalation every four (4) hours as needed for Wheezing. Yes Provider, Historical   Cetirizine (ZYRTEC) 10 mg cap Take 1 Tab by mouth every evening. Yes Provider, Historical   multivitamins-minerals-lutein (CENTRUM SILVER) tab Take 1 Tab by mouth daily. Yes Provider, Historical   glucosamine-chondroit-vit c-mn (GLUCOSAMINE CHONDROITIN MAXSTR) 500-400 mg capsule Take 1 Cap by mouth daily. Yes Other, MD [place]   varicella-zoster recombinant, PF, (SHINGRIX, PF,) 50 mcg/0.5 mL susr injection 0.5mL by IntraMUSCular route once now and then repeat in 2-6 months 10/8/19   Henry MAGAN Cannon     Allergies   Allergen Reactions    Latex Rash    Prednisone Anaphylaxis     Throat swelling. Can take methylpredisone po or IV without problems.     Augmentin [Amoxicillin-Pot Clavulanate] Nausea and Vomiting    Biaxin [Clarithromycin] Nausea and Vomiting    Metformin Diarrhea    Parafon Forte Dsc [Chlorzoxazone] Nausea and Vomiting      Social History     Tobacco Use    Smoking status: Never Smoker    Smokeless tobacco: Never Used   Substance Use Topics    Alcohol use: No     Frequency: Never     Comment: 2x year      Family History   Problem Relation Age of Onset    Elevated Lipids Mother     Dementia Mother     Osteoporosis Mother     Lung Disease Father         copd    Delayed Awakening Father     Post-op Nausea/Vomiting Father     Hypertension Sister     Diabetes Sister type 2    Breast Cancer Maternal Aunt         Current Facility-Administered Medications   Medication Dose Route Frequency    insulin glargine (LANTUS) injection 18 Units  18 Units SubCUTAneous DAILY    guaiFENesin ER (MUCINEX) tablet 1,200 mg  1,200 mg Oral BID    famotidine (PEPCID) tablet 20 mg  20 mg Oral BID    atorvastatin (LIPITOR) tablet 40 mg  40 mg Oral DAILY    zinc sulfate (ZINCATE) 220 (50) mg capsule 1 Cap  1 Cap Oral DAILY    doxycycline (VIBRAMYCIN) 100 mg in 0.9% sodium chloride (MBP/ADV) 100 mL  100 mg IntraVENous Q12H    sodium chloride (NS) flush 5-40 mL  5-40 mL IntraVENous Q8H    enoxaparin (LOVENOX) injection 40 mg  40 mg SubCUTAneous Q24H    ascorbic acid (vitamin C) (VITAMIN C) tablet 500 mg  500 mg Oral BID    aspirin delayed-release tablet 81 mg  81 mg Oral DAILY    atenoloL (TENORMIN) tablet 25 mg  25 mg Oral QHS    calcium-vitamin D (OS-TRISTEN) 500 mg-200 unit tablet  1 Tab Oral DAILY    cetirizine (ZYRTEC) tablet 10 mg  10 mg Oral DAILY    therapeutic multivitamin (THERAGRAN) tablet 1 Tab  1 Tab Oral DAILY    levothyroxine (SYNTHROID) tablet 100 mcg  100 mcg Oral ACB    losartan (COZAAR) tablet 50 mg  50 mg Oral DAILY    montelukast (SINGULAIR) tablet 10 mg  10 mg Oral QHS    sertraline (ZOLOFT) tablet 50 mg  50 mg Oral DAILY    traZODone (DESYREL) tablet 100 mg  100 mg Oral QHS    ipratropium-albuterol (COMBIVENT RESPIMAT) 20 mcg-100 mcg inhalation spray  1 Puff Inhalation Q6H RT    fluticasone-vilanterol (BREO ELLIPTA) 200mcg-25mcg/puff  1 Puff Inhalation DAILY    insulin lispro (HUMALOG) injection   SubCUTAneous AC&HS    pantoprazole (PROTONIX) tablet 40 mg  40 mg Oral ACB         Objective:   Vital Signs:    Visit Vitals  /63 (BP 1 Location: Left arm, BP Patient Position: At rest)   Pulse 76   Temp 99.5 °F (37.5 °C)   Resp 18   Ht 5' 4\" (1.626 m)   Wt 87.5 kg (193 lb)   SpO2 93%   BMI 33.13 kg/m²       O2 Device: Hi flow nasal cannula, Heated   O2 Flow Rate (L/min): 40 l/min   Temp (24hrs), Av.9 °F (37.2 °C), Min:98.5 °F (36.9 °C), Max:99.5 °F (37.5 °C)       Intake/Output:   Last shift:       07 - 1900  In: 400 [P.O.:300; I.V.:100]  Out: 130 [Urine:130]  Last 3 shifts: 05/15 1901 -  0700  In: 9277 [P.O.:1160; I.V.:650]  Out: 1600 [Urine:1600]    Intake/Output Summary (Last 24 hours) at 2020 1828  Last data filed at 2020 1610  Gross per 24 hour   Intake 940 ml   Output 1330 ml   Net -390 ml      Physical Exam:   General:  Alert, cooperative   Head:     Eyes:     Nose:    Throat:    Neck:    Lungs:   diminished   Chest wall:     Heart:  RRR   Abdomen:      Extremities: No edema   Pulses:    Skin:    Lymph nodes:    Neurologic: Oriented x 3    Exam limited to avoid unnecessary exposure and to preserve PPE during COVID19 pandemic. Data review:     Recent Results (from the past 24 hour(s))   GLUCOSE, POC    Collection Time: 20  9:45 PM   Result Value Ref Range    Glucose (POC) 251 (H) 65 - 100 mg/dL    Performed by [address], COMPREHENSIVE    Collection Time: 20  7:00 AM   Result Value Ref Range    Sodium 138 136 - 145 mmol/L    Potassium 3.2 (L) 3.5 - 5.1 mmol/L    Chloride 102 97 - 108 mmol/L    CO2 29 21 - 32 mmol/L    Anion gap 7 5 - 15 mmol/L    Glucose 196 (H) 65 - 100 mg/dL    BUN 18 6 - 20 MG/DL    Creatinine 0.78 0.55 - 1.02 MG/DL    BUN/Creatinine ratio 23 (H) 12 - 20      GFR est AA >60 >60 ml/min/1.73m2    GFR est non-AA >60 >60 ml/min/1.73m2    Calcium 8.3 (L) 8.5 - 10.1 MG/DL    Bilirubin, total 0.4 0.2 - 1.0 MG/DL    ALT (SGPT) 61 12 - 78 U/L    AST (SGOT) 66 (H) 15 - 37 U/L    Alk.  phosphatase 129 (H) 45 - 117 U/L    Protein, total 5.4 (L) 6.4 - 8.2 g/dL    Albumin 2.1 (L) 3.5 - 5.0 g/dL    Globulin 3.3 2.0 - 4.0 g/dL    A-G Ratio 0.6 (L) 1.1 - 2.2     CBC WITH AUTOMATED DIFF    Collection Time: 20  7:00 AM   Result Value Ref Range    WBC 10.5 3.6 - 11.0 K/uL    RBC 4.09 3.80 - 5.20 M/uL    HGB 12.0 11.5 - 16.0 g/dL    HCT 35.9 35.0 - 47.0 %    MCV 87.8 80.0 - 99.0 FL    MCH 29.3 26.0 - 34.0 PG    MCHC 33.4 30.0 - 36.5 g/dL    RDW 12.5 11.5 - 14.5 %    PLATELET 723 409 - 114 K/uL    NRBC 0.0 0  WBC    ABSOLUTE NRBC 0.00 0.00 - 0.01 K/uL    NEUTROPHILS 67 32 - 75 %    LYMPHOCYTES 23 12 - 49 %    MONOCYTES 5 5 - 13 %    EOSINOPHILS 2 0 - 7 %    BASOPHILS 1 0 - 1 %    IMMATURE GRANULOCYTES 2 (H) 0.0 - 0.5 %    ABS. NEUTROPHILS 7.1 1.8 - 8.0 K/UL    ABS. LYMPHOCYTES 2.4 0.8 - 3.5 K/UL    ABS. MONOCYTES 0.5 0.0 - 1.0 K/UL    ABS. EOSINOPHILS 0.2 0.0 - 0.4 K/UL    ABS. BASOPHILS 0.1 0.0 - 0.1 K/UL    ABS. IMM. GRANS. 0.2 (H) 0.00 - 0.04 K/UL    DF AUTOMATED      RBC COMMENTS NORMOCYTIC, NORMOCHROMIC     PROCALCITONIN    Collection Time: 05/17/20  7:00 AM   Result Value Ref Range    Procalcitonin 0.17 ng/mL   C REACTIVE PROTEIN, QT    Collection Time: 05/17/20  7:00 AM   Result Value Ref Range    C-Reactive protein 6.27 (H) 0.00 - 0.60 mg/dL   LD    Collection Time: 05/17/20  7:00 AM   Result Value Ref Range     (H) 81 - 246 U/L   D DIMER    Collection Time: 05/17/20  7:00 AM   Result Value Ref Range    D-dimer 1.80 (H) 0.00 - 0.65 mg/L FEU   PROTHROMBIN TIME + INR    Collection Time: 05/17/20  7:00 AM   Result Value Ref Range    INR 1.1 0.9 - 1.1      Prothrombin time 10.9 9.0 - 11.1 sec   GLUCOSE, POC    Collection Time: 05/17/20  7:38 AM   Result Value Ref Range    Glucose (POC) 182 (H) 65 - 100 mg/dL    Performed by Nakia Rnedon    TSH 3RD GENERATION    Collection Time: 05/17/20  9:15 AM   Result Value Ref Range    TSH 5.26 (H) 0.36 - 3.74 uIU/mL   HEPATITIS PANEL, ACUTE    Collection Time: 05/17/20  9:15 AM   Result Value Ref Range    Hepatitis A, IgM NONREACTIVE NR      __          Hepatitis B surface Ag 0.22 Index    Hep B surface Ag Interp. Negative NEG      __          Hepatitis B core, IgM NONREACTIVE NR      __          Hep C  virus Ab Interp.  NONREACTIVE NR      Hep C  virus Ab comment Method used is Siemens Advia Centaur     HIV 1/2 AG/AB, 4TH GENERATION,W RFLX CONFIRM    Collection Time: 05/17/20  9:15 AM   Result Value Ref Range    HIV 1/2 Interpretation NONREACTIVE NR      HIV 1/2 result comment SEE NOTE     FERRITIN    Collection Time: 05/17/20  9:15 AM   Result Value Ref Range    Ferritin 369 26 - 388 NG/ML   GLUCOSE, POC    Collection Time: 05/17/20 11:31 AM   Result Value Ref Range    Glucose (POC) 200 (H) 65 - 100 mg/dL    Performed by Yudi Palumbo    GLUCOSE, POC    Collection Time: 05/17/20  4:08 PM   Result Value Ref Range    Glucose (POC) 162 (H) 65 - 100 mg/dL    Performed by Yudi Palumbo        Imaging:  I have personally reviewed the patients radiographs and have reviewed the reports:          Betsey Saravia MD

## 2020-05-17 NOTE — PROGRESS NOTES
Bedside and Verbal shift change report given to Mary Gregory (oncoming nurse) by Dominique Hitchcock RN (offgoing nurse). Report included the following information SBAR, Kardex, ED Summary, Intake/Output, Recent Results, Med Rec Status and Cardiac Rhythm NSR.    2101: Pt alert, oriented, denies pain. Pt refused scheduled Bumex 1 mg dose IV. Pt reported the previous night she could not sleep due to Bumex, \" making my skin crawl\". Held scheduled dose of Bumex. 4369: Notified family practice unable to obtain labs this am. Pt refused further attempts at this time. Labs will be obtained after midline placed today per Dr. Darrell Frost and Verbal shift change report given to Cindi Segal RN (oncoming nurse) by Mary Gregory (offgoing nurse). Report included the following information SBAR, Kardex, ED Summary, Intake/Output, Recent Results, Med Rec Status and Cardiac Rhythm NSR/SA.

## 2020-05-17 NOTE — PROGRESS NOTES
Problem: Falls - Risk of  Goal: *Absence of Falls  Description: Document [place] Fall Risk and appropriate interventions in the flowsheet. Outcome: Progressing Towards Goal  Note: Fall Risk Interventions:  Mobility Interventions: Assess mobility with egress test, Bed/chair exit alarm, OT consult for ADLs, Patient to call before getting OOB, PT Consult for mobility concerns, PT Consult for assist device competence, Strengthening exercises (ROM-active/passive)         Medication Interventions: Patient to call before getting OOB, Bed/chair exit alarm, Teach patient to arise slowly    Elimination Interventions: Call light in reach, Bed/chair exit alarm, Patient to call for help with toileting needs, Stay With Me (per policy), Toilet paper/wipes in reach              Problem: Patient Education: Go to Patient Education Activity  Goal: Patient/Family Education  Outcome: Progressing Towards Goal     Problem: Diabetes Self-Management  Goal: *Disease process and treatment process  Description: Define diabetes and identify own type of diabetes; list 3 options for treating diabetes. Outcome: Progressing Towards Goal  Goal: *Incorporating nutritional management into lifestyle  Description: Describe effect of type, amount and timing of food on blood glucose; list 3 methods for planning meals. Outcome: Progressing Towards Goal  Goal: *Incorporating physical activity into lifestyle  Description: State effect of exercise on blood glucose levels. Outcome: Progressing Towards Goal  Goal: *Developing strategies to promote health/change behavior  Description: Define the ABC's of diabetes; identify appropriate screenings, schedule and personal plan for screenings. Outcome: Progressing Towards Goal  Goal: *Using medications safely  Description: State effect of diabetes medications on diabetes; name diabetes medication taking, action and side effects.   Outcome: Progressing Towards Goal  Goal: *Monitoring blood glucose, interpreting and using results  Description: Identify recommended blood glucose targets  and personal targets. Outcome: Progressing Towards Goal  Goal: *Prevention, detection, treatment of acute complications  Description: List symptoms of hyper- and hypoglycemia; describe how to treat low blood sugar and actions for lowering  high blood glucose level. Outcome: Progressing Towards Goal  Goal: *Prevention, detection and treatment of chronic complications  Description: Define the natural course of diabetes and describe the relationship of blood glucose levels to long term complications of diabetes. Outcome: Progressing Towards Goal  Goal: *Developing strategies to address psychosocial issues  Description: Describe feelings about living with diabetes; identify support needed and support network  Outcome: Progressing Towards Goal     Problem: Risk for Spread of Infection  Goal: Prevent transmission of infectious organism to others  Description: Prevent the transmission of infectious organisms to other patients, staff members, and visitors. Outcome: Progressing Towards Goal     Problem: Patient Education:  Go to Education Activity  Goal: Patient/Family Education  Outcome: Progressing Towards Goal     Problem: Pressure Injury - Risk of  Goal: *Prevention of pressure injury  Description: Document Anoop Scale and appropriate interventions in the flowsheet.   Outcome: Progressing Towards Goal  Note: Pressure Injury Interventions:       Moisture Interventions: Absorbent underpads, Maintain skin hydration (lotion/cream), Minimize layers, Moisture barrier, Offer toileting Q_hr    Activity Interventions: Increase time out of bed, Pressure redistribution bed/mattress(bed type), PT/OT evaluation    Mobility Interventions: HOB 30 degrees or less, Pressure redistribution bed/mattress (bed type), PT/OT evaluation    Nutrition Interventions: Document food/fluid/supplement intake    Friction and Shear Interventions: Apply protective barrier, creams and emollients, HOB 30 degrees or less, Minimize layers, Sit at 90-degree angle, Transfer aides:transfer board/Nubia lift/ceiling lift, Transferring/repositioning devices                Problem: Patient Education: Go to Patient Education Activity  Goal: Patient/Family Education  Outcome: Progressing Towards Goal

## 2020-05-18 ENCOUNTER — APPOINTMENT (OUTPATIENT)
Dept: GENERAL RADIOLOGY | Age: 75
DRG: 177 | End: 2020-05-18
Attending: STUDENT IN AN ORGANIZED HEALTH CARE EDUCATION/TRAINING PROGRAM
Payer: MEDICARE

## 2020-05-18 LAB
GLUCOSE BLD STRIP.AUTO-MCNC: 200 MG/DL (ref 65–100)
GLUCOSE BLD STRIP.AUTO-MCNC: 236 MG/DL (ref 65–100)
GLUCOSE BLD STRIP.AUTO-MCNC: 242 MG/DL (ref 65–100)
GLUCOSE BLD STRIP.AUTO-MCNC: 493 MG/DL (ref 65–100)
SERVICE CMNT-IMP: ABNORMAL

## 2020-05-18 PROCEDURE — 76937 US GUIDE VASCULAR ACCESS: CPT

## 2020-05-18 PROCEDURE — 74011636637 HC RX REV CODE- 636/637: Performed by: FAMILY MEDICINE

## 2020-05-18 PROCEDURE — 97165 OT EVAL LOW COMPLEX 30 MIN: CPT | Performed by: OCCUPATIONAL THERAPIST

## 2020-05-18 PROCEDURE — 97161 PT EVAL LOW COMPLEX 20 MIN: CPT

## 2020-05-18 PROCEDURE — 74011250637 HC RX REV CODE- 250/637: Performed by: STUDENT IN AN ORGANIZED HEALTH CARE EDUCATION/TRAINING PROGRAM

## 2020-05-18 PROCEDURE — 94640 AIRWAY INHALATION TREATMENT: CPT

## 2020-05-18 PROCEDURE — 97110 THERAPEUTIC EXERCISES: CPT

## 2020-05-18 PROCEDURE — 74011250637 HC RX REV CODE- 250/637: Performed by: INTERNAL MEDICINE

## 2020-05-18 PROCEDURE — 82962 GLUCOSE BLOOD TEST: CPT

## 2020-05-18 PROCEDURE — 77010033711 HC HIGH FLOW OXYGEN

## 2020-05-18 PROCEDURE — C1751 CATH, INF, PER/CENT/MIDLINE: HCPCS

## 2020-05-18 PROCEDURE — 74011250636 HC RX REV CODE- 250/636: Performed by: NURSE PRACTITIONER

## 2020-05-18 PROCEDURE — 74011250636 HC RX REV CODE- 250/636: Performed by: STUDENT IN AN ORGANIZED HEALTH CARE EDUCATION/TRAINING PROGRAM

## 2020-05-18 PROCEDURE — 74011250636 HC RX REV CODE- 250/636: Performed by: INTERNAL MEDICINE

## 2020-05-18 PROCEDURE — 97535 SELF CARE MNGMENT TRAINING: CPT | Performed by: OCCUPATIONAL THERAPIST

## 2020-05-18 PROCEDURE — 97110 THERAPEUTIC EXERCISES: CPT | Performed by: OCCUPATIONAL THERAPIST

## 2020-05-18 PROCEDURE — 74011636637 HC RX REV CODE- 636/637: Performed by: STUDENT IN AN ORGANIZED HEALTH CARE EDUCATION/TRAINING PROGRAM

## 2020-05-18 PROCEDURE — 74011000250 HC RX REV CODE- 250: Performed by: STUDENT IN AN ORGANIZED HEALTH CARE EDUCATION/TRAINING PROGRAM

## 2020-05-18 PROCEDURE — 74011000258 HC RX REV CODE- 258: Performed by: STUDENT IN AN ORGANIZED HEALTH CARE EDUCATION/TRAINING PROGRAM

## 2020-05-18 PROCEDURE — 65660000000 HC RM CCU STEPDOWN

## 2020-05-18 PROCEDURE — 77030018786 HC NDL GD F/USND BARD -B

## 2020-05-18 RX ORDER — INSULIN GLARGINE 100 [IU]/ML
24 INJECTION, SOLUTION SUBCUTANEOUS DAILY
Status: DISCONTINUED | OUTPATIENT
Start: 2020-05-18 | End: 2020-05-19

## 2020-05-18 RX ADMIN — Medication 10 ML: at 21:15

## 2020-05-18 RX ADMIN — INSULIN HUMAN 18 UNITS: 100 INJECTION, SUSPENSION SUBCUTANEOUS at 21:42

## 2020-05-18 RX ADMIN — ATENOLOL 25 MG: 50 TABLET ORAL at 21:16

## 2020-05-18 RX ADMIN — BUMETANIDE 1 MG: 0.25 INJECTION INTRAMUSCULAR; INTRAVENOUS at 08:47

## 2020-05-18 RX ADMIN — FAMOTIDINE 20 MG: 20 TABLET, FILM COATED ORAL at 08:46

## 2020-05-18 RX ADMIN — DOXYCYCLINE 100 MG: 100 INJECTION, POWDER, LYOPHILIZED, FOR SOLUTION INTRAVENOUS at 08:54

## 2020-05-18 RX ADMIN — INSULIN LISPRO 3 UNITS: 100 INJECTION, SOLUTION INTRAVENOUS; SUBCUTANEOUS at 12:37

## 2020-05-18 RX ADMIN — INSULIN GLARGINE 24 UNITS: 100 INJECTION, SOLUTION SUBCUTANEOUS at 09:00

## 2020-05-18 RX ADMIN — OXYCODONE HYDROCHLORIDE AND ACETAMINOPHEN 500 MG: 500 TABLET ORAL at 08:46

## 2020-05-18 RX ADMIN — OXYCODONE HYDROCHLORIDE AND ACETAMINOPHEN 500 MG: 500 TABLET ORAL at 21:15

## 2020-05-18 RX ADMIN — ATORVASTATIN CALCIUM 40 MG: 20 TABLET, FILM COATED ORAL at 08:46

## 2020-05-18 RX ADMIN — SERTRALINE HYDROCHLORIDE 50 MG: 50 TABLET ORAL at 08:46

## 2020-05-18 RX ADMIN — METHYLPREDNISOLONE SODIUM SUCCINATE 40 MG: 40 INJECTION, POWDER, FOR SOLUTION INTRAMUSCULAR; INTRAVENOUS at 17:56

## 2020-05-18 RX ADMIN — Medication 10 ML: at 07:25

## 2020-05-18 RX ADMIN — IPRATROPIUM BROMIDE AND ALBUTEROL 1 PUFF: 20; 100 SPRAY, METERED RESPIRATORY (INHALATION) at 19:55

## 2020-05-18 RX ADMIN — IPRATROPIUM BROMIDE AND ALBUTEROL 1 PUFF: 20; 100 SPRAY, METERED RESPIRATORY (INHALATION) at 03:33

## 2020-05-18 RX ADMIN — ENOXAPARIN SODIUM 40 MG: 40 INJECTION SUBCUTANEOUS at 21:18

## 2020-05-18 RX ADMIN — GUAIFENESIN 1200 MG: 600 TABLET, EXTENDED RELEASE ORAL at 21:15

## 2020-05-18 RX ADMIN — FLUTICASONE FUROATE AND VILANTEROL TRIFENATATE 1 PUFF: 200; 25 POWDER RESPIRATORY (INHALATION) at 07:42

## 2020-05-18 RX ADMIN — ZINC SULFATE 220 MG (50 MG) CAPSULE 1 CAPSULE: CAPSULE at 08:46

## 2020-05-18 RX ADMIN — OYSTER SHELL CALCIUM WITH VITAMIN D 1 TABLET: 500; 200 TABLET, FILM COATED ORAL at 08:46

## 2020-05-18 RX ADMIN — MONTELUKAST SODIUM 10 MG: 10 TABLET, FILM COATED ORAL at 21:16

## 2020-05-18 RX ADMIN — FAMOTIDINE 20 MG: 20 TABLET, FILM COATED ORAL at 21:15

## 2020-05-18 RX ADMIN — THERA TABS 1 TABLET: TAB at 08:46

## 2020-05-18 RX ADMIN — CETIRIZINE HYDROCHLORIDE 10 MG: 10 TABLET, FILM COATED ORAL at 08:46

## 2020-05-18 RX ADMIN — PANTOPRAZOLE SODIUM 40 MG: 40 TABLET, DELAYED RELEASE ORAL at 07:24

## 2020-05-18 RX ADMIN — INSULIN LISPRO 3 UNITS: 100 INJECTION, SOLUTION INTRAVENOUS; SUBCUTANEOUS at 08:46

## 2020-05-18 RX ADMIN — ASPIRIN 81 MG: 81 TABLET, COATED ORAL at 08:46

## 2020-05-18 RX ADMIN — IPRATROPIUM BROMIDE AND ALBUTEROL 1 PUFF: 20; 100 SPRAY, METERED RESPIRATORY (INHALATION) at 13:28

## 2020-05-18 RX ADMIN — INSULIN LISPRO 3 UNITS: 100 INJECTION, SOLUTION INTRAVENOUS; SUBCUTANEOUS at 17:56

## 2020-05-18 RX ADMIN — DOXYCYCLINE 100 MG: 100 INJECTION, POWDER, LYOPHILIZED, FOR SOLUTION INTRAVENOUS at 21:15

## 2020-05-18 RX ADMIN — LOSARTAN POTASSIUM 50 MG: 50 TABLET, FILM COATED ORAL at 08:47

## 2020-05-18 RX ADMIN — LEVOTHYROXINE SODIUM 100 MCG: 0.1 TABLET ORAL at 07:25

## 2020-05-18 RX ADMIN — IPRATROPIUM BROMIDE AND ALBUTEROL 1 PUFF: 20; 100 SPRAY, METERED RESPIRATORY (INHALATION) at 07:30

## 2020-05-18 RX ADMIN — INSULIN LISPRO 2 UNITS: 100 INJECTION, SOLUTION INTRAVENOUS; SUBCUTANEOUS at 00:01

## 2020-05-18 RX ADMIN — Medication 10 ML: at 12:38

## 2020-05-18 RX ADMIN — Medication 10 ML: at 19:53

## 2020-05-18 RX ADMIN — METHYLPREDNISOLONE SODIUM SUCCINATE 40 MG: 40 INJECTION, POWDER, FOR SOLUTION INTRAMUSCULAR; INTRAVENOUS at 23:58

## 2020-05-18 RX ADMIN — METHYLPREDNISOLONE SODIUM SUCCINATE 40 MG: 40 INJECTION, POWDER, FOR SOLUTION INTRAMUSCULAR; INTRAVENOUS at 14:52

## 2020-05-18 RX ADMIN — TRAZODONE HYDROCHLORIDE 100 MG: 100 TABLET ORAL at 21:15

## 2020-05-18 RX ADMIN — GUAIFENESIN 1200 MG: 600 TABLET, EXTENDED RELEASE ORAL at 08:46

## 2020-05-18 NOTE — PROGRESS NOTES
MIDLINE PLACEMENT NOTE    Midline catheters are NOT central lines. Approved midline products are peripheral infusion devices with the tip terminating in the arm at or below the axillary vein, distal to the shoulder. The tip DOES NOT ENTER THE CENTRAL VASCULATURE. A Midline is for reliable short term (less than 30 days) vascular access. PRE-PROCEDURE VERIFICATION  Correct Procedure: yes  Correct Site:  yes  Temperature: Temp: 99 °F (37.2 °C), Temperature Source: Temp Source: Oral  Recent Labs     05/17/20  0700   BUN 18   CREA 0.78      INR 1.1   WBC 10.5     Allergies: Latex; Prednisone; Augmentin [amoxicillin-pot clavulanate]; Biaxin [clarithromycin]; Metformin; and Parafon forte dsc [chlorzoxazone]  Education materials for Midline Care given: yes. See Patient Education activity for further details. Midline Booklet placed at bedside: yes    Midline Catheter Maintenance: For complete information reference Policy # [id number]    A. Dressing Changes       1. Assess the dressing in the first 24 hours for accumulation of blood, fluid or moisture beneath the dressing. During dressing changes, assess the external length of the catheter to determine if migration of the catheter has occurred. Periodically confirm catheter placement, tip location, patency and security of dressing. Dressing changes should be done every 7 days, and PRN using sterile technique if integrity of dressing is compromised. Apply new dressing per hospital policy. Caution: Do not use scissors to remove dressing to minimize the risk of cutting the  Catheter. B. Flushing       1. Flush each lumen of the catheter with 10 mL of sterile saline every 12 hours or after each use. In addition, lock each lumen of the catheter with sterile saline. Note: Flush with 20 mL of sterile saline after blood therapy   Caution: DO NOT USE A SYRINGE SMALLER THAN 10 mL TO FLUSH AND CONFIRM PATENCY.   Patency should be assessed with a 10 mL or larger syringe and  sterile saline. Upon confirmation of patency, administration of medication should be  given in a syringe appropriately sized for the dose. Do not infuse against resistance. C. Blood Draws:        1. Stop infusion, draw off and waste 10 ml of blood. Draw sample with 10 mL syringe or          greater. DO NOT USE VACUTAINER . Transfer with appropriate device to lab tubes. Flush        with 20 ml NS.  D. Occluded or Partially Occluded Catheter       1. Catheters that present resistance to flushing and aspiration may be partially or completely  occluded. Do not flush against resistance. PROCEDURE DETAIL:    Verbal consent was obtained and all questions were answered related to risks and benefits. A Midline was inserted as a sterile procedure using ultrasound and Modified Seldinger Technique for vascular access. The following documentation is in addition to the Midline properties in the lines/airways Doc Flow Sheet:  Lot #: [id number]  Lidocaine 1% administered intradermally :yes  Internal Catheter Total Length: 12 (cm)   External catheter length: 0 (cm)  Vein Selection for Midline:left basilic  Sterile CVC Insertion Bundle was used to place line yes  Complication Related to Insertion:no    Prior to use, line patency MUST be verified by flushing at least a 10 mL syringe. Line should flush easily without resistance, and withdrawal of brisk blood return to verify patency.     Line is okay to use: yes        (Remove Midline by:     6/17/20         )  Dina Meyer RN

## 2020-05-18 NOTE — PROGRESS NOTES
OCCUPATIONAL THERAPY EVALUATION/DISCHARGE  Patient: Bill Armstrong (84 y.o. female)  Date: 5/18/2020  Primary Diagnosis: Respiratory failure with hypoxia ([facility] [address] [J96.91]       Precautions:  Fall, Contact(Droplet plus)    ASSESSMENT  Based on the objective data described below, the patient presents with O2 sats 90-94% on HFNC 50L, 90% FiO2, mild ROBISON, good safety awareness and no LOB following admission on 5/10/20 with respiratory failure with hypoxia and Covid-19 positive. Pt is at an overall SBA and set-up level with LE ADLs, toileting and functional mobility. Educated pt on energy conservation techniques and BUE/LE home exer program with written handout provided. Pt demonstrated good understanding. No further skilled acute OT required at this time. Current Level of Function (ADLs/self-care): SBA and set-up level with LE ADLs, toileting and functional mobility. Functional Outcome Measure: The patient scored 55/100 on the Barthel Index outcome measure. Other factors to consider for discharge: see above     PLAN :  Recommend with staff: Recommend with nursing patient to complete as able in order to maintain strength, endurance and independence: ADLs with SBA/setup and OOB to chair 3x/day and mobilizing to the [facility] for toileting with SB assist. Thank you for your assistance. Recommendation for discharge: (in order for the patient to meet his/her long term goals)  No skilled occupational therapy/ follow up rehabilitation needs identified at this time. This discharge recommendation:  Has not yet been discussed the attending provider and/or case management    IF patient discharges home will need the following DME: none for OT       SUBJECTIVE:   Patient stated I feel much better today.     OBJECTIVE DATA SUMMARY:   HISTORY:   Past Medical History:   Diagnosis Date    Anxiety     Arthritis     Asthma     Chronic pain     back,right hip    Depression     Diabetes ([facility] [address]     type 2    GERD (gastroesophageal reflux disease)     History of vascular access device 05/18/2020    4 Fr midline, 12 cm L basilic, blood draws    Hypercholesteremia     Hypertension     Hypothyroid     Nausea & vomiting     Reflux     Sleep apnea     wears dental appliance- Somnident    Thyroid disease     Unspecified adverse effect of anesthesia     delayed awakening     Past Surgical History:   Procedure Laterality Date    HX COLONOSCOPY  2012    normal repeat 2022, diverticulosis of sigmoid colon, Dr. Migue Tirado HX HEENT  1/08/14    bilateral cataract surgery1/8/14    HX KNEE REPLACEMENT      right    HX OTHER SURGICAL  2007    cardiac cath- No CAD found    NEUROLOGICAL PROCEDURE UNLISTED  1999    vascular brain surgery       Prior Level of Function/Environment/Context: Independent, active, drives,  Enjoys traveling with her . Pt states she doesn't do much when she is having back pain due to a \"slipped disk\" which isn't too often. Expanded or extensive additional review of patient history:   Home Situation  Home Environment: Private residence  # Steps to Enter: 5  Rails to Enter: Yes  Hand Rails : Bilateral  One/Two Story Residence: Two story, live on 1st floor  # of Interior Steps: 14  Interior Rails: Left  Living Alone: No  Support Systems: Spouse/Significant Other/Partner, Child(lucinda), Family member(s), Friends \ neighbors, [employer] Deem / [facility] [employer]  Patient Expects to be Discharged to[de-identified] Private residence  Current DME Used/Available at Home: [address] [facility] [address], straight(only uses when traveling)  Tub or Shower Type: (built in seat)    Hand dominance: Right    EXAMINATION OF PERFORMANCE DEFICITS:  Cognitive/Behavioral Status:  Neurologic State: Alert; Appropriate for age  Orientation Level: Oriented X4  Cognition: Appropriate decision making; Appropriate for age attention/concentration; Appropriate safety awareness; Follows commands  Perception: Appears intact  Perseveration: No perseveration noted  Safety/Judgement: Awareness of environment;Driving appropriateness; Fall prevention;Good awareness of safety precautions; Home safety; Insight into deficits    Hearing: Auditory  Auditory Impairment: None    Vision/Perceptual:    Acuity: Within Defined Limits         Range of Motion:  AROM: Within functional limits  PROM: Within functional limits                      Strength:  Strength: Generally decreased, functional                Coordination:  Coordination: Within functional limits  Fine Motor Skills-Upper: Left Intact; Right Intact    Gross Motor Skills-Upper: Left Intact; Right Intact    Tone & Sensation:  Tone: Normal  Sensation: Intact                      Balance:  Sitting: Intact  Standing: Intact; With support    Functional Mobility and Transfers for ADLs:  Bed Mobility:  Rolling: Supervision  Supine to Sit: Supervision  Sit to Supine: Supervision  Scooting: Supervision    Transfers:  Sit to Stand: Stand-by assistance  Stand to Sit: Stand-by assistance  Bed to Chair: Stand-by assistance  Toilet Transfer : Stand-by assistance(BSC due to HFNC)    ADL Assessment:  Feeding: Modified independent    Oral Facial Hygiene/Grooming: Setup; Additional time(seated)    Bathing: Stand-by assistance;Assist x1;Additional time(seated)    Upper Body Dressing: Setup    Lower Body Dressing: Stand-by assistance; Additional time(seated using crossed leg technique to reach feet)    Toileting: Stand by assistance                ADL Intervention and task modifications:  Patient instructed and indicated understanding energy conservation techniques to increase independence and safety during all ADLs for end goal of returning back home. Provided instruction body is like a jar of marbles, marbles represent energy, at end of day need as many marbles as possible to obtain a good night sleep, REM sleep is when the body repairs itself. This ensures a full jar of marbles, full of energy when wake up to start a new day.  Use energy conservation techniques during ADLs so can increase participation in life activities patient prefers, to ensure more frequent good days. If having a bad day, evaluate tasks completed day before and re-plan how to save energy to complete same tasks, for example if going grocery shopping do not complete full bathing/dressing/grooming. Visual handout provided. Patient indicated good understanding. Cognitive Retraining  Safety/Judgement: Awareness of environment;Driving appropriateness; Fall prevention;Good awareness of safety precautions; Home safety; Insight into deficits    Therapeutic Exercise:  Pt educated on the benefits of exer to improve her strength and endurance for all functional tasks. Handout provided and pt demonstrated good understanding. Functional Measure:  Barthel Index:    Bathin  Bladder: 10  Bowels: 10  Groomin  Dressin  Feeding: 10  Mobility: 0  Stairs: 0  Toilet Use: 5  Transfer (Bed to Chair and Back): 10  Total: 55/100        The Barthel ADL Index: Guidelines  1. The index should be used as a record of what a patient does, not as a record of what a patient could do. 2. The main aim is to establish degree of independence from any help, physical or verbal, however minor and for whatever reason. 3. The need for supervision renders the patient not independent. 4. A patient's performance should be established using the best available evidence. Asking the patient, friends/relatives and nurses are the usual sources, but direct observation and common sense are also important. However direct testing is not needed. 5. Usually the patient's performance over the preceding 24-48 hours is important, but occasionally longer periods will be relevant. 6. Middle categories imply that the patient supplies over 50 per cent of the effort. 7. Use of aids to be independent is allowed. Jerry Miller., Barthel, D.W. ([id number]). Functional evaluation: the Barthel Index. [facility] (14)2.   LEANNA Selby, Corey Hernandez., Stefani Damon (1999). Measuring the change indisability after inpatient rehabilitation; comparison of the responsiveness of the Barthel Index and Functional Dana Measure. Journal of Neurology, Neurosurgery, and Psychiatry, 66(4), 184-338. MELANY Babb, JEAN-PAUL Mclain, & Lamine Lerma M.A. (2004.) Assessment of post-stroke quality of life in cost-effectiveness studies: The usefulness of the Barthel Index and the EuroQoL-5D. Quality of Life Research, 15, 425-08       Occupational Therapy Evaluation Charge Determination   History Examination Decision-Making   LOW Complexity : Brief history review  MEDIUM Complexity : 3-5 performance deficits relating to physical, cognitive , or psychosocial skils that result in activity limitations and / or participation restrictions LOW Complexity : No comorbidities that affect functional and no verbal or physical assistance needed to complete eval tasks       Based on the above components, the patient evaluation is determined to be of the following complexity level: LOW   Pain Ratin/10    Activity Tolerance:   Fair, desaturates with exertion and requires oxygen, requires frequent rest breaks and observed SOB with activity  Please refer to the flowsheet for vital signs taken during this treatment. After treatment patient left in no apparent distress:    Sitting in chair and Call bell within reach    COMMUNICATION/EDUCATION:   The patients plan of care was discussed with: Physical therapist and Registered nurse.      Thank you for this referral.  Robert Way OT  Time Calculation: 43 mins

## 2020-05-18 NOTE — PROGRESS NOTES
[facility]  Pulmonary, Critical Care, and Sleep Medicine      Name: Patricia Dejesus MRN: [id number]   : 1945 Hospital: [address] [facility]   Date: 2020        IMPRESSION:   · Acute hypoxemic respiratory failure  · COVID19 +. Started on Caitlin pulse , had to stop this on  due to increased O2 requirements. Given actemra 5/15  · Mild intermittent asthma  · Chronic back pain  · DM  · HTN      RECOMMENDATIONS:   · Supplemental O2 as needed to keep sats > 88%. Now on HFNC  · Continue diuresis. Strict I&O  · Continue doxy  · Trend inflammatory markers  · Add Solu-Medrol given lack of improvement and inability to wean O2 thus far  · Continue combivent, breo, singulair, zyrtec, mucinex   · Continue vit C, zinc, lipitor  · Continue pepcid  · Encouraged pt to lie prone at least 4 hours per day and to use inc tommy q 1 hr w/a  · OOB to chair    Pt is critically ill and at high risk of decompensation  CCT: 30+ minutes     Subjective: This patient has been seen and evaluated at the request of Dr. Shekhar Fernandez for [place] 19 pneumonia and resp failure. Patient is a 69 y.o. female diagnosed one week ago with Covid 19. Pt with fevers, diarrhea, cough, and sob. She has been isolating at home but has had work done within the home and believes that she may have gotten an infection from one of the workers there.  has also become infected. Pt states that her symptoms have remained stable to slightly improved over the past few days but has noted that her oxygen sats have been falling and presented to ED for evaluation. Fevers seem to have resolved. Main symptoms are now diarrhea, sob, and cough. Pt w h/o asthma which by description appears to be mild intermittent and is followed by Dr Guanakito Chauhan in [place]. Controlled with montelukast and (rare) albuterol MDI. Never smoker      Interval history: Tm 99.1  Remains on HFNC 30L/90%.   No new markers today; yesterday most trending down except for d-dimer  BP stable  Blood cultures negative x 5 days  Fluid balane: +415; but ? If being documented    Thinks that her SOB is better with activity. Doesn't take her a long to recover. Denies much cough or sputum production. Denies fever/chills. Past Medical History:   Diagnosis Date    Anxiety     Arthritis     Asthma     Chronic pain     back,right hip    Depression     Diabetes ([address]     type 2    GERD (gastroesophageal reflux disease)     History of vascular access device 05/18/2020    4 Fr midline, 12 cm L basilic, blood draws    Hypercholesteremia     Hypertension     Hypothyroid     Nausea & vomiting     Reflux     Sleep apnea     wears dental appliance- Somnident    Thyroid disease     Unspecified adverse effect of anesthesia     delayed awakening      Past Surgical History:   Procedure Laterality Date    HX COLONOSCOPY  2012    normal repeat 2022, diverticulosis of sigmoid colon, Dr. Coello Man HX HEENT  1/08/14    bilateral cataract surgery1/8/14    HX KNEE REPLACEMENT      right    HX OTHER SURGICAL  2007    cardiac cath- No CAD found    NEUROLOGICAL PROCEDURE UNLISTED  1999    vascular brain surgery      Prior to Admission medications    Medication Sig Start Date End Date Taking? Authorizing Provider   guaiFENesin ER (Mucinex) 600 mg ER tablet Take 600 mg by mouth two (2) times a day. Yes Provider, Historical   omeprazole (PRILOSEC) 20 mg capsule Take 20 mg by mouth daily. Yes Provider, Historical   calcium-vitamin D (OYSTER SHELL) 500 mg(1,250mg) -200 unit per tablet Take 1 Tab by mouth daily. Yes Provider, Historical   traZODone (DESYREL) 100 mg tablet TAKE 1 TABLET BY MOUTH EVERY DAY AT NIGHT 3/25/20  Yes Steff Bender NP   glipiZIDE SR (GLUCOTROL XL) 10 mg CR tablet TAKE 1 TABLET BY MOUTH TWICE A DAY 2/15/20  Yes Steff Bender NP   simvastatin (ZOCOR) 10 mg tablet Take 1 Tab by mouth nightly.  10/8/19  Yes Steff Bender NP   losartan (COZAAR) 50 mg tablet TAKE 1 TABLET BY MOUTH EVERY DAY. 10/8/19  Yes Chacorta Bender NP   levothyroxine (SYNTHROID) 100 mcg tablet Take 1 Tab by mouth Daily (before breakfast). 10/8/19  Yes Chacorta Bender NP   atenolol (TENORMIN) 25 mg tablet Take 1 Tab by mouth nightly. 10/8/19  Yes Chacorta Bender NP   sertraline (ZOLOFT) 50 mg tablet TAKE 1 TABLET BY MOUTH EVERY DAY 9/4/19  Yes Chacorta Bender NP   aspirin delayed-release 81 mg tablet Take 81 mg by mouth daily. Yes Provider, Historical   fluticasone (FLONASE) 50 mcg/actuation nasal spray 2 Sprays by Both Nostrils route daily as needed for Rhinitis or Allergies. Yes Provider, Historical   albuterol (PROAIR HFA) 90 mcg/actuation inhaler Take 2 Puffs by inhalation every four (4) hours as needed for Wheezing. Yes Provider, Historical   Cetirizine (ZYRTEC) 10 mg cap Take 1 Tab by mouth every evening. Yes Provider, Historical   multivitamins-minerals-lutein (CENTRUM SILVER) tab Take 1 Tab by mouth daily. Yes Provider, Historical   glucosamine-chondroit-vit c-mn (GLUCOSAMINE CHONDROITIN MAXSTR) 500-400 mg capsule Take 1 Cap by mouth daily. Yes Other, MD [place]   varicella-zoster recombinant, PF, (SHINGRIX, PF,) 50 mcg/0.5 mL susr injection 0.5mL by IntraMUSCular route once now and then repeat in 2-6 months 10/8/19   Beau Upton NP     Allergies   Allergen Reactions    Latex Rash    Prednisone Anaphylaxis     Throat swelling. Can take methylpredisone po or IV without problems.     Augmentin [Amoxicillin-Pot Clavulanate] Nausea and Vomiting    Biaxin [Clarithromycin] Nausea and Vomiting    Metformin Diarrhea    Parafon Forte Dsc [Chlorzoxazone] Nausea and Vomiting      Social History     Tobacco Use    Smoking status: Never Smoker    Smokeless tobacco: Never Used   Substance Use Topics    Alcohol use: No     Frequency: Never     Comment: 2x year      Family History   Problem Relation Age of Onset    Elevated Lipids Mother    Jonnathan Herbert Dementia Mother     Osteoporosis Mother     Lung Disease Father         copd    Delayed Awakening Father     Post-op Nausea/Vomiting Father     Hypertension Sister     Diabetes Sister         type 2    Breast Cancer Maternal Aunt         Current Facility-Administered Medications   Medication Dose Route Frequency    insulin glargine (LANTUS) injection 24 Units  24 Units SubCUTAneous DAILY    bumetanide (BUMEX) injection 1 mg  1 mg IntraVENous DAILY    guaiFENesin ER (MUCINEX) tablet 1,200 mg  1,200 mg Oral BID    famotidine (PEPCID) tablet 20 mg  20 mg Oral BID    atorvastatin (LIPITOR) tablet 40 mg  40 mg Oral DAILY    zinc sulfate (ZINCATE) 220 (50) mg capsule 1 Cap  1 Cap Oral DAILY    doxycycline (VIBRAMYCIN) 100 mg in 0.9% sodium chloride (MBP/ADV) 100 mL  100 mg IntraVENous Q12H    sodium chloride (NS) flush 5-40 mL  5-40 mL IntraVENous Q8H    enoxaparin (LOVENOX) injection 40 mg  40 mg SubCUTAneous Q24H    ascorbic acid (vitamin C) (VITAMIN C) tablet 500 mg  500 mg Oral BID    aspirin delayed-release tablet 81 mg  81 mg Oral DAILY    atenoloL (TENORMIN) tablet 25 mg  25 mg Oral QHS    calcium-vitamin D (OS-TRISTEN) 500 mg-200 unit tablet  1 Tab Oral DAILY    cetirizine (ZYRTEC) tablet 10 mg  10 mg Oral DAILY    therapeutic multivitamin (THERAGRAN) tablet 1 Tab  1 Tab Oral DAILY    levothyroxine (SYNTHROID) tablet 100 mcg  100 mcg Oral ACB    losartan (COZAAR) tablet 50 mg  50 mg Oral DAILY    montelukast (SINGULAIR) tablet 10 mg  10 mg Oral QHS    sertraline (ZOLOFT) tablet 50 mg  50 mg Oral DAILY    traZODone (DESYREL) tablet 100 mg  100 mg Oral QHS    ipratropium-albuterol (COMBIVENT RESPIMAT) 20 mcg-100 mcg inhalation spray  1 Puff Inhalation Q6H RT    fluticasone-vilanterol (BREO ELLIPTA) 200mcg-25mcg/puff  1 Puff Inhalation DAILY    insulin lispro (HUMALOG) injection   SubCUTAneous AC&HS    pantoprazole (PROTONIX) tablet 40 mg  40 mg Oral ACB         Objective:   Vital Signs: Visit Vitals  /53 (BP 1 Location: Left arm, BP Patient Position: At rest)   Pulse 70   Temp 99 °F (37.2 °C)   Resp 21   Ht 5' 4\" (1.626 m)   Wt 93.9 kg (207 lb 1 oz)   SpO2 94%   BMI 35.54 kg/m²       O2 Device: Heated, Hi flow nasal cannula   O2 Flow Rate (L/min): 60 l/min   Temp (24hrs), Av.9 °F (37.2 °C), Min:98.3 °F (36.8 °C), Max:99.5 °F (37.5 °C)       Intake/Output:   Last shift:       07 -  190  In: 340 [P.O.:240; I.V.:100]  Out: 1100 [Urine:1100]  Last 3 shifts: 1901 -  0700  In: 1350 [P.O.:1050; I.V.:300]  Out: 1435 [Urine:1435]    Intake/Output Summary (Last 24 hours) at 2020 1355  Last data filed at 2020 1237  Gross per 24 hour   Intake 990 ml   Output 1685 ml   Net -695 ml      Physical Exam:   General:  Alert, cooperative   Head:     Eyes:     Nose:    Throat:    Neck:    Lungs:   diminished   Chest wall:     Heart:  RRR   Abdomen:      Extremities: No edema   Pulses:    Skin:    Lymph nodes:    Neurologic: Oriented x 3    Exam limited to avoid unnecessary exposure and to preserve PPE during COVID19 pandemic.      Data review:     Recent Results (from the past 24 hour(s))   GLUCOSE, POC    Collection Time: 20  4:08 PM   Result Value Ref Range    Glucose (POC) 162 (H) 65 - 100 mg/dL    Performed by Nakia Rendon    GLUCOSE, POC    Collection Time: 20 11:44 PM   Result Value Ref Range    Glucose (POC) 209 (H) 65 - 100 mg/dL    Performed by Dina Mendenhall, POC    Collection Time: 20  7:39 AM   Result Value Ref Range    Glucose (POC) 236 (H) 65 - 100 mg/dL    Performed by Bakari Melvin    GLUCOSE, POC    Collection Time: 20 11:20 AM   Result Value Ref Range    Glucose (POC) 200 (H) 65 - 100 mg/dL    Performed by Radha Funez (PCT)        Imaging:  I have personally reviewed the patients radiographs and have reviewed the reports:          Franklin Terry NP

## 2020-05-18 NOTE — PROGRESS NOTES
4:06 PM  Chart review, did not see patient in the room due to Prashanth Gonzales. Continues with High flow oxygen, working to strengthen by getting out of bed, sleeping sitting up. Transition of Care:  RUR 14%    1- CM to follow for progress  2- Monitor for discharge needs.

## 2020-05-18 NOTE — PROGRESS NOTES
Problem: Mobility Impaired (Adult and Pediatric)  Goal: *Acute Goals and Plan of Care (Insert Text)  Description: FUNCTIONAL STATUS PRIOR TO ADMISSION: Patient was independent and active without use of DME.    HOME SUPPORT PRIOR TO ADMISSION: The patient lived with  but did not require assist.    Physical Therapy Goals  Initiated 5/18/2020  1. Patient will move from supine to sit and sit to supine  in bed with independence within 7 day(s). 2.  Patient will transfer from bed to chair and chair to bed with independence using the least restrictive device within 7 day(s). 3.  Patient will perform sit to stand with independence within 7 day(s). 4.  Patient will ambulate with independence for 150 feet with the least restrictive device within 7 day(s). 5.  Patient will ascend/descend 4 stairs with 1 handrail(s) with modified independence within 7 day(s). Outcome: Progressing Towards Goal   PHYSICAL THERAPY EVALUATION  Patient: Jairo Reynolds (70 y.o. female)  Date: 5/18/2020  Primary Diagnosis: Respiratory failure with hypoxia ([facility] [address] [J96.91]        Precautions:   Fall, Contact(Droplet plus)      ASSESSMENT  Based on the objective data described below, the patient presents with O2 sats 87-92% on HFNC 50L, 90% FiO2, mild ROBISON, good safety awareness and generalized weakness following admission on 5/10/20 with respiratory failure with hypoxia and Covid-19 positive. Pt on day 8 of admission. Received sitting up in chair. Reports increased back pain d/t prolonged sitting and history of a slipped disc. Provided pt with energy conservation and exercise handout. Reviewed all exercises as well as breathing techniques and continued education on IS use. Pt tolerated well though tends to hold her breath with exercises and saturations dropping into high 80s. Assisted pt to [facility] and then back to supine for pain relief. Anticipate continued steady progress as O2 is weaned.      Current Level of Function Impacting Discharge (mobility/balance): SBA for mobility, desaturation with exercises    Functional Outcome Measure: The patient scored 21/28 on the Tinetti outcome measure which is indicative of moderate fall risk. Other factors to consider for discharge: increased O2 needs, independent PTA     Patient will benefit from skilled therapy intervention to address the above noted impairments. PLAN :  Recommendations and Planned Interventions: bed mobility training, transfer training, gait training, therapeutic exercises, neuromuscular re-education, patient and family training/education, and therapeutic activities      Frequency/Duration: Patient will be followed by physical therapy:  5 times a week to address goals. Recommendation for discharge: (in order for the patient to meet his/her long term goals)  Physical therapy at least 2 days/week in the home     This discharge recommendation:  Has been made in collaboration with the attending provider and/or case management    IF patient discharges home will need the following DME: to be determined (TBD)-possibly portable O2         SUBJECTIVE:   Patient stated My back is giving me a cramp.     OBJECTIVE DATA SUMMARY:   HISTORY:    Past Medical History:   Diagnosis Date    Anxiety     Arthritis     Asthma     Chronic pain     back,right hip    Depression     Diabetes ([address]     type 2    GERD (gastroesophageal reflux disease)     History of vascular access device 05/18/2020    4 Fr midline, 12 cm L basilic, blood draws    Hypercholesteremia     Hypertension     Hypothyroid     Nausea & vomiting     Reflux     Sleep apnea     wears dental appliance- Somnident    Thyroid disease     Unspecified adverse effect of anesthesia     delayed awakening     Past Surgical History:   Procedure Laterality Date    HX COLONOSCOPY  2012    normal repeat 2022, diverticulosis of sigmoid colon, Dr. Oliverio ARAMBULA  1/08/14    bilateral cataract surgery1/8/14    HX KNEE REPLACEMENT right    HX OTHER SURGICAL  2007    cardiac cath- No CAD found    NEUROLOGICAL PROCEDURE UNLISTED  1999    vascular brain surgery       Personal factors and/or comorbidities impacting plan of care: asthma, DM, HTN, diabetes    Home Situation  Home Environment: Private residence  # Steps to Enter: 5  Rails to Enter: Yes  Hand Rails : Bilateral  One/Two Story Residence: Two story, live on 1st floor  # of Interior Steps: 15  Interior Rails: Left  Living Alone: No  Support Systems: Spouse/Significant Other/Partner, Child(lucinda), Family member(s), Friends \ neighbors, Arland Beat / ju[employer] community  Patient Expects to be Discharged to[de-identified] Private residence  Current DME Used/Available at Home: Linzie Lipoma, straight(only uses when traveling)  Tub or Shower Type: (built in seat)    EXAMINATION/PRESENTATION/DECISION MAKING:   Critical Behavior:  Neurologic State: Alert, Appropriate for age  Orientation Level: Oriented X4  Cognition: Appropriate decision making, Appropriate for age attention/concentration, Appropriate safety awareness, Follows commands  Safety/Judgement: Awareness of environment, Driving appropriateness, Fall prevention, Good awareness of safety precautions, Home safety, Insight into deficits  Hearing: Auditory  Auditory Impairment: None  Skin:    Edema:   Range Of Motion:  AROM: Within functional limits           PROM: Within functional limits           Strength:    Strength: Generally decreased, functional                    Tone & Sensation:   Tone: Normal              Sensation: Intact               Coordination:  Coordination: Within functional limits  Vision:   Acuity: Within Defined Limits  Functional Mobility:  Bed Mobility:  Rolling: Supervision  Supine to Sit: Supervision  Sit to Supine: Supervision  Scooting: Supervision  Transfers:  Sit to Stand: Stand-by assistance  Stand to Sit: Stand-by assistance        Bed to Chair: Stand-by assistance              Balance:   Sitting: Intact  Standing: Intact; With support  Ambulation/Gait Training:  Distance (ft): 6 Feet (ft)(x 2)  Assistive Device: (HHA)  Ambulation - Level of Assistance: Stand-by assistance                 Base of Support: Widened                              Stairs: Therapeutic Exercises:   Seated exercises UE/LEs    Functional Measure:  Tinetti test:    Sitting Balance: 1  Arises: 1  Attempts to Rise: 2  Immediate Standing Balance: 2  Standing Balance: 2  Nudged: 1  Eyes Closed: 1  Turn 360 Degrees - Continuous/Discontinuous: 1  Turn 360 Degrees - Steady/Unsteady: 1  Sitting Down: 1  Balance Score: 13 Balance total score  Indication of Gait: 1  R Step Length/Height: 1  L Step Length/Height: 1  R Foot Clearance: 1  L Foot Clearance: 1  Step Symmetry: 1  Step Continuity: 1  Path: 1  Trunk: 0  Walking Time: 0  Gait Score: 8 Gait total score  Total Score: 21/28 Overall total score         Tinetti Tool Score Risk of Falls  <19 = High Fall Risk  19-24 = Moderate Fall Risk  25-28 = Low Fall Risk  Tinetti ME. Performance-Oriented Assessment of Mobility Problems in Elderly Patients. [facility] 66; [id number].  (Scoring Description: PT Bulletin Feb. 10, 1993)    Older adults: Pennie Amita et al, 2009; n = 1000 [facility] elderly evaluated with ABC, KRYSTA, ADL, and IADL)  · Mean KRYSTA score for males aged 69-68 years = 26.21(3.40)  · Mean KRYSTA score for females age 69-68 years = 25.16(4.30)  · Mean KRYSTA score for males over 80 years = 23.29(6.02)  · Mean KRYSTA score for females over 80 years = 17.20(8.32)            Physical Therapy Evaluation Charge Determination   History Examination Presentation Decision-Making   MEDIUM  Complexity : 1-2 comorbidities / personal factors will impact the outcome/ POC  MEDIUM Complexity : 3 Standardized tests and measures addressing body structure, function, activity limitation and / or participation in recreation  LOW Complexity : Stable, uncomplicated  Other outcome measures Tinetti  LOW       Based on the above components, the patient evaluation is determined to be of the following complexity level: LOW       Activity Tolerance:   Fair, desaturates with exertion and requires oxygen, requires rest breaks, and observed SOB with activity  Please refer to the flowsheet for vital signs taken during this treatment. After treatment patient left in no apparent distress:   Supine in bed, Call bell within reach, and Side rails x 3    COMMUNICATION/EDUCATION:   The patients plan of care was discussed with: Registered nurse. Fall prevention education was provided and the patient/caregiver indicated understanding., Patient/family have participated as able in goal setting and plan of care. , and Patient/family agree to work toward stated goals and plan of care.     Thank you for this referral.  Petar Adams, PT   Time Calculation: 25 mins

## 2020-05-18 NOTE — PROGRESS NOTES
Bedside and Verbal shift change report given to Tatyana Etienne (oncoming nurse) by James Chavis RN (offgoing nurse). Report included the following information SBAR, Kardex, Intake/Output, Accordion and Recent Results. SHIFT REPORT:            Bedside and Verbal shift change report given to [address] [place] [address] (oncoming nurse) by Kina Hatch RN  (offgoing nurse). Report included the following information SBAR, Kardex, Intake/Output, Accordion and Recent Results.

## 2020-05-19 ENCOUNTER — APPOINTMENT (OUTPATIENT)
Dept: GENERAL RADIOLOGY | Age: 75
DRG: 177 | End: 2020-05-19
Attending: STUDENT IN AN ORGANIZED HEALTH CARE EDUCATION/TRAINING PROGRAM
Payer: MEDICARE

## 2020-05-19 LAB
ALBUMIN SERPL-MCNC: 2.2 G/DL (ref 3.5–5)
ALBUMIN/GLOB SERPL: 0.6 {RATIO} (ref 1.1–2.2)
ALP SERPL-CCNC: 114 U/L (ref 45–117)
ALT SERPL-CCNC: 98 U/L (ref 12–78)
ANION GAP SERPL CALC-SCNC: 6 MMOL/L (ref 5–15)
ARTERIAL PATENCY WRIST A: YES
AST SERPL-CCNC: 68 U/L (ref 15–37)
BACTERIA SPEC CULT: NORMAL
BASE EXCESS BLDA CALC-SCNC: 1.4 MMOL/L
BASOPHILS # BLD: 0.1 K/UL (ref 0–0.1)
BASOPHILS NFR BLD: 1 % (ref 0–1)
BDY SITE: NORMAL
BILIRUB SERPL-MCNC: 0.4 MG/DL (ref 0.2–1)
BUN SERPL-MCNC: 19 MG/DL (ref 6–20)
BUN/CREAT SERPL: 22 (ref 12–20)
CALCIUM SERPL-MCNC: 8.4 MG/DL (ref 8.5–10.1)
CHLORIDE SERPL-SCNC: 100 MMOL/L (ref 97–108)
CO2 SERPL-SCNC: 29 MMOL/L (ref 21–32)
CREAT SERPL-MCNC: 0.85 MG/DL (ref 0.55–1.02)
CRP SERPL-MCNC: 1.74 MG/DL (ref 0–0.6)
D DIMER PPP FEU-MCNC: 1.6 MG/L FEU (ref 0–0.65)
DIFFERENTIAL METHOD BLD: ABNORMAL
EOSINOPHIL # BLD: 0.1 K/UL (ref 0–0.4)
EOSINOPHIL NFR BLD: 1 % (ref 0–7)
EPAP/CPAP/PEEP, PAPEEP: 10
ERYTHROCYTE [DISTWIDTH] IN BLOOD BY AUTOMATED COUNT: 12.3 % (ref 11.5–14.5)
FERRITIN SERPL-MCNC: 323 NG/ML (ref 8–252)
FIO2 ON VENT: 100 %
GAS FLOW.O2 SETTING OXYMISER: 4 L/MIN
GLOBULIN SER CALC-MCNC: 3.7 G/DL (ref 2–4)
GLUCOSE BLD STRIP.AUTO-MCNC: 205 MG/DL (ref 65–100)
GLUCOSE BLD STRIP.AUTO-MCNC: 206 MG/DL (ref 65–100)
GLUCOSE BLD STRIP.AUTO-MCNC: 357 MG/DL (ref 65–100)
GLUCOSE BLD STRIP.AUTO-MCNC: 385 MG/DL (ref 65–100)
GLUCOSE BLD STRIP.AUTO-MCNC: 393 MG/DL (ref 65–100)
GLUCOSE BLD STRIP.AUTO-MCNC: 397 MG/DL (ref 65–100)
GLUCOSE SERPL-MCNC: 429 MG/DL (ref 65–100)
HCO3 BLDA-SCNC: 25 MMOL/L (ref 22–26)
HCT VFR BLD AUTO: 33.2 % (ref 35–47)
HGB BLD-MCNC: 11.1 G/DL (ref 11.5–16)
IMM GRANULOCYTES # BLD AUTO: 0 K/UL (ref 0–0.04)
IMM GRANULOCYTES NFR BLD AUTO: 0 % (ref 0–0.5)
INR PPP: 1.1 (ref 0.9–1.1)
INR PPP: 1.1 (ref 0.9–1.1)
IPAP/PIP, IPAPIP: 18
LDH SERPL L TO P-CCNC: 416 U/L (ref 81–246)
LYMPHOCYTES # BLD: 1.2 K/UL (ref 0.8–3.5)
LYMPHOCYTES NFR BLD: 11 % (ref 12–49)
M TB IFN-G BLD-IMP: ABNORMAL
MCH RBC QN AUTO: 28.8 PG (ref 26–34)
MCHC RBC AUTO-ENTMCNC: 33.4 G/DL (ref 30–36.5)
MCV RBC AUTO: 86 FL (ref 80–99)
MONOCYTES # BLD: 0.3 K/UL (ref 0–1)
MONOCYTES NFR BLD: 3 % (ref 5–13)
NEUTS BAND NFR BLD MANUAL: 2 %
NEUTS SEG # BLD: 8.9 K/UL (ref 1.8–8)
NEUTS SEG NFR BLD: 82 % (ref 32–75)
NRBC # BLD: 0 K/UL (ref 0–0.01)
NRBC BLD-RTO: 0 PER 100 WBC
PCO2 BLDA: 37 MMHG (ref 35–45)
PH BLDA: 7.45 [PH] (ref 7.35–7.45)
PLATELET # BLD AUTO: 359 K/UL (ref 150–400)
PMV BLD AUTO: 10.4 FL (ref 8.9–12.9)
PO2 BLDA: 84 MMHG (ref 80–100)
POTASSIUM SERPL-SCNC: 4.6 MMOL/L (ref 3.5–5.1)
PROT SERPL-MCNC: 5.9 G/DL (ref 6.4–8.2)
PROTHROMBIN TIME: 11.4 SEC (ref 9–11.1)
PROTHROMBIN TIME: 11.5 SEC (ref 9–11.1)
QUANTIFERON CRITERIA, QFI1T: ABNORMAL
QUANTIFERON MITOGEN VALUE: 0.03 IU/ML
QUANTIFERON NIL VALUE: 0.1 IU/ML
QUANTIFERON TB1 AG: 0.06 IU/ML
QUANTIFERON TB2 AG: 0.05 IU/ML
RBC # BLD AUTO: 3.86 M/UL (ref 3.8–5.2)
RBC MORPH BLD: ABNORMAL
SAO2 % BLD: 97 % (ref 92–97)
SAO2% DEVICE SAO2% SENSOR NAME: NORMAL
SERVICE CMNT-IMP: ABNORMAL
SERVICE CMNT-IMP: NORMAL
SODIUM SERPL-SCNC: 135 MMOL/L (ref 136–145)
SPECIMEN SITE: NORMAL
WBC # BLD AUTO: 10.6 K/UL (ref 3.6–11)
WBC MORPH BLD: ABNORMAL

## 2020-05-19 PROCEDURE — 74011000258 HC RX REV CODE- 258: Performed by: STUDENT IN AN ORGANIZED HEALTH CARE EDUCATION/TRAINING PROGRAM

## 2020-05-19 PROCEDURE — 85379 FIBRIN DEGRADATION QUANT: CPT

## 2020-05-19 PROCEDURE — 86140 C-REACTIVE PROTEIN: CPT

## 2020-05-19 PROCEDURE — 74011250636 HC RX REV CODE- 250/636: Performed by: STUDENT IN AN ORGANIZED HEALTH CARE EDUCATION/TRAINING PROGRAM

## 2020-05-19 PROCEDURE — 85610 PROTHROMBIN TIME: CPT

## 2020-05-19 PROCEDURE — 94664 DEMO&/EVAL PT USE INHALER: CPT

## 2020-05-19 PROCEDURE — 94660 CPAP INITIATION&MGMT: CPT

## 2020-05-19 PROCEDURE — 74011250637 HC RX REV CODE- 250/637: Performed by: INTERNAL MEDICINE

## 2020-05-19 PROCEDURE — 74011250637 HC RX REV CODE- 250/637: Performed by: STUDENT IN AN ORGANIZED HEALTH CARE EDUCATION/TRAINING PROGRAM

## 2020-05-19 PROCEDURE — 36600 WITHDRAWAL OF ARTERIAL BLOOD: CPT

## 2020-05-19 PROCEDURE — 74011636637 HC RX REV CODE- 636/637: Performed by: STUDENT IN AN ORGANIZED HEALTH CARE EDUCATION/TRAINING PROGRAM

## 2020-05-19 PROCEDURE — 5A09457 ASSISTANCE WITH RESPIRATORY VENTILATION, 24-96 CONSECUTIVE HOURS, CONTINUOUS POSITIVE AIRWAY PRESSURE: ICD-10-PCS | Performed by: STUDENT IN AN ORGANIZED HEALTH CARE EDUCATION/TRAINING PROGRAM

## 2020-05-19 PROCEDURE — 83615 LACTATE (LD) (LDH) ENZYME: CPT

## 2020-05-19 PROCEDURE — 85025 COMPLETE CBC W/AUTO DIFF WBC: CPT

## 2020-05-19 PROCEDURE — 36415 COLL VENOUS BLD VENIPUNCTURE: CPT

## 2020-05-19 PROCEDURE — 74011250636 HC RX REV CODE- 250/636: Performed by: NURSE PRACTITIONER

## 2020-05-19 PROCEDURE — 82803 BLOOD GASES ANY COMBINATION: CPT

## 2020-05-19 PROCEDURE — 65660000000 HC RM CCU STEPDOWN

## 2020-05-19 PROCEDURE — 83520 IMMUNOASSAY QUANT NOS NONAB: CPT

## 2020-05-19 PROCEDURE — 80053 COMPREHEN METABOLIC PANEL: CPT

## 2020-05-19 PROCEDURE — 74011636637 HC RX REV CODE- 636/637: Performed by: FAMILY MEDICINE

## 2020-05-19 PROCEDURE — 82962 GLUCOSE BLOOD TEST: CPT

## 2020-05-19 PROCEDURE — 77010033711 HC HIGH FLOW OXYGEN

## 2020-05-19 PROCEDURE — 74011250636 HC RX REV CODE- 250/636: Performed by: INTERNAL MEDICINE

## 2020-05-19 PROCEDURE — 71045 X-RAY EXAM CHEST 1 VIEW: CPT

## 2020-05-19 PROCEDURE — 82728 ASSAY OF FERRITIN: CPT

## 2020-05-19 PROCEDURE — 74011000250 HC RX REV CODE- 250: Performed by: STUDENT IN AN ORGANIZED HEALTH CARE EDUCATION/TRAINING PROGRAM

## 2020-05-19 PROCEDURE — 94640 AIRWAY INHALATION TREATMENT: CPT

## 2020-05-19 RX ORDER — INSULIN GLARGINE 100 [IU]/ML
30 INJECTION, SOLUTION SUBCUTANEOUS DAILY
Status: DISCONTINUED | OUTPATIENT
Start: 2020-05-20 | End: 2020-05-21

## 2020-05-19 RX ORDER — INSULIN GLARGINE 100 [IU]/ML
30 INJECTION, SOLUTION SUBCUTANEOUS DAILY
Status: DISCONTINUED | OUTPATIENT
Start: 2020-05-19 | End: 2020-05-19

## 2020-05-19 RX ORDER — DEXTROSE MONOHYDRATE 100 MG/ML
0-250 INJECTION, SOLUTION INTRAVENOUS AS NEEDED
Status: DISCONTINUED | OUTPATIENT
Start: 2020-05-19 | End: 2020-05-19

## 2020-05-19 RX ORDER — MAGNESIUM SULFATE 100 %
4 CRYSTALS MISCELLANEOUS AS NEEDED
Status: DISCONTINUED | OUTPATIENT
Start: 2020-05-19 | End: 2020-05-30 | Stop reason: HOSPADM

## 2020-05-19 RX ORDER — INSULIN LISPRO 100 [IU]/ML
INJECTION, SOLUTION INTRAVENOUS; SUBCUTANEOUS
Status: DISCONTINUED | OUTPATIENT
Start: 2020-05-19 | End: 2020-05-19

## 2020-05-19 RX ORDER — MAGNESIUM SULFATE 100 %
4 CRYSTALS MISCELLANEOUS AS NEEDED
Status: DISCONTINUED | OUTPATIENT
Start: 2020-05-19 | End: 2020-05-19

## 2020-05-19 RX ORDER — DEXTROSE MONOHYDRATE 100 MG/ML
0-250 INJECTION, SOLUTION INTRAVENOUS AS NEEDED
Status: DISCONTINUED | OUTPATIENT
Start: 2020-05-19 | End: 2020-05-21 | Stop reason: CLARIF

## 2020-05-19 RX ORDER — INSULIN LISPRO 100 [IU]/ML
INJECTION, SOLUTION INTRAVENOUS; SUBCUTANEOUS
Status: DISCONTINUED | OUTPATIENT
Start: 2020-05-19 | End: 2020-05-30 | Stop reason: HOSPADM

## 2020-05-19 RX ADMIN — DOXYCYCLINE 100 MG: 100 INJECTION, POWDER, LYOPHILIZED, FOR SOLUTION INTRAVENOUS at 09:17

## 2020-05-19 RX ADMIN — ATORVASTATIN CALCIUM 40 MG: 20 TABLET, FILM COATED ORAL at 14:05

## 2020-05-19 RX ADMIN — SERTRALINE HYDROCHLORIDE 50 MG: 50 TABLET ORAL at 14:05

## 2020-05-19 RX ADMIN — LOSARTAN POTASSIUM 50 MG: 50 TABLET, FILM COATED ORAL at 14:04

## 2020-05-19 RX ADMIN — ATENOLOL 25 MG: 50 TABLET ORAL at 20:25

## 2020-05-19 RX ADMIN — INSULIN LISPRO 2 UNITS: 100 INJECTION, SOLUTION INTRAVENOUS; SUBCUTANEOUS at 22:21

## 2020-05-19 RX ADMIN — Medication 10 ML: at 14:05

## 2020-05-19 RX ADMIN — CETIRIZINE HYDROCHLORIDE 10 MG: 10 TABLET, FILM COATED ORAL at 14:04

## 2020-05-19 RX ADMIN — METHYLPREDNISOLONE SODIUM SUCCINATE 40 MG: 40 INJECTION, POWDER, FOR SOLUTION INTRAMUSCULAR; INTRAVENOUS at 06:05

## 2020-05-19 RX ADMIN — PANTOPRAZOLE SODIUM 40 MG: 40 TABLET, DELAYED RELEASE ORAL at 06:05

## 2020-05-19 RX ADMIN — FLUTICASONE FUROATE AND VILANTEROL TRIFENATATE 1 PUFF: 200; 25 POWDER RESPIRATORY (INHALATION) at 08:35

## 2020-05-19 RX ADMIN — OXYCODONE HYDROCHLORIDE AND ACETAMINOPHEN 500 MG: 500 TABLET ORAL at 20:25

## 2020-05-19 RX ADMIN — IPRATROPIUM BROMIDE AND ALBUTEROL 1 PUFF: 20; 100 SPRAY, METERED RESPIRATORY (INHALATION) at 01:19

## 2020-05-19 RX ADMIN — METHYLPREDNISOLONE SODIUM SUCCINATE 40 MG: 40 INJECTION, POWDER, FOR SOLUTION INTRAMUSCULAR; INTRAVENOUS at 12:16

## 2020-05-19 RX ADMIN — IPRATROPIUM BROMIDE AND ALBUTEROL 1 PUFF: 20; 100 SPRAY, METERED RESPIRATORY (INHALATION) at 08:35

## 2020-05-19 RX ADMIN — ASPIRIN 81 MG: 81 TABLET, COATED ORAL at 14:05

## 2020-05-19 RX ADMIN — INSULIN GLARGINE 30 UNITS: 100 INJECTION, SOLUTION SUBCUTANEOUS at 09:11

## 2020-05-19 RX ADMIN — FAMOTIDINE 20 MG: 20 TABLET, FILM COATED ORAL at 20:25

## 2020-05-19 RX ADMIN — LEVOTHYROXINE SODIUM 100 MCG: 0.1 TABLET ORAL at 06:05

## 2020-05-19 RX ADMIN — INSULIN LISPRO 10 UNITS: 100 INJECTION, SOLUTION INTRAVENOUS; SUBCUTANEOUS at 12:16

## 2020-05-19 RX ADMIN — Medication 10 ML: at 22:22

## 2020-05-19 RX ADMIN — GUAIFENESIN 1200 MG: 600 TABLET, EXTENDED RELEASE ORAL at 14:05

## 2020-05-19 RX ADMIN — OXYCODONE HYDROCHLORIDE AND ACETAMINOPHEN 500 MG: 500 TABLET ORAL at 14:05

## 2020-05-19 RX ADMIN — INSULIN HUMAN 23 UNITS: 100 INJECTION, SUSPENSION SUBCUTANEOUS at 06:05

## 2020-05-19 RX ADMIN — ENOXAPARIN SODIUM 40 MG: 40 INJECTION SUBCUTANEOUS at 20:26

## 2020-05-19 RX ADMIN — ZINC SULFATE 220 MG (50 MG) CAPSULE 1 CAPSULE: CAPSULE at 14:04

## 2020-05-19 RX ADMIN — METHYLPREDNISOLONE SODIUM SUCCINATE 40 MG: 40 INJECTION, POWDER, FOR SOLUTION INTRAMUSCULAR; INTRAVENOUS at 18:22

## 2020-05-19 RX ADMIN — OYSTER SHELL CALCIUM WITH VITAMIN D 1 TABLET: 500; 200 TABLET, FILM COATED ORAL at 14:05

## 2020-05-19 RX ADMIN — DOXYCYCLINE 100 MG: 100 INJECTION, POWDER, LYOPHILIZED, FOR SOLUTION INTRAVENOUS at 20:26

## 2020-05-19 RX ADMIN — FAMOTIDINE 20 MG: 20 TABLET, FILM COATED ORAL at 14:04

## 2020-05-19 RX ADMIN — BUMETANIDE 1 MG: 0.25 INJECTION INTRAMUSCULAR; INTRAVENOUS at 09:16

## 2020-05-19 RX ADMIN — IPRATROPIUM BROMIDE AND ALBUTEROL 1 PUFF: 20; 100 SPRAY, METERED RESPIRATORY (INHALATION) at 14:06

## 2020-05-19 RX ADMIN — INSULIN LISPRO 4 UNITS: 100 INJECTION, SOLUTION INTRAVENOUS; SUBCUTANEOUS at 17:10

## 2020-05-19 RX ADMIN — GUAIFENESIN 1200 MG: 600 TABLET, EXTENDED RELEASE ORAL at 20:26

## 2020-05-19 RX ADMIN — TRAZODONE HYDROCHLORIDE 100 MG: 100 TABLET ORAL at 22:21

## 2020-05-19 RX ADMIN — THERA TABS 1 TABLET: TAB at 14:04

## 2020-05-19 RX ADMIN — MONTELUKAST SODIUM 10 MG: 10 TABLET, FILM COATED ORAL at 20:26

## 2020-05-19 RX ADMIN — Medication 10 ML: at 06:05

## 2020-05-19 RX ADMIN — INSULIN HUMAN 28 UNITS: 100 INJECTION, SUSPENSION SUBCUTANEOUS at 18:22

## 2020-05-19 RX ADMIN — INSULIN LISPRO 9 UNITS: 100 INJECTION, SOLUTION INTRAVENOUS; SUBCUTANEOUS at 09:11

## 2020-05-19 NOTE — PROGRESS NOTES
1907 Verbal shift change report given to Geeta Cohen (oncoming nurse) by Jaclyn Lester (offgoing nurse). Report included the following information SBAR, ED Summary, Intake/Output, MAR, Recent Results and Cardiac Rhythm sinus. 2026 assessment completed, please refer to the complex assessment flow sheet for details. 2100 spoke with daughter Francisco Keller and  Gagandeep Mckinney to update on pt, both are requesting pulmonologist contact them at  for an update. [phone] spoke with pt regarding laying in the prone position, pt stated she could not lay that way, pt agreed to lay on left side    [id number] The documentation for this period is being entered following the guidelines as defined in the [facility] downtime policy by Maya Preston RN. Verbal shift change report given to Shira (oncoming nurse) by Geeta Cohen (offgoing nurse). Report included the following information SBAR, ED Summary, MAR, Recent Results and Cardiac Rhythm sinus genaro.

## 2020-05-19 NOTE — PROGRESS NOTES
[facility] RESIDENCY PROGRAM  PROGRESS NOTE     5/19/2020  PCP: Mariana Alicia MD       Assessment/Plan:     Rowena Leavitt is a 69 y.o. female with history of HTN, type 2DM, GERD, hypothyroidism, depression, asthma, and hyperlipidemia who is admitted for AHRF 2/2 COVID 19.     Overnight events: Steroid induced hyperglycemia to 493. Treated with NPH      AHRF 2/2 COVID 19: COVID+ on 5/4, worsening SOB on admission and new O2 requirement (RA baseline). POA CXR with opacification in lower lobes b/l L>R, ABG pH 7.41, pCO2 31, pO2 64, O2 sat 93, bicarb 19, LA 1.8, S/p outpatient tx of azithro and plaquenil ending 5/10  - Follow COVID inflammatory markers      - Testing every other day - Currently flat/improved over last several days      - Repeat CXR today  - Bcx NGTD  - Doxy for empiric coverage (Started 5/12). No azithro or levaquin due to prolonged qtc. - Pulmonology following, appreciate rec's-        -s/p Actemra. Quant-gold, HIV, hepatitis panel NEGATIVE. - Proning, pt endorses compliance  - Zinc, Vit C, Lipitor, Mucinex  - Zyrtec, Breo-Ellipta, Combivent Q6H  - Supplemental O2 as needed- If continues to desat on 40L high flow, may need mechanical ventilation       Type 2 DM: HgA1C 10.4 in 11/2019. This admission A1C 13.1. Decreased PO intake. - Hold home glipizide  - Lantus 30U and SSI ACHS- Resistant scale  - NPH 23 BID currently, will follow glucose this am and adjust accordingly.     Asthma  - Continue home Ventolin, patient has brought in her home inhaler  - Continue Singulair 10mg qhs     HTN  - Continue home BP meds of atenolol 25mg qhs, losartan 50mg daily     Hypothyroid: TSH 3.570 in 11/2019  - Continue home synthroid 100mcg daily     Depression  - Continue Zoloft 50mg dialy and trazadone 100mg qhs to help with sleep     GERD  - Will substitute home prilosec for protonix daily     Hypokalemia: RESOLVED.  Likely 2/2 diarrhea prior to arrival and diuretics inpatient. K 2.7 on admission.   - Repletion prn     Hyperlipidemia: Lipid panel , , HDL 59, LDL 89 in 2019  - Lipitor 40mg daily for additional anti-inflammatory benefit      Patricia Dejesus discussed with Dr. Tan Fairbanks. Subjective:   Spoke to pt on the phone. Pt continues to be very optimistic and always says that her breathing feels better. No concerns overnight, states she slept well. Denies cp, n, v, c, d. Objective:   Physical examination  Patient Vitals for the past 24 hrs:   Temp Pulse Resp BP SpO2   20 0835     (!) 89 %   20 0819 98.5 °F (36.9 °C) 61 25 130/71 90 %   20 0702  65      20 0419 97.6 °F (36.4 °C) 67 22 142/69 92 %   20 0119     94 %   20 2359 98.3 °F (36.8 °C) 97 22 118/57 94 %   20 2339  61      20 2000  71 21 137/64 95 %   20 1952 98.7 °F (37.1 °C) 71 18 165/71 98 %   20 1631 99.2 °F (37.3 °C) 66 (!) 35 120/60 93 %   20 1507  79      20 1434  80 12 108/53 90 %   20 1124 99 °F (37.2 °C) 70 21 111/53 94 %      Temp (24hrs), Av.6 °F (37 °C), Min:97.6 °F (36.4 °C), Max:99.2 °F (37.3 °C)     O2 Flow Rate (L/min): 60 l/min   O2 Device: Hi flow nasal cannula, Heated    Date 20 - 20 - 20 0659   Shift 2595-0660 0291-6066 24 Hour Total 9362-3703 3180-3959 24 Hour Total   INTAKE   P.O. 240 480 720        P. O. 240 480 720      I. V.(mL/kg/hr) 100(0.1) 100(0.1) 200(0.1)        Volume (doxycycline (VIBRAMYCIN) 100 mg in 0.9% sodium chloride (MBP/ADV) 100 mL) 100 100 200      Shift Total(mL/kg) 340(3.6) 580(6.7) 920(10.7)      OUTPUT   Urine(mL/kg/hr) 1100(1) 1750(1.7) 2850(1.4)        Urine Voided 1100 1750 2850      Stool           Stool Occurrence(s) 2 x  2 x      Shift Total(mL/kg) 1100(11.7) 1750(20.3) 2850(33)      NET -481 -8486 -2248      Weight (kg) 93.9 86.3 86.3 86.3 86.3 86.3     Pt is currently a COVID infected pt.  I did not go in the room to decrease exposure and conserve PE. The attending physician will exam in the pt. Talked on the phone. General:   Alert, cooperative, mild respiratory distress     Data Review:     CBC:  Recent Labs     05/19/20  0119 05/17/20  0700   WBC 10.6 10.5   HGB 11.1* 12.0   HCT 33.2* 35.9    960     Metabolic Panel:  Recent Labs     05/19/20  0607 05/19/20  0119 05/17/20  0700   NA  --  135* 138   K  --  4.6 3.2*   CL  --  100 102   CO2  --  29 29   BUN  --  19 18   CREA  --  0.85 0.78   GLU  --  429* 196*   CA  --  8.4* 8.3*   ALB  --  2.2* 2.1*   TBILI  --  0.4 0.4   SGOT  --  68* 66*   ALT  --  98* 61   INR 1.1 1.1 1.1     Micro:  Lab Results   Component Value Date/Time    Culture result: NO GROWTH 6 DAYS 05/13/2020 04:29 AM    Culture result:  05/11/2020 12:35 AM     MRSA NOT PRESENT. Apparent Staphylococus aureus (not MRSA noted). Culture result:  05/11/2020 12:35 AM         Screening of patient nares for MRSA is for surveillance purposes and, if positive, to facilitate isolation considerations in high risk settings. It is not intended for automatic decolonization interventions per se as regimens are not sufficiently effective to warrant routine use. Imaging:  Xr Chest Port    Result Date: 5/10/2020  EXAM: XR CHEST PORT INDICATION: sob, +COVID COMPARISON: 2013 FINDINGS: A portable AP radiograph of the chest was obtained at 2145 hours. The patient is on a cardiac monitor. There is opacification in the lower lobes bilaterally, left greater than right. The cardiac and mediastinal contours and pulmonary vascularity are normal.  The bones and soft tissues are grossly within normal limits. IMPRESSION: Opacification in the lower lobes bilaterally, left greater than right. CXR AP Port: 5/15/2020    FINDINGS:  AP portable upright view of the chest demonstrates a stable  cardiopericardial  silhouette. The lungs are adequately expanded.   Increased peripheral parenchymal  opacities greater on the left than on the right. Increased interstitial opacity  as well. . The osseous structures are unremarkable. Patient is on a cardiac  monitor.      IMPRESSION  IMPRESSION:  Interval progression of extensive bilateral parenchymal disease greater on the  left than on the right.     Medications reviewed  Current Facility-Administered Medications   Medication Dose Route Frequency    insulin NPH (NOVOLIN N, HUMULIN N) injection 23 Units  23 Units SubCUTAneous Q12H    insulin glargine (LANTUS) injection 30 Units  30 Units SubCUTAneous DAILY    methylPREDNISolone (PF) (SOLU-MEDROL) injection 40 mg  40 mg IntraVENous Q6H    bumetanide (BUMEX) injection 1 mg  1 mg IntraVENous DAILY    guaiFENesin ER (MUCINEX) tablet 1,200 mg  1,200 mg Oral BID    famotidine (PEPCID) tablet 20 mg  20 mg Oral BID    atorvastatin (LIPITOR) tablet 40 mg  40 mg Oral DAILY    zinc sulfate (ZINCATE) 220 (50) mg capsule 1 Cap  1 Cap Oral DAILY    diphenhydrAMINE (BENADRYL) injection 50 mg  50 mg IntraVENous Q4H PRN    doxycycline (VIBRAMYCIN) 100 mg in 0.9% sodium chloride (MBP/ADV) 100 mL  100 mg IntraVENous Q12H    acetaminophen (TYLENOL) tablet 650 mg  650 mg Oral Q6H PRN    sodium chloride (NS) flush 5-40 mL  5-40 mL IntraVENous Q8H    sodium chloride (NS) flush 5-40 mL  5-40 mL IntraVENous PRN    ondansetron (ZOFRAN) injection 4 mg  4 mg IntraVENous Q4H PRN    enoxaparin (LOVENOX) injection 40 mg  40 mg SubCUTAneous Q24H    ascorbic acid (vitamin C) (VITAMIN C) tablet 500 mg  500 mg Oral BID    aspirin delayed-release tablet 81 mg  81 mg Oral DAILY    atenoloL (TENORMIN) tablet 25 mg  25 mg Oral QHS    calcium-vitamin D (OS-TRISTEN) 500 mg-200 unit tablet  1 Tab Oral DAILY    cetirizine (ZYRTEC) tablet 10 mg  10 mg Oral DAILY    fluticasone propionate (FLONASE) 50 mcg/actuation nasal spray 2 Spray  2 Spray Both Nostrils DAILY PRN    therapeutic multivitamin (THERAGRAN) tablet 1 Tab  1 Tab Oral DAILY    levothyroxine (SYNTHROID) tablet 100 mcg 100 mcg Oral ACB    losartan (COZAAR) tablet 50 mg  50 mg Oral DAILY    montelukast (SINGULAIR) tablet 10 mg  10 mg Oral QHS    sertraline (ZOLOFT) tablet 50 mg  50 mg Oral DAILY    traZODone (DESYREL) tablet 100 mg  100 mg Oral QHS    glucose chewable tablet 16 g  4 Tab Oral PRN    glucagon (GLUCAGEN) injection 1 mg  1 mg IntraMUSCular PRN    dextrose 10% infusion 0-250 mL  0-250 mL IntraVENous PRN    ipratropium-albuterol (COMBIVENT RESPIMAT) 20 mcg-100 mcg inhalation spray  1 Puff Inhalation Q6H RT    fluticasone-vilanterol (BREO ELLIPTA) 200mcg-25mcg/puff  1 Puff Inhalation DAILY    albuterol (PROVENTIL HFA, VENTOLIN HFA, PROAIR HFA) inhaler 2 Puff  2 Puff Inhalation Q4H PRN    insulin lispro (HUMALOG) injection   SubCUTAneous AC&HS    pantoprazole (PROTONIX) tablet 40 mg  40 mg Oral ACB    sodium chloride (NS) flush 5-10 mL  5-10 mL IntraVENous PRN         Signed:   Ángela Garcia MD   Resident, Family Medicine      Attending note: Attending note to follow. ..

## 2020-05-19 NOTE — PROGRESS NOTES
Palliative Medicine Consult  [place]: [phone])    Patient Name: Migel Burleson  YOB: 1945    Date of Initial Consult: 5/12/2020  Reason for Consult: Care decisions  Requesting Provider: Family medicine team  Primary Care Physician: Duane Montanez MD     SUMMARY:   Migel Burleson is a 69 y.o. with a past history of chronic back pain, diabetes, hypertension, asthma, who was admitted on 5/10/2020 from home with a diagnosis of acute respiratory failure/COVID-19 infection. Current medical issues leading to Palliative Medicine involvement include: Care decisions. Chart reviewedpatient is a 77-year-old female admitted to the hospital with acute respiratory failure/COVID-19 infection. Both her and her  tested positive last week. He seems to be doing okay but she has had increasing respiratory distress. Presents to the hospital on 5/10 with bilateral pneumonia and respiratory failure. Patient currently on 5 L oxygen and nitric oxide. Our team is been asked to see her for goals of care    Social historypatient has been  for 39 years. She has 2 children from a prior marriage. One child lives locally and another lives in [place]. 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 PALLIATIVE DIAGNOSES:   1. Goals of care discussion  2. DNR discussion  3. Advance care planning  4. Acute respiratory failure-worsening  5. COVID-19 infection       PLAN:   1. Met with patient in the room. Patient been in hospital since 5/10. I had seen her last week when she was on 5 L oxygen for her acute respiratory failure/COVID-19 infection. Been following the chart peripherally but patient has had a decline had to be transitioned to high flow nasal cannula and now on BiPAP. She still remains awake and interactive.   Difficult to have a full conversation given the BiPAP but reintroduced myself as the palliative team.  Discussed our role in her care.  She is definitely a little frustrated with the transition to high flow nasal cannula and also a little bit anxious on how all this may go. Discussed the concern that sometimes patients with COVID-19 infection can have this fairly rapid decline and may require intubation. She clearly states she would prefer not to be intubated and this would be \"a last resort \". Having said that, she would consider it for short-term if needed. She remains clear that she would not want to be on the ventilator indefinitelyi.huber ramirez. Explained that we are hopeful that she would not require intubation. I did tell her I would talk with her  since she was unable to speak with him this morning since she was on BiPAP. 2. Talked with her  via phone. Introduced the role of palliative medicine. I updated him on all the current medical issues and explained that she currently is on BiPAP. Discussed that her inflammatory markers seem to be decreasing but unfortunately her oxygen requirements has gone higher. Talked with him about our discussion concerning intubation and resuscitation. He knows his wife's wishes and will support her. Told him we would try to update him again tomorrow  3. Goals of carepatient wants attempts at full restorative measures. Would consider intubation if needed. 4. Advance care planningshe has not completed advanced medical directive but she is clear that her  would serve as her medical power of . He is her legal next of kin. 5. CODE STATUS patient would accept intubation and resuscitation if needed. She remains clear that she would not want long-term ventilation if required. Our team will continue to address and support her decisions. 6. Symptom managementinpatient team currently managing  7. Psychosocialpatient appears of good support from her  and children.     8. Discussed with bedside nurse, case management, Kassidy-NP/Pulmonary, family medicine team  9. Initial consult note routed to primary continuity provider and/or primary health care team members  10. Communicated plan of care with: Palliative IDTSteve 192 Team     GOALS OF CARE / TREATMENT PREFERENCES:     GOALS OF CARE:  Patient/Health Care Proxy Stated Goals: Prolong life    TREATMENT PREFERENCES:   Code Status: Full Code    Advance Care Planning:  [x] [facility] Interdisciplinary Team has updated the ACP Navigator with Health Care Decision Maker and Patient Capacity      Advance Care Planning 5/11/2020   Confirm Advance Directive None       Medical Interventions: Full interventions     Other Instructions: Other:    As far as possible, the palliative care team has discussed with patient / health care proxy about goals of care / treatment preferences for patient. HISTORY:     History obtained from: Chart, patient    CHIEF COMPLAINT: Shortness of breath    HPI/SUBJECTIVE:    The patient is:   [x] Verbal and participatory  [] Non-participatory due to:   Patient states her breathing is little better today. Feeling a little bit tired overall. 5/19patient currently on BiPAP.   Difficult to understand secondary to the BiPAP but remains lucid    Clinical Pain Assessment (nonverbal scale for severity on nonverbal patients):   Clinical Pain Assessment  Severity: 0          Duration: for how long has pt been experiencing pain (e.g., 2 days, 1 month, years)  Frequency: how often pain is an issue (e.g., several times per day, once every few days, constant)     FUNCTIONAL ASSESSMENT:     Palliative Performance Scale (PPS):  PPS: 60       PSYCHOSOCIAL/SPIRITUAL SCREENING:     Palliative IDT has assessed this patient for cultural preferences / practices and a referral made as appropriate to needs (Cultural Services, Patient Advocacy, Ethics, etc.)    Any spiritual / Orthodox concerns:  [] Yes /  [x] No    Caregiver Burnout:  [] Yes /  [] No /  [x] No Caregiver Present Anticipatory grief assessment:   [x] Normal  / [] Maladaptive       ESAS Anxiety: Anxiety: 0    ESAS Depression: Depression: 0        REVIEW OF SYSTEMS:     Positive and pertinent negative findings in ROS are noted above in HPI. The following systems were [x] reviewed / [] unable to be reviewed as noted in HPI  Other findings are noted below. Systems: constitutional, ears/nose/mouth/throat, respiratory, gastrointestinal, genitourinary, musculoskeletal, integumentary, neurologic, psychiatric, endocrine. Positive findings noted below. Modified ESAS Completed by: provider   Fatigue: 3 Drowsiness: 0   Depression: 0 Pain: 0   Anxiety: 0 Nausea: 0   Anorexia: 0 Dyspnea: 5     Constipation: No     Stool Occurrence(s): 1        PHYSICAL EXAM:     From RN flowsheet:  Wt Readings from Last 3 Encounters:   05/19/20 190 lb 4.8 oz (86.3 kg)   10/08/19 201 lb (91.2 kg)   02/13/19 197 lb 8 oz (89.6 kg)     Blood pressure 120/62, pulse 63, temperature 98.6 °F (37 °C), resp. rate 19, height 5' 4\" (1.626 m), weight 190 lb 4.8 oz (86.3 kg), SpO2 94 %.     Pain Scale 1: Numeric (0 - 10)  Pain Intensity 1: 0                 Last bowel movement, if known:     Constitutional: Tired appearing, BiPAP in place  Eyes: pupils equal, anicteric  ENMT: no nasal discharge, moist mucous membranes  Cardiovascular: regular rhythm, distal pulses intact  Respiratory: breathing mildly labored, symmetric, BiPAP in place  Gastrointestinal: soft non-tender, +bowel sounds  Musculoskeletal: no deformity, no tenderness to palpation  Skin: warm, dry  Neurologic: following commands, moving all extremities  Psychiatric: full affect, no hallucinations  Other:       HISTORY:     Principal Problem:    COVID-19 (5/16/2020)    Active Problems:    Respiratory failure with hypoxia (HCC) (5/10/2020)      Past Medical History:   Diagnosis Date    Anxiety     Arthritis     Asthma     Chronic pain     back,right hip    Depression     Diabetes ([facility] [address]     type 2  GERD (gastroesophageal reflux disease)     History of vascular access device 05/18/2020    4 Fr midline, 12 cm L basilic, blood draws    Hypercholesteremia     Hypertension     Hypothyroid     Nausea & vomiting     Reflux     Sleep apnea     wears dental appliance- Somnident    Thyroid disease     Unspecified adverse effect of anesthesia     delayed awakening      Past Surgical History:   Procedure Laterality Date    HX COLONOSCOPY  2012    normal repeat 2022, diverticulosis of sigmoid colon, Dr. Nano Kamara    HX HEENT  1/08/14    bilateral cataract surgery1/8/14    HX KNEE REPLACEMENT      right    HX OTHER SURGICAL  2007    cardiac cath- No CAD found    NEUROLOGICAL PROCEDURE UNLISTED  1999    vascular brain surgery      Family History   Problem Relation Age of Onset    Elevated Lipids Mother     Dementia Mother     Osteoporosis Mother     Lung Disease Father         copd    Delayed Awakening Father     Post-op Nausea/Vomiting Father     Hypertension Sister     Diabetes Sister         type 2    Breast Cancer Maternal Aunt       History reviewed, no pertinent family history. Social History     Tobacco Use    Smoking status: Never Smoker    Smokeless tobacco: Never Used   Substance Use Topics    Alcohol use: No     Frequency: Never     Comment: 2x year     Allergies   Allergen Reactions    Latex Rash    Prednisone Anaphylaxis     Throat swelling. Can take methylpredisone po or IV without problems.     Augmentin [Amoxicillin-Pot Clavulanate] Nausea and Vomiting    Biaxin [Clarithromycin] Nausea and Vomiting    Metformin Diarrhea    Parafon Forte Dsc [Chlorzoxazone] Nausea and Vomiting      Current Facility-Administered Medications   Medication Dose Route Frequency    insulin NPH (NOVOLIN N, HUMULIN N) injection 23 Units  23 Units SubCUTAneous Q12H    insulin glargine (LANTUS) injection 30 Units  30 Units SubCUTAneous DAILY    insulin lispro (HUMALOG) injection   SubCUTAneous AC&HS  methylPREDNISolone (PF) (SOLU-MEDROL) injection 40 mg  40 mg IntraVENous Q6H    bumetanide (BUMEX) injection 1 mg  1 mg IntraVENous DAILY    guaiFENesin ER (MUCINEX) tablet 1,200 mg  1,200 mg Oral BID    famotidine (PEPCID) tablet 20 mg  20 mg Oral BID    atorvastatin (LIPITOR) tablet 40 mg  40 mg Oral DAILY    zinc sulfate (ZINCATE) 220 (50) mg capsule 1 Cap  1 Cap Oral DAILY    diphenhydrAMINE (BENADRYL) injection 50 mg  50 mg IntraVENous Q4H PRN    doxycycline (VIBRAMYCIN) 100 mg in 0.9% sodium chloride (MBP/ADV) 100 mL  100 mg IntraVENous Q12H    acetaminophen (TYLENOL) tablet 650 mg  650 mg Oral Q6H PRN    sodium chloride (NS) flush 5-40 mL  5-40 mL IntraVENous Q8H    sodium chloride (NS) flush 5-40 mL  5-40 mL IntraVENous PRN    ondansetron (ZOFRAN) injection 4 mg  4 mg IntraVENous Q4H PRN    enoxaparin (LOVENOX) injection 40 mg  40 mg SubCUTAneous Q24H    ascorbic acid (vitamin C) (VITAMIN C) tablet 500 mg  500 mg Oral BID    aspirin delayed-release tablet 81 mg  81 mg Oral DAILY    atenoloL (TENORMIN) tablet 25 mg  25 mg Oral QHS    calcium-vitamin D (OS-TRISTEN) 500 mg-200 unit tablet  1 Tab Oral DAILY    cetirizine (ZYRTEC) tablet 10 mg  10 mg Oral DAILY    fluticasone propionate (FLONASE) 50 mcg/actuation nasal spray 2 Spray  2 Spray Both Nostrils DAILY PRN    therapeutic multivitamin (THERAGRAN) tablet 1 Tab  1 Tab Oral DAILY    levothyroxine (SYNTHROID) tablet 100 mcg  100 mcg Oral ACB    losartan (COZAAR) tablet 50 mg  50 mg Oral DAILY    montelukast (SINGULAIR) tablet 10 mg  10 mg Oral QHS    sertraline (ZOLOFT) tablet 50 mg  50 mg Oral DAILY    traZODone (DESYREL) tablet 100 mg  100 mg Oral QHS    glucose chewable tablet 16 g  4 Tab Oral PRN    glucagon (GLUCAGEN) injection 1 mg  1 mg IntraMUSCular PRN    dextrose 10% infusion 0-250 mL  0-250 mL IntraVENous PRN    ipratropium-albuterol (COMBIVENT RESPIMAT) 20 mcg-100 mcg inhalation spray  1 Puff Inhalation Q6H RT    fluticasone-vilanterol (BREO ELLIPTA) 200mcg-25mcg/puff  1 Puff Inhalation DAILY    albuterol (PROVENTIL HFA, VENTOLIN HFA, PROAIR HFA) inhaler 2 Puff  2 Puff Inhalation Q4H PRN    pantoprazole (PROTONIX) tablet 40 mg  40 mg Oral ACB    sodium chloride (NS) flush 5-10 mL  5-10 mL IntraVENous PRN          LAB AND IMAGING FINDINGS:     Lab Results   Component Value Date/Time    WBC 10.6 05/19/2020 01:19 AM    HGB 11.1 (L) 05/19/2020 01:19 AM    PLATELET 452 67/71/4096 01:19 AM     Lab Results   Component Value Date/Time    Sodium 135 (L) 05/19/2020 01:19 AM    Potassium 4.6 05/19/2020 01:19 AM    Chloride 100 05/19/2020 01:19 AM    CO2 29 05/19/2020 01:19 AM    BUN 19 05/19/2020 01:19 AM    Creatinine 0.85 05/19/2020 01:19 AM    Calcium 8.4 (L) 05/19/2020 01:19 AM    Magnesium 1.4 (L) 05/15/2020 02:38 AM    Phosphorus 1.8 (L) 05/15/2020 02:38 AM      Lab Results   Component Value Date/Time    AST (SGOT) 68 (H) 05/19/2020 01:19 AM    Alk. phosphatase 114 05/19/2020 01:19 AM    Protein, total 5.9 (L) 05/19/2020 01:19 AM    Albumin 2.2 (L) 05/19/2020 01:19 AM    Globulin 3.7 05/19/2020 01:19 AM     Lab Results   Component Value Date/Time    INR 1.1 05/19/2020 06:07 AM    Prothrombin time 11.5 (H) 05/19/2020 06:07 AM    aPTT 24.5 12/20/2013 11:00 AM      Lab Results   Component Value Date/Time    Ferritin 323 (H) 05/19/2020 06:07 AM      Lab Results   Component Value Date/Time    pH 7.45 05/19/2020 10:22 AM    PCO2 37 05/19/2020 10:22 AM    PO2 84 05/19/2020 10:22 AM     No components found for: [place]   Lab Results   Component Value Date/Time    CK 79 11/13/2011 08:45 PM    CK - MB <0.5 (L) 11/13/2011 08:45 PM                Total time: 35  Counseling / coordination time, spent as noted above: 30  > 50% counseling / coordination?: yes    Prolonged service was provided for  []30 min   []75 min in face to face time in the presence of the patient, spent as noted above.   Time Start:   Time End:   Note: this can only be billed with 95622 (initial) or 68260 (follow up). If multiple start / stop times, list each separately.

## 2020-05-19 NOTE — PROGRESS NOTES
[facility]  Pulmonary, Critical Care, and Sleep Medicine      Name: Breonna Fox MRN: [id number]   : 1945 Hospital: [facility]   Date: 2020        IMPRESSION:   · Acute hypoxemic respiratory failure  · COVID19 +. Started on Caitlin pulse , had to stop this on  due to increased O2 requirements. Given actemra 5/15  · Mild intermittent asthma  · Chronic back pain  · DM  · HTN      RECOMMENDATIONS:   · Supplemental O2 as needed to keep sats > 88%. Will place on BiPap now and wean as sats improve. · ABG  · Continue diuresis. Strict I&O  · Continue doxy: can stop after today's dose  · Trend inflammatory markers  · Check CXR  · C/W Solu-Medrol given lack of improvement and inability to wean O2 thus far  · Diligent BG control  · Continue combivent, breo, singulair, zyrtec, mucinex   · Continue vit C, zinc, lipitor  · Continue pepcid  · Encouraged pt to lie prone at least 4 hours per day and to use inc tommy q 1 hr w/a  · OOB to chair    Pt is critically ill and at high risk of decompensation  CCT: 30+ minutes     Subjective: This patient has been seen and evaluated at the request of Dr. Buddy Martinez for [place] 19 pneumonia and resp failure. Patient is a 69 y.o. female diagnosed one week ago with Covid 19. Pt with fevers, diarrhea, cough, and sob. She has been isolating at home but has had work done within the home and believes that she may have gotten an infection from one of the workers there.  has also become infected. Pt states that her symptoms have remained stable to slightly improved over the past few days but has noted that her oxygen sats have been falling and presented to ED for evaluation. Fevers seem to have resolved. Main symptoms are now diarrhea, sob, and cough. Pt w h/o asthma which by description appears to be mild intermittent and is followed by Dr Yasir Mckeon in [place]. Controlled with montelukast and (rare) albuterol MDI.     Never smoker      Interval history:  Afebrile  BP stable  Maxed out on HFNC; sats mid 80s. D.dimer 1.6; better  ; stable  Ferritin 323; better  CRP 1.74; improved  Blood cultures negative x 5 days: final  Fluid andrae: -1930  Thinks that her SOB is better with activity. Doesn't take her a long to recover. Denies much cough or sputum production. Denies fever/chills. Past Medical History:   Diagnosis Date    Anxiety     Arthritis     Asthma     Chronic pain     back,right hip    Depression     Diabetes ([address]     type 2    GERD (gastroesophageal reflux disease)     History of vascular access device 05/18/2020    4 Fr midline, 12 cm L basilic, blood draws    Hypercholesteremia     Hypertension     Hypothyroid     Nausea & vomiting     Reflux     Sleep apnea     wears dental appliance- Somnident    Thyroid disease     Unspecified adverse effect of anesthesia     delayed awakening      Past Surgical History:   Procedure Laterality Date    HX COLONOSCOPY  2012    normal repeat 2022, diverticulosis of sigmoid colon, Dr. Benoit Sutton HX HEENT  1/08/14    bilateral cataract surgery1/8/14    HX KNEE REPLACEMENT      right    HX OTHER SURGICAL  2007    cardiac cath- No CAD found    NEUROLOGICAL PROCEDURE UNLISTED  1999    vascular brain surgery      Prior to Admission medications    Medication Sig Start Date End Date Taking? Authorizing Provider   guaiFENesin ER (Mucinex) 600 mg ER tablet Take 600 mg by mouth two (2) times a day. Yes Provider, Historical   omeprazole (PRILOSEC) 20 mg capsule Take 20 mg by mouth daily. Yes Provider, Historical   calcium-vitamin D (OYSTER SHELL) 500 mg(1,250mg) -200 unit per tablet Take 1 Tab by mouth daily.    Yes Provider, Historical   traZODone (DESYREL) 100 mg tablet TAKE 1 TABLET BY MOUTH EVERY DAY AT NIGHT 3/25/20  Yes Erin Bender NP   glipiZIDE SR (GLUCOTROL XL) 10 mg CR tablet TAKE 1 TABLET BY MOUTH TWICE A DAY 2/15/20  Yes Jelly Mar NP simvastatin (ZOCOR) 10 mg tablet Take 1 Tab by mouth nightly. 10/8/19  Yes Brinda Bendre NP   losartan (COZAAR) 50 mg tablet TAKE 1 TABLET BY MOUTH EVERY DAY. 10/8/19  Yes Brinda Bender NP   levothyroxine (SYNTHROID) 100 mcg tablet Take 1 Tab by mouth Daily (before breakfast). 10/8/19  Yes Brinda Bender NP   atenolol (TENORMIN) 25 mg tablet Take 1 Tab by mouth nightly. 10/8/19  Yes Brinda Bender NP   sertraline (ZOLOFT) 50 mg tablet TAKE 1 TABLET BY MOUTH EVERY DAY 9/4/19  Yes Brinda Bender NP   aspirin delayed-release 81 mg tablet Take 81 mg by mouth daily. Yes Provider, Historical   fluticasone (FLONASE) 50 mcg/actuation nasal spray 2 Sprays by Both Nostrils route daily as needed for Rhinitis or Allergies. Yes Provider, Historical   albuterol (PROAIR HFA) 90 mcg/actuation inhaler Take 2 Puffs by inhalation every four (4) hours as needed for Wheezing. Yes Provider, Historical   Cetirizine (ZYRTEC) 10 mg cap Take 1 Tab by mouth every evening. Yes Provider, Historical   multivitamins-minerals-lutein (CENTRUM SILVER) tab Take 1 Tab by mouth daily. Yes Provider, Historical   glucosamine-chondroit-vit c-mn (GLUCOSAMINE CHONDROITIN MAXSTR) 500-400 mg capsule Take 1 Cap by mouth daily. Yes Other, MD [place]   varicella-zoster recombinant, PF, (SHINGRIX, PF,) 50 mcg/0.5 mL susr injection 0.5mL by IntraMUSCular route once now and then repeat in 2-6 months 10/8/19   Kody Flannery NP     Allergies   Allergen Reactions    Latex Rash    Prednisone Anaphylaxis     Throat swelling. Can take methylpredisone po or IV without problems.     Augmentin [Amoxicillin-Pot Clavulanate] Nausea and Vomiting    Biaxin [Clarithromycin] Nausea and Vomiting    Metformin Diarrhea    Parafon Forte Dsc [Chlorzoxazone] Nausea and Vomiting      Social History     Tobacco Use    Smoking status: Never Smoker    Smokeless tobacco: Never Used   Substance Use Topics    Alcohol use: No     Frequency: Never     Comment: 2x year      Family History   Problem Relation Age of Onset    Elevated Lipids Mother     Dementia Mother     Osteoporosis Mother     Lung Disease Father         copd    Delayed Awakening Father     Post-op Nausea/Vomiting Father     Hypertension Sister     Diabetes Sister         type 2    Breast Cancer Maternal Aunt         Current Facility-Administered Medications   Medication Dose Route Frequency    insulin NPH (NOVOLIN N, HUMULIN N) injection 23 Units  23 Units SubCUTAneous Q12H    insulin glargine (LANTUS) injection 30 Units  30 Units SubCUTAneous DAILY    methylPREDNISolone (PF) (SOLU-MEDROL) injection 40 mg  40 mg IntraVENous Q6H    bumetanide (BUMEX) injection 1 mg  1 mg IntraVENous DAILY    guaiFENesin ER (MUCINEX) tablet 1,200 mg  1,200 mg Oral BID    famotidine (PEPCID) tablet 20 mg  20 mg Oral BID    atorvastatin (LIPITOR) tablet 40 mg  40 mg Oral DAILY    zinc sulfate (ZINCATE) 220 (50) mg capsule 1 Cap  1 Cap Oral DAILY    doxycycline (VIBRAMYCIN) 100 mg in 0.9% sodium chloride (MBP/ADV) 100 mL  100 mg IntraVENous Q12H    sodium chloride (NS) flush 5-40 mL  5-40 mL IntraVENous Q8H    enoxaparin (LOVENOX) injection 40 mg  40 mg SubCUTAneous Q24H    ascorbic acid (vitamin C) (VITAMIN C) tablet 500 mg  500 mg Oral BID    aspirin delayed-release tablet 81 mg  81 mg Oral DAILY    atenoloL (TENORMIN) tablet 25 mg  25 mg Oral QHS    calcium-vitamin D (OS-TRISTEN) 500 mg-200 unit tablet  1 Tab Oral DAILY    cetirizine (ZYRTEC) tablet 10 mg  10 mg Oral DAILY    therapeutic multivitamin (THERAGRAN) tablet 1 Tab  1 Tab Oral DAILY    levothyroxine (SYNTHROID) tablet 100 mcg  100 mcg Oral ACB    losartan (COZAAR) tablet 50 mg  50 mg Oral DAILY    montelukast (SINGULAIR) tablet 10 mg  10 mg Oral QHS    sertraline (ZOLOFT) tablet 50 mg  50 mg Oral DAILY    traZODone (DESYREL) tablet 100 mg  100 mg Oral QHS    ipratropium-albuterol (COMBIVENT RESPIMAT) 20 mcg-100 mcg inhalation spray  1 Puff Inhalation Q6H RT    fluticasone-vilanterol (BREO ELLIPTA) 200mcg-25mcg/puff  1 Puff Inhalation DAILY    insulin lispro (HUMALOG) injection   SubCUTAneous AC&HS    pantoprazole (PROTONIX) tablet 40 mg  40 mg Oral ACB         Objective:   Vital Signs:    Visit Vitals  /71 (BP 1 Location: Right arm, BP Patient Position: At rest)   Pulse 61   Temp 98.5 °F (36.9 °C)   Resp 25   Ht 5' 4\" (1.626 m)   Wt 86.3 kg (190 lb 4.8 oz)   SpO2 92%   BMI 32.66 kg/m²       O2 Device: BIPAP   O2 Flow Rate (L/min): 60 l/min   Temp (24hrs), Av.6 °F (37 °C), Min:97.6 °F (36.4 °C), Max:99.2 °F (37.3 °C)       Intake/Output:   Last shift:      No intake/output data recorded. Last 3 shifts:  1901 -  0700  In: 1270 [P.O.:970; I.V.:300]  Out: 3275 [Urine:3275]    Intake/Output Summary (Last 24 hours) at 2020 0955  Last data filed at 2020 0432  Gross per 24 hour   Intake 820 ml   Output 2550 ml   Net -1730 ml      Physical Exam:   General:  Alert, cooperative   Head:     Eyes:     Nose:    Throat:    Neck:    Lungs:   diminished   Chest wall:     Heart:  RRR   Abdomen:      Extremities: No edema   Pulses:    Skin:    Lymph nodes:    Neurologic: Oriented x 3    Exam limited to avoid unnecessary exposure and to preserve PPE during COVID19 pandemic.      Data review:     Recent Results (from the past 24 hour(s))   GLUCOSE, POC    Collection Time: 20 11:20 AM   Result Value Ref Range    Glucose (POC) 200 (H) 65 - 100 mg/dL    Performed by Bijan Miller (PCT)    GLUCOSE, POC    Collection Time: 20  4:34 PM   Result Value Ref Range    Glucose (POC) 242 (H) 65 - 100 mg/dL    Performed by Bijan Miller (PCT)    GLUCOSE, POC    Collection Time: 20  9:16 PM   Result Value Ref Range    Glucose (POC) 493 (H) 65 - 100 mg/dL    Performed by [facility] [address] [address], POC    Collection Time: 20  1:17 AM   Result Value Ref Range    Glucose (POC) 397 (H) 65 - 100 mg/dL    Performed by Tamela Spaulding    PROTHROMBIN TIME + INR    Collection Time: 05/19/20  1:19 AM   Result Value Ref Range    INR 1.1 0.9 - 1.1      Prothrombin time 11.4 (H) 9.0 - 71.3 sec   METABOLIC PANEL, COMPREHENSIVE    Collection Time: 05/19/20  1:19 AM   Result Value Ref Range    Sodium 135 (L) 136 - 145 mmol/L    Potassium 4.6 3.5 - 5.1 mmol/L    Chloride 100 97 - 108 mmol/L    CO2 29 21 - 32 mmol/L    Anion gap 6 5 - 15 mmol/L    Glucose 429 (H) 65 - 100 mg/dL    BUN 19 6 - 20 MG/DL    Creatinine 0.85 0.55 - 1.02 MG/DL    BUN/Creatinine ratio 22 (H) 12 - 20      GFR est AA >60 >60 ml/min/1.73m2    GFR est non-AA >60 >60 ml/min/1.73m2    Calcium 8.4 (L) 8.5 - 10.1 MG/DL    Bilirubin, total 0.4 0.2 - 1.0 MG/DL    ALT (SGPT) 98 (H) 12 - 78 U/L    AST (SGOT) 68 (H) 15 - 37 U/L    Alk. phosphatase 114 45 - 117 U/L    Protein, total 5.9 (L) 6.4 - 8.2 g/dL    Albumin 2.2 (L) 3.5 - 5.0 g/dL    Globulin 3.7 2.0 - 4.0 g/dL    A-G Ratio 0.6 (L) 1.1 - 2.2     CBC WITH AUTOMATED DIFF    Collection Time: 05/19/20  1:19 AM   Result Value Ref Range    WBC 10.6 3.6 - 11.0 K/uL    RBC 3.86 3.80 - 5.20 M/uL    HGB 11.1 (L) 11.5 - 16.0 g/dL    HCT 33.2 (L) 35.0 - 47.0 %    MCV 86.0 80.0 - 99.0 FL    MCH 28.8 26.0 - 34.0 PG    MCHC 33.4 30.0 - 36.5 g/dL    RDW 12.3 11.5 - 14.5 %    PLATELET 734 850 - 050 K/uL    MPV 10.4 8.9 - 12.9 FL    NRBC 0.0 0  WBC    ABSOLUTE NRBC 0.00 0.00 - 0.01 K/uL    NEUTROPHILS 82 (H) 32 - 75 %    BAND NEUTROPHILS 2 %    LYMPHOCYTES 11 (L) 12 - 49 %    MONOCYTES 3 (L) 5 - 13 %    EOSINOPHILS 1 0 - 7 %    BASOPHILS 1 0 - 1 %    IMMATURE GRANULOCYTES 0 0.0 - 0.5 %    ABS. NEUTROPHILS 8.9 (H) 1.8 - 8.0 K/UL    ABS. LYMPHOCYTES 1.2 0.8 - 3.5 K/UL    ABS. MONOCYTES 0.3 0.0 - 1.0 K/UL    ABS. EOSINOPHILS 0.1 0.0 - 0.4 K/UL    ABS. BASOPHILS 0.1 0.0 - 0.1 K/UL    ABS. IMM.  GRANS. 0.0 0.00 - 0.04 K/UL    DF MANUAL      RBC COMMENTS NORMOCYTIC, NORMOCHROMIC      WBC COMMENTS TOXIC GRANULATION     C REACTIVE PROTEIN, QT Collection Time: 05/19/20  1:19 AM   Result Value Ref Range    C-Reactive protein 1.74 (H) 0.00 - 0.60 mg/dL   LD    Collection Time: 05/19/20  1:19 AM   Result Value Ref Range     (H) 81 - 246 U/L   GLUCOSE, POC    Collection Time: 05/19/20  6:03 AM   Result Value Ref Range    Glucose (POC) 393 (H) 65 - 100 mg/dL    Performed by Arden Benoit    D DIMER    Collection Time: 05/19/20  6:07 AM   Result Value Ref Range    D-dimer 1.60 (H) 0.00 - 0.65 mg/L FEU   FERRITIN    Collection Time: 05/19/20  6:07 AM   Result Value Ref Range    Ferritin 323 (H) 8 - 252 NG/ML   PROTHROMBIN TIME + INR    Collection Time: 05/19/20  6:07 AM   Result Value Ref Range    INR 1.1 0.9 - 1.1      Prothrombin time 11.5 (H) 9.0 - 11.1 sec   GLUCOSE, POC    Collection Time: 05/19/20  8:15 AM   Result Value Ref Range    Glucose (POC) 357 (H) 65 - 100 mg/dL    Performed by Harmony Borja (PCT)        Imaging:  I have personally reviewed the patients radiographs and have reviewed the reports:  CXR 5/18: Stable bilateral infiltrates        Zuhair Serrano NP

## 2020-05-19 NOTE — PROGRESS NOTES
Bedside shift change report given to [facility] (oncoming nurse) by Casey Cason (offgoing nurse). Report included the following information SBAR, Kardex, ED Summary, Intake/Output, MAR, Recent Results and Cardiac Rhythm NSR/Arrythmia.     0684 Per Dr Catrachita Mena, Give 9 units of Lispro for .    0845 SpO2 mid 80's on hi flow. Respiratory at bedside. Family practice and pulmonary notified. 0900 Pt placed on continuous Bipap. 1155 Per Family practice, give 10 units of Lispro for . Patient able to tolerate Bipap well. Bedside shift change report given to STANTON (oncoming nurse) by [facility] (offgoing nurse).  Report included the following information SBAR, Kardex, ED Summary, Intake/Output, MAR, Recent Results and Cardiac Rhythm NSr.

## 2020-05-19 NOTE — PROGRESS NOTES
Problem: Falls - Risk of  Goal: *Absence of Falls  Description: Document Angela Vines Fall Risk and appropriate interventions in the flowsheet.   Outcome: Progressing Towards Goal  Note: Fall Risk Interventions:  Mobility Interventions: Bed/chair exit alarm, Communicate number of staff needed for ambulation/transfer, Patient to call before getting OOB         Medication Interventions: Bed/chair exit alarm, Evaluate medications/consider consulting pharmacy, Patient to call before getting OOB, Teach patient to arise slowly, Utilize gait belt for transfers/ambulation    Elimination Interventions: Bed/chair exit alarm, Call light in reach, Elevated toilet seat, Patient to call for help with toileting needs, Stay With Me (per policy), Toilet paper/wipes in reach, Toileting schedule/hourly rounds, Urinal in reach              Problem: Patient Education: Go to Patient Education Activity  Goal: Patient/Family Education  Outcome: Progressing Towards Goal

## 2020-05-19 NOTE — PROGRESS NOTES
Due to [address] Pandemic, CM assessment completed via EMR review and collaboration with nursing staff. No contact with patient for this assessment. Care Management follow up, LOS - 8 days. Patient admitted for respiratory failure, COVID19+. Hx diabetes type 2, hypothyroid, sleep apnea, asthma, arthritis, peripheral neuropathy, HTN, GERD. RUR score 21%/moderate risk    Current status  Patient remains on high flow oxygen 60L/min alt with BiPap as needed. High risk for decline. Transition of Care Plan  1. Monitor patient status and response to treatment. 2. Continues on high flow oxygen/BiPap. 3. Unable to determine discharge needs at this time. 4. CM to follow.     Guerrero Lowe, RN, MSN/Care manager

## 2020-05-19 NOTE — DIABETES MGMT
[facility] SPECIALIST CONSULT    Presentation   Ariane Cruz is a 69 y.o. female admitted due to COVID-19 positive and was having worsening shortness of breath. Patient was admitted 5/11/20 to ICU and transferred to stepdown today. PMH is diabetes, hypothyroidism, HTN, neuropathy, LORENZO, obesity, asthma, depression and chronic pain. Patient remains on isolation precautions due to COVID-19. Patient has known Type 2 diabetes. Home medication regime is Glipizide 10 mg twice a day. A1c 5/11/20: 13.1%    Consulted by Provider for advanced diabetes nursing assessment and care, specifically related to      [x] Inpatient management strategy    Current clinical course has been uncomplicated. Patient is currently on continuous Bipap, information below found by chart review. Diabetes-related medical history  Neurological complications  Peripheral neuropathy  Other associated conditions     Depression    Diabetes medication history  Drug class Currently in use Discontinued Never used   Biguanide      DDP-4 inhibitor       Sulfonylurea Glipizide 10 mg twice a day     Thiazolidinedione      GLP-1 RA      SGLT-2 inhibitors      Basal insulin      Bolus insulin        Subjective   Patient is on isolation precautions due to COVID-19 positive. Phone call placed to patient's room, primary RN [facility] answered phone and advised patient is unable to speak with me due to continuous bipap for low oxygen sats. Иван Manzanares RN advised that patient started on steroids yesterday and BG spiked today. Objective   Physical exam    PE deferred due to patient on isolation precautions for positive COVID-19 test.  Unable to speak with patient by phone due to on continuous bipap at this time. Vital Signs   Visit Vitals  /76   Pulse 66   Temp 98.6 °F (37 °C)   Resp 19   Ht 5' 4\" (1.626 m)   Wt 86.3 kg (190 lb 4.8 oz)   SpO2 92%   BMI 32.66 kg/m²   .  Orthostatic BP measurement not indicated      Laboratory  Lab Results   Component Value Date/Time    Hemoglobin A1c 13.1 (H) 05/11/2020 02:31 AM     Lab Results   Component Value Date/Time    LDL, calculated 89 11/04/2019 10:20 AM     Lab Results   Component Value Date/Time    Creatinine 0.85 05/19/2020 01:19 AM     Lab Results   Component Value Date/Time    Sodium 135 (L) 05/19/2020 01:19 AM    Potassium 4.6 05/19/2020 01:19 AM    Chloride 100 05/19/2020 01:19 AM    CO2 29 05/19/2020 01:19 AM    Anion gap 6 05/19/2020 01:19 AM    Glucose 429 (H) 05/19/2020 01:19 AM    BUN 19 05/19/2020 01:19 AM    Creatinine 0.85 05/19/2020 01:19 AM    BUN/Creatinine ratio 22 (H) 05/19/2020 01:19 AM    GFR est AA >60 05/19/2020 01:19 AM    GFR est non-AA >60 05/19/2020 01:19 AM    Calcium 8.4 (L) 05/19/2020 01:19 AM    Bilirubin, total 0.4 05/19/2020 01:19 AM    AST (SGOT) 68 (H) 05/19/2020 01:19 AM    Alk. phosphatase 114 05/19/2020 01:19 AM    Protein, total 5.9 (L) 05/19/2020 01:19 AM    Albumin 2.2 (L) 05/19/2020 01:19 AM    Globulin 3.7 05/19/2020 01:19 AM    A-G Ratio 0.6 (L) 05/19/2020 01:19 AM    ALT (SGPT) 98 (H) 05/19/2020 01:19 AM     Lab Results   Component Value Date/Time    ALT (SGPT) 98 (H) 05/19/2020 01:19 AM       Blood glucose pattern        Evaluation   This 69year old female with known diabetes hasn't achieved inpatient blood glucose target of 140-180mg/dl. Patient started on Solumedrol 40 mg IV every 6 hours 5/18/20. BG spiked to 493 the evening of 5/18/20 after first 2 doses of solumedrol given. BG have ranged 200 - 493 over the past 24 hours. Basal insulin is in use. Lantus 12 units per day started 5/12/20. Lantus has required adjusting several times over the last week. Lantus 30 units per day is current order. NPH 23 units every 12 hours added today 5/19/20 to cover steroids. Bolus insulin isn't in use. Patient is eating meals. Diabetic CHO consistent diet ordered. Corrective insulin is in use.   Patient has required 20 units corrective insulin in the past 24 hours. Inpatient blood glucose management has been impacted by    [x] Glucocorticoid use      Assessment and Plan   Nursing Diagnosis Risk for unstable blood glucose pattern   Nursing Intervention Domain 2970 Decision-making Support   Nursing Interventions Examined current inpatient diabetes control   Explored factors facilitating and impeding inpatient management  Identified self-management practices impeding diabetes control  Explored corrective strategies with patient and responsible inpatient provider        Recommendations   Use Subcutaneous Insulin Order set (7716):  Basal insulin   Continue Lantus 30 units (0.35 units/kg/day) per day. Decrease NPH to 12 units (0.15 units/kg) every 12 hours. Will continue to follow as adjustments may be needed. Bolus insulin  Humalog 8 units with meals, hold if patient eats < 50% CHO on meal tray. Corrective insulin  Correctional Scale for Normal Sensitivity    200-249- 2units Humalog  [phone] Humalog  [phone] Humalog  [phone] Humalog  Over 400- 10units Humalog    Do NOT hold for NPO; give in addition to meal time insulin dose. If patient does not eat, -give correction dose only. [x] Will require adjustments as glucocorticoids are in use     Consider referrals    [x] Diabetes Self-Management Training through [facility] (Phone [phone] to schedule appointment)    Time Spent     Total time spent with patient: 15 Minutes   I personally reviewed chart, notes, data and current medications in the medical record. I have personally examined and treated the patient at bedside during this period.     SUNNY Laureano  Access via Strap  [phone]

## 2020-05-19 NOTE — PROGRESS NOTES
Physical Therapy  5/19/2020    Chart reviewed and spoke with RN. Pt is currently on continuous BiPAP d/t desaturation on HighFlow. RN requesting to hold for today. Pt has exercise handout in room. Will continue to follow.      Thank Karen Monique, PT, DPT

## 2020-05-19 NOTE — PROGRESS NOTES
1952- Initial assessment performed. VSS in flowsheet. Pt remains on high flow at this time. Pt has no complaints at this time. 2116- , FP MD paged for further orders. Order received for NPH, will hold sliding scale tonight. 0000- re-assessment complete. No new changes. 0423- Re-assessment complete. No new changes. VSS in flowsheet. Pt up to bedside commode and daily weight obtained. Pt has no further complaints at this time. Pt refusing to lay prone. RN educated patient on purpose of prone position but pt refuses due to chronic back pain. 0710-Bedside and Verbal shift change report given to Danielle Alonso RN (oncoming nurse) by Payton Anglin RN (offgoing nurse). Report included the following information SBAR and Kardex.

## 2020-05-20 LAB
ALBUMIN SERPL-MCNC: 2.4 G/DL (ref 3.5–5)
ALBUMIN/GLOB SERPL: 0.7 {RATIO} (ref 1.1–2.2)
ALP SERPL-CCNC: 102 U/L (ref 45–117)
ALT SERPL-CCNC: 75 U/L (ref 12–78)
ANION GAP SERPL CALC-SCNC: 5 MMOL/L (ref 5–15)
AST SERPL-CCNC: 41 U/L (ref 15–37)
BASOPHILS # BLD: 0 K/UL (ref 0–0.1)
BASOPHILS NFR BLD: 0 % (ref 0–1)
BILIRUB SERPL-MCNC: 0.4 MG/DL (ref 0.2–1)
BUN SERPL-MCNC: 16 MG/DL (ref 6–20)
BUN/CREAT SERPL: 22 (ref 12–20)
CALCIUM SERPL-MCNC: 8.6 MG/DL (ref 8.5–10.1)
CHLORIDE SERPL-SCNC: 102 MMOL/L (ref 97–108)
CO2 SERPL-SCNC: 30 MMOL/L (ref 21–32)
COMMENT, HOLDF: NORMAL
CREAT SERPL-MCNC: 0.74 MG/DL (ref 0.55–1.02)
D DIMER PPP FEU-MCNC: 1.11 MG/L FEU (ref 0–0.65)
DIFFERENTIAL METHOD BLD: ABNORMAL
EOSINOPHIL # BLD: 0 K/UL (ref 0–0.4)
EOSINOPHIL NFR BLD: 0 % (ref 0–7)
ERYTHROCYTE [DISTWIDTH] IN BLOOD BY AUTOMATED COUNT: 12.4 % (ref 11.5–14.5)
GLOBULIN SER CALC-MCNC: 3.4 G/DL (ref 2–4)
GLUCOSE BLD STRIP.AUTO-MCNC: 221 MG/DL (ref 65–100)
GLUCOSE BLD STRIP.AUTO-MCNC: 223 MG/DL (ref 65–100)
GLUCOSE BLD STRIP.AUTO-MCNC: 324 MG/DL (ref 65–100)
GLUCOSE BLD STRIP.AUTO-MCNC: 358 MG/DL (ref 65–100)
GLUCOSE BLD STRIP.AUTO-MCNC: 410 MG/DL (ref 65–100)
GLUCOSE SERPL-MCNC: 201 MG/DL (ref 65–100)
HCT VFR BLD AUTO: 34.5 % (ref 35–47)
HGB BLD-MCNC: 11.4 G/DL (ref 11.5–16)
IL6 SERPL-MCNC: 49.3 PG/ML (ref 0–15.5)
IMM GRANULOCYTES # BLD AUTO: 0.3 K/UL (ref 0–0.04)
IMM GRANULOCYTES NFR BLD AUTO: 2 % (ref 0–0.5)
LYMPHOCYTES # BLD: 1.7 K/UL (ref 0.8–3.5)
LYMPHOCYTES NFR BLD: 9 % (ref 12–49)
MAGNESIUM SERPL-MCNC: 1.9 MG/DL (ref 1.6–2.4)
MCH RBC QN AUTO: 28.6 PG (ref 26–34)
MCHC RBC AUTO-ENTMCNC: 33 G/DL (ref 30–36.5)
MCV RBC AUTO: 86.5 FL (ref 80–99)
MONOCYTES # BLD: 0.7 K/UL (ref 0–1)
MONOCYTES NFR BLD: 4 % (ref 5–13)
NEUTS SEG # BLD: 15.7 K/UL (ref 1.8–8)
NEUTS SEG NFR BLD: 85 % (ref 32–75)
NRBC # BLD: 0 K/UL (ref 0–0.01)
NRBC BLD-RTO: 0 PER 100 WBC
PLATELET # BLD AUTO: 373 K/UL (ref 150–400)
PMV BLD AUTO: 10.1 FL (ref 8.9–12.9)
POTASSIUM SERPL-SCNC: 4 MMOL/L (ref 3.5–5.1)
PROT SERPL-MCNC: 5.8 G/DL (ref 6.4–8.2)
RBC # BLD AUTO: 3.99 M/UL (ref 3.8–5.2)
SAMPLES BEING HELD,HOLD: NORMAL
SERVICE CMNT-IMP: ABNORMAL
SODIUM SERPL-SCNC: 137 MMOL/L (ref 136–145)
WBC # BLD AUTO: 18.4 K/UL (ref 3.6–11)

## 2020-05-20 PROCEDURE — 94762 N-INVAS EAR/PLS OXIMTRY CONT: CPT

## 2020-05-20 PROCEDURE — 65660000000 HC RM CCU STEPDOWN

## 2020-05-20 PROCEDURE — 94664 DEMO&/EVAL PT USE INHALER: CPT

## 2020-05-20 PROCEDURE — 94640 AIRWAY INHALATION TREATMENT: CPT

## 2020-05-20 PROCEDURE — 85025 COMPLETE CBC W/AUTO DIFF WBC: CPT

## 2020-05-20 PROCEDURE — 74011250636 HC RX REV CODE- 250/636: Performed by: STUDENT IN AN ORGANIZED HEALTH CARE EDUCATION/TRAINING PROGRAM

## 2020-05-20 PROCEDURE — 80053 COMPREHEN METABOLIC PANEL: CPT

## 2020-05-20 PROCEDURE — 74011250637 HC RX REV CODE- 250/637: Performed by: INTERNAL MEDICINE

## 2020-05-20 PROCEDURE — 74011636637 HC RX REV CODE- 636/637: Performed by: STUDENT IN AN ORGANIZED HEALTH CARE EDUCATION/TRAINING PROGRAM

## 2020-05-20 PROCEDURE — 82962 GLUCOSE BLOOD TEST: CPT

## 2020-05-20 PROCEDURE — 74011250636 HC RX REV CODE- 250/636: Performed by: INTERNAL MEDICINE

## 2020-05-20 PROCEDURE — 83735 ASSAY OF MAGNESIUM: CPT

## 2020-05-20 PROCEDURE — 97530 THERAPEUTIC ACTIVITIES: CPT

## 2020-05-20 PROCEDURE — 74011000250 HC RX REV CODE- 250: Performed by: STUDENT IN AN ORGANIZED HEALTH CARE EDUCATION/TRAINING PROGRAM

## 2020-05-20 PROCEDURE — 94660 CPAP INITIATION&MGMT: CPT

## 2020-05-20 PROCEDURE — 97110 THERAPEUTIC EXERCISES: CPT

## 2020-05-20 PROCEDURE — 77030038269 HC DRN EXT URIN PURWCK BARD -A

## 2020-05-20 PROCEDURE — 85379 FIBRIN DEGRADATION QUANT: CPT

## 2020-05-20 PROCEDURE — 74011250637 HC RX REV CODE- 250/637: Performed by: STUDENT IN AN ORGANIZED HEALTH CARE EDUCATION/TRAINING PROGRAM

## 2020-05-20 PROCEDURE — 36415 COLL VENOUS BLD VENIPUNCTURE: CPT

## 2020-05-20 RX ORDER — INSULIN LISPRO 100 [IU]/ML
8 INJECTION, SOLUTION INTRAVENOUS; SUBCUTANEOUS
Status: DISCONTINUED | OUTPATIENT
Start: 2020-05-20 | End: 2020-05-21

## 2020-05-20 RX ADMIN — INSULIN LISPRO 4 UNITS: 100 INJECTION, SOLUTION INTRAVENOUS; SUBCUTANEOUS at 08:27

## 2020-05-20 RX ADMIN — BUMETANIDE 1 MG: 0.25 INJECTION INTRAMUSCULAR; INTRAVENOUS at 08:22

## 2020-05-20 RX ADMIN — GUAIFENESIN 1200 MG: 600 TABLET, EXTENDED RELEASE ORAL at 08:22

## 2020-05-20 RX ADMIN — FAMOTIDINE 20 MG: 20 TABLET, FILM COATED ORAL at 08:22

## 2020-05-20 RX ADMIN — LEVOTHYROXINE SODIUM 100 MCG: 0.1 TABLET ORAL at 08:22

## 2020-05-20 RX ADMIN — CETIRIZINE HYDROCHLORIDE 10 MG: 10 TABLET, FILM COATED ORAL at 08:22

## 2020-05-20 RX ADMIN — FAMOTIDINE 20 MG: 20 TABLET, FILM COATED ORAL at 21:24

## 2020-05-20 RX ADMIN — Medication 10 ML: at 00:19

## 2020-05-20 RX ADMIN — ATORVASTATIN CALCIUM 40 MG: 20 TABLET, FILM COATED ORAL at 08:22

## 2020-05-20 RX ADMIN — FLUTICASONE FUROATE AND VILANTEROL TRIFENATATE 1 PUFF: 200; 25 POWDER RESPIRATORY (INHALATION) at 21:27

## 2020-05-20 RX ADMIN — ENOXAPARIN SODIUM 40 MG: 40 INJECTION SUBCUTANEOUS at 21:23

## 2020-05-20 RX ADMIN — INSULIN LISPRO 2 UNITS: 100 INJECTION, SOLUTION INTRAVENOUS; SUBCUTANEOUS at 21:51

## 2020-05-20 RX ADMIN — PANTOPRAZOLE SODIUM 40 MG: 40 TABLET, DELAYED RELEASE ORAL at 08:22

## 2020-05-20 RX ADMIN — IPRATROPIUM BROMIDE AND ALBUTEROL 1 PUFF: 20; 100 SPRAY, METERED RESPIRATORY (INHALATION) at 14:53

## 2020-05-20 RX ADMIN — GUAIFENESIN 1200 MG: 600 TABLET, EXTENDED RELEASE ORAL at 21:24

## 2020-05-20 RX ADMIN — INSULIN GLARGINE 30 UNITS: 100 INJECTION, SOLUTION SUBCUTANEOUS at 08:23

## 2020-05-20 RX ADMIN — SERTRALINE HYDROCHLORIDE 50 MG: 50 TABLET ORAL at 08:22

## 2020-05-20 RX ADMIN — METHYLPREDNISOLONE SODIUM SUCCINATE 40 MG: 40 INJECTION, POWDER, FOR SOLUTION INTRAMUSCULAR; INTRAVENOUS at 00:19

## 2020-05-20 RX ADMIN — TRAZODONE HYDROCHLORIDE 100 MG: 100 TABLET ORAL at 21:24

## 2020-05-20 RX ADMIN — IPRATROPIUM BROMIDE AND ALBUTEROL 1 PUFF: 20; 100 SPRAY, METERED RESPIRATORY (INHALATION) at 07:59

## 2020-05-20 RX ADMIN — ASPIRIN 81 MG: 81 TABLET, COATED ORAL at 08:22

## 2020-05-20 RX ADMIN — OYSTER SHELL CALCIUM WITH VITAMIN D 1 TABLET: 500; 200 TABLET, FILM COATED ORAL at 08:22

## 2020-05-20 RX ADMIN — Medication 20 ML: at 06:20

## 2020-05-20 RX ADMIN — METHYLPREDNISOLONE SODIUM SUCCINATE 40 MG: 40 INJECTION, POWDER, FOR SOLUTION INTRAMUSCULAR; INTRAVENOUS at 11:33

## 2020-05-20 RX ADMIN — INSULIN LISPRO 10 UNITS: 100 INJECTION, SOLUTION INTRAVENOUS; SUBCUTANEOUS at 11:45

## 2020-05-20 RX ADMIN — INSULIN LISPRO 16 UNITS: 100 INJECTION, SOLUTION INTRAVENOUS; SUBCUTANEOUS at 17:30

## 2020-05-20 RX ADMIN — METHYLPREDNISOLONE SODIUM SUCCINATE 40 MG: 40 INJECTION, POWDER, FOR SOLUTION INTRAMUSCULAR; INTRAVENOUS at 06:20

## 2020-05-20 RX ADMIN — THERA TABS 1 TABLET: TAB at 08:22

## 2020-05-20 RX ADMIN — INSULIN LISPRO 8 UNITS: 100 INJECTION, SOLUTION INTRAVENOUS; SUBCUTANEOUS at 17:30

## 2020-05-20 RX ADMIN — FLUTICASONE FUROATE AND VILANTEROL TRIFENATATE 1 PUFF: 200; 25 POWDER RESPIRATORY (INHALATION) at 08:00

## 2020-05-20 RX ADMIN — INSULIN LISPRO 8 UNITS: 100 INJECTION, SOLUTION INTRAVENOUS; SUBCUTANEOUS at 13:55

## 2020-05-20 RX ADMIN — IPRATROPIUM BROMIDE AND ALBUTEROL 1 PUFF: 20; 100 SPRAY, METERED RESPIRATORY (INHALATION) at 02:15

## 2020-05-20 RX ADMIN — IPRATROPIUM BROMIDE AND ALBUTEROL 1 PUFF: 20; 100 SPRAY, METERED RESPIRATORY (INHALATION) at 21:27

## 2020-05-20 RX ADMIN — ZINC SULFATE 220 MG (50 MG) CAPSULE 1 CAPSULE: CAPSULE at 08:22

## 2020-05-20 RX ADMIN — Medication 10 ML: at 14:00

## 2020-05-20 RX ADMIN — METHYLPREDNISOLONE SODIUM SUCCINATE 40 MG: 40 INJECTION, POWDER, FOR SOLUTION INTRAMUSCULAR; INTRAVENOUS at 17:31

## 2020-05-20 RX ADMIN — INSULIN HUMAN 28 UNITS: 100 INJECTION, SUSPENSION SUBCUTANEOUS at 06:21

## 2020-05-20 RX ADMIN — OXYCODONE HYDROCHLORIDE AND ACETAMINOPHEN 500 MG: 500 TABLET ORAL at 21:23

## 2020-05-20 RX ADMIN — INSULIN HUMAN 28 UNITS: 100 INJECTION, SUSPENSION SUBCUTANEOUS at 17:31

## 2020-05-20 RX ADMIN — OXYCODONE HYDROCHLORIDE AND ACETAMINOPHEN 500 MG: 500 TABLET ORAL at 08:22

## 2020-05-20 RX ADMIN — MONTELUKAST SODIUM 10 MG: 10 TABLET, FILM COATED ORAL at 21:24

## 2020-05-20 NOTE — PROGRESS NOTES
attempted to follow up with Mrs. Harris on the [facility] unit. Mrs. Iveth Turner is on droplet plus precautions. Collaborated with her RN, who informed the  that Mrs. Iveth Turner would be able to speak with the .  observed the other providers were going into the assess Mrs. Harris at this time.  will follow up as able and/or needed  [facility]. Gabrielle Sifuentes.      Paging Service: [phone])

## 2020-05-20 NOTE — PROGRESS NOTES
Palliative Medicine Consult  Jaswant: [phone] ([phone]    Patient Name: Ailyn Hatfield  YOB: 1945    Date of Initial Consult: 5/12/2020  Reason for Consult: Care decisions  Requesting Provider: Family medicine team  Primary Care Physician: Emelia Pierce MD     SUMMARY:   Ailyn Hatfield is a 69 y.o. with a past history of chronic back pain, diabetes, hypertension, asthma, who was admitted on 5/10/2020 from home with a diagnosis of acute respiratory failure/COVID-19 infection. Current medical issues leading to Palliative Medicine involvement include: Care decisions. Chart reviewedpatient is a 37-year-old female admitted to the hospital with acute respiratory failure/COVID-19 infection. Both her and her  tested positive last week. He seems to be doing okay but she has had increasing respiratory distress. Presents to the hospital on 5/10 with bilateral pneumonia and respiratory failure. Patient currently on 5 L oxygen and nitric oxide. Our team is been asked to see her for goals of care    Social historypatient has been  for 39 years. She has 2 children from a prior marriage. One child lives locally and another lives in [place]. 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 PALLIATIVE DIAGNOSES:   1. Goals of care discussion  2. DNR discussion  3. Advance care planning  4. Acute respiratory failure-worsening  5. COVID-19 infection       PLAN:   1. Met with patient in the room. Patient been able to be weaned to high flow nasal cannulacurrently 50 to 60 L/FiO2 100%. She states feeling much better. Able to eat and talk with her  a couple times. Denies any pain. 2. Talked with her  and daughter via phone. Reviewed all current issues as well as treatment plan. Family well versed in COVID-19 treatment options as they have been reading a lot online.   Encouraged them to encourage her to continue to prone at night.  Explained that repeat lab work check inflammatory markers and chest x-ray will be done tomorrow. They feel better overall that she is on high flow. We will continue to update them on a daily basis. 3. Goals of carepatient wants attempts at full restorative measures. Would consider intubation if needed. 4. Advance care planningshe has not completed advanced medical directive but she is clear that her  would serve as her medical power of . He is her legal next of kin. 5. CODE STATUS patient would accept intubation and resuscitation if needed. She remains clear that she would not want long-term ventilation if required. Our team will continue to address and support her decisions. 6. Symptom managementinpatient team currently managing  7. Psychosocialpatient appears of good support from her  and children. 8. Discussed with bedside nurse, case management, Kassidy-NP/Pulmonary, family medicine team  9. Initial consult note routed to primary continuity provider and/or primary health care team members  10. Communicated plan of care with: Palliative [facility] Team     GOALS OF CARE / TREATMENT PREFERENCES:     GOALS OF CARE:  Patient/Health Care Proxy Stated Goals: Prolong life    TREATMENT PREFERENCES:   Code Status: Full Code    Advance Care Planning:  [x] [facility] Interdisciplinary Team has updated the ACP Navigator with Health Care Decision Maker and Patient Capacity      Advance Care Planning 5/11/2020   Confirm Advance Directive None       Medical Interventions: Full interventions     Other Instructions: Other:    As far as possible, the palliative care team has discussed with patient / health care proxy about goals of care / treatment preferences for patient.      HISTORY:     History obtained from: Chart, patient    CHIEF COMPLAINT: Shortness of breath    HPI/SUBJECTIVE:    The patient is:   [x] Verbal and participatory  [] Non-participatory due to:   Patient states her breathing is little better today. Feeling a little bit tired overall. 5/19patient currently on BiPAP. Difficult to understand secondary to the BiPAP but remains lucid    5/20patient currently on high flow nasal cannula. She states she is feeling better overall. She is excited that she could eat and talk with her     Clinical Pain Assessment (nonverbal scale for severity on nonverbal patients):   Clinical Pain Assessment  Severity: 0          Duration: for how long has pt been experiencing pain (e.g., 2 days, 1 month, years)  Frequency: how often pain is an issue (e.g., several times per day, once every few days, constant)     FUNCTIONAL ASSESSMENT:     Palliative Performance Scale (PPS):  PPS: 60       PSYCHOSOCIAL/SPIRITUAL SCREENING:     Palliative IDT has assessed this patient for cultural preferences / practices and a referral made as appropriate to needs (Cultural Services, Patient Advocacy, Ethics, etc.)    Any spiritual / Hindu concerns:  [] Yes /  [x] No    Caregiver Burnout:  [] Yes /  [] No /  [x] No Caregiver Present      Anticipatory grief assessment:   [x] Normal  / [] Maladaptive       ESAS Anxiety: Anxiety: 0    ESAS Depression: Depression: 0        REVIEW OF SYSTEMS:     Positive and pertinent negative findings in ROS are noted above in HPI. The following systems were [x] reviewed / [] unable to be reviewed as noted in HPI  Other findings are noted below. Systems: constitutional, ears/nose/mouth/throat, respiratory, gastrointestinal, genitourinary, musculoskeletal, integumentary, neurologic, psychiatric, endocrine. Positive findings noted below.   Modified ESAS Completed by: provider   Fatigue: 3 Drowsiness: 0   Depression: 0 Pain: 0   Anxiety: 0 Nausea: 0   Anorexia: 0 Dyspnea: 5     Constipation: No     Stool Occurrence(s): 1        PHYSICAL EXAM:     From RN flowsheet:  Wt Readings from Last 3 Encounters:   05/19/20 190 lb 4.8 oz (86.3 kg)   10/08/19 201 lb (91.2 kg)   02/13/19 197 lb 8 oz (89.6 kg)     Blood pressure (P) 129/72, pulse (P) 67, temperature (P) 99 °F (37.2 °C), resp. rate (P) 18, height 5' 4\" (1.626 m), weight 190 lb 4.8 oz (86.3 kg), SpO2 (P) 92 %. Pain Scale 1: (P) Numeric (0 - 10)  Pain Intensity 1: (P) 0                 Last bowel movement, if known:     Constitutional: Appears much better today. High flow nasal cannula in place. She feels like she is breathing better.   Eyes: pupils equal, anicteric  ENMT: no nasal discharge, moist mucous membranes  Cardiovascular: regular rhythm, distal pulses intact  Respiratory: breathing mildly labored, symmetric, high flow nasal cannula in place  Gastrointestinal: soft non-tender, +bowel sounds  Musculoskeletal: no deformity, no tenderness to palpation  Skin: warm, dry  Neurologic: following commands, moving all extremities  Psychiatric: full affect, no hallucinations  Other:       HISTORY:     Principal Problem:    COVID-19 (5/16/2020)    Active Problems:    Respiratory failure with hypoxia ([address] (5/10/2020)      Past Medical History:   Diagnosis Date    Anxiety     Arthritis     Asthma     Chronic pain     back,right hip    Depression     Diabetes ([address]     type 2    GERD (gastroesophageal reflux disease)     History of vascular access device 05/18/2020    4 Fr midline, 12 cm L basilic, blood draws    Hypercholesteremia     Hypertension     Hypothyroid     Nausea & vomiting     Reflux     Sleep apnea     wears dental appliance- Somnident    Thyroid disease     Unspecified adverse effect of anesthesia     delayed awakening      Past Surgical History:   Procedure Laterality Date    HX COLONOSCOPY  2012    normal repeat 2022, diverticulosis of sigmoid colon, Dr. Camarena [facility] HX HEENT  1/08/14    bilateral cataract surgery1/8/14    HX KNEE REPLACEMENT      right    HX OTHER SURGICAL  2007    cardiac cath- No CAD found   [address] [place] [address]    vascular brain surgery Family History   Problem Relation Age of Onset    Elevated Lipids Mother     Dementia Mother     Osteoporosis Mother     Lung Disease Father         copd    Delayed Awakening Father     Post-op Nausea/Vomiting Father     Hypertension Sister     Diabetes Sister         type 2    Breast Cancer Maternal Aunt       History reviewed, no pertinent family history. Social History     Tobacco Use    Smoking status: Never Smoker    Smokeless tobacco: Never Used   Substance Use Topics    Alcohol use: No     Frequency: Never     Comment: 2x year     Allergies   Allergen Reactions    Latex Rash    Prednisone Anaphylaxis     Throat swelling. Can take methylpredisone po or IV without problems.     Augmentin [Amoxicillin-Pot Clavulanate] Nausea and Vomiting    Biaxin [Clarithromycin] Nausea and Vomiting    Metformin Diarrhea    Parafon Forte Dsc [Chlorzoxazone] Nausea and Vomiting      Current Facility-Administered Medications   Medication Dose Route Frequency    insulin glargine (LANTUS) injection 30 Units  30 Units SubCUTAneous DAILY    glucose chewable tablet 16 g  4 Tab Oral PRN    glucagon (GLUCAGEN) injection 1 mg  1 mg IntraMUSCular PRN    dextrose 10% infusion 0-250 mL  0-250 mL IntraVENous PRN    methylPREDNISolone (PF) (SOLU-MEDROL) injection 40 mg  40 mg IntraVENous Q6H    And    insulin NPH (NOVOLIN N, HUMULIN N) injection 28 Units  28 Units SubCUTAneous Q12H    insulin lispro (HUMALOG) injection   SubCUTAneous AC&HS    bumetanide (BUMEX) injection 1 mg  1 mg IntraVENous DAILY    guaiFENesin ER (MUCINEX) tablet 1,200 mg  1,200 mg Oral BID    famotidine (PEPCID) tablet 20 mg  20 mg Oral BID    atorvastatin (LIPITOR) tablet 40 mg  40 mg Oral DAILY    zinc sulfate (ZINCATE) 220 (50) mg capsule 1 Cap  1 Cap Oral DAILY    diphenhydrAMINE (BENADRYL) injection 50 mg  50 mg IntraVENous Q4H PRN    acetaminophen (TYLENOL) tablet 650 mg  650 mg Oral Q6H PRN    sodium chloride (NS) flush 5-40 mL  5-40 mL IntraVENous Q8H    sodium chloride (NS) flush 5-40 mL  5-40 mL IntraVENous PRN    ondansetron (ZOFRAN) injection 4 mg  4 mg IntraVENous Q4H PRN    enoxaparin (LOVENOX) injection 40 mg  40 mg SubCUTAneous Q24H    ascorbic acid (vitamin C) (VITAMIN C) tablet 500 mg  500 mg Oral BID    aspirin delayed-release tablet 81 mg  81 mg Oral DAILY    calcium-vitamin D (OS-TRISTEN) 500 mg-200 unit tablet  1 Tab Oral DAILY    cetirizine (ZYRTEC) tablet 10 mg  10 mg Oral DAILY    fluticasone propionate (FLONASE) 50 mcg/actuation nasal spray 2 Spray  2 Spray Both Nostrils DAILY PRN    therapeutic multivitamin (THERAGRAN) tablet 1 Tab  1 Tab Oral DAILY    levothyroxine (SYNTHROID) tablet 100 mcg  100 mcg Oral ACB    losartan (COZAAR) tablet 50 mg  50 mg Oral DAILY    montelukast (SINGULAIR) tablet 10 mg  10 mg Oral QHS    sertraline (ZOLOFT) tablet 50 mg  50 mg Oral DAILY    traZODone (DESYREL) tablet 100 mg  100 mg Oral QHS    ipratropium-albuterol (COMBIVENT RESPIMAT) 20 mcg-100 mcg inhalation spray  1 Puff Inhalation Q6H RT    fluticasone-vilanterol (BREO ELLIPTA) 200mcg-25mcg/puff  1 Puff Inhalation DAILY    albuterol (PROVENTIL HFA, VENTOLIN HFA, PROAIR HFA) inhaler 2 Puff  2 Puff Inhalation Q4H PRN    pantoprazole (PROTONIX) tablet 40 mg  40 mg Oral ACB    sodium chloride (NS) flush 5-10 mL  5-10 mL IntraVENous PRN          LAB AND IMAGING FINDINGS:     Lab Results   Component Value Date/Time    WBC 18.4 (H) 05/20/2020 03:38 AM    HGB 11.4 (L) 05/20/2020 03:38 AM    PLATELET 370 15/28/3463 03:38 AM     Lab Results   Component Value Date/Time    Sodium 137 05/20/2020 03:38 AM    Potassium 4.0 05/20/2020 03:38 AM    Chloride 102 05/20/2020 03:38 AM    CO2 30 05/20/2020 03:38 AM    BUN 16 05/20/2020 03:38 AM    Creatinine 0.74 05/20/2020 03:38 AM    Calcium 8.6 05/20/2020 03:38 AM    Magnesium 1.9 05/20/2020 03:38 AM    Phosphorus 1.8 (L) 05/15/2020 02:38 AM      Lab Results   Component Value Date/Time AST (SGOT) 41 (H) 05/20/2020 03:38 AM    Alk. phosphatase 102 05/20/2020 03:38 AM    Protein, total 5.8 (L) 05/20/2020 03:38 AM    Albumin 2.4 (L) 05/20/2020 03:38 AM    Globulin 3.4 05/20/2020 03:38 AM     Lab Results   Component Value Date/Time    INR 1.1 05/19/2020 06:07 AM    Prothrombin time 11.5 (H) 05/19/2020 06:07 AM    aPTT 24.5 12/20/2013 11:00 AM      Lab Results   Component Value Date/Time    Ferritin 323 (H) 05/19/2020 06:07 AM      Lab Results   Component Value Date/Time    pH 7.45 05/19/2020 10:22 AM    PCO2 37 05/19/2020 10:22 AM    PO2 84 05/19/2020 10:22 AM     No components found for: [place]   Lab Results   Component Value Date/Time    CK 79 11/13/2011 08:45 PM    CK - MB <0.5 (L) 11/13/2011 08:45 PM                Total time: 35  Counseling / coordination time, spent as noted above: 30  > 50% counseling / coordination?: yes    Prolonged service was provided for  []30 min   []75 min in face to face time in the presence of the patient, spent as noted above. Time Start:   Time End:   Note: this can only be billed with 78680 (initial) or 30992 (follow up). If multiple start / stop times, list each separately.

## 2020-05-20 NOTE — PROGRESS NOTES
Chart accessed to assist primary RN. , updated Family Practice. Orders received to give 10units SSI and recheck BG in 2 hours.      Updated primary RN 29-Nov-2019 07:20

## 2020-05-20 NOTE — PROGRESS NOTES
Problem: Mobility Impaired (Adult and Pediatric)  Goal: *Acute Goals and Plan of Care (Insert Text)  Description: FUNCTIONAL STATUS PRIOR TO ADMISSION: Patient was independent and active without use of DME.    HOME SUPPORT PRIOR TO ADMISSION: The patient lived with  but did not require assist.    Physical Therapy Goals  Initiated 5/18/2020  1. Patient will move from supine to sit and sit to supine  in bed with independence within 7 day(s). 2.  Patient will transfer from bed to chair and chair to bed with independence using the least restrictive device within 7 day(s). 3.  Patient will perform sit to stand with independence within 7 day(s). 4.  Patient will ambulate with independence for 150 feet with the least restrictive device within 7 day(s). 5.  Patient will ascend/descend 4 stairs with 1 handrail(s) with modified independence within 7 day(s). Outcome: Progressing Towards Goal   PHYSICAL THERAPY TREATMENT  Patient: Armando Peña (89 y.o. female)  Date: 5/20/2020  Diagnosis: Respiratory failure with hypoxia ([facility] [J96.91] COVID-19      Precautions: Fall, Contact(Droplet plus)  Chart, physical therapy assessment, plan of care and goals were reviewed. ASSESSMENT  Patient continues with skilled PT services and is progressing towards goals. Patient limited by increased O2 needs and ROBISON. Currently on 60L/min at 80% FiO2. Received sitting up in chair reviewed seated exercises and performed multiple rounds of standing marching with bilateral HHA. Pt c/o dizziness in standing. She reports a history of dizziness but it has not been a problem for years. Multiple seated rest breaks taken to perform breathing exercises to bring O2 saturations >90%. Transferred back to bed and following education on lying prone pt agreeable to attempt. Managed to achieve nearly full prone position with head turned to the right. Pt comfortable and RN aware.      Current Level of Function Impacting Discharge (mobility/balance): O2 needs increased         PLAN :  Patient continues to benefit from skilled intervention to address the above impairments. Continue treatment per established plan of care. to address goals. 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     OBJECTIVE DATA SUMMARY:   Critical Behavior:  Neurologic State: Alert  Orientation Level: Oriented X4  Cognition: Appropriate decision making, Appropriate for age attention/concentration, Appropriate safety awareness, Follows commands  Safety/Judgement: Awareness of environment, Driving appropriateness, Fall prevention, Good awareness of safety precautions, Home safety, Insight into deficits  Functional Mobility Training:  Bed Mobility:  Rolling: Supervision     Sit to Supine: Supervision           Transfers:  Sit to Stand: Stand-by assistance  Stand to Sit: Stand-by assistance                             Balance:  Sitting: Intact  Standing: Impaired  Standing - Static: Fair  Standing - Dynamic : Fair  Ambulation/Gait Training:  Distance (ft): 5 Feet (ft)  Assistive Device: (HHA)  Ambulation - Level of Assistance: Contact guard assistance                 Base of Support: Widened                   Activity Tolerance:   Fair, desaturates with exertion and requires oxygen, requires rest breaks and observed SOB with activity  Please refer to the flowsheet for vital signs taken during this treatment.     After treatment patient left in no apparent distress:   Supine in bed, Call bell within reach and Side rails x 3    COMMUNICATION/COLLABORATION:   The patients plan of care was discussed with: Registered nurse.      All Garcia, PT   Time Calculation: 44 mins

## 2020-05-20 NOTE — PROGRESS NOTES
Nutrition Assessment:    RECOMMENDATIONS/INTERVENTION(S):   1. Continue CHO consistent diet. 2. Continue Glucerna TID (660 kcal, 30 gm protein) to promote adequate PO intake. 3. Monitor BG, PO intakes, GI function, labs, and weight trends. ASSESSMENT:   5/20: F/u. Spoke with pt over phone. On consistent CHO diet. Pt states she ate 1/2 of her pancakes, most of fruit cup, a bite of sausage, and 100% Glucerna shake for breakfast this morning. Is feeling hungry today. Yesterday she states she had nothing to eat all day secondary to being on Bipap. Intakes documented in chart confirm this. Prior to yesterday she states intakes were fair, depending on the food she received. C/o food tasting \"like poison\" previously but today food tastes better. States she is drinking all of the Glucerna shakes. Now on High flow O2. Labs: -201, POC -205-223. Meds: Vit C, statin, Bumex, Ca+/Vit D, lantus, Solumedrol, Humalog, MVI, zinc.      5/14: F/u. COVID-19, spoke with pt over phone. Pt reports appetite improving, able to consume 30% breakfast this AM. Meal intake per EMR shows 15-65% intake. Pt enjoys Glucerna shake, does not like Gelatein. Pt requesting Glucerna with every meal, will order to promote PO intakes. BG/, 196, 181, 167; on insulin regimen. Pt denies n/v. LBM 5/11. Labs - Meds - ascorbic acid, calcium-vitamin D, insulin glargine, insulin lispro, levothyroxine, pantoprazole, multivitamin, zinc sulfate, KCl.     5/11: 68 y/o F admitted with respiratory failure with hypoxia. MST screen >2: recently lost weight without trying, unsure weight loss. PMH - DM, primary hypertension, obesity, depression. COVID-19 +. Charting remotely, spoke with pt over phone. Pt reports poor appetite 2/2 not feeling well and was able to consume <50% breakfast per report. Pt has been experiencing poor appetite/ intake x1 week. Hgb A1c 13.1 (5/11). BG/, 269, 183, 192; on insulin regimen.  Pt typically with good appetite, consuming x3 meals/d without frequent snacking.  lb. BMI 32.87 c/w obesity. Pt reports  lb, experiencing recent 10 lb weight loss. Pt denies n/v. C/o diarrhea. LBM 5/11 - loose. Skin without PI. Pt agreeable to trial ONS to promote adequate PO intakes. Labs - K+ 3.1 L, Ca 7.4 L, Hgb A1c 13.1 (5/11). Meds -  NS 75 ml/hr, ascorbic acid, calcium-vitamin D, insulin lispro, levothyroxine, pantoprazole, multivitamin, zinc sulfate, KCl. Diet Order: Consistent carb  % Eaten:    Patient Vitals for the past 72 hrs:   % Diet Eaten   05/19/20 1822 0 %   05/19/20 1216 0 %   05/19/20 0909 0 %   05/18/20 1237 50 %   05/17/20 1817 70 %   05/17/20 1223 60 %       Pertinent Medications: [x] Reviewed    Labs: [x] Reviewed    Anthropometrics: Height: 5' 4\" (162.6 cm) Weight: 86.3 kg (190 lb 4.8 oz)    IBW (%IBW):   ( ) UBW (%UBW):   (  %)      BMI: Body mass index is 32.66 kg/m². This BMI is indicative of:   [] Underweight    [] Normal    [] Overweight    [x]  Class II Obesity    []  Extreme Obesity (BMI>40)  Estimated Nutrition Needs (Based on): 6788 Kcals/day(REE 1362 x 1.3) , 85 g(69 - 87 gm (0.8 - 1.0 gm/kg)) Protein  Carbohydrate: At Least 130 g/day  Fluids: 1771 mL/day (1 ml/kcal)    Last BM: 5/17  [x]Active     []Hyperactive  []Hypoactive       [] Absent   BS  Skin: No PI    [] Intact   [] Incision  [] Breakdown   [] DTI   [] Tears/Excoriation/Abrasion  [x]Edema - non-pitting generalized [] Other:    Wt Readings from Last 30 Encounters:   05/19/20 86.3 kg (190 lb 4.8 oz)   10/08/19 91.2 kg (201 lb)   02/13/19 89.6 kg (197 lb 8 oz)   12/26/18 90.3 kg (199 lb)   01/20/14 95.3 kg (210 lb)   12/20/13 94.8 kg (209 lb)   11/20/13 95.3 kg (210 lb)   11/13/11 84.8 kg (187 lb)      NUTRITION DIAGNOSES:   Problem:  Inadequate energy intake     Etiology: related to decreased ability to consume sufficient energy     Signs/Symptoms: as evidenced by pt reports poor appetite with <50% meal intake and untintentional 10 lb weight loss       5/14: Nutrition Dx improving.   5/20: Nutrition Dx improving with consumption of 3 Glucerna shakes/day + fair PO. No further weight loss since admission.       NUTRITION INTERVENTIONS:  Meals/Snacks: General/healthful diet   Supplements: Commercial supplement              GOAL:   PO intakes > 50% all meals + ONS within nextx 3-5 days    Cultural, Religion, or Ethnic Dietary Needs: None     EDUCATION & DISCHARGE NEEDS:    [x] None Identified   [] Identified and Education Provided/Documented   [] Identified and Pt declined/was not appropriate      [x] Interdisciplinary Care Plan Reviewed/Documented    [x] Discharge Needs:    CHO consistent diet    [] No Nutrition Related Discharge Needs    NUTRITION RISK:   Pt Is At Nutrition Risk  [x]     No Nutrition Risk Identified  []       PT SEEN FOR:    []  MD Consult: []Calorie Count      []Diabetic Diet Education        []Diet Education     []Electrolyte Management     []General Nutrition Management and Supplements     []Management of Tube Feeding     []TPN Recommendations    []  RN Referral:  []MST score >=2     []Enteral/Parenteral Nutrition PTA     []Pregnant: Gestational DM or Multigestation                 [] Pressure Ulcer    []  Low BMI      []  Length of Stay       [] Dysphagia Diet         [] Ventilator  [x]  Follow-up     Previous Recommendations:   [x] Implemented          [] Not Implemented          [] Not Applicable    Previous Goal:   [] Met              [x] Progressing Towards Goal              [] Not Progressing Towards Goal   [] Not Applicable            Prudencio Mckenna, [address] [facility]  Pager [phone]  Phone [phone]

## 2020-05-20 NOTE — PROGRESS NOTES
Bedside and Verbal shift change report given to Shira (oncoming nurse) by Aurora Reich (offgoing nurse). Report included the following information SBAR, Kardex and Cardiac Rhythm NSR.     1130 patient OOB and in chair. Tolerating well. Encouraged IS.     1200 per Rekha Cote RN note: , updated Family Practice. Orders received to give 10units SSI and recheck BG in 2 hours. 1330 patient lying prone in bed. 1530 patient now finished lying prone in bed, tolerated well today     1600 multiple blood sugar readings in 300s today. Per Dr. Manuel Duty, give 16 units sliding scale and 8 mealtime of humalog, total of 24.     1650 educated patient about insulin and signs of hypoglycemia as patient is getting large amount of fast acting insulin. Patient aware to call RN if she feels any signs of hypoclycemia. 1800 able to wean patient to 30L Hi flow NC today, RR stable, sats in mid 90s    Bedside and Verbal shift change report given to Cayla Chung (oncoming nurse) by Sera Holbrook RN (offgoing nurse). Report included the following information SBAR, Kardex and Cardiac Rhythm NSR.

## 2020-05-20 NOTE — DIABETES MGMT
[facility] SPECIALIST CONSULT    Presentation   Tiffanie Schneider is a 69 y.o. female admitted due to COVID-19 positive and was having worsening shortness of breath. Patient was admitted 5/11/20 to ICU and transferred to stepdown today. PMH is diabetes, hypothyroidism, HTN, neuropathy, LORENZO, obesity, asthma, depression and chronic pain. Patient remains on isolation precautions due to COVID-19.       Patient has known Type 2 diabetes, diagnosed approximately 20 years ago. Home medication regime is Glipizide 10 mg twice a day. A1c 5/11/20: 13.1%     Consulted by Provider for advanced diabetes nursing assessment and care, specifically related to      [x]? Inpatient management strategy     Current clinical course has been uncomplicated. Primary RN indicated patient has been taken off of continuous bipap this morning, called patient's room and discussed home management of diabetes. Diabetes-related medical history  Acute complications  denies  Neurological complications  Peripheral neuropathy  Microvascular disease  denies  Macrovascular disease  denies  Other associated conditions     Depression    Diabetes medication history  Drug class Currently in use Discontinued Never used   Biguanide      DDP-4 inhibitor       Sulfonylurea Glipizide 10 mg twice daily     Thiazolidinedione      GLP-1 RA      SGLT-2 inhibitors      Basal insulin      Bolus insulin        Subjective   I am okay.     Patient reports the following home diabetes self-care practices:  Eating pattern  [x] Breakfast Eggs with cheese and coffee  [x] Lunch  Tuna (no bread)  [x] Dinner  Meat (chicken/salmon/pork) with fresh vegetables or salad  [x] Beverages Tea with sweet and low/ water  Physical activity pattern  Denies an exercise routine at home due to back pain. Monitoring pattern  Patient reports that she has not been checking blood sugars \"in a while\" at home.   Taking medications pattern  [x] Consistent administration  [x] Affordable    Social determinants of health impacting diabetes self-management practices   Patient denies issues or concerns at this time. Objective   Physical exam    Patient is on isolation precautions due to COVID-19. Full PE deferred as assessment was completed via phone call to patient's room. General Alert, oriented. Conversant and cooperative  Vital Signs   Visit Vitals  /54 (BP 1 Location: Right arm, BP Patient Position: At rest)   Pulse 68   Temp 98.3 °F (36.8 °C)   Resp 16   Ht 5' 4\" (1.626 m)   Wt 86.3 kg (190 lb 4.8 oz)   SpO2 93%   BMI 32.66 kg/m²   . Orthostatic BP measurement not indicated      Laboratory  Lab Results   Component Value Date/Time    Hemoglobin A1c 13.1 (H) 05/11/2020 02:31 AM     Lab Results   Component Value Date/Time    LDL, calculated 89 11/04/2019 10:20 AM     Lab Results   Component Value Date/Time    Creatinine 0.74 05/20/2020 03:38 AM     Lab Results   Component Value Date/Time    Sodium 137 05/20/2020 03:38 AM    Potassium 4.0 05/20/2020 03:38 AM    Chloride 102 05/20/2020 03:38 AM    CO2 30 05/20/2020 03:38 AM    Anion gap 5 05/20/2020 03:38 AM    Glucose 201 (H) 05/20/2020 03:38 AM    BUN 16 05/20/2020 03:38 AM    Creatinine 0.74 05/20/2020 03:38 AM    BUN/Creatinine ratio 22 (H) 05/20/2020 03:38 AM    GFR est AA >60 05/20/2020 03:38 AM    GFR est non-AA >60 05/20/2020 03:38 AM    Calcium 8.6 05/20/2020 03:38 AM    Bilirubin, total 0.4 05/20/2020 03:38 AM    AST (SGOT) 41 (H) 05/20/2020 03:38 AM    Alk.  phosphatase 102 05/20/2020 03:38 AM    Protein, total 5.8 (L) 05/20/2020 03:38 AM    Albumin 2.4 (L) 05/20/2020 03:38 AM    Globulin 3.4 05/20/2020 03:38 AM    A-G Ratio 0.7 (L) 05/20/2020 03:38 AM    ALT (SGPT) 75 05/20/2020 03:38 AM     Lab Results   Component Value Date/Time    ALT (SGPT) 75 05/20/2020 03:38 AM       Blood glucose pattern      Evaluation   This 69year old female with known diabetes hasn't achieved inpatient blood glucose target of 140-180mg/dl. Patient started on Solumedrol 40 mg IV every 6 hours 5/18/20. BG spiked to 493 the evening of 5/18/20 after first 2 doses of solumedrol given. BG have ranged 201 - 393 over the past 24 hours.      Basal insulin is in use. Lantus 12 units per day started 5/12/20. Lantus has required adjusting several times over the last week. Lantus 30 units per day is current order. NPH 23 units every 12 hours added 5/19/20 to cover steroids. NPH increased to 28 units every 12 hours starting with evening dose 5/19/20.        Bolus insulin isn't in use. Patient is eating meals. Diabetic CHO consistent diet ordered.      Corrective insulin is in use. Patient has required 29 units corrective insulin in the past 24 hours.      Inpatient blood glucose management has been impacted by     [x]?         Glucocorticoid use    Assessment and Plan   Nursing Diagnosis Risk for unstable blood glucose pattern   Nursing Intervention Domain 0374 Decision-making Support   Nursing Interventions Examined current inpatient diabetes control   Explored factors facilitating and impeding inpatient management  Identified self-management practices impeding diabetes control  Explored corrective strategies with patient and responsible inpatient provider   Informed patient of rational for basal bolus insulin strategy while hospitalized       Recommendations   Use Subcutaneous Insulin Order set (7600):  Basal insulin   Increase Lantus to 34 units (0.4 units/kg/day) per day.       Decrease NPH to 17 units (0.2 units/kg) every 12 hours.       Will continue to follow as adjustments may be needed.      Bolus insulin  Humalog 8 units with meals, hold if patient eats < 50% CHO on meal tray.      Corrective insulin  Correctional Scale for Normal Sensitivity     200-249- 2units Humalog  718-172-3mgbcr Humalog  [id number] Humalog  [id number] Humalog  Over 400- 10units Humalog     Do NOT hold for NPO; give in addition to meal time insulin dose.     If patient does not eat, -give correction dose only.        [x]? Will require adjustments as glucocorticoids are in use      Consider referrals     [x]? Diabetes Self-Management Training through [facility] (Phone [phone] to schedule appointment)    Time Spent     Total time spent with patient: 15 Minutes   I personally reviewed chart, notes, data and current medications in the medical record. I have personally examined and treated the patient at bedside during this period.     Siobhan Sweeney, CNS  Access via [employer]  [phone]

## 2020-05-20 NOTE — PROGRESS NOTES
Due to [address] Pandemic, CM assessment completed via EMR review and collaboration with nursing staff. No contact with patient for this assessment.     Care Management follow up, LOS - 9 days.     Patient admitted for respiratory failure, COVID19+. Hx diabetes type 2, hypothyroid, sleep apnea, asthma, arthritis, peripheral neuropathy, HTN, GERD.     RUR score 19%/moderate risk     Current status  Patient remains on high flow oxygen 60L/min alt with BiPap as needed. High risk for decline.     Transition of Care Plan  1. Monitor patient status and response to treatment. 2. Continues on high flow oxygen/BiPap. 3. Unable to determine discharge needs at this time. 4. Palliative following and helping family with decisions regarding care.   4. CM to follow.     Dick Ervin, RN, MSN/Care manager

## 2020-05-20 NOTE — PROGRESS NOTES
Spiritual Care Assessment/Progress Note  [address]      NAME: Piedad Rowan      MRN: [id number]  AGE: 69 y.o. SEX: female  Amish Affiliation: Religion   Language: English     5/20/2020     Total Time (in minutes): 5     Spiritual Assessment begun in SF 3 PROG CARE TELE 2 through conversation with:         [x]Patient        [] Family    [] Friend(s)        Reason for Consult: Palliative Care, Initial/Spiritual Assessment     Spiritual beliefs: (Please include comment if needed)     [x] Identifies with a juan tradition: Religion        [] Supported by a juan community:            [] Claims no spiritual orientation:           [] Seeking spiritual identity:                [] Adheres to an individual form of spirituality:           [] Not able to assess:                           Identified resources for coping:      [x] Prayer                               [] Music                  [] Guided Imagery     [x] Family/friends                 [] Pet visits     [] Devotional reading                         [] Unknown     [] Other:                                       Interventions offered during this visit: (See comments for more details)    Patient Interventions: Affirmation of emotions/emotional suffering, Coping skills reviewed/reinforced, Prayer (actual)           Plan of Care:     [] Support spiritual and/or cultural needs    [] Support AMD and/or advance care planning process      [] Support grieving process   [] Coordinate Rites and/or Rituals    [] Coordination with community clergy   [] No spiritual needs identified at this time   [] Detailed Plan of Care below (See Comments)  [] Make referral to Music Therapy  [] Make referral to Pet Therapy     [] Make referral to Addiction services  [] Make referral to [facility]  [] Make referral to Spiritual Care Partner  [] No future visits requested        [x] Follow up visits as needed     Comments:  followed up with Mrs. Erika Guzman for a palliative care initial spiritual assessment on the [facility] unit. Mrs. Preet Westfall is on droplet plus precautions. While on the unit,  called into Mrs. Friedman room, introduced herself , and extended support. Mrs Preet Westfall shared that she was doing much better today and felt that she was able to make some progress. She is hopeful she will continued to recover well and that she will be able to get back home. Mrs. Preet Westfall discussed her support systems and shared that her family and [facility] juan have been helpful during this time. Her family has been very supportive and several family members have called to speak with her today. She is looking forward speaking to her son who lives in [place] later tonight.  inquired how she could support Mrs. Harris at this time and she requested the  keep her in prayer. Assurance provided. Mrs. Harris thanked the  for her call and is aware of the 's availability.  will follow up as able and/or needed  iLost. Caren Jenkins.      Paging Service: [phone])

## 2020-05-20 NOTE — PROGRESS NOTES
[facility]  Pulmonary, Critical Care, and Sleep Medicine      Name: Joycelyn Alvarez MRN: [id number]   : 1945 Hospital: [address] [facility]   Date: 2020        IMPRESSION:   · Acute hypoxemic respiratory failure  · COVID19 +. Started on Caitlin pulse , had to stop this on  due to increased O2 requirements. Given actemra 5/15  · Mild intermittent asthma  · Chronic back pain  · DM  · HTN      RECOMMENDATIONS:   · Supplemental O2 as needed to keep sats > 88%. Wean [place] to keep sats >88%; BiPap PRN. Weaned to 50L on HFNC his AM.  · Continue diuresis. Strict I&O. Watch creat and bicarb. · Completed course of Doxy  · Trend inflammatory markers; check tomororw  · Check CXR tomorrow AM  · C/W Solu-Medrol given lack of improvement and inability to wean O2 thus far  · Diligent BG control  · Continue combivent, breo, singulair, zyrtec, mucinex   · Continue vit C, zinc, lipitor  · Continue pepcid  · Encouraged pt to lie prone at least 4 hours per day and to use inc tommy q 1 hr w/a  · OOB to chair  · Palliative care following    Pt is critically ill and at high risk of decompensation  CCT: 30+ minutes     Subjective: This patient has been seen and evaluated at the request of Dr. Kevin Mcgowan for [place] 19 pneumonia and resp failure. Patient is a 69 y.o. female diagnosed one week ago with Covid 19. Pt with fevers, diarrhea, cough, and sob. She has been isolating at home but has had work done within the home and believes that she may have gotten an infection from one of the workers there.  has also become infected. Pt states that her symptoms have remained stable to slightly improved over the past few days but has noted that her oxygen sats have been falling and presented to ED for evaluation. Fevers seem to have resolved. Main symptoms are now diarrhea, sob, and cough.       Pt w h/o asthma which by description appears to be mild intermittent and is followed by Dr Courtney Roberson in [place]. Controlled with montelukast and (rare) albuterol MDI. Never smoker      Interval history:  Afebrile  BP stable  O2 sats 96% on BiPap 18/10 & 100%, just switched to HFNC 60L/100%  WBC 18.3; increased  Fluid balance: -1200    ROS:  Reports that she feels pretty good today; better than yesterday. Coughing up some clear phlegm. Denies fever/chills. No CP, LE pain or swelling. Past Medical History:   Diagnosis Date    Anxiety     Arthritis     Asthma     Chronic pain     back,right hip    Depression     Diabetes ([facility] [address]     type 2    GERD (gastroesophageal reflux disease)     History of vascular access device 05/18/2020    4 Fr midline, 12 cm L basilic, blood draws    Hypercholesteremia     Hypertension     Hypothyroid     Nausea & vomiting     Reflux     Sleep apnea     wears dental appliance- Somnident    Thyroid disease     Unspecified adverse effect of anesthesia     delayed awakening      Past Surgical History:   Procedure Laterality Date    HX COLONOSCOPY  2012    normal repeat 2022, diverticulosis of sigmoid colon, Dr. Robins Ket HX HEENT  1/08/14    bilateral cataract surgery1/8/14    HX KNEE REPLACEMENT      right    HX OTHER SURGICAL  2007    cardiac cath- No CAD found    NEUROLOGICAL PROCEDURE UNLISTED  1999    vascular brain surgery      Prior to Admission medications    Medication Sig Start Date End Date Taking? Authorizing Provider   guaiFENesin ER (Mucinex) 600 mg ER tablet Take 600 mg by mouth two (2) times a day. Yes Provider, Historical   omeprazole (PRILOSEC) 20 mg capsule Take 20 mg by mouth daily. Yes Provider, Historical   calcium-vitamin D (OYSTER SHELL) 500 mg(1,250mg) -200 unit per tablet Take 1 Tab by mouth daily.    Yes Provider, Historical   traZODone (DESYREL) 100 mg tablet TAKE 1 TABLET BY MOUTH EVERY DAY AT NIGHT 3/25/20  Yes Randi Bender, NP   glipiZIDE SR (GLUCOTROL XL) 10 mg CR tablet TAKE 1 TABLET BY MOUTH TWICE A DAY 2/15/20  Yes Bernadette Bender NP   simvastatin (ZOCOR) 10 mg tablet Take 1 Tab by mouth nightly. 10/8/19  Yes Jovon Bender NP   losartan (COZAAR) 50 mg tablet TAKE 1 TABLET BY MOUTH EVERY DAY. 10/8/19  Yes Jovon Bender NP   levothyroxine (SYNTHROID) 100 mcg tablet Take 1 Tab by mouth Daily (before breakfast). 10/8/19  Yes Jovon Bender NP   atenolol (TENORMIN) 25 mg tablet Take 1 Tab by mouth nightly. 10/8/19  Yes Jovon Bender NP   sertraline (ZOLOFT) 50 mg tablet TAKE 1 TABLET BY MOUTH EVERY DAY 9/4/19  Yes Jovon Bender NP   aspirin delayed-release 81 mg tablet Take 81 mg by mouth daily. Yes Provider, Historical   fluticasone (FLONASE) 50 mcg/actuation nasal spray 2 Sprays by Both Nostrils route daily as needed for Rhinitis or Allergies. Yes Provider, Historical   albuterol (PROAIR HFA) 90 mcg/actuation inhaler Take 2 Puffs by inhalation every four (4) hours as needed for Wheezing. Yes Provider, Historical   Cetirizine (ZYRTEC) 10 mg cap Take 1 Tab by mouth every evening. Yes Provider, Historical   multivitamins-minerals-lutein (CENTRUM SILVER) tab Take 1 Tab by mouth daily. Yes Provider, Historical   glucosamine-chondroit-vit c-mn (GLUCOSAMINE CHONDROITIN MAXSTR) 500-400 mg capsule Take 1 Cap by mouth daily. Yes Other, MD [place]   varicella-zoster recombinant, PF, (SHINGRIX, PF,) 50 mcg/0.5 mL susr injection 0.5mL by IntraMUSCular route once now and then repeat in 2-6 months 10/8/19   Rama Persaud NP     Allergies   Allergen Reactions    Latex Rash    Prednisone Anaphylaxis     Throat swelling. Can take methylpredisone po or IV without problems.     Augmentin [Amoxicillin-Pot Clavulanate] Nausea and Vomiting    Biaxin [Clarithromycin] Nausea and Vomiting    Metformin Diarrhea    Parafon Forte Dsc [Chlorzoxazone] Nausea and Vomiting      Social History     Tobacco Use    Smoking status: Never Smoker    Smokeless tobacco: Never Used   Substance Use Topics    Alcohol use: No     Frequency: Never     Comment: 2x year      Family History   Problem Relation Age of Onset    Elevated Lipids Mother     Dementia Mother     Osteoporosis Mother     Lung Disease Father         copd    Delayed Awakening Father     Post-op Nausea/Vomiting Father     Hypertension Sister     Diabetes Sister         type 2    Breast Cancer Maternal Aunt         Current Facility-Administered Medications   Medication Dose Route Frequency    insulin glargine (LANTUS) injection 30 Units  30 Units SubCUTAneous DAILY    methylPREDNISolone (PF) (SOLU-MEDROL) injection 40 mg  40 mg IntraVENous Q6H    And    insulin NPH (NOVOLIN N, HUMULIN N) injection 28 Units  28 Units SubCUTAneous Q12H    insulin lispro (HUMALOG) injection   SubCUTAneous AC&HS    bumetanide (BUMEX) injection 1 mg  1 mg IntraVENous DAILY    guaiFENesin ER (MUCINEX) tablet 1,200 mg  1,200 mg Oral BID    famotidine (PEPCID) tablet 20 mg  20 mg Oral BID    atorvastatin (LIPITOR) tablet 40 mg  40 mg Oral DAILY    zinc sulfate (ZINCATE) 220 (50) mg capsule 1 Cap  1 Cap Oral DAILY    sodium chloride (NS) flush 5-40 mL  5-40 mL IntraVENous Q8H    enoxaparin (LOVENOX) injection 40 mg  40 mg SubCUTAneous Q24H    ascorbic acid (vitamin C) (VITAMIN C) tablet 500 mg  500 mg Oral BID    aspirin delayed-release tablet 81 mg  81 mg Oral DAILY    atenoloL (TENORMIN) tablet 25 mg  25 mg Oral QHS    calcium-vitamin D (OS-TRISTEN) 500 mg-200 unit tablet  1 Tab Oral DAILY    cetirizine (ZYRTEC) tablet 10 mg  10 mg Oral DAILY    therapeutic multivitamin (THERAGRAN) tablet 1 Tab  1 Tab Oral DAILY    levothyroxine (SYNTHROID) tablet 100 mcg  100 mcg Oral ACB    losartan (COZAAR) tablet 50 mg  50 mg Oral DAILY    montelukast (SINGULAIR) tablet 10 mg  10 mg Oral QHS    sertraline (ZOLOFT) tablet 50 mg  50 mg Oral DAILY    traZODone (DESYREL) tablet 100 mg  100 mg Oral QHS    ipratropium-albuterol (COMBIVENT RESPIMAT) 20 mcg-100 mcg inhalation spray  1 Puff Inhalation Q6H RT    fluticasone-vilanterol (BREO ELLIPTA) 200mcg-25mcg/puff  1 Puff Inhalation DAILY    pantoprazole (PROTONIX) tablet 40 mg  40 mg Oral ACB         Objective:   Vital Signs:    Visit Vitals  /54 (BP 1 Location: Right arm, BP Patient Position: At rest)   Pulse 68   Temp 98.3 °F (36.8 °C)   Resp 16   Ht 5' 4\" (1.626 m)   Wt 86.3 kg (190 lb 4.8 oz)   SpO2 96%   BMI 32.66 kg/m²       O2 Device: BIPAP   O2 Flow Rate (L/min): 60 l/min   Temp (24hrs), Av.8 °F (36.6 °C), Min:96.6 °F (35.9 °C), Max:98.6 °F (37 °C)       Intake/Output:   Last shift:      No intake/output data recorded.   Last 3 shifts:  1901 -  0700  In: 1080 [P.O.:780; I.V.:300]  Out: 3450 [Urine:3450]    Intake/Output Summary (Last 24 hours) at 2020 0919  Last data filed at 2020 4942  Gross per 24 hour   Intake 500 ml   Output 1700 ml   Net -1200 ml      Physical Exam:   General:  Alert, cooperative, no distress,    Head:  Normocephalic   Eyes:     Nose: HFNC in place   Throat: Supple   Neck:    Lungs:   diminished   Chest wall:     Heart:  RRR   Abdomen:      Extremities: No edema   Pulses: +2 bilaterally    Skin: Dry   Lymph nodes: No cervical lymphadenopathy   Neurologic: Oriented x 3       Data review:     Recent Results (from the past 24 hour(s))   GLUCOSE, POC    Collection Time: 20 10:14 AM   Result Value Ref Range    Glucose (POC) 385 (H) 65 - 100 mg/dL    Performed by Zeyad Livingston (PCT)    BLOOD GAS, ARTERIAL    Collection Time: 20 10:22 AM   Result Value Ref Range    pH 7.45 7.35 - 7.45      PCO2 37 35.0 - 45.0 mmHg    PO2 84 80 - 100 mmHg    O2 SAT 97 92 - 97 %    BICARBONATE 25 22 - 26 mmol/L    BASE EXCESS 1.4 mmol/L    O2 METHOD BIPAP      FIO2 100 %    SET RATE 4      IPAP/PIP 18      EPAP/CPAP/PEEP 10      Sample source ARTERIAL      SITE RIGHT RADIAL      ANGÉLICA'S TEST YES     GLUCOSE, POC    Collection Time: 20  4:31 PM   Result Value Ref Range    Glucose (POC) 206 (H) 65 - 100 mg/dL    Performed by Amari Barrientos    GLUCOSE, POC    Collection Time: 05/19/20  9:22 PM   Result Value Ref Range    Glucose (POC) 205 (H) 65 - 100 mg/dL    Performed by Haskel Opitz (PCT)    METABOLIC PANEL, COMPREHENSIVE    Collection Time: 05/20/20  3:38 AM   Result Value Ref Range    Sodium 137 136 - 145 mmol/L    Potassium 4.0 3.5 - 5.1 mmol/L    Chloride 102 97 - 108 mmol/L    CO2 30 21 - 32 mmol/L    Anion gap 5 5 - 15 mmol/L    Glucose 201 (H) 65 - 100 mg/dL    BUN 16 6 - 20 MG/DL    Creatinine 0.74 0.55 - 1.02 MG/DL    BUN/Creatinine ratio 22 (H) 12 - 20      GFR est AA >60 >60 ml/min/1.73m2    GFR est non-AA >60 >60 ml/min/1.73m2    Calcium 8.6 8.5 - 10.1 MG/DL    Bilirubin, total 0.4 0.2 - 1.0 MG/DL    ALT (SGPT) 75 12 - 78 U/L    AST (SGOT) 41 (H) 15 - 37 U/L    Alk. phosphatase 102 45 - 117 U/L    Protein, total 5.8 (L) 6.4 - 8.2 g/dL    Albumin 2.4 (L) 3.5 - 5.0 g/dL    Globulin 3.4 2.0 - 4.0 g/dL    A-G Ratio 0.7 (L) 1.1 - 2.2     CBC WITH AUTOMATED DIFF    Collection Time: 05/20/20  3:38 AM   Result Value Ref Range    WBC 18.4 (H) 3.6 - 11.0 K/uL    RBC 3.99 3.80 - 5.20 M/uL    HGB 11.4 (L) 11.5 - 16.0 g/dL    HCT 34.5 (L) 35.0 - 47.0 %    MCV 86.5 80.0 - 99.0 FL    MCH 28.6 26.0 - 34.0 PG    MCHC 33.0 30.0 - 36.5 g/dL    RDW 12.4 11.5 - 14.5 %    PLATELET 847 486 - 359 K/uL    MPV 10.1 8.9 - 12.9 FL    NRBC 0.0 0  WBC    ABSOLUTE NRBC 0.00 0.00 - 0.01 K/uL    NEUTROPHILS 85 (H) 32 - 75 %    LYMPHOCYTES 9 (L) 12 - 49 %    MONOCYTES 4 (L) 5 - 13 %    EOSINOPHILS 0 0 - 7 %    BASOPHILS 0 0 - 1 %    IMMATURE GRANULOCYTES 2 (H) 0.0 - 0.5 %    ABS. NEUTROPHILS 15.7 (H) 1.8 - 8.0 K/UL    ABS. LYMPHOCYTES 1.7 0.8 - 3.5 K/UL    ABS. MONOCYTES 0.7 0.0 - 1.0 K/UL    ABS. EOSINOPHILS 0.0 0.0 - 0.4 K/UL    ABS. BASOPHILS 0.0 0.0 - 0.1 K/UL    ABS. IMM.  GRANS. 0.3 (H) 0.00 - 0.04 K/UL    DF AUTOMATED     MAGNESIUM    Collection Time: 05/20/20  3:38 AM   Result Value Ref Range Magnesium 1.9 1.6 - 2.4 mg/dL   GLUCOSE, POC    Collection Time: 05/20/20  8:26 AM   Result Value Ref Range    Glucose (POC) 223 (H) 65 - 100 mg/dL    Performed by Humphrey Linton        Imaging:  I have personally reviewed the patients radiographs and have reviewed the reports:  CXR 5/18: Stable bilateral infiltrates  CXR 5/19:  No change in bilateral infiltrates        Trudy Seats, NP

## 2020-05-20 NOTE — PROGRESS NOTES
[address] [facility] RESIDENCY PROGRAM  PROGRESS NOTE     5/20/2020  PCP: Nima Umanzor MD       Assessment/Plan:     Ivelisse Carlton is a 69 y.o. female with history of HTN, type 2DM, GERD, hypothyroidism, depression, asthma, and hyperlipidemia who is admitted for AHRF 2/2 COVID 19.     Overnight events: Tele with intermittent sinus arrhythmias, concerning for Mobitz II. AHRF 2/2 COVID 19: COVID+ on 5/4, worsening SOB on admission and new O2 requirement (RA baseline). POA CXR with opacification in lower lobes b/l L>R, ABG pH 7.41, pCO2 31, pO2 64, O2 sat 93, bicarb 19, LA 1.8, S/p outpatient tx of azithro and plaquenil ending 5/10  - Follow COVID inflammatory markers      - Testing every day - Currently flat/improved over last several days  - Bcx NGTD  - Doxy 7 day course completed  - Pulmonology following, appreciate rec's-        -s/p Actemra. Quant-gold, HIV, hepatitis panel NEGATIVE. - Proning  - Zinc, Vit C, Lipitor, Mucinex  - Zyrtec, Breo-Ellipta, Combivent Q6H, Singulair  - Supplemental O2 as needed- If continues to desat on 40L high flow, may need mechanical ventilation       Sinus Arrhythmia- Intermittent on tele, many PACs. POA EKG with Left anterior fascicular block, Left ventricular hypertrophy.  - Discontinue beta blocker  - Monitor Mag and K, Replete prn  - Attempt to capture active arrhythmia on EKG    Type 2 DM: HgA1C 10.4 in 11/2019. This admission A1C 13.1. Decreased PO intake.    - Hold home glipizide  - Lantus 30U and Lispro 8U with meals with SSI ACHS- Resistant scale  - NPH 28 BID     Asthma  - Continue home Ventolin, patient has brought in her home inhaler  - Continue Singulair 10mg qhs     HTN  - Continue home BP med losartan 50mg daily  - HOLDING atenolol 25mg qhs     Hypothyroid: TSH 3.570 in 11/2019  - Continue home synthroid 100mcg daily     Depression  - Continue Zoloft 50mg dialy and trazadone 100mg qhs to help with sleep     GERD  - Will substitute home prilosec for protonix daily     Hypokalemia: RESOLVED. Likely 2/2 diarrhea prior to arrival and diuretics inpatient. K 2.7 on admission.   - Repletion prn     Hyperlipidemia: Lipid panel , , HDL 59, LDL 89 in 11/2019  - Lipitor 40mg daily for additional anti-inflammatory benefit      Vika Murry discussed with Dr. Janee Warren. Subjective:   Pt taking a break from BiPAP this am, tolerating room air. No concerns overnight. Denies cp, n, v, c, d.      Objective:   Physical examination  Patient Vitals for the past 24 hrs:   Temp Pulse Resp BP SpO2   05/20/20 0819 98.3 °F (36.8 °C) 68 16 116/54 96 %   05/20/20 0803     95 %   05/20/20 0801     96 %   05/20/20 0800     96 %   05/20/20 0619  (!) 54 16  95 %   05/20/20 0526  (!) 57 18  95 %   05/20/20 0439  (!) 55 16  96 %   05/20/20 0350     95 %   05/20/20 0343 (!) 96.6 °F (35.9 °C) (!) 51 16 126/55 96 %   05/20/20 0215     94 %   05/20/20 0030     94 %   05/20/20 0000 97.8 °F (36.6 °C) (!) 59 17 97/50 93 %   05/19/20 2324  (!) 55      05/19/20 2300  60 19  91 %   05/19/20 2225  63 18  97 %   05/19/20 2100  65 19  98 %   05/19/20 2000 97.5 °F (36.4 °C) 64 14 126/62 97 %   05/19/20 1959     97 %   05/19/20 1907  66 15  97 %   05/19/20 1845  64 16  96 %   05/19/20 1830  67 16  98 %   05/19/20 1815  65 13  95 %   05/19/20 1800  70 14  97 %   05/19/20 1745  67 18  98 %   05/19/20 1730  68 13  97 %   05/19/20 1715  64 15  97 %   05/19/20 1712     97 %   05/19/20 1700  63 14  97 %   05/19/20 1645  62 14  96 %   05/19/20 1630  64 17  97 %   05/19/20 1615  62 19  96 %   05/19/20 1600 97.8 °F (36.6 °C) 65 21 102/54 95 %   05/19/20 1545  63 15  95 %   05/19/20 1530  63 21  93 %   05/19/20 1526     94 %   05/19/20 1515  62 21  93 %   05/19/20 1502  61      05/19/20 1500  60 21  95 %   05/19/20 1445  71 17  95 %   05/19/20 1415  68 14  93 %   05/19/20 1403  66 19 128/76 92 %   05/19/20 1400  64 21 128/76 93 %   05/19/20 1345  63 22  93 %   05/19/20 1330  64 24  93 %   05/19/20 1315  63 20  93 %   05/19/20 1300  64 19  93 %   05/19/20 1245  62 25  93 %   05/19/20 1230  69 20  95 %   05/19/20 1215  68 17  95 %   05/19/20 1200  67 17 119/65 95 %   05/19/20 1145  68 21  96 %   05/19/20 1140     94 %   05/19/20 1130  62 22  92 %   05/19/20 1115  61 21  91 %   05/19/20 1100  64 20  92 %   05/19/20 1045  63 18  94 %   05/19/20 1030  65 18  95 %   05/19/20 1015  69 18  93 %   05/19/20 1012 98.6 °F (37 °C) 63 19 120/62 94 %   05/19/20 1000  79 19  93 %   05/19/20 0945  67 26  92 %   05/19/20 0930  61 15  93 %   05/19/20 0915  67 22  90 %   05/19/20 0913     92 %   05/19/20 0900  63 11  (!) 87 %   05//20 0845  68 11  (!) 86 %   05/20 0840     (!) 87 %   05/20 0835     (!) 89 %   05//20 0830  (!) 57 25  (!) 89 %      Temp (24hrs), Av.8 °F (36.6 °C), Min:96.6 °F (35.9 °C), Max:98.6 °F (37 °C)     O2 Flow Rate (L/min): 60 l/min   O2 Device: BIPAP    Date 20 07 - 20 0620 07 - 20 0659   Shift 3029-3866 8556-8752 24 Hour Total 2100-1038 5243-7266 24 Hour Total   INTAKE   P.O. 200 100 300        P. O. 200 100 300      I. V.(mL/kg/hr)  200(0.2) 200(0.1)        Volume (doxycycline (VIBRAMYCIN) 100 mg in 0.9% sodium chloride (MBP/ADV) 100 mL)  200 200      Shift Total(mL/kg) 200(2.3) 300(3.5) 500(5.8)      OUTPUT   Urine(mL/kg/hr) 1300(1.3) 400(0.4) 1700(0.8)        Urine Voided 500  500        Urine Output (mL) (External Female Catheter 20)       Shift Total(mL/kg) 1300(15.1) 400(4.6) 1700(19.7)      NET -1100 -100 -1200      Weight (kg) 86.3 86.3 86.3 86.3 86.3 86.3     Pt is currently a COVID infected pt. I did not go in the room to decrease exposure and conserve PE. The attending physician will exam in the pt. Talked on the phone.     General:   Alert, cooperative, mild respiratory distress     Data Review:     CBC:  Recent Labs     05/20/20  0338 05/19/20  0119   WBC 18.4* 10.6   HGB 11.4* 11.1*   HCT 34.5* 33.2*    945     Metabolic Panel:  Recent Labs     05/20/20  0338 05/19/20  0607 05/19/20  0119     --  135*   K 4.0  --  4.6     --  100   CO2 30  --  29   BUN 16  --  19   CREA 0.74  --  0.85   *  --  429*   CA 8.6  --  8.4*   MG 1.9  --   --    ALB 2.4*  --  2.2*   TBILI 0.4  --  0.4   SGOT 41*  --  68*   ALT 75  --  98*   INR  --  1.1 1.1     Micro:  Lab Results   Component Value Date/Time    Culture result: NO GROWTH 6 DAYS 05/13/2020 04:29 AM    Culture result:  05/11/2020 12:35 AM     MRSA NOT PRESENT. Apparent Staphylococus aureus (not MRSA noted). Culture result:  05/11/2020 12:35 AM         Screening of patient nares for MRSA is for surveillance purposes and, if positive, to facilitate isolation considerations in high risk settings. It is not intended for automatic decolonization interventions per se as regimens are not sufficiently effective to warrant routine use. Imaging:  Xr Chest Port    Result Date: 5/10/2020  EXAM: XR CHEST PORT INDICATION: sob, +COVID COMPARISON: 2013 FINDINGS: A portable AP radiograph of the chest was obtained at 2145 hours. The patient is on a cardiac monitor. There is opacification in the lower lobes bilaterally, left greater than right. The cardiac and mediastinal contours and pulmonary vascularity are normal.  The bones and soft tissues are grossly within normal limits. IMPRESSION: Opacification in the lower lobes bilaterally, left greater than right. CXR AP Port: 5/15/2020    FINDINGS:  AP portable upright view of the chest demonstrates a stable  cardiopericardial  silhouette. The lungs are adequately expanded. Increased peripheral parenchymal  opacities greater on the left than on the right. Increased interstitial opacity  as well. . The osseous structures are unremarkable. Patient is on a cardiac  monitor.    IMPRESSION  IMPRESSION:  Interval progression of extensive bilateral parenchymal disease greater on the  left than on the right.     Medications reviewed  Current Facility-Administered Medications   Medication Dose Route Frequency    insulin glargine (LANTUS) injection 30 Units  30 Units SubCUTAneous DAILY    glucose chewable tablet 16 g  4 Tab Oral PRN    glucagon (GLUCAGEN) injection 1 mg  1 mg IntraMUSCular PRN    dextrose 10% infusion 0-250 mL  0-250 mL IntraVENous PRN    methylPREDNISolone (PF) (SOLU-MEDROL) injection 40 mg  40 mg IntraVENous Q6H    And    insulin NPH (NOVOLIN N, HUMULIN N) injection 28 Units  28 Units SubCUTAneous Q12H    insulin lispro (HUMALOG) injection   SubCUTAneous AC&HS    bumetanide (BUMEX) injection 1 mg  1 mg IntraVENous DAILY    guaiFENesin ER (MUCINEX) tablet 1,200 mg  1,200 mg Oral BID    famotidine (PEPCID) tablet 20 mg  20 mg Oral BID    atorvastatin (LIPITOR) tablet 40 mg  40 mg Oral DAILY    zinc sulfate (ZINCATE) 220 (50) mg capsule 1 Cap  1 Cap Oral DAILY    diphenhydrAMINE (BENADRYL) injection 50 mg  50 mg IntraVENous Q4H PRN    acetaminophen (TYLENOL) tablet 650 mg  650 mg Oral Q6H PRN    sodium chloride (NS) flush 5-40 mL  5-40 mL IntraVENous Q8H    sodium chloride (NS) flush 5-40 mL  5-40 mL IntraVENous PRN    ondansetron (ZOFRAN) injection 4 mg  4 mg IntraVENous Q4H PRN    enoxaparin (LOVENOX) injection 40 mg  40 mg SubCUTAneous Q24H    ascorbic acid (vitamin C) (VITAMIN C) tablet 500 mg  500 mg Oral BID    aspirin delayed-release tablet 81 mg  81 mg Oral DAILY    atenoloL (TENORMIN) tablet 25 mg  25 mg Oral QHS    calcium-vitamin D (OS-TRISTEN) 500 mg-200 unit tablet  1 Tab Oral DAILY    cetirizine (ZYRTEC) tablet 10 mg  10 mg Oral DAILY    fluticasone propionate (FLONASE) 50 mcg/actuation nasal spray 2 Spray  2 Spray Both Nostrils DAILY PRN    therapeutic multivitamin (THERAGRAN) tablet 1 Tab  1 Tab Oral DAILY    levothyroxine (SYNTHROID) tablet 100 mcg  100 mcg Oral ACB    losartan (COZAAR) tablet 50 mg  50 mg Oral DAILY    montelukast (SINGULAIR) tablet 10 mg  10 mg Oral QHS    sertraline (ZOLOFT) tablet 50 mg  50 mg Oral DAILY    traZODone (DESYREL) tablet 100 mg  100 mg Oral QHS    ipratropium-albuterol (COMBIVENT RESPIMAT) 20 mcg-100 mcg inhalation spray  1 Puff Inhalation Q6H RT    fluticasone-vilanterol (BREO ELLIPTA) 200mcg-25mcg/puff  1 Puff Inhalation DAILY    albuterol (PROVENTIL HFA, VENTOLIN HFA, PROAIR HFA) inhaler 2 Puff  2 Puff Inhalation Q4H PRN    pantoprazole (PROTONIX) tablet 40 mg  40 mg Oral ACB    sodium chloride (NS) flush 5-10 mL  5-10 mL IntraVENous PRN         Signed:   Celine Tobar MD   Resident, Family Medicine      Attending note: Attending note to follow. ..

## 2020-05-21 ENCOUNTER — APPOINTMENT (OUTPATIENT)
Dept: GENERAL RADIOLOGY | Age: 75
DRG: 177 | End: 2020-05-21
Attending: NURSE PRACTITIONER
Payer: MEDICARE

## 2020-05-21 LAB
ALBUMIN SERPL-MCNC: 2.5 G/DL (ref 3.5–5)
ALBUMIN/GLOB SERPL: 0.7 {RATIO} (ref 1.1–2.2)
ALP SERPL-CCNC: 114 U/L (ref 45–117)
ALT SERPL-CCNC: 87 U/L (ref 12–78)
ANION GAP SERPL CALC-SCNC: 4 MMOL/L (ref 5–15)
AST SERPL-CCNC: 49 U/L (ref 15–37)
BASOPHILS # BLD: 0 K/UL (ref 0–0.1)
BASOPHILS NFR BLD: 0 % (ref 0–1)
BILIRUB SERPL-MCNC: 0.5 MG/DL (ref 0.2–1)
BUN SERPL-MCNC: 30 MG/DL (ref 6–20)
BUN/CREAT SERPL: 33 (ref 12–20)
CALCIUM SERPL-MCNC: 8.4 MG/DL (ref 8.5–10.1)
CHLORIDE SERPL-SCNC: 99 MMOL/L (ref 97–108)
CO2 SERPL-SCNC: 31 MMOL/L (ref 21–32)
CREAT SERPL-MCNC: 0.91 MG/DL (ref 0.55–1.02)
CRP SERPL-MCNC: 0.55 MG/DL (ref 0–0.6)
D DIMER PPP FEU-MCNC: 0.91 MG/L FEU (ref 0–0.65)
DIFFERENTIAL METHOD BLD: ABNORMAL
EOSINOPHIL # BLD: 0 K/UL (ref 0–0.4)
EOSINOPHIL NFR BLD: 0 % (ref 0–7)
ERYTHROCYTE [DISTWIDTH] IN BLOOD BY AUTOMATED COUNT: 12.3 % (ref 11.5–14.5)
FERRITIN SERPL-MCNC: 288 NG/ML (ref 8–252)
GLOBULIN SER CALC-MCNC: 3.7 G/DL (ref 2–4)
GLUCOSE BLD STRIP.AUTO-MCNC: 177 MG/DL (ref 65–100)
GLUCOSE BLD STRIP.AUTO-MCNC: 214 MG/DL (ref 65–100)
GLUCOSE BLD STRIP.AUTO-MCNC: 253 MG/DL (ref 65–100)
GLUCOSE BLD STRIP.AUTO-MCNC: 281 MG/DL (ref 65–100)
GLUCOSE SERPL-MCNC: 255 MG/DL (ref 65–100)
HCT VFR BLD AUTO: 35.1 % (ref 35–47)
HGB BLD-MCNC: 11.8 G/DL (ref 11.5–16)
IMM GRANULOCYTES # BLD AUTO: 0.3 K/UL (ref 0–0.04)
IMM GRANULOCYTES NFR BLD AUTO: 2 % (ref 0–0.5)
LDH SERPL L TO P-CCNC: 304 U/L (ref 81–246)
LYMPHOCYTES # BLD: 1.7 K/UL (ref 0.8–3.5)
LYMPHOCYTES NFR BLD: 9 % (ref 12–49)
MAGNESIUM SERPL-MCNC: 1.9 MG/DL (ref 1.6–2.4)
MCH RBC QN AUTO: 28.9 PG (ref 26–34)
MCHC RBC AUTO-ENTMCNC: 33.6 G/DL (ref 30–36.5)
MCV RBC AUTO: 85.8 FL (ref 80–99)
MONOCYTES # BLD: 0.7 K/UL (ref 0–1)
MONOCYTES NFR BLD: 4 % (ref 5–13)
NEUTS SEG # BLD: 15.5 K/UL (ref 1.8–8)
NEUTS SEG NFR BLD: 85 % (ref 32–75)
NRBC # BLD: 0 K/UL (ref 0–0.01)
NRBC BLD-RTO: 0 PER 100 WBC
PLATELET # BLD AUTO: 354 K/UL (ref 150–400)
PMV BLD AUTO: 10.3 FL (ref 8.9–12.9)
POTASSIUM SERPL-SCNC: 3.9 MMOL/L (ref 3.5–5.1)
PROCALCITONIN SERPL-MCNC: 0.1 NG/ML
PROT SERPL-MCNC: 6.2 G/DL (ref 6.4–8.2)
RBC # BLD AUTO: 4.09 M/UL (ref 3.8–5.2)
SERVICE CMNT-IMP: ABNORMAL
SODIUM SERPL-SCNC: 134 MMOL/L (ref 136–145)
WBC # BLD AUTO: 18.3 K/UL (ref 3.6–11)

## 2020-05-21 PROCEDURE — 97116 GAIT TRAINING THERAPY: CPT

## 2020-05-21 PROCEDURE — 80053 COMPREHEN METABOLIC PANEL: CPT

## 2020-05-21 PROCEDURE — 74011250636 HC RX REV CODE- 250/636: Performed by: STUDENT IN AN ORGANIZED HEALTH CARE EDUCATION/TRAINING PROGRAM

## 2020-05-21 PROCEDURE — 77030038269 HC DRN EXT URIN PURWCK BARD -A

## 2020-05-21 PROCEDURE — 94640 AIRWAY INHALATION TREATMENT: CPT

## 2020-05-21 PROCEDURE — 74011636637 HC RX REV CODE- 636/637: Performed by: STUDENT IN AN ORGANIZED HEALTH CARE EDUCATION/TRAINING PROGRAM

## 2020-05-21 PROCEDURE — 65660000000 HC RM CCU STEPDOWN

## 2020-05-21 PROCEDURE — 74011250637 HC RX REV CODE- 250/637: Performed by: STUDENT IN AN ORGANIZED HEALTH CARE EDUCATION/TRAINING PROGRAM

## 2020-05-21 PROCEDURE — 82728 ASSAY OF FERRITIN: CPT

## 2020-05-21 PROCEDURE — 86140 C-REACTIVE PROTEIN: CPT

## 2020-05-21 PROCEDURE — 97530 THERAPEUTIC ACTIVITIES: CPT

## 2020-05-21 PROCEDURE — 77010033678 HC OXYGEN DAILY

## 2020-05-21 PROCEDURE — 82962 GLUCOSE BLOOD TEST: CPT

## 2020-05-21 PROCEDURE — 85379 FIBRIN DEGRADATION QUANT: CPT

## 2020-05-21 PROCEDURE — 77010033711 HC HIGH FLOW OXYGEN

## 2020-05-21 PROCEDURE — 74011250637 HC RX REV CODE- 250/637: Performed by: INTERNAL MEDICINE

## 2020-05-21 PROCEDURE — 71045 X-RAY EXAM CHEST 1 VIEW: CPT

## 2020-05-21 PROCEDURE — 85025 COMPLETE CBC W/AUTO DIFF WBC: CPT

## 2020-05-21 PROCEDURE — 74011250636 HC RX REV CODE- 250/636: Performed by: NURSE PRACTITIONER

## 2020-05-21 PROCEDURE — 83615 LACTATE (LD) (LDH) ENZYME: CPT

## 2020-05-21 PROCEDURE — 83735 ASSAY OF MAGNESIUM: CPT

## 2020-05-21 PROCEDURE — 36591 DRAW BLOOD OFF VENOUS DEVICE: CPT

## 2020-05-21 PROCEDURE — 83520 IMMUNOASSAY QUANT NOS NONAB: CPT

## 2020-05-21 PROCEDURE — 74011250636 HC RX REV CODE- 250/636: Performed by: INTERNAL MEDICINE

## 2020-05-21 PROCEDURE — 74011000250 HC RX REV CODE- 250: Performed by: STUDENT IN AN ORGANIZED HEALTH CARE EDUCATION/TRAINING PROGRAM

## 2020-05-21 PROCEDURE — 94660 CPAP INITIATION&MGMT: CPT

## 2020-05-21 PROCEDURE — 84145 PROCALCITONIN (PCT): CPT

## 2020-05-21 RX ORDER — INSULIN GLARGINE 100 [IU]/ML
34 INJECTION, SOLUTION SUBCUTANEOUS DAILY
Status: DISCONTINUED | OUTPATIENT
Start: 2020-05-21 | End: 2020-05-21

## 2020-05-21 RX ORDER — DEXTROSE 50 % IN WATER (D50W) INTRAVENOUS SYRINGE
12.5-25 AS NEEDED
Status: DISCONTINUED | OUTPATIENT
Start: 2020-05-21 | End: 2020-05-30 | Stop reason: HOSPADM

## 2020-05-21 RX ORDER — INSULIN LISPRO 100 [IU]/ML
12 INJECTION, SOLUTION INTRAVENOUS; SUBCUTANEOUS
Status: DISCONTINUED | OUTPATIENT
Start: 2020-05-21 | End: 2020-05-24

## 2020-05-21 RX ORDER — INSULIN GLARGINE 100 [IU]/ML
36 INJECTION, SOLUTION SUBCUTANEOUS DAILY
Status: DISCONTINUED | OUTPATIENT
Start: 2020-05-21 | End: 2020-05-24

## 2020-05-21 RX ADMIN — PANTOPRAZOLE SODIUM 40 MG: 40 TABLET, DELAYED RELEASE ORAL at 08:28

## 2020-05-21 RX ADMIN — INSULIN LISPRO 12 UNITS: 100 INJECTION, SOLUTION INTRAVENOUS; SUBCUTANEOUS at 08:27

## 2020-05-21 RX ADMIN — INSULIN LISPRO 12 UNITS: 100 INJECTION, SOLUTION INTRAVENOUS; SUBCUTANEOUS at 17:37

## 2020-05-21 RX ADMIN — SERTRALINE HYDROCHLORIDE 50 MG: 50 TABLET ORAL at 08:28

## 2020-05-21 RX ADMIN — METHYLPREDNISOLONE SODIUM SUCCINATE 30 MG: 40 INJECTION, POWDER, FOR SOLUTION INTRAMUSCULAR; INTRAVENOUS at 11:44

## 2020-05-21 RX ADMIN — IPRATROPIUM BROMIDE AND ALBUTEROL 1 PUFF: 20; 100 SPRAY, METERED RESPIRATORY (INHALATION) at 21:43

## 2020-05-21 RX ADMIN — FLUTICASONE FUROATE AND VILANTEROL TRIFENATATE 1 PUFF: 200; 25 POWDER RESPIRATORY (INHALATION) at 08:09

## 2020-05-21 RX ADMIN — INSULIN LISPRO 4 UNITS: 100 INJECTION, SOLUTION INTRAVENOUS; SUBCUTANEOUS at 17:41

## 2020-05-21 RX ADMIN — METHYLPREDNISOLONE SODIUM SUCCINATE 40 MG: 40 INJECTION, POWDER, FOR SOLUTION INTRAMUSCULAR; INTRAVENOUS at 00:10

## 2020-05-21 RX ADMIN — GUAIFENESIN 1200 MG: 600 TABLET, EXTENDED RELEASE ORAL at 21:41

## 2020-05-21 RX ADMIN — INSULIN GLARGINE 36 UNITS: 100 INJECTION, SOLUTION SUBCUTANEOUS at 08:26

## 2020-05-21 RX ADMIN — Medication 10 ML: at 00:11

## 2020-05-21 RX ADMIN — OYSTER SHELL CALCIUM WITH VITAMIN D 1 TABLET: 500; 200 TABLET, FILM COATED ORAL at 08:27

## 2020-05-21 RX ADMIN — INSULIN LISPRO 7 UNITS: 100 INJECTION, SOLUTION INTRAVENOUS; SUBCUTANEOUS at 11:47

## 2020-05-21 RX ADMIN — Medication 10 ML: at 08:28

## 2020-05-21 RX ADMIN — Medication 10 ML: at 21:42

## 2020-05-21 RX ADMIN — THERA TABS 1 TABLET: TAB at 08:28

## 2020-05-21 RX ADMIN — HUMAN INSULIN 24 UNITS: 100 INJECTION, SUSPENSION SUBCUTANEOUS at 17:38

## 2020-05-21 RX ADMIN — Medication 10 ML: at 11:47

## 2020-05-21 RX ADMIN — GUAIFENESIN 1200 MG: 600 TABLET, EXTENDED RELEASE ORAL at 08:28

## 2020-05-21 RX ADMIN — LOSARTAN POTASSIUM 50 MG: 50 TABLET, FILM COATED ORAL at 08:28

## 2020-05-21 RX ADMIN — OXYCODONE HYDROCHLORIDE AND ACETAMINOPHEN 500 MG: 500 TABLET ORAL at 22:04

## 2020-05-21 RX ADMIN — INSULIN HUMAN 28 UNITS: 100 INJECTION, SUSPENSION SUBCUTANEOUS at 08:26

## 2020-05-21 RX ADMIN — ENOXAPARIN SODIUM 40 MG: 40 INJECTION SUBCUTANEOUS at 21:41

## 2020-05-21 RX ADMIN — IPRATROPIUM BROMIDE AND ALBUTEROL 1 PUFF: 20; 100 SPRAY, METERED RESPIRATORY (INHALATION) at 16:01

## 2020-05-21 RX ADMIN — METHYLPREDNISOLONE SODIUM SUCCINATE 40 MG: 40 INJECTION, POWDER, FOR SOLUTION INTRAMUSCULAR; INTRAVENOUS at 07:33

## 2020-05-21 RX ADMIN — ASPIRIN 81 MG: 81 TABLET, COATED ORAL at 08:28

## 2020-05-21 RX ADMIN — IPRATROPIUM BROMIDE AND ALBUTEROL 1 PUFF: 20; 100 SPRAY, METERED RESPIRATORY (INHALATION) at 08:08

## 2020-05-21 RX ADMIN — INSULIN LISPRO 12 UNITS: 100 INJECTION, SOLUTION INTRAVENOUS; SUBCUTANEOUS at 11:47

## 2020-05-21 RX ADMIN — CETIRIZINE HYDROCHLORIDE 10 MG: 10 TABLET, FILM COATED ORAL at 08:27

## 2020-05-21 RX ADMIN — METHYLPREDNISOLONE SODIUM SUCCINATE 30 MG: 40 INJECTION, POWDER, FOR SOLUTION INTRAMUSCULAR; INTRAVENOUS at 17:38

## 2020-05-21 RX ADMIN — ZINC SULFATE 220 MG (50 MG) CAPSULE 1 CAPSULE: CAPSULE at 08:27

## 2020-05-21 RX ADMIN — FAMOTIDINE 20 MG: 20 TABLET, FILM COATED ORAL at 21:41

## 2020-05-21 RX ADMIN — ATORVASTATIN CALCIUM 40 MG: 20 TABLET, FILM COATED ORAL at 08:28

## 2020-05-21 RX ADMIN — FAMOTIDINE 20 MG: 20 TABLET, FILM COATED ORAL at 08:28

## 2020-05-21 RX ADMIN — TRAZODONE HYDROCHLORIDE 100 MG: 100 TABLET ORAL at 21:41

## 2020-05-21 RX ADMIN — INSULIN LISPRO 7 UNITS: 100 INJECTION, SOLUTION INTRAVENOUS; SUBCUTANEOUS at 08:27

## 2020-05-21 RX ADMIN — OXYCODONE HYDROCHLORIDE AND ACETAMINOPHEN 500 MG: 500 TABLET ORAL at 08:27

## 2020-05-21 RX ADMIN — MONTELUKAST SODIUM 10 MG: 10 TABLET, FILM COATED ORAL at 21:41

## 2020-05-21 RX ADMIN — BUMETANIDE 1 MG: 0.25 INJECTION INTRAMUSCULAR; INTRAVENOUS at 08:26

## 2020-05-21 RX ADMIN — LEVOTHYROXINE SODIUM 100 MCG: 0.1 TABLET ORAL at 08:28

## 2020-05-21 RX ADMIN — IPRATROPIUM BROMIDE AND ALBUTEROL 1 PUFF: 20; 100 SPRAY, METERED RESPIRATORY (INHALATION) at 03:40

## 2020-05-21 NOTE — PROGRESS NOTES
Bedside and Verbal shift change report given to Tatyana 46 (oncoming nurse) by Dottie Whitley RN  (offgoing nurse). Report included the following information SBAR, Kardex, Intake/Output, Accordion and Recent Results. SHIFT REPORT:      Bedside and Verbal shift change report given to Agustin [address] (oncoming nurse) by Baird Schilder RN  (offgoing nurse). Report included the following information SBAR, Kardex, Intake/Output, Accordion and Recent Results.

## 2020-05-21 NOTE — PROGRESS NOTES
Due to [address] Pandemic, CM assessment completed via EMR review and collaboration with nursing staff.  No contact with patient for this assessment.     Care Management follow up, LOS - 10 days.     Patient admitted for respiratory failure, COVID19+. Hx diabetes type 2, hypothyroid, sleep apnea, asthma, arthritis, peripheral neuropathy, HTN, GERD.     RUR score 23%/moderate risk     Current status  Patient remains on high flow oxygen 60L/min alt with BiPap as needed. High risk for decline.     Transition of Care Plan  1. Monitor patient status and response to treatment. 2. Continues on high flow oxygen/BiPap. 3. Unable to determine discharge needs at this time. 4. Palliative following and helping family with decisions regarding care.   4. CM to follow.     Janessa Iniguez, RN, MSN/Care manager

## 2020-05-21 NOTE — PROGRESS NOTES
Bedside and Verbal shift change report given to Johnson (oncoming nurse) by Soheila Davey (offgoing nurse). Report included the following information SBAR, Kardex, Intake/Output, MAR, Recent Results and Cardiac Rhythm NS.

## 2020-05-21 NOTE — DIABETES MGMT
[facility] SPECIALIST   Follow-up Progress Note    Presentation   Ann Daly a 69 y. o. female admitted due to COVID-19 positive and was having worsening shortness of breath.  Patient was admitted 5/11/20 to ICU and transferred to stepdown today.  PMH is diabetes, hypothyroidism, HTN, neuropathy, LORENZO, obesity, asthma, depression and chronic pain.  Patient remains on isolation precautions due to COVID-19.       Patient has known Type 2 diabetes, diagnosed approximately 20 years ago.  Home medication regime is Glipizide 10 mg twice a day.  A1c 5/11/20: 13.1%     Consulted by Provider for advanced specialty nursing care related to inpatient diabetes management. Hyperglycemia management order set is in place. Subjective   \"My blood sugars are still up and down but at least I haven't gotten back to 500 again. \"    Patient remains on isolation precautions due to COVID-19 positive. Called patient's phone in room. Objective   Physical exam    General Alert, oriented. Conversant and cooperative  Vital Signs   Visit Vitals  /53   Pulse 61   Temp 98.7 °F (37.1 °C)   Resp 20   Ht 5' 4\" (1.626 m)   Wt 86.3 kg (190 lb 4.8 oz)   SpO2 94%   BMI 32.66 kg/m²     Physical Exam deferred due to patient on isolation precautions for COVID-19.      Laboratory  Recent Results (from the past 24 hour(s))   GLUCOSE, POC    Collection Time: 05/20/20 11:32 AM   Result Value Ref Range    Glucose (POC) 410 (H) 65 - 100 mg/dL    Performed by Janessa Leary, POC    Collection Time: 05/20/20  2:04 PM   Result Value Ref Range    Glucose (POC) 324 (H) 65 - 100 mg/dL    Performed by Janessa Leary, POC    Collection Time: 05/20/20  3:47 PM   Result Value Ref Range    Glucose (POC) 358 (H) 65 - 100 mg/dL    Performed by Johnson Miller    GLUCOSE, POC    Collection Time: 05/20/20  9:37 PM   Result Value Ref Range    Glucose (POC) 221 (H) 65 - 100 mg/dL    Performed by Parul Rausch    CBC WITH AUTOMATED DIFF    Collection Time: 05/21/20  3:54 AM   Result Value Ref Range    WBC 18.3 (H) 3.6 - 11.0 K/uL    RBC 4.09 3.80 - 5.20 M/uL    HGB 11.8 11.5 - 16.0 g/dL    HCT 35.1 35.0 - 47.0 %    MCV 85.8 80.0 - 99.0 FL    MCH 28.9 26.0 - 34.0 PG    MCHC 33.6 30.0 - 36.5 g/dL    RDW 12.3 11.5 - 14.5 %    PLATELET 114 348 - 122 K/uL    MPV 10.3 8.9 - 12.9 FL    NRBC 0.0 0  WBC    ABSOLUTE NRBC 0.00 0.00 - 0.01 K/uL    NEUTROPHILS 85 (H) 32 - 75 %    LYMPHOCYTES 9 (L) 12 - 49 %    MONOCYTES 4 (L) 5 - 13 %    EOSINOPHILS 0 0 - 7 %    BASOPHILS 0 0 - 1 %    IMMATURE GRANULOCYTES 2 (H) 0.0 - 0.5 %    ABS. NEUTROPHILS 15.5 (H) 1.8 - 8.0 K/UL    ABS. LYMPHOCYTES 1.7 0.8 - 3.5 K/UL    ABS. MONOCYTES 0.7 0.0 - 1.0 K/UL    ABS. EOSINOPHILS 0.0 0.0 - 0.4 K/UL    ABS. BASOPHILS 0.0 0.0 - 0.1 K/UL    ABS. IMM.  GRANS. 0.3 (H) 0.00 - 0.04 K/UL    DF AUTOMATED     D DIMER    Collection Time: 05/21/20  3:54 AM   Result Value Ref Range    D-dimer 0.91 (H) 0.00 - 0.65 mg/L FEU   FERRITIN    Collection Time: 05/21/20  3:54 AM   Result Value Ref Range    Ferritin 288 (H) 8 - 252 NG/ML   MAGNESIUM    Collection Time: 05/21/20  3:54 AM   Result Value Ref Range    Magnesium 1.9 1.6 - 2.4 mg/dL   C REACTIVE PROTEIN, QT    Collection Time: 05/21/20  3:54 AM   Result Value Ref Range    C-Reactive protein 0.55 0.00 - 0.60 mg/dL   LD    Collection Time: 05/21/20  3:54 AM   Result Value Ref Range     (H) 81 - 024 U/L   METABOLIC PANEL, COMPREHENSIVE    Collection Time: 05/21/20  3:54 AM   Result Value Ref Range    Sodium 134 (L) 136 - 145 mmol/L    Potassium 3.9 3.5 - 5.1 mmol/L    Chloride 99 97 - 108 mmol/L    CO2 31 21 - 32 mmol/L    Anion gap 4 (L) 5 - 15 mmol/L    Glucose 255 (H) 65 - 100 mg/dL    BUN 30 (H) 6 - 20 MG/DL    Creatinine 0.91 0.55 - 1.02 MG/DL    BUN/Creatinine ratio 33 (H) 12 - 20      GFR est AA >60 >60 ml/min/1.73m2    GFR est non-AA >60 >60 ml/min/1.73m2    Calcium 8.4 (L) 8.5 - 10.1 MG/DL    Bilirubin, total 0.5 0.2 - 1.0 MG/DL    ALT (SGPT) 87 (H) 12 - 78 U/L    AST (SGOT) 49 (H) 15 - 37 U/L    Alk. phosphatase 114 45 - 117 U/L    Protein, total 6.2 (L) 6.4 - 8.2 g/dL    Albumin 2.5 (L) 3.5 - 5.0 g/dL    Globulin 3.7 2.0 - 4.0 g/dL    A-G Ratio 0.7 (L) 1.1 - 2.2     PROCALCITONIN    Collection Time: 05/21/20  3:54 AM   Result Value Ref Range    Procalcitonin 0.10 ng/mL   GLUCOSE, POC    Collection Time: 05/21/20  7:35 AM   Result Value Ref Range    Glucose (POC) 281 (H) 65 - 100 mg/dL    Performed by Katya Pascual         Blood glucose pattern        Evaluation   MDKF 9 year old female with known diabetes hasn't achieved inpatient blood glucose target of 140-180mg/dl. Patient started on Solumedrol 40 mg IV every 6 hours 5/18/20.  BG spiked to 493 the evening of 5/18/20 after first 2 doses of solumedrol given.  BG have ranged 221 - 410 over the past 24 hours.      Basal insulin is in use.  Lantus 12 units per day started 5/12/20.  Lantus has required adjusting several times over the last week.  Lantus 36 units per day is current order, first dose given this morning 5/21/20.  NPH 23 units every 12 hours added 5/19/20 to cover steroids. NPH increased to 28 units every 12 hours starting with evening dose 5/19/20.       Bolus insulin is in use. Humalog 12 units with meals ordered. Patient is eating meals.  Diabetic CHO consistent diet ordered.      Corrective insulin is in use.  Patient has required 39 units corrective insulin in the past 24 hours.      Inpatient blood glucose management has been impacted by     [x]? ?        Glucocorticoid use    Assessment and Plan   Nursing Diagnosis Risk for unstable blood glucose pattern   Nursing Intervention Domain 9147 Decision-making Support   Nursing Interventions Examined current inpatient diabetes control   Explored factors facilitating and impeding inpatient management  Identified self-management practices impeding diabetes control  Explored corrective strategies with patient and responsible inpatient provider   Informed patient of rational for basal bolus insulin strategy while hospitalized       Recommendations     Continue current diabetes medication regime. Will continue to follow as patient may require adjustments.         [x]? ?        Will require adjustments as glucocorticoids are in use      Consider referrals     [x]? ?        Diabetes Self-Management Training through [facility] (Phone [phone] QM schedule appointment)       Time Spent     Total time spent with patient: 15 Minutes   I personally reviewed chart, notes, data and current medications in the medical record. I have personally examined and treated the patient at bedside during this period.      Andi Ching, CNS  Access via "[employer]

## 2020-05-21 NOTE — PROGRESS NOTES
[address] [facility] RESIDENCY PROGRAM  PROGRESS NOTE     5/21/2020  PCP: Cassandra Feliz MD       Assessment/Plan:     Migel Burleson is a 69 y.o. female with history of HTN, type 2DM, GERD, hypothyroidism, depression, asthma, and hyperlipidemia who is admitted for AHRF 2/2 COVID 19.     Overnight events: Tele with occasional PACs. CXR improved      AHRF 2/2 COVID 19: COVID+ on 5/4, worsening SOB on admission and new O2 requirement (RA baseline). POA CXR with opacification in lower lobes b/l L>R, ABG pH 7.41, pCO2 31, pO2 64, O2 sat 93, bicarb 19, LA 1.8, S/p outpatient tx of azithro and plaquenil ending 5/10  - Follow COVID inflammatory markers      - Testing every day - Much improved  - Bcx NGTD  - Doxy 7 day course completed  - Pulmonology following, appreciate rec's-        -s/p Actemra. Quant-gold, HIV, hepatitis panel NEGATIVE. - Proning  - Zinc, Vit C, Lipitor, Mucinex  - Zyrtec, Breo-Ellipta, Combivent Q6H, Singulair  - Supplemental O2 as needed- on 60L high flow       Sinus Arrhythmia- Intermittent on tele, many PACs, leading to bradycardia. POA EKG with Left anterior fascicular block, Left ventricular hypertrophy.  - Discontinued beta blocker  - Monitor Mag and K, Replete prn    Type 2 DM: HgA1C 10.4 in 11/2019. This admission A1C 13.1. Decreased PO intake. - Hold home glipizide  - Lantus 34U and Lispro 12U with meals with SSI ACHS- Resistant scale  - NPH 28 BID     Asthma  - Continue Singulair 10mg qhs     HTN  - Continue home BP med losartan 50mg daily  - HOLDING atenolol 25mg qhs     Hypothyroid: TSH 3.570 in 11/2019  - Continue home synthroid 100mcg daily     Depression  - Continue Zoloft 50mg dialy and trazadone 100mg qhs to help with sleep     GERD  - Will substitute home prilosec for protonix daily     Hypokalemia: RESOLVED.  Likely 2/2 diarrhea prior to arrival and diuretics inpatient. K 2.7 on admission.   - Repletion prn     Hyperlipidemia: Lipid panel , , HDL 59, LDL 89 in 2019  - Lipitor 40mg daily for additional anti-inflammatory benefit      Breonna Fox discussed with Dr. Cori Nolan. Subjective:   Pt states doing well this am but still tired. Feels hungry and has been eating well. No concerns overnight. Denies cp, n, v, c, d. Objective:   Physical examination  Patient Vitals for the past 24 hrs:   Temp Pulse Resp BP SpO2   20 0809     94 %   20 0732 98.7 °F (37.1 °C) 61 20 118/53 95 %   20 0700  61      20 0339 98.7 °F (37.1 °C) (!) 59 12 116/61 94 %   20 0009 98.9 °F (37.2 °C) 68 14 93/45 94 %   20 2300  67      20 2116 99.8 °F (37.7 °C) 69 13 124/61 92 %   20 1640 99.1 °F (37.3 °C) 81 18 123/71 94 %   20 1502  82      20 1453     91 %   20 1149 99 °F (37.2 °C) 67 18 129/72 92 %   20 1109     93 %      Temp (24hrs), Av °F (37.2 °C), Min:98.7 °F (37.1 °C), Max:99.8 °F (37.7 °C)     O2 Flow Rate (L/min): 60 l/min   O2 Device: Hi flow nasal cannula    Date 20 - 20 07 - 20 0659   Shift 6089-3701 6666-3425 24 Hour Total 1160-8820 2693-2643 24 Hour Total   INTAKE   P.O. 1560  1560        P.O. 1560  1560      Shift Total(mL/kg) 0032(95.2)  3679(01.8)      OUTPUT   Urine(mL/kg/hr) 1050(1) 500(0.5) 1550(0.7)        Urine Voided 450 500 950        Urine Output (mL) (External Female Catheter 20) 600  600      Shift Total(mL/kg) 0179(53.8) 500(5.8) 1550(18)       -500 10      Weight (kg) 86.3 86.3 86.3 86.3 86.3 86.3     Pt is currently a COVID infected pt. I did not go in the room to decrease exposure and conserve PPE. The attending physician will exam in the pt. Talked on the phone.     General:   Alert, cooperative, mild respiratory distress     Data Review:     CBC:  Recent Labs     20  0354 20  0338 20  0119   WBC 18.3* 18.4* 10.6   HGB 11.8 11.4* 11.1*   HCT 35.1 34.5* 33.2*    786 872     Metabolic Panel:  Recent Labs     05/21/20  0354 05/20/20  0338 05/19/20  0607 05/19/20  0119   * 137  --  135*   K 3.9 4.0  --  4.6   CL 99 102  --  100   CO2 31 30  --  29   BUN 30* 16  --  19   CREA 0.91 0.74  --  0.85   * 201*  --  429*   CA 8.4* 8.6  --  8.4*   MG 1.9 1.9  --   --    ALB 2.5* 2.4*  --  2.2*   TBILI 0.5 0.4  --  0.4   SGOT 49* 41*  --  68*   ALT 87* 75  --  98*   INR  --   --  1.1 1.1     Micro:  Lab Results   Component Value Date/Time    Culture result: NO GROWTH 6 DAYS 05/13/2020 04:29 AM    Culture result:  05/11/2020 12:35 AM     MRSA NOT PRESENT. Apparent Staphylococus aureus (not MRSA noted). Culture result:  05/11/2020 12:35 AM         Screening of patient nares for MRSA is for surveillance purposes and, if positive, to facilitate isolation considerations in high risk settings. It is not intended for automatic decolonization interventions per se as regimens are not sufficiently effective to warrant routine use. Imaging:  Xr Chest Port    Result Date: 5/10/2020  EXAM: XR CHEST PORT INDICATION: sob, +COVID COMPARISON: 2013 FINDINGS: A portable AP radiograph of the chest was obtained at 2145 hours. The patient is on a cardiac monitor. There is opacification in the lower lobes bilaterally, left greater than right. The cardiac and mediastinal contours and pulmonary vascularity are normal.  The bones and soft tissues are grossly within normal limits. IMPRESSION: Opacification in the lower lobes bilaterally, left greater than right. CXR AP Port: 5/15/2020    FINDINGS:  AP portable upright view of the chest demonstrates a stable  cardiopericardial  silhouette. The lungs are adequately expanded. Increased peripheral parenchymal  opacities greater on the left than on the right. Increased interstitial opacity  as well. . The osseous structures are unremarkable.  Patient is on a cardiac  monitor.      IMPRESSION  IMPRESSION:  Interval progression of extensive bilateral parenchymal disease greater on the  left than on the right.     Medications reviewed  Current Facility-Administered Medications   Medication Dose Route Frequency    dextrose (D50W) injection syrg 12.5-25 g  12.5-25 g IntraVENous PRN    insulin lispro (HUMALOG) injection 12 Units  12 Units SubCUTAneous TIDAC    insulin glargine (LANTUS) injection 36 Units  36 Units SubCUTAneous DAILY    glucose chewable tablet 16 g  4 Tab Oral PRN    glucagon (GLUCAGEN) injection 1 mg  1 mg IntraMUSCular PRN    methylPREDNISolone (PF) (SOLU-MEDROL) injection 40 mg  40 mg IntraVENous Q6H    And    insulin NPH (NOVOLIN N, HUMULIN N) injection 28 Units  28 Units SubCUTAneous Q12H    insulin lispro (HUMALOG) injection   SubCUTAneous AC&HS    bumetanide (BUMEX) injection 1 mg  1 mg IntraVENous DAILY    guaiFENesin ER (MUCINEX) tablet 1,200 mg  1,200 mg Oral BID    famotidine (PEPCID) tablet 20 mg  20 mg Oral BID    atorvastatin (LIPITOR) tablet 40 mg  40 mg Oral DAILY    zinc sulfate (ZINCATE) 220 (50) mg capsule 1 Cap  1 Cap Oral DAILY    diphenhydrAMINE (BENADRYL) injection 50 mg  50 mg IntraVENous Q4H PRN    acetaminophen (TYLENOL) tablet 650 mg  650 mg Oral Q6H PRN    sodium chloride (NS) flush 5-40 mL  5-40 mL IntraVENous Q8H    sodium chloride (NS) flush 5-40 mL  5-40 mL IntraVENous PRN    ondansetron (ZOFRAN) injection 4 mg  4 mg IntraVENous Q4H PRN    enoxaparin (LOVENOX) injection 40 mg  40 mg SubCUTAneous Q24H    ascorbic acid (vitamin C) (VITAMIN C) tablet 500 mg  500 mg Oral BID    aspirin delayed-release tablet 81 mg  81 mg Oral DAILY    calcium-vitamin D (OS-TRISTEN) 500 mg-200 unit tablet  1 Tab Oral DAILY    cetirizine (ZYRTEC) tablet 10 mg  10 mg Oral DAILY    fluticasone propionate (FLONASE) 50 mcg/actuation nasal spray 2 Spray  2 Spray Both Nostrils DAILY PRN    therapeutic multivitamin (THERAGRAN) tablet 1 Tab  1 Tab Oral DAILY    levothyroxine (SYNTHROID) tablet 100 mcg  100 mcg Oral ACB    losartan (COZAAR) tablet 50 mg  50 mg Oral DAILY    montelukast (SINGULAIR) tablet 10 mg  10 mg Oral QHS    sertraline (ZOLOFT) tablet 50 mg  50 mg Oral DAILY    traZODone (DESYREL) tablet 100 mg  100 mg Oral QHS    ipratropium-albuterol (COMBIVENT RESPIMAT) 20 mcg-100 mcg inhalation spray  1 Puff Inhalation Q6H RT    fluticasone-vilanterol (BREO ELLIPTA) 200mcg-25mcg/puff  1 Puff Inhalation DAILY    albuterol (PROVENTIL HFA, VENTOLIN HFA, PROAIR HFA) inhaler 2 Puff  2 Puff Inhalation Q4H PRN    pantoprazole (PROTONIX) tablet 40 mg  40 mg Oral ACB    sodium chloride (NS) flush 5-10 mL  5-10 mL IntraVENous PRN         Signed:   Huan Ramachandran MD   Resident, Family Medicine      Attending note: Attending note to follow. ..

## 2020-05-21 NOTE — ROUTINE PROCESS
Bedside shift change report given to [place] (oncoming nurse) by Quang Gordon (offgoing nurse). Report included the following information SBAR, Kardex, Intake/Output, MAR, Accordion and Recent Results.

## 2020-05-21 NOTE — PROGRESS NOTES
[facility]  Pulmonary, Critical Care, and Sleep Medicine      Name: Jairo Reynolds MRN: [id number]   : 1945 Hospital: [address] [facility]   Date: 2020        IMPRESSION:   · Acute hypoxemic respiratory failure  · COVID19 +. Started on Caitlin pulse , had to stop this on  due to increased O2 requirements. Given actemra 5/15  · Mild intermittent asthma  · Chronic back pain  · DM  · HTN      RECOMMENDATIONS:   · Supplemental O2 as needed to keep sats > 88%. Wean [place] to keep sats >88%; BiPap PRN. · Continue diuresis. Strict I&O. Watch creat and bicarb. · Completed course of Doxy  · Trend inflammatory markers  · C/W Solu-Medrol given lack of improvement and inability to wean O2 thus far: weaned slightly  · Diligent BG control  · Continue combivent, breo, singulair, zyrtec, mucinex   · Continue vit C, zinc, lipitor  · Continue pepcid  · Encouraged pt to lie prone at least 4 hours per day and to use inc tommy q 1 hr w/a  · OOB to chair  · Palliative care following    Pt is critically ill and at high risk of decompensation  CCT: 30+ minutes     Subjective: This patient has been seen and evaluated at the request of Dr. Virginia Vargas for [employer] 19 pneumonia and resp failure. Patient is a 69 y.o. female diagnosed one week ago with Covid 19. Pt with fevers, diarrhea, cough, and sob. She has been isolating at home but has had work done within the home and believes that she may have gotten an infection from one of the workers there.  has also become infected. Pt states that her symptoms have remained stable to slightly improved over the past few days but has noted that her oxygen sats have been falling and presented to ED for evaluation. Fevers seem to have resolved. Main symptoms are now diarrhea, sob, and cough. Pt w h/o asthma which by description appears to be mild intermittent and is followed by Dr Laura Wells in [place].   Controlled with montelukast and (rare) albuterol MDI. Never smoker      Interval history:  Afebrile  BP stable  O2 sats 94% on HFNC 60L/100%  WBC 18.3  Fluid balance: -1200  PCT . 10; better  Ferritin 288; better  C-reactive protein . 55; better  ; better  D-dimer: .91; better      ROS:  States that she feels better. Denies SOB. Was coughing up more sputum yesterday, but not as much today. Has been using IS. Past Medical History:   Diagnosis Date    Anxiety     Arthritis     Asthma     Chronic pain     back,right hip    Depression     Diabetes ([address]     type 2    GERD (gastroesophageal reflux disease)     History of vascular access device 05/18/2020    4 Fr midline, 12 cm L basilic, blood draws    Hypercholesteremia     Hypertension     Hypothyroid     Nausea & vomiting     Reflux     Sleep apnea     wears dental appliance- Somnident    Thyroid disease     Unspecified adverse effect of anesthesia     delayed awakening      Past Surgical History:   Procedure Laterality Date    HX COLONOSCOPY  2012    normal repeat 2022, diverticulosis of sigmoid colon, Dr. Darby Cueva HX HEENT  1/08/14    bilateral cataract surgery1/8/14    HX KNEE REPLACEMENT      right    HX OTHER SURGICAL  2007    cardiac cath- No CAD found    NEUROLOGICAL PROCEDURE UNLISTED  1999    vascular brain surgery      Prior to Admission medications    Medication Sig Start Date End Date Taking? Authorizing Provider   guaiFENesin ER (Mucinex) 600 mg ER tablet Take 600 mg by mouth two (2) times a day. Yes Provider, Historical   omeprazole (PRILOSEC) 20 mg capsule Take 20 mg by mouth daily. Yes Provider, Historical   calcium-vitamin D (OYSTER SHELL) 500 mg(1,250mg) -200 unit per tablet Take 1 Tab by mouth daily.    Yes Provider, Historical   traZODone (DESYREL) 100 mg tablet TAKE 1 TABLET BY MOUTH EVERY DAY AT NIGHT 3/25/20  Yes Meg Bender NP   glipiZIDE SR (GLUCOTROL XL) 10 mg CR tablet TAKE 1 TABLET BY MOUTH TWICE A DAY 2/15/20  Yes Napoleon Erin Roe NP   simvastatin (ZOCOR) 10 mg tablet Take 1 Tab by mouth nightly. 10/8/19  Yes Erin Bender NP   losartan (COZAAR) 50 mg tablet TAKE 1 TABLET BY MOUTH EVERY DAY. 10/8/19  Yes Erin Bender NP   levothyroxine (SYNTHROID) 100 mcg tablet Take 1 Tab by mouth Daily (before breakfast). 10/8/19  Yes Erin Bender NP   atenolol (TENORMIN) 25 mg tablet Take 1 Tab by mouth nightly. 10/8/19  Yes Erin Bender NP   sertraline (ZOLOFT) 50 mg tablet TAKE 1 TABLET BY MOUTH EVERY DAY 9/4/19  Yes Erin Bender NP   aspirin delayed-release 81 mg tablet Take 81 mg by mouth daily. Yes Provider, Historical   fluticasone (FLONASE) 50 mcg/actuation nasal spray 2 Sprays by Both Nostrils route daily as needed for Rhinitis or Allergies. Yes Provider, Historical   albuterol (PROAIR HFA) 90 mcg/actuation inhaler Take 2 Puffs by inhalation every four (4) hours as needed for Wheezing. Yes Provider, Historical   Cetirizine (ZYRTEC) 10 mg cap Take 1 Tab by mouth every evening. Yes Provider, Historical   multivitamins-minerals-lutein (CENTRUM SILVER) tab Take 1 Tab by mouth daily. Yes Provider, Historical   glucosamine-chondroit-vit c-mn (GLUCOSAMINE CHONDROITIN MAXSTR) 500-400 mg capsule Take 1 Cap by mouth daily. Yes Other, MD [place]   varicella-zoster recombinant, PF, (SHINGRIX, PF,) 50 mcg/0.5 mL susr injection 0.5mL by IntraMUSCular route once now and then repeat in 2-6 months 10/8/19   Jelly Mar NP     Allergies   Allergen Reactions    Latex Rash    Prednisone Anaphylaxis     Throat swelling. Can take methylpredisone po or IV without problems.  Augmentin [Amoxicillin-Pot Clavulanate] Nausea and Vomiting    Biaxin [Clarithromycin] Nausea and Vomiting    Metformin Diarrhea    Parafon Forte Dsc [Chlorzoxazone] Nausea and Vomiting      Social History     Tobacco Use    Smoking status: Never Smoker    Smokeless tobacco: Never Used   Substance Use Topics    Alcohol use:  No Frequency: Never     Comment: 2x year      Family History   Problem Relation Age of Onset    Elevated Lipids Mother     Dementia Mother     Osteoporosis Mother     Lung Disease Father         copd    Delayed Awakening Father     Post-op Nausea/Vomiting Father     Hypertension Sister     Diabetes Sister         type 2    Breast Cancer Maternal Aunt         Current Facility-Administered Medications   Medication Dose Route Frequency    insulin lispro (HUMALOG) injection 12 Units  12 Units SubCUTAneous TIDAC    insulin glargine (LANTUS) injection 36 Units  36 Units SubCUTAneous DAILY    methylPREDNISolone (PF) (SOLU-MEDROL) injection 40 mg  40 mg IntraVENous Q6H    And    insulin NPH (NOVOLIN N, HUMULIN N) injection 28 Units  28 Units SubCUTAneous Q12H    insulin lispro (HUMALOG) injection   SubCUTAneous AC&HS    bumetanide (BUMEX) injection 1 mg  1 mg IntraVENous DAILY    guaiFENesin ER (MUCINEX) tablet 1,200 mg  1,200 mg Oral BID    famotidine (PEPCID) tablet 20 mg  20 mg Oral BID    atorvastatin (LIPITOR) tablet 40 mg  40 mg Oral DAILY    zinc sulfate (ZINCATE) 220 (50) mg capsule 1 Cap  1 Cap Oral DAILY    sodium chloride (NS) flush 5-40 mL  5-40 mL IntraVENous Q8H    enoxaparin (LOVENOX) injection 40 mg  40 mg SubCUTAneous Q24H    ascorbic acid (vitamin C) (VITAMIN C) tablet 500 mg  500 mg Oral BID    aspirin delayed-release tablet 81 mg  81 mg Oral DAILY    calcium-vitamin D (OS-TRISTEN) 500 mg-200 unit tablet  1 Tab Oral DAILY    cetirizine (ZYRTEC) tablet 10 mg  10 mg Oral DAILY    therapeutic multivitamin (THERAGRAN) tablet 1 Tab  1 Tab Oral DAILY    levothyroxine (SYNTHROID) tablet 100 mcg  100 mcg Oral ACB    losartan (COZAAR) tablet 50 mg  50 mg Oral DAILY    montelukast (SINGULAIR) tablet 10 mg  10 mg Oral QHS    sertraline (ZOLOFT) tablet 50 mg  50 mg Oral DAILY    traZODone (DESYREL) tablet 100 mg  100 mg Oral QHS    ipratropium-albuterol (COMBIVENT RESPIMAT) 20 mcg-100 mcg inhalation spray  1 Puff Inhalation Q6H RT    fluticasone-vilanterol (BREO ELLIPTA) 200mcg-25mcg/puff  1 Puff Inhalation DAILY    pantoprazole (PROTONIX) tablet 40 mg  40 mg Oral ACB         Objective:   Vital Signs:    Visit Vitals  /53   Pulse 61   Temp 98.7 °F (37.1 °C)   Resp 20   Ht 5' 4\" (1.626 m)   Wt 86.3 kg (190 lb 4.8 oz)   SpO2 94%   BMI 32.66 kg/m²       O2 Device: Hi flow nasal cannula   O2 Flow Rate (L/min): 60 l/min   Temp (24hrs), Av °F (37.2 °C), Min:98.7 °F (37.1 °C), Max:99.8 °F (37.7 °C)       Intake/Output:   Last shift:      No intake/output data recorded. Last 3 shifts:  1901 -  0700  In: 1860 [P.O.:1660;  I.V.:200]  Out: 1950 [Urine:1950]    Intake/Output Summary (Last 24 hours) at 2020 8500  Last data filed at 2020 6099  Gross per 24 hour   Intake 1080 ml   Output 1550 ml   Net -470 ml      Physical Exam:   General:  Alert, cooperative, no distress,    Head:  Normocephalic   Eyes:     Nose: HFNC in place   Throat: Supple   Neck:    Lungs:   diminished   Chest wall:     Heart:  RRR   Abdomen:      Extremities: No edema   Pulses: +2 bilaterally    Skin: Dry   Lymph nodes: No cervical lymphadenopathy   Neurologic: Oriented x 3       Data review:     Recent Results (from the past 24 hour(s))   GLUCOSE, POC    Collection Time: 20 11:32 AM   Result Value Ref Range    Glucose (POC) 410 (H) 65 - 100 mg/dL    Performed by Osa Mortimer, POC    Collection Time: 20  2:04 PM   Result Value Ref Range    Glucose (POC) 324 (H) 65 - 100 mg/dL    Performed by Osa Mortimer, POC    Collection Time: 20  3:47 PM   Result Value Ref Range    Glucose (POC) 358 (H) 65 - 100 mg/dL    Performed by Osa Mortimer, POC    Collection Time: 20  9:37 PM   Result Value Ref Range    Glucose (POC) 221 (H) 65 - 100 mg/dL    Performed by David Song    CBC WITH AUTOMATED DIFF    Collection Time: 20  3:54 AM   Result Value Ref Range    WBC 18.3 (H) 3.6 - 11.0 K/uL    RBC 4.09 3.80 - 5.20 M/uL    HGB 11.8 11.5 - 16.0 g/dL    HCT 35.1 35.0 - 47.0 %    MCV 85.8 80.0 - 99.0 FL    MCH 28.9 26.0 - 34.0 PG    MCHC 33.6 30.0 - 36.5 g/dL    RDW 12.3 11.5 - 14.5 %    PLATELET 977 118 - 326 K/uL    MPV 10.3 8.9 - 12.9 FL    NRBC 0.0 0  WBC    ABSOLUTE NRBC 0.00 0.00 - 0.01 K/uL    NEUTROPHILS 85 (H) 32 - 75 %    LYMPHOCYTES 9 (L) 12 - 49 %    MONOCYTES 4 (L) 5 - 13 %    EOSINOPHILS 0 0 - 7 %    BASOPHILS 0 0 - 1 %    IMMATURE GRANULOCYTES 2 (H) 0.0 - 0.5 %    ABS. NEUTROPHILS 15.5 (H) 1.8 - 8.0 K/UL    ABS. LYMPHOCYTES 1.7 0.8 - 3.5 K/UL    ABS. MONOCYTES 0.7 0.0 - 1.0 K/UL    ABS. EOSINOPHILS 0.0 0.0 - 0.4 K/UL    ABS. BASOPHILS 0.0 0.0 - 0.1 K/UL    ABS. IMM.  GRANS. 0.3 (H) 0.00 - 0.04 K/UL    DF AUTOMATED     D DIMER    Collection Time: 05/21/20  3:54 AM   Result Value Ref Range    D-dimer 0.91 (H) 0.00 - 0.65 mg/L FEU   FERRITIN    Collection Time: 05/21/20  3:54 AM   Result Value Ref Range    Ferritin 288 (H) 8 - 252 NG/ML   MAGNESIUM    Collection Time: 05/21/20  3:54 AM   Result Value Ref Range    Magnesium 1.9 1.6 - 2.4 mg/dL   C REACTIVE PROTEIN, QT    Collection Time: 05/21/20  3:54 AM   Result Value Ref Range    C-Reactive protein 0.55 0.00 - 0.60 mg/dL   LD    Collection Time: 05/21/20  3:54 AM   Result Value Ref Range     (H) 81 - 988 U/L   METABOLIC PANEL, COMPREHENSIVE    Collection Time: 05/21/20  3:54 AM   Result Value Ref Range    Sodium 134 (L) 136 - 145 mmol/L    Potassium 3.9 3.5 - 5.1 mmol/L    Chloride 99 97 - 108 mmol/L    CO2 31 21 - 32 mmol/L    Anion gap 4 (L) 5 - 15 mmol/L    Glucose 255 (H) 65 - 100 mg/dL    BUN 30 (H) 6 - 20 MG/DL    Creatinine 0.91 0.55 - 1.02 MG/DL    BUN/Creatinine ratio 33 (H) 12 - 20      GFR est AA >60 >60 ml/min/1.73m2    GFR est non-AA >60 >60 ml/min/1.73m2    Calcium 8.4 (L) 8.5 - 10.1 MG/DL    Bilirubin, total 0.5 0.2 - 1.0 MG/DL    ALT (SGPT) 87 (H) 12 - 78 U/L    AST (SGOT) 49 (H) 15 - 37 U/L    Alk.  phosphatase 114 45 - 117 U/L    Protein, total 6.2 (L) 6.4 - 8.2 g/dL    Albumin 2.5 (L) 3.5 - 5.0 g/dL    Globulin 3.7 2.0 - 4.0 g/dL    A-G Ratio 0.7 (L) 1.1 - 2.2     PROCALCITONIN    Collection Time: 05/21/20  3:54 AM   Result Value Ref Range    Procalcitonin 0.10 ng/mL   GLUCOSE, POC    Collection Time: 05/21/20  7:35 AM   Result Value Ref Range    Glucose (POC) 281 (H) 65 - 100 mg/dL    Performed by Tauna Formica        Imaging:  I have personally reviewed the patients radiographs and have reviewed the reports:  CXR 5/18: Stable bilateral infiltrates  CXR 5/19:  No change in bilateral infiltrates  CXR 5/21: Slight improvement in bilateral infiltrates        Zofia Meyer NP

## 2020-05-21 NOTE — PROGRESS NOTES
Palliative Medicine Consult  Jaswant: [phone] ([phone])    Patient Name: Ryan Brown  YOB: 1945    Date of Initial Consult: 5/12/2020  Reason for Consult: Care decisions  Requesting Provider: Family medicine team  Primary Care Physician: Meena Storey MD     SUMMARY:   Ryan Brown is a 69 y.o. with a past history of chronic back pain, diabetes, hypertension, asthma, who was admitted on 5/10/2020 from home with a diagnosis of acute respiratory failure/COVID-19 infection. Current medical issues leading to Palliative Medicine involvement include: Care decisions. Chart reviewedpatient is a 21-year-old female admitted to the hospital with acute respiratory failure/COVID-19 infection. Both her and her  tested positive last week. He seems to be doing okay but she has had increasing respiratory distress. Presents to the hospital on 5/10 with bilateral pneumonia and respiratory failure. Patient currently on 5 L oxygen and nitric oxide. Our team is been asked to see her for goals of care    Social historypatient has been  for 39 years. She has 2 children from a prior marriage. One child lives locally and another lives in [place]. 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 PALLIATIVE DIAGNOSES:   1. Goals of care discussion  2. DNR discussion  3. Advance care planning  4. Acute respiratory failure-worsening  5. COVID-19 infection       PLAN:   1. Met with patient in the room. She remains on high flow nasal cannulalooks like she is on 50 L right now/FiO2 of 100%. She continues to feel better overall. We reviewed her inflammatory markers and chest x-rayall look better. She has been able to eat some. Denies any pain right now. Encouraged her to continue to do the proning and incentive spirometer.   We did discussion about feeling a little down given that she cannot have any visitors and this overall situation is very challenging. She states she is not cried yet but is trying to remain positive. Did tell her that sometimes this can take several weeks to recover from but we are hopeful that she can get out of the hospital before that. 2. Talked with her  and daughter via phone. They have been talking with her regularly. Updated them on the current lab work and chest x-rayall trending in the right direction. Feel like she looks stable overall. Still very weak and I think the deconditioning related to being ill is the next big stepneed to get her stronger as we attempt to wean high flow nasal cannula. They are strongly with not been able to see her as well. We will continue to try to update them daily  3. Goals of carepatient wants attempts at full restorative measures. Would consider intubation if needed. 4. Advance care planningshe has not completed advanced medical directive but she is clear that her  would serve as her medical power of . He is her legal next of kin. 5. CODE STATUS patient would accept intubation and resuscitation if needed. She remains clear that she would not want long-term ventilation if required. Our team will continue to address and support her decisions. 6. Symptom managementinpatient team currently managing  7. Psychosocialpatient appears to have good support from her  and children. Situation remains challenging as she is not able to have visitors. 8. Discussed with bedside nurse, case management, MomoNP/Pulmonary,   9. Initial consult note routed to primary continuity provider and/or primary health care team members  10.  Communicated plan of care with: Palliative [facility] Team     GOALS OF CARE / TREATMENT PREFERENCES:     GOALS OF CARE:  Patient/Health Care Proxy Stated Goals: Prolong life    TREATMENT PREFERENCES:   Code Status: Full Code    Advance Care Planning:  [x] [facility] Inter[facility] Team has updated the ACP Navigator with Health Care Decision Maker and Patient Capacity      Advance Care Planning 5/11/2020   Confirm Advance Directive None       Medical Interventions: Full interventions     Other Instructions: Other:    As far as possible, the palliative care team has discussed with patient / health care proxy about goals of care / treatment preferences for patient. HISTORY:     History obtained from: Chart, patient    CHIEF COMPLAINT: Shortness of breath    HPI/SUBJECTIVE:    The patient is:   [x] Verbal and participatory  [] Non-participatory due to:   Patient states her breathing is little better today. Feeling a little bit tired overall. 5/19patient currently on BiPAP. Difficult to understand secondary to the BiPAP but remains lucid    5/20patient currently on high flow nasal cannula. She states she is feeling better overall. She is excited that she could eat and talk with her     5/21patient remains on high flow. Feel little better overall.   Been able to talk with her family    Clinical Pain Assessment (nonverbal scale for severity on nonverbal patients):   Clinical Pain Assessment  Severity: 0          Duration: for how long has pt been experiencing pain (e.g., 2 days, 1 month, years)  Frequency: how often pain is an issue (e.g., several times per day, once every few days, constant)     FUNCTIONAL ASSESSMENT:     Palliative Performance Scale (PPS):  PPS: 60       PSYCHOSOCIAL/SPIRITUAL SCREENING:     Palliative IDT has assessed this patient for cultural preferences / practices and a referral made as appropriate to needs (Cultural Services, Patient Advocacy, Ethics, etc.)    Any spiritual / Pentecostalism concerns:  [] Yes /  [x] No    Caregiver Burnout:  [] Yes /  [] No /  [x] No Caregiver Present      Anticipatory grief assessment:   [x] Normal  / [] Maladaptive       ESAS Anxiety: Anxiety: 0    ESAS Depression: Depression: 0        REVIEW OF SYSTEMS:     Positive and pertinent negative findings in ROS are noted above in HPI. The following systems were [x] reviewed / [] unable to be reviewed as noted in HPI  Other findings are noted below. Systems: constitutional, ears/nose/mouth/throat, respiratory, gastrointestinal, genitourinary, musculoskeletal, integumentary, neurologic, psychiatric, endocrine. Positive findings noted below. Modified ESAS Completed by: provider   Fatigue: 3 Drowsiness: 0   Depression: 0 Pain: 0   Anxiety: 0 Nausea: 0   Anorexia: 0 Dyspnea: 5     Constipation: No     Stool Occurrence(s): 1        PHYSICAL EXAM:     From RN flowsheet:  Wt Readings from Last 3 Encounters:   05/21/20 186 lb (84.4 kg)   10/08/19 201 lb (91.2 kg)   02/13/19 197 lb 8 oz (89.6 kg)     Blood pressure 101/60, pulse 75, temperature 99 °F (37.2 °C), resp. rate 17, height 5' 4\" (1.626 m), weight 186 lb (84.4 kg), SpO2 93 %.     Pain Scale 1: Numeric (0 - 10)  Pain Intensity 1: 0                 Last bowel movement, if known:     Constitutional: Alert and oriented, high flow nasal cannula in place  Eyes: pupils equal, anicteric  ENMT: no nasal discharge, moist mucous membranes  Cardiovascular: regular rhythm, distal pulses intact  Respiratory: breathing mildly labored, symmetric, high flow nasal cannula in place  Gastrointestinal: soft non-tender, +bowel sounds  Musculoskeletal: no deformity, no tenderness to palpation  Skin: warm, dry  Neurologic: following commands, moving all extremities  Psychiatric: full affect, no hallucinations  Other:       HISTORY:     Principal Problem:    COVID-19 (5/16/2020)    Active Problems:    Respiratory failure with hypoxia (HCC) (5/10/2020)      Past Medical History:   Diagnosis Date    Anxiety     Arthritis     Asthma     Chronic pain     back,right hip    Depression     Diabetes (HCC)     type 2    GERD (gastroesophageal reflux disease)     History of vascular access device 05/18/2020    4 Fr midline, 12 cm L basilic, blood draws    Hypercholesteremia     Hypertension     Hypothyroid     Nausea & vomiting     Reflux     Sleep apnea     wears dental appliance- Somnident    Thyroid disease     Unspecified adverse effect of anesthesia     delayed awakening      Past Surgical History:   Procedure Laterality Date    HX COLONOSCOPY  2012    normal repeat 2022, diverticulosis of sigmoid colon, Dr. Lindsey Barclay HX HEENT  1/08/14    bilateral cataract surgery1/8/14    HX KNEE REPLACEMENT      right    HX OTHER SURGICAL  2007    cardiac cath- No CAD found    NEUROLOGICAL PROCEDURE UNLISTED  1999    vascular brain surgery      Family History   Problem Relation Age of Onset    Elevated Lipids Mother     Dementia Mother     Osteoporosis Mother     Lung Disease Father         copd    Delayed Awakening Father     Post-op Nausea/Vomiting Father     Hypertension Sister     Diabetes Sister         type 2    Breast Cancer Maternal Aunt       History reviewed, no pertinent family history. Social History     Tobacco Use    Smoking status: Never Smoker    Smokeless tobacco: Never Used   Substance Use Topics    Alcohol use: No     Frequency: Never     Comment: 2x year     Allergies   Allergen Reactions    Latex Rash    Prednisone Anaphylaxis     Throat swelling. Can take methylpredisone po or IV without problems.     Augmentin [Amoxicillin-Pot Clavulanate] Nausea and Vomiting    Biaxin [Clarithromycin] Nausea and Vomiting    Metformin Diarrhea    Parafon Forte Dsc [Chlorzoxazone] Nausea and Vomiting      Current Facility-Administered Medications   Medication Dose Route Frequency    dextrose (D50W) injection syrg 12.5-25 g  12.5-25 g IntraVENous PRN    insulin lispro (HUMALOG) injection 12 Units  12 Units SubCUTAneous TIDAC    insulin glargine (LANTUS) injection 36 Units  36 Units SubCUTAneous DAILY    insulin NPH (NOVOLIN N, HUMULIN N) injection 24 Units  24 Units SubCUTAneous Q12H    And    methylPREDNISolone (PF) (SOLU-MEDROL) injection 30 mg  30 mg IntraVENous Q6H    glucose chewable tablet 16 g  4 Tab Oral PRN    glucagon (GLUCAGEN) injection 1 mg  1 mg IntraMUSCular PRN    insulin lispro (HUMALOG) injection   SubCUTAneous AC&HS    bumetanide (BUMEX) injection 1 mg  1 mg IntraVENous DAILY    guaiFENesin ER (MUCINEX) tablet 1,200 mg  1,200 mg Oral BID    famotidine (PEPCID) tablet 20 mg  20 mg Oral BID    atorvastatin (LIPITOR) tablet 40 mg  40 mg Oral DAILY    zinc sulfate (ZINCATE) 220 (50) mg capsule 1 Cap  1 Cap Oral DAILY    diphenhydrAMINE (BENADRYL) injection 50 mg  50 mg IntraVENous Q4H PRN    acetaminophen (TYLENOL) tablet 650 mg  650 mg Oral Q6H PRN    sodium chloride (NS) flush 5-40 mL  5-40 mL IntraVENous Q8H    sodium chloride (NS) flush 5-40 mL  5-40 mL IntraVENous PRN    ondansetron (ZOFRAN) injection 4 mg  4 mg IntraVENous Q4H PRN    enoxaparin (LOVENOX) injection 40 mg  40 mg SubCUTAneous Q24H    ascorbic acid (vitamin C) (VITAMIN C) tablet 500 mg  500 mg Oral BID    aspirin delayed-release tablet 81 mg  81 mg Oral DAILY    calcium-vitamin D (OS-TRISTEN) 500 mg-200 unit tablet  1 Tab Oral DAILY    cetirizine (ZYRTEC) tablet 10 mg  10 mg Oral DAILY    fluticasone propionate (FLONASE) 50 mcg/actuation nasal spray 2 Spray  2 Spray Both Nostrils DAILY PRN    therapeutic multivitamin (THERAGRAN) tablet 1 Tab  1 Tab Oral DAILY    levothyroxine (SYNTHROID) tablet 100 mcg  100 mcg Oral ACB    losartan (COZAAR) tablet 50 mg  50 mg Oral DAILY    montelukast (SINGULAIR) tablet 10 mg  10 mg Oral QHS    sertraline (ZOLOFT) tablet 50 mg  50 mg Oral DAILY    traZODone (DESYREL) tablet 100 mg  100 mg Oral QHS    ipratropium-albuterol (COMBIVENT RESPIMAT) 20 mcg-100 mcg inhalation spray  1 Puff Inhalation Q6H RT    fluticasone-vilanterol (BREO ELLIPTA) 200mcg-25mcg/puff  1 Puff Inhalation DAILY    albuterol (PROVENTIL HFA, VENTOLIN HFA, PROAIR HFA) inhaler 2 Puff  2 Puff Inhalation Q4H PRN    pantoprazole (PROTONIX) tablet 40 mg  40 mg Oral ACB    sodium chloride (NS) flush 5-10 mL  5-10 mL IntraVENous PRN          LAB AND IMAGING FINDINGS:     Lab Results   Component Value Date/Time    WBC 18.3 (H) 05/21/2020 03:54 AM    HGB 11.8 05/21/2020 03:54 AM    PLATELET 683 24/90/0179 03:54 AM     Lab Results   Component Value Date/Time    Sodium 134 (L) 05/21/2020 03:54 AM    Potassium 3.9 05/21/2020 03:54 AM    Chloride 99 05/21/2020 03:54 AM    CO2 31 05/21/2020 03:54 AM    BUN 30 (H) 05/21/2020 03:54 AM    Creatinine 0.91 05/21/2020 03:54 AM    Calcium 8.4 (L) 05/21/2020 03:54 AM    Magnesium 1.9 05/21/2020 03:54 AM    Phosphorus 1.8 (L) 05/15/2020 02:38 AM      Lab Results   Component Value Date/Time    AST (SGOT) 49 (H) 05/21/2020 03:54 AM    Alk. phosphatase 114 05/21/2020 03:54 AM    Protein, total 6.2 (L) 05/21/2020 03:54 AM    Albumin 2.5 (L) 05/21/2020 03:54 AM    Globulin 3.7 05/21/2020 03:54 AM     Lab Results   Component Value Date/Time    INR 1.1 05/19/2020 06:07 AM    Prothrombin time 11.5 (H) 05/19/2020 06:07 AM    aPTT 24.5 12/20/2013 11:00 AM      Lab Results   Component Value Date/Time    Ferritin 288 (H) 05/21/2020 03:54 AM      Lab Results   Component Value Date/Time    pH 7.45 05/19/2020 10:22 AM    PCO2 37 05/19/2020 10:22 AM    PO2 84 05/19/2020 10:22 AM     No components found for: [place]   Lab Results   Component Value Date/Time    CK 79 11/13/2011 08:45 PM    CK - MB <0.5 (L) 11/13/2011 08:45 PM                Total time: 35  Counseling / coordination time, spent as noted above: 30  > 50% counseling / coordination?: yes    Prolonged service was provided for  []30 min   []75 min in face to face time in the presence of the patient, spent as noted above. Time Start:   Time End:   Note: this can only be billed with 90135 (initial) or 60032 (follow up). If multiple start / stop times, list each separately.

## 2020-05-21 NOTE — PROGRESS NOTES
Problem: Mobility Impaired (Adult and Pediatric)  Goal: *Acute Goals and Plan of Care (Insert Text)  Description: FUNCTIONAL STATUS PRIOR TO ADMISSION: Patient was independent and active without use of DME.    HOME SUPPORT PRIOR TO ADMISSION: The patient lived with  but did not require assist.    Physical Therapy Goals  Initiated 5/18/2020  1. Patient will move from supine to sit and sit to supine  in bed with independence within 7 day(s). 2.  Patient will transfer from bed to chair and chair to bed with independence using the least restrictive device within 7 day(s). 3.  Patient will perform sit to stand with independence within 7 day(s). 4.  Patient will ambulate with independence for 150 feet with the least restrictive device within 7 day(s). 5.  Patient will ascend/descend 4 stairs with 1 handrail(s) with modified independence within 7 day(s). Outcome: Progressing Towards Goal   PHYSICAL THERAPY TREATMENT  Patient: Josie Gautam (77 y.o. female)  Date: 5/21/2020  Diagnosis: Respiratory failure with hypoxia ([facility] [address] [J96.91]   COVID-19       Precautions: Fall, Contact(Droplet plus)  Chart, physical therapy assessment, plan of care and goals were reviewed. ASSESSMENT  Patient continues with skilled PT services and is slowly progressing towards goals. Pt continues to desaturate with minimal mobility and is currently on HighFlow at 60L/min 94% FiO2. O2 saturations ranging from 86-96% with mobility. Pt quickly rebounds with seated rest break and PLBing. After room set up pt able to walk three bouts of 6ft and perform a BSC transfer with SBA-CGA. Provided light HHA for increased confidence. Pt requires extended seated rest breaks to recover between gait trials. Returned to supine and encouraged pt to prone herself this evening. Pt in agreement. Pt reporting she usually feels better in the morning and would like to try therapy closer to lunch time tomorrow. Will continue to follow.      Current Level of Function Impacting Discharge (mobility/balance): limited ambulation d/t high flow need    Other factors to consider for discharge: weaning from HighFlow         PLAN :  Patient continues to benefit from skilled intervention to address the above impairments. Continue treatment per established plan of care. to address goals. Recommendation for discharge: (in order for the patient to meet his/her long term goals)  To be determined: pending pulmonary progress     This discharge recommendation:  Has been made in collaboration with the attending provider and/or case management    IF patient discharges home will need the following DME: to be determined (TBD)       SUBJECTIVE:   Patient stated I am on the verge of tears. I can't sit in my urine for an hour.     OBJECTIVE DATA SUMMARY:   Critical Behavior:  Neurologic State: Alert  Orientation Level: Oriented X4  Cognition: Appropriate decision making  Safety/Judgement: Awareness of environment, Driving appropriateness, Fall prevention, Good awareness of safety precautions, Home safety, Insight into deficits  Functional Mobility Training:  Bed Mobility:  Rolling: Supervision  Supine to Sit: Supervision              Transfers:  Sit to Stand: Stand-by assistance  Stand to Sit: Stand-by assistance                             Balance:  Sitting: Intact  Standing: Impaired  Standing - Static: Good  Standing - Dynamic : Fair  Ambulation/Gait Training:  Distance (ft): 6 Feet (ft)(x 3)  Assistive Device: (HHA)  Ambulation - Level of Assistance: Contact guard assistance                 Base of Support: Widened               Activity Tolerance:   Fair, desaturates with exertion and requires oxygen, requires rest breaks, requires frequent rest breaks, and observed SOB with activity  Please refer to the flowsheet for vital signs taken during this treatment.     After treatment patient left in no apparent distress:   Supine in bed and Call bell within reach    COMMUNICATION/COLLABORATION:   The patients plan of care was discussed with: Registered nurse.      Alejandro Bustamante, PT   Time Calculation: 28 mins

## 2020-05-22 LAB
ALBUMIN SERPL-MCNC: 2.6 G/DL (ref 3.5–5)
ALBUMIN/GLOB SERPL: 0.8 {RATIO} (ref 1.1–2.2)
ALP SERPL-CCNC: 99 U/L (ref 45–117)
ALT SERPL-CCNC: 99 U/L (ref 12–78)
ANION GAP SERPL CALC-SCNC: 3 MMOL/L (ref 5–15)
AST SERPL-CCNC: 61 U/L (ref 15–37)
BASOPHILS # BLD: 0 K/UL (ref 0–0.1)
BASOPHILS NFR BLD: 0 % (ref 0–1)
BILIRUB SERPL-MCNC: 0.5 MG/DL (ref 0.2–1)
BUN SERPL-MCNC: 21 MG/DL (ref 6–20)
BUN/CREAT SERPL: 25 (ref 12–20)
CALCIUM SERPL-MCNC: 8.3 MG/DL (ref 8.5–10.1)
CHLORIDE SERPL-SCNC: 103 MMOL/L (ref 97–108)
CO2 SERPL-SCNC: 30 MMOL/L (ref 21–32)
CREAT SERPL-MCNC: 0.84 MG/DL (ref 0.55–1.02)
CRP SERPL-MCNC: <0.29 MG/DL (ref 0–0.6)
D DIMER PPP FEU-MCNC: 0.8 MG/L FEU (ref 0–0.65)
DIFFERENTIAL METHOD BLD: ABNORMAL
EOSINOPHIL # BLD: 0.1 K/UL (ref 0–0.4)
EOSINOPHIL NFR BLD: 1 % (ref 0–7)
ERYTHROCYTE [DISTWIDTH] IN BLOOD BY AUTOMATED COUNT: 12.8 % (ref 11.5–14.5)
FERRITIN SERPL-MCNC: 273 NG/ML (ref 26–388)
GLOBULIN SER CALC-MCNC: 3.4 G/DL (ref 2–4)
GLUCOSE BLD STRIP.AUTO-MCNC: 225 MG/DL (ref 65–100)
GLUCOSE BLD STRIP.AUTO-MCNC: 239 MG/DL (ref 65–100)
GLUCOSE BLD STRIP.AUTO-MCNC: 310 MG/DL (ref 65–100)
GLUCOSE BLD STRIP.AUTO-MCNC: 379 MG/DL (ref 65–100)
GLUCOSE SERPL-MCNC: 171 MG/DL (ref 65–100)
HCT VFR BLD AUTO: 36.2 % (ref 35–47)
HGB BLD-MCNC: 11.9 G/DL (ref 11.5–16)
IL6 SERPL-MCNC: 22.7 PG/ML (ref 0–15.5)
IMM GRANULOCYTES # BLD AUTO: 0.3 K/UL (ref 0–0.04)
IMM GRANULOCYTES NFR BLD AUTO: 2 % (ref 0–0.5)
LDH SERPL L TO P-CCNC: 270 U/L (ref 81–246)
LYMPHOCYTES # BLD: 1.7 K/UL (ref 0.8–3.5)
LYMPHOCYTES NFR BLD: 11 % (ref 12–49)
MAGNESIUM SERPL-MCNC: 2 MG/DL (ref 1.6–2.4)
MCH RBC QN AUTO: 28.7 PG (ref 26–34)
MCHC RBC AUTO-ENTMCNC: 32.9 G/DL (ref 30–36.5)
MCV RBC AUTO: 87.2 FL (ref 80–99)
MONOCYTES # BLD: 0.8 K/UL (ref 0–1)
MONOCYTES NFR BLD: 5 % (ref 5–13)
NEUTS SEG # BLD: 11.9 K/UL (ref 1.8–8)
NEUTS SEG NFR BLD: 81 % (ref 32–75)
NRBC # BLD: 0 K/UL (ref 0–0.01)
NRBC BLD-RTO: 0 PER 100 WBC
PLATELET # BLD AUTO: 311 K/UL (ref 150–400)
PMV BLD AUTO: 10.4 FL (ref 8.9–12.9)
POTASSIUM SERPL-SCNC: 4.2 MMOL/L (ref 3.5–5.1)
PROT SERPL-MCNC: 6 G/DL (ref 6.4–8.2)
RBC # BLD AUTO: 4.15 M/UL (ref 3.8–5.2)
SERVICE CMNT-IMP: ABNORMAL
SODIUM SERPL-SCNC: 136 MMOL/L (ref 136–145)
WBC # BLD AUTO: 14.7 K/UL (ref 3.6–11)

## 2020-05-22 PROCEDURE — 83520 IMMUNOASSAY QUANT NOS NONAB: CPT

## 2020-05-22 PROCEDURE — 94660 CPAP INITIATION&MGMT: CPT

## 2020-05-22 PROCEDURE — 74011250637 HC RX REV CODE- 250/637: Performed by: INTERNAL MEDICINE

## 2020-05-22 PROCEDURE — 77030038269 HC DRN EXT URIN PURWCK BARD -A

## 2020-05-22 PROCEDURE — 77010033711 HC HIGH FLOW OXYGEN

## 2020-05-22 PROCEDURE — 85025 COMPLETE CBC W/AUTO DIFF WBC: CPT

## 2020-05-22 PROCEDURE — 74011250637 HC RX REV CODE- 250/637: Performed by: STUDENT IN AN ORGANIZED HEALTH CARE EDUCATION/TRAINING PROGRAM

## 2020-05-22 PROCEDURE — 36415 COLL VENOUS BLD VENIPUNCTURE: CPT

## 2020-05-22 PROCEDURE — 74011000250 HC RX REV CODE- 250: Performed by: STUDENT IN AN ORGANIZED HEALTH CARE EDUCATION/TRAINING PROGRAM

## 2020-05-22 PROCEDURE — 85379 FIBRIN DEGRADATION QUANT: CPT

## 2020-05-22 PROCEDURE — 80053 COMPREHEN METABOLIC PANEL: CPT

## 2020-05-22 PROCEDURE — 74011250636 HC RX REV CODE- 250/636: Performed by: INTERNAL MEDICINE

## 2020-05-22 PROCEDURE — 83615 LACTATE (LD) (LDH) ENZYME: CPT

## 2020-05-22 PROCEDURE — 82962 GLUCOSE BLOOD TEST: CPT

## 2020-05-22 PROCEDURE — 97116 GAIT TRAINING THERAPY: CPT

## 2020-05-22 PROCEDURE — 86140 C-REACTIVE PROTEIN: CPT

## 2020-05-22 PROCEDURE — 97530 THERAPEUTIC ACTIVITIES: CPT

## 2020-05-22 PROCEDURE — 74011636637 HC RX REV CODE- 636/637: Performed by: STUDENT IN AN ORGANIZED HEALTH CARE EDUCATION/TRAINING PROGRAM

## 2020-05-22 PROCEDURE — 83735 ASSAY OF MAGNESIUM: CPT

## 2020-05-22 PROCEDURE — 74011250636 HC RX REV CODE- 250/636: Performed by: STUDENT IN AN ORGANIZED HEALTH CARE EDUCATION/TRAINING PROGRAM

## 2020-05-22 PROCEDURE — 94640 AIRWAY INHALATION TREATMENT: CPT

## 2020-05-22 PROCEDURE — 82728 ASSAY OF FERRITIN: CPT

## 2020-05-22 PROCEDURE — 65660000000 HC RM CCU STEPDOWN

## 2020-05-22 PROCEDURE — 97110 THERAPEUTIC EXERCISES: CPT

## 2020-05-22 RX ADMIN — FAMOTIDINE 20 MG: 20 TABLET, FILM COATED ORAL at 20:54

## 2020-05-22 RX ADMIN — INSULIN LISPRO 10 UNITS: 100 INJECTION, SOLUTION INTRAVENOUS; SUBCUTANEOUS at 17:09

## 2020-05-22 RX ADMIN — CETIRIZINE HYDROCHLORIDE 10 MG: 10 TABLET, FILM COATED ORAL at 08:15

## 2020-05-22 RX ADMIN — ATORVASTATIN CALCIUM 40 MG: 20 TABLET, FILM COATED ORAL at 08:15

## 2020-05-22 RX ADMIN — ENOXAPARIN SODIUM 40 MG: 40 INJECTION SUBCUTANEOUS at 20:55

## 2020-05-22 RX ADMIN — OYSTER SHELL CALCIUM WITH VITAMIN D 1 TABLET: 500; 200 TABLET, FILM COATED ORAL at 08:13

## 2020-05-22 RX ADMIN — INSULIN LISPRO 2 UNITS: 100 INJECTION, SOLUTION INTRAVENOUS; SUBCUTANEOUS at 21:01

## 2020-05-22 RX ADMIN — INSULIN LISPRO 12 UNITS: 100 INJECTION, SOLUTION INTRAVENOUS; SUBCUTANEOUS at 17:08

## 2020-05-22 RX ADMIN — METHYLPREDNISOLONE SODIUM SUCCINATE 30 MG: 40 INJECTION, POWDER, FOR SOLUTION INTRAMUSCULAR; INTRAVENOUS at 11:30

## 2020-05-22 RX ADMIN — Medication 10 ML: at 17:10

## 2020-05-22 RX ADMIN — TRAZODONE HYDROCHLORIDE 100 MG: 100 TABLET ORAL at 20:56

## 2020-05-22 RX ADMIN — FLUTICASONE FUROATE AND VILANTEROL TRIFENATATE 1 PUFF: 200; 25 POWDER RESPIRATORY (INHALATION) at 08:16

## 2020-05-22 RX ADMIN — METHYLPREDNISOLONE SODIUM SUCCINATE 30 MG: 40 INJECTION, POWDER, FOR SOLUTION INTRAMUSCULAR; INTRAVENOUS at 05:53

## 2020-05-22 RX ADMIN — IPRATROPIUM BROMIDE AND ALBUTEROL 1 PUFF: 20; 100 SPRAY, METERED RESPIRATORY (INHALATION) at 07:46

## 2020-05-22 RX ADMIN — GUAIFENESIN 1200 MG: 600 TABLET, EXTENDED RELEASE ORAL at 20:54

## 2020-05-22 RX ADMIN — IPRATROPIUM BROMIDE AND ALBUTEROL 1 PUFF: 20; 100 SPRAY, METERED RESPIRATORY (INHALATION) at 13:31

## 2020-05-22 RX ADMIN — Medication 10 ML: at 05:53

## 2020-05-22 RX ADMIN — INSULIN LISPRO 4 UNITS: 100 INJECTION, SOLUTION INTRAVENOUS; SUBCUTANEOUS at 08:14

## 2020-05-22 RX ADMIN — ZINC SULFATE 220 MG (50 MG) CAPSULE 1 CAPSULE: CAPSULE at 08:15

## 2020-05-22 RX ADMIN — INSULIN LISPRO 12 UNITS: 100 INJECTION, SOLUTION INTRAVENOUS; SUBCUTANEOUS at 08:14

## 2020-05-22 RX ADMIN — MONTELUKAST SODIUM 10 MG: 10 TABLET, FILM COATED ORAL at 20:55

## 2020-05-22 RX ADMIN — GUAIFENESIN 1200 MG: 600 TABLET, EXTENDED RELEASE ORAL at 08:15

## 2020-05-22 RX ADMIN — INSULIN LISPRO 12 UNITS: 100 INJECTION, SOLUTION INTRAVENOUS; SUBCUTANEOUS at 11:30

## 2020-05-22 RX ADMIN — METHYLPREDNISOLONE SODIUM SUCCINATE 40 MG: 40 INJECTION, POWDER, FOR SOLUTION INTRAMUSCULAR; INTRAVENOUS at 01:01

## 2020-05-22 RX ADMIN — HUMAN INSULIN 24 UNITS: 100 INJECTION, SUSPENSION SUBCUTANEOUS at 05:52

## 2020-05-22 RX ADMIN — FAMOTIDINE 20 MG: 20 TABLET, FILM COATED ORAL at 08:17

## 2020-05-22 RX ADMIN — OXYCODONE HYDROCHLORIDE AND ACETAMINOPHEN 500 MG: 500 TABLET ORAL at 20:54

## 2020-05-22 RX ADMIN — Medication 10 ML: at 20:58

## 2020-05-22 RX ADMIN — PANTOPRAZOLE SODIUM 40 MG: 40 TABLET, DELAYED RELEASE ORAL at 05:54

## 2020-05-22 RX ADMIN — LEVOTHYROXINE SODIUM 100 MCG: 0.1 TABLET ORAL at 05:54

## 2020-05-22 RX ADMIN — LOSARTAN POTASSIUM 50 MG: 50 TABLET, FILM COATED ORAL at 08:18

## 2020-05-22 RX ADMIN — BUMETANIDE 1 MG: 0.25 INJECTION INTRAMUSCULAR; INTRAVENOUS at 08:13

## 2020-05-22 RX ADMIN — THERA TABS 1 TABLET: TAB at 08:15

## 2020-05-22 RX ADMIN — INSULIN LISPRO 10 UNITS: 100 INJECTION, SOLUTION INTRAVENOUS; SUBCUTANEOUS at 11:30

## 2020-05-22 RX ADMIN — HUMAN INSULIN 24 UNITS: 100 INJECTION, SUSPENSION SUBCUTANEOUS at 17:07

## 2020-05-22 RX ADMIN — SERTRALINE HYDROCHLORIDE 50 MG: 50 TABLET ORAL at 08:15

## 2020-05-22 RX ADMIN — METHYLPREDNISOLONE SODIUM SUCCINATE 30 MG: 40 INJECTION, POWDER, FOR SOLUTION INTRAMUSCULAR; INTRAVENOUS at 18:00

## 2020-05-22 RX ADMIN — ASPIRIN 81 MG: 81 TABLET, COATED ORAL at 08:15

## 2020-05-22 RX ADMIN — OXYCODONE HYDROCHLORIDE AND ACETAMINOPHEN 500 MG: 500 TABLET ORAL at 08:13

## 2020-05-22 RX ADMIN — METHYLPREDNISOLONE SODIUM SUCCINATE 30 MG: 40 INJECTION, POWDER, FOR SOLUTION INTRAMUSCULAR; INTRAVENOUS at 23:55

## 2020-05-22 RX ADMIN — INSULIN GLARGINE 36 UNITS: 100 INJECTION, SOLUTION SUBCUTANEOUS at 08:14

## 2020-05-22 RX ADMIN — IPRATROPIUM BROMIDE AND ALBUTEROL 1 PUFF: 20; 100 SPRAY, METERED RESPIRATORY (INHALATION) at 20:56

## 2020-05-22 RX ADMIN — Medication 10 ML: at 01:02

## 2020-05-22 NOTE — PROGRESS NOTES
[facility]  Pulmonary, Critical Care, and Sleep Medicine      Name: Ryan Brown MRN: [id number]   : 1945 Hospital: [address] [facility]   Date: 2020        IMPRESSION:   · Acute hypoxemic respiratory failure  · COVID19 +. Started on Caitlin pulse , had to stop this on  due to increased O2 requirements. Given actemra 5/15  · Mild intermittent asthma  · Chronic back pain  · DM  · HTN      RECOMMENDATIONS:   · Supplemental O2 as needed to keep sats > 88%. Wean [place] to keep sats >88%: weaned to 55L  · Continue diuresis. Strict I&O. Watch creat and bicarb. · Completed course of Doxy  · Trend inflammatory markers  · C/W Solu-Medrol given lack of improvement and inability to wean O2 thus far; weaned yesterday  · Diligent BG control  · Continue combivent, breo, singulair, zyrtec, mucinex   · Continue vit C, zinc, lipitor  · Continue pepcid  · Encouraged pt to lie prone at least 4 hours per day and to use inc tommy q 1 hr w/a  · OOB to chair  · Palliative care following    Pt is critically ill and at high risk of decompensation  CCT: 30+ minutes     Subjective: This patient has been seen and evaluated at the request of Dr. Gregg Franco for [place] 19 pneumonia and resp failure. Patient is a 69 y.o. female diagnosed one week ago with Covid 19. Pt with fevers, diarrhea, cough, and sob. She has been isolating at home but has had work done within the home and believes that she may have gotten an infection from one of the workers there.  has also become infected. Pt states that her symptoms have remained stable to slightly improved over the past few days but has noted that her oxygen sats have been falling and presented to ED for evaluation. Fevers seem to have resolved. Main symptoms are now diarrhea, sob, and cough. Pt w h/o asthma which by description appears to be mild intermittent and is followed by Dr Oleg Ramsey in [place].   Controlled with montelukast and (rare) albuterol MDI. Never smoker      Interval history:  Afebrile  BP stable  O2 sats 94% on HFNC 60L/100%  Fluid balance: -1490  Bicarb 30  WBC 14.7; better  D-dimer . 8; better  C-reactive protein <.29; better  Ferritin 273  ; better    ROS:  States that she feels better. States that she gets SOB with activity. Past Medical History:   Diagnosis Date    Anxiety     Arthritis     Asthma     Chronic pain     back,right hip    Depression     Diabetes ([facility] [address]     type 2    GERD (gastroesophageal reflux disease)     History of vascular access device 05/18/2020    4 Fr midline, 12 cm L basilic, blood draws    Hypercholesteremia     Hypertension     Hypothyroid     Nausea & vomiting     Reflux     Sleep apnea     wears dental appliance- Somnident    Thyroid disease     Unspecified adverse effect of anesthesia     delayed awakening      Past Surgical History:   Procedure Laterality Date    HX COLONOSCOPY  2012    normal repeat 2022, diverticulosis of sigmoid colon, Dr. Coello Man HX HEENT  1/08/14    bilateral cataract surgery1/8/14    HX KNEE REPLACEMENT      right    HX OTHER SURGICAL  2007    cardiac cath- No CAD found    NEUROLOGICAL PROCEDURE UNLISTED  1999    vascular brain surgery      Prior to Admission medications    Medication Sig Start Date End Date Taking? Authorizing Provider   guaiFENesin ER (Mucinex) 600 mg ER tablet Take 600 mg by mouth two (2) times a day. Yes Provider, Historical   omeprazole (PRILOSEC) 20 mg capsule Take 20 mg by mouth daily. Yes Provider, Historical   calcium-vitamin D (OYSTER SHELL) 500 mg(1,250mg) -200 unit per tablet Take 1 Tab by mouth daily.    Yes Provider, Historical   traZODone (DESYREL) 100 mg tablet TAKE 1 TABLET BY MOUTH EVERY DAY AT NIGHT 3/25/20  Yes Steff Bender, NP   glipiZIDE SR (GLUCOTROL XL) 10 mg CR tablet TAKE 1 TABLET BY MOUTH TWICE A DAY 2/15/20  Yes Steff Bender NP   simvastatin (ZOCOR) 10 mg tablet Take 1 Tab by mouth nightly. 10/8/19  Yes Solange Bender NP   losartan (COZAAR) 50 mg tablet TAKE 1 TABLET BY MOUTH EVERY DAY. 10/8/19  Yes Solange Bender NP   levothyroxine (SYNTHROID) 100 mcg tablet Take 1 Tab by mouth Daily (before breakfast). 10/8/19  Yes Solange Bender NP   atenolol (TENORMIN) 25 mg tablet Take 1 Tab by mouth nightly. 10/8/19  Yes Solange Bender NP   sertraline (ZOLOFT) 50 mg tablet TAKE 1 TABLET BY MOUTH EVERY DAY 9/4/19  Yes Solange Bender NP   aspirin delayed-release 81 mg tablet Take 81 mg by mouth daily. Yes Provider, Historical   fluticasone (FLONASE) 50 mcg/actuation nasal spray 2 Sprays by Both Nostrils route daily as needed for Rhinitis or Allergies. Yes Provider, Historical   albuterol (PROAIR HFA) 90 mcg/actuation inhaler Take 2 Puffs by inhalation every four (4) hours as needed for Wheezing. Yes Provider, Historical   Cetirizine (ZYRTEC) 10 mg cap Take 1 Tab by mouth every evening. Yes Provider, Historical   multivitamins-minerals-lutein (CENTRUM SILVER) tab Take 1 Tab by mouth daily. Yes Provider, Historical   glucosamine-chondroit-vit c-mn (GLUCOSAMINE CHONDROITIN MAXSTR) 500-400 mg capsule Take 1 Cap by mouth daily. Yes Other, MD [place]   varicella-zoster recombinant, PF, (SHINGRIX, PF,) 50 mcg/0.5 mL susr injection 0.5mL by IntraMUSCular route once now and then repeat in 2-6 months 10/8/19   Etta Rubio NP     Allergies   Allergen Reactions    Latex Rash    Prednisone Anaphylaxis     Throat swelling. Can take methylpredisone po or IV without problems.     Augmentin [Amoxicillin-Pot Clavulanate] Nausea and Vomiting    Biaxin [Clarithromycin] Nausea and Vomiting    Metformin Diarrhea    Parafon Forte Dsc [Chlorzoxazone] Nausea and Vomiting      Social History     Tobacco Use    Smoking status: Never Smoker    Smokeless tobacco: Never Used   Substance Use Topics    Alcohol use: No     Frequency: Never     Comment: 2x year      Family History Problem Relation Age of Onset    Elevated Lipids Mother     Dementia Mother     Osteoporosis Mother     Lung Disease Father         copd    Delayed Awakening Father     Post-op Nausea/Vomiting Father     Hypertension Sister     Diabetes Sister         type 2    Breast Cancer Maternal Aunt         Current Facility-Administered Medications   Medication Dose Route Frequency    insulin lispro (HUMALOG) injection 12 Units  12 Units SubCUTAneous TIDAC    insulin glargine (LANTUS) injection 36 Units  36 Units SubCUTAneous DAILY    insulin NPH (NOVOLIN N, HUMULIN N) injection 24 Units  24 Units SubCUTAneous Q12H    And    methylPREDNISolone (PF) (SOLU-MEDROL) injection 30 mg  30 mg IntraVENous Q6H    insulin lispro (HUMALOG) injection   SubCUTAneous AC&HS    bumetanide (BUMEX) injection 1 mg  1 mg IntraVENous DAILY    guaiFENesin ER (MUCINEX) tablet 1,200 mg  1,200 mg Oral BID    famotidine (PEPCID) tablet 20 mg  20 mg Oral BID    atorvastatin (LIPITOR) tablet 40 mg  40 mg Oral DAILY    zinc sulfate (ZINCATE) 220 (50) mg capsule 1 Cap  1 Cap Oral DAILY    sodium chloride (NS) flush 5-40 mL  5-40 mL IntraVENous Q8H    enoxaparin (LOVENOX) injection 40 mg  40 mg SubCUTAneous Q24H    ascorbic acid (vitamin C) (VITAMIN C) tablet 500 mg  500 mg Oral BID    aspirin delayed-release tablet 81 mg  81 mg Oral DAILY    calcium-vitamin D (OS-TRISTEN) 500 mg-200 unit tablet  1 Tab Oral DAILY    cetirizine (ZYRTEC) tablet 10 mg  10 mg Oral DAILY    therapeutic multivitamin (THERAGRAN) tablet 1 Tab  1 Tab Oral DAILY    levothyroxine (SYNTHROID) tablet 100 mcg  100 mcg Oral ACB    losartan (COZAAR) tablet 50 mg  50 mg Oral DAILY    montelukast (SINGULAIR) tablet 10 mg  10 mg Oral QHS    sertraline (ZOLOFT) tablet 50 mg  50 mg Oral DAILY    traZODone (DESYREL) tablet 100 mg  100 mg Oral QHS    ipratropium-albuterol (COMBIVENT RESPIMAT) 20 mcg-100 mcg inhalation spray  1 Puff Inhalation Q6H RT    fluticasone-vilanterol (BREO ELLIPTA) 200mcg-25mcg/puff  1 Puff Inhalation DAILY    pantoprazole (PROTONIX) tablet 40 mg  40 mg Oral ACB         Objective:   Vital Signs:    Visit Vitals  /53 (BP 1 Location: Right arm, BP Patient Position: At rest;Head of bed elevated (Comment degrees))   Pulse 68   Temp 98.7 °F (37.1 °C)   Resp 23   Ht 5' 4\" (1.626 m)   Wt 85.3 kg (188 lb)   SpO2 91%   BMI 32.27 kg/m²       O2 Device: Hi flow nasal cannula(Heated.)   O2 Flow Rate (L/min): 60 l/min   Temp (24hrs), Av.8 °F (37.1 °C), Min:97.9 °F (36.6 °C), Max:99.4 °F (37.4 °C)       Intake/Output:   Last shift:      No intake/output data recorded.   Last 3 shifts:  1901 -  0700  In: 535 [P.O.:535]  Out: 2525 [Urine:2525]    Intake/Output Summary (Last 24 hours) at 2020 0156  Last data filed at 2020 8132  Gross per 24 hour   Intake 175 ml   Output 2025 ml   Net -1850 ml      Physical Exam:   General:  Alert, cooperative, no distress,    Head:  Normocephalic   Eyes:     Nose: HFNC in place   Throat: Supple   Neck:    Lungs:   Deferred   Chest wall:     Heart:  Deferred   Abdomen:      Extremities: No edema   Pulses: +2 bilaterally    Skin: Dry   Lymph nodes: No cervical lymphadenopathy   Neurologic: Oriented x 3       Data review:     Recent Results (from the past 24 hour(s))   GLUCOSE, POC    Collection Time: 20 11:37 AM   Result Value Ref Range    Glucose (POC) 253 (H) 65 - 100 mg/dL    Performed by Brian 142Astrid, POC    Collection Time: 20  5:40 PM   Result Value Ref Range    Glucose (POC) 214 (H) 65 - 100 mg/dL    Performed by Sharad Angelo    GLUCOSE, POC    Collection Time: 20  9:39 PM   Result Value Ref Range    Glucose (POC) 177 (H) 65 - 100 mg/dL    Performed by Rhea Roman    CBC WITH AUTOMATED DIFF    Collection Time: 20  4:39 AM   Result Value Ref Range    WBC 14.7 (H) 3.6 - 11.0 K/uL    RBC 4.15 3.80 - 5.20 M/uL    HGB 11.9 11.5 - 16.0 g/dL    HCT 36.2 35.0 - 47.0 %    MCV 87.2 80.0 - 99.0 FL    MCH 28.7 26.0 - 34.0 PG    MCHC 32.9 30.0 - 36.5 g/dL    RDW 12.8 11.5 - 14.5 %    PLATELET 200 098 - 807 K/uL    MPV 10.4 8.9 - 12.9 FL    NRBC 0.0 0  WBC    ABSOLUTE NRBC 0.00 0.00 - 0.01 K/uL    NEUTROPHILS 81 (H) 32 - 75 %    LYMPHOCYTES 11 (L) 12 - 49 %    MONOCYTES 5 5 - 13 %    EOSINOPHILS 1 0 - 7 %    BASOPHILS 0 0 - 1 %    IMMATURE GRANULOCYTES 2 (H) 0.0 - 0.5 %    ABS. NEUTROPHILS 11.9 (H) 1.8 - 8.0 K/UL    ABS. LYMPHOCYTES 1.7 0.8 - 3.5 K/UL    ABS. MONOCYTES 0.8 0.0 - 1.0 K/UL    ABS. EOSINOPHILS 0.1 0.0 - 0.4 K/UL    ABS. BASOPHILS 0.0 0.0 - 0.1 K/UL    ABS. IMM. GRANS. 0.3 (H) 0.00 - 0.04 K/UL    DF AUTOMATED     D DIMER    Collection Time: 05/22/20  4:39 AM   Result Value Ref Range    D-dimer 0.80 (H) 0.00 - 0.65 mg/L FEU   FERRITIN    Collection Time: 05/22/20  4:39 AM   Result Value Ref Range    Ferritin 273 26 - 388 NG/ML   C REACTIVE PROTEIN, QT    Collection Time: 05/22/20  4:39 AM   Result Value Ref Range    C-Reactive protein <0.29 0.00 - 0.60 mg/dL   LD    Collection Time: 05/22/20  4:39 AM   Result Value Ref Range     (H) 81 - 246 U/L   MAGNESIUM    Collection Time: 05/22/20  4:39 AM   Result Value Ref Range    Magnesium 2.0 1.6 - 2.4 mg/dL   METABOLIC PANEL, COMPREHENSIVE    Collection Time: 05/22/20  4:39 AM   Result Value Ref Range    Sodium 136 136 - 145 mmol/L    Potassium 4.2 3.5 - 5.1 mmol/L    Chloride 103 97 - 108 mmol/L    CO2 30 21 - 32 mmol/L    Anion gap 3 (L) 5 - 15 mmol/L    Glucose 171 (H) 65 - 100 mg/dL    BUN 21 (H) 6 - 20 MG/DL    Creatinine 0.84 0.55 - 1.02 MG/DL    BUN/Creatinine ratio 25 (H) 12 - 20      GFR est AA >60 >60 ml/min/1.73m2    GFR est non-AA >60 >60 ml/min/1.73m2    Calcium 8.3 (L) 8.5 - 10.1 MG/DL    Bilirubin, total 0.5 0.2 - 1.0 MG/DL    ALT (SGPT) 99 (H) 12 - 78 U/L    AST (SGOT) 61 (H) 15 - 37 U/L    Alk.  phosphatase 99 45 - 117 U/L    Protein, total 6.0 (L) 6.4 - 8.2 g/dL    Albumin 2.6 (L) 3.5 - 5.0 g/dL    Globulin 3.4 2.0 - 4.0 g/dL    A-G Ratio 0.8 (L) 1.1 - 2.2     GLUCOSE, POC    Collection Time: 05/22/20  8:12 AM   Result Value Ref Range    Glucose (POC) 225 (H) 65 - 100 mg/dL    Performed by Diego Caro        Imaging:  I have personally reviewed the patients radiographs and have reviewed the reports:  CXR 5/18: Stable bilateral infiltrates  CXR 5/19:  No change in bilateral infiltrates  CXR 5/21: Slight improvement in bilateral infiltrates        [place] NP

## 2020-05-22 NOTE — PROGRESS NOTES
Due to [address] Pandemic, CM assessment completed via EMR review and collaboration with nursing staff.  No contact with patient for this assessment.     Care Management follow up, LOS note,  LOS - 11 days.     Patient admitted for respiratory failure, COVID19+. Hx diabetes type 2, hypothyroid, sleep apnea, asthma, arthritis, peripheral neuropathy, HTN, GERD.     RUR score 23%/moderate risk     Current status  Patient remains on high flow oxygen 60L/min, 60%FiO2. High risk for decline. Palliative team following, Patient \"feeling better overall\".      Transition of Care Plan  1. Monitor patient status and response to treatment. 2. Continues on high flow oxygen/BiPap. 3. Unable to determine discharge needs at this time. 4. Palliative following and helping family with decisions regarding care.   4. CM to follow.     Janessa Iniguez, RN, MSN/Care manager

## 2020-05-22 NOTE — PROGRESS NOTES
Palliative Medicine Consult  Jaswant: [phone] ([phone])    Patient Name: Joycelyn Alvarez  YOB: 1945    Date of Initial Consult: 5/12/2020  Reason for Consult: Care decisions  Requesting Provider: Family medicine team  Primary Care Physician: Namita Collins MD     SUMMARY:   Joycelyn Alvarez is a 69 y.o. with a past history of chronic back pain, diabetes, hypertension, asthma, who was admitted on 5/10/2020 from home with a diagnosis of acute respiratory failure/COVID-19 infection. Current medical issues leading to Palliative Medicine involvement include: Care decisions. Chart reviewedpatient is a 77-year-old female admitted to the hospital with acute respiratory failure/COVID-19 infection. Both her and her  tested positive last week. He seems to be doing okay but she has had increasing respiratory distress. Presents to the hospital on 5/10 with bilateral pneumonia and respiratory failure. Patient currently on 5 L oxygen and nitric oxide. Our team is been asked to see her for goals of care    Social historypatient has been  for 39 years. She has 2 children from a prior marriage. One child lives locally and another lives in [place]. 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 PALLIATIVE DIAGNOSES:   1. Goals of care discussion  2. DNR discussion  3. Advance care planning  4. Acute respiratory failure-worsening  5. COVID-19 infection       PLAN:   1. Met with patient in the room. Patient continues to show gradual improvement. High flow has been titrated to around 50 L. Her inflammatory markers continue to decline or be in the normal range. She was able to work with therapy and walk about 90 feet without desaturations. She continues to feel encouraged that she is improving. Reassured her that this may be slow but she is making gradual improvements each day. Told her I would update her family.   2. Talked with her  and daughter via phone. They have been talking with her regularly. Updated family again today. Discussed all of her lab work as a have much interest in the numbers. They do have a friend who is a nurse so they discuss her lab work with their friend. She continues to show improvement overall. Just think it is now more of deconditioning type of issue and just allowing her body to heal.  I am not on-call this weekend but family would appreciate a phone call from the inpatient family medicine team.  Please call our team if any significant changes for the negative. 3. Goals of carepatient wants attempts at full restorative measures. Would consider intubation if needed. 4. Advance care planningshe has not completed advanced medical directive but she is clear that her  would serve as her medical power of . He is her legal next of kin. 5. CODE STATUS patient would accept intubation and resuscitation if needed. She remains clear that she would not want long-term ventilation if required. Our team will continue to address and support her decisions. 6. Symptom managementinpatient team currently managing  7. Psychosocialpatient appears to have good support from her  and children. Situation remains challenging as she is not able to have visitors. 8. Discussed with bedside nurse, therapy  9. Initial consult note routed to primary continuity provider and/or primary health care team members  10.  Communicated plan of care with: Palliative [facility] Team     GOALS OF CARE / TREATMENT PREFERENCES:     GOALS OF CARE:  Patient/Health Care Proxy Stated Goals: Prolong life    TREATMENT PREFERENCES:   Code Status: Full Code    Advance Care Planning:  [x] [facility] Inter[facility] Team has updated the ACP Navigator with Health Care Decision Maker and Patient Capacity      Advance Care Planning 5/11/2020   Confirm Advance Directive None       Medical Interventions: Full interventions     Other Instructions: Other:    As far as possible, the palliative care team has discussed with patient / health care proxy about goals of care / treatment preferences for patient. HISTORY:     History obtained from: Chart, patient    CHIEF COMPLAINT: Shortness of breath    HPI/SUBJECTIVE:    The patient is:   [x] Verbal and participatory  [] Non-participatory due to:   Patient states her breathing is little better today. Feeling a little bit tired overall. 5/19patient currently on BiPAP. Difficult to understand secondary to the BiPAP but remains lucid    5/20patient currently on high flow nasal cannula. She states she is feeling better overall. She is excited that she could eat and talk with her     5/21patient remains on high flow. Feel little better overall. Been able to talk with her family    5/22patient sitting in the bedside chair. Feeling better overall. Eating her lunch    Clinical Pain Assessment (nonverbal scale for severity on nonverbal patients):   Clinical Pain Assessment  Severity: 0          Duration: for how long has pt been experiencing pain (e.g., 2 days, 1 month, years)  Frequency: how often pain is an issue (e.g., several times per day, once every few days, constant)     FUNCTIONAL ASSESSMENT:     Palliative Performance Scale (PPS):  PPS: 60       PSYCHOSOCIAL/SPIRITUAL SCREENING:     Palliative IDT has assessed this patient for cultural preferences / practices and a referral made as appropriate to needs (Cultural Services, Patient Advocacy, Ethics, etc.)    Any spiritual / Bahai concerns:  [] Yes /  [x] No    Caregiver Burnout:  [] Yes /  [] No /  [x] No Caregiver Present      Anticipatory grief assessment:   [x] Normal  / [] Maladaptive       ESAS Anxiety: Anxiety: 0    ESAS Depression: Depression: 0        REVIEW OF SYSTEMS:     Positive and pertinent negative findings in ROS are noted above in HPI.   The following systems were [x] reviewed / [] unable to be reviewed as noted in HPI  Other findings are noted below. Systems: constitutional, ears/nose/mouth/throat, respiratory, gastrointestinal, genitourinary, musculoskeletal, integumentary, neurologic, psychiatric, endocrine. Positive findings noted below. Modified ESAS Completed by: provider   Fatigue: 3 Drowsiness: 0   Depression: 0 Pain: 0   Anxiety: 0 Nausea: 0   Anorexia: 0 Dyspnea: 5     Constipation: No     Stool Occurrence(s): 1        PHYSICAL EXAM:     From RN flowsheet:  Wt Readings from Last 3 Encounters:   05/22/20 188 lb (85.3 kg)   10/08/19 201 lb (91.2 kg)   02/13/19 197 lb 8 oz (89.6 kg)     Blood pressure 108/55, pulse 74, temperature 99.3 °F (37.4 °C), resp. rate 13, height 5' 4\" (1.626 m), weight 188 lb (85.3 kg), SpO2 96 %.     Pain Scale 1: Numeric (0 - 10)  Pain Intensity 1: 0                 Last bowel movement, if known:     Constitutional: Alert and oriented, high flow nasal cannula in place, no acute distress  Eyes: pupils equal, anicteric  ENMT: no nasal discharge, moist mucous membranes  Cardiovascular: regular rhythm, distal pulses intact  Respiratory: breathing mildly labored, symmetric, high flow nasal cannula in place  Gastrointestinal: soft non-tender, +bowel sounds  Musculoskeletal: no deformity, no tenderness to palpation  Skin: warm, dry  Neurologic: following commands, moving all extremities  Psychiatric: full affect, no hallucinations  Other:       HISTORY:     Principal Problem:    COVID-19 (5/16/2020)    Active Problems:    Respiratory failure with hypoxia (HCC) (5/10/2020)      Past Medical History:   Diagnosis Date    Anxiety     Arthritis     Asthma     Chronic pain     back,right hip    Depression     Diabetes (HCC)     type 2    GERD (gastroesophageal reflux disease)     History of vascular access device 05/18/2020    4 Fr midline, 12 cm L basilic, blood draws    Hypercholesteremia     Hypertension     Hypothyroid     Nausea & vomiting     Reflux     Sleep apnea     wears dental appliance- Somnident    Thyroid disease     Unspecified adverse effect of anesthesia     delayed awakening      Past Surgical History:   Procedure Laterality Date    HX COLONOSCOPY  2012    normal repeat 2022, diverticulosis of sigmoid colon, Dr. Sonia Patino HX HEENT  1/08/14    bilateral cataract surgery1/8/14    HX KNEE REPLACEMENT      right    HX OTHER SURGICAL  2007    cardiac cath- No CAD found    NEUROLOGICAL PROCEDURE UNLISTED  1999    vascular brain surgery      Family History   Problem Relation Age of Onset    Elevated Lipids Mother     Dementia Mother     Osteoporosis Mother     Lung Disease Father         copd    Delayed Awakening Father     Post-op Nausea/Vomiting Father     Hypertension Sister     Diabetes Sister         type 2    Breast Cancer Maternal Aunt       History reviewed, no pertinent family history. Social History     Tobacco Use    Smoking status: Never Smoker    Smokeless tobacco: Never Used   Substance Use Topics    Alcohol use: No     Frequency: Never     Comment: 2x year     Allergies   Allergen Reactions    Latex Rash    Prednisone Anaphylaxis     Throat swelling. Can take methylpredisone po or IV without problems.     Augmentin [Amoxicillin-Pot Clavulanate] Nausea and Vomiting    Biaxin [Clarithromycin] Nausea and Vomiting    Metformin Diarrhea    Parafon Forte Dsc [Chlorzoxazone] Nausea and Vomiting      Current Facility-Administered Medications   Medication Dose Route Frequency    dextrose (D50W) injection syrg 12.5-25 g  12.5-25 g IntraVENous PRN    insulin lispro (HUMALOG) injection 12 Units  12 Units SubCUTAneous TIDAC    insulin glargine (LANTUS) injection 36 Units  36 Units SubCUTAneous DAILY    insulin NPH (NOVOLIN N, HUMULIN N) injection 24 Units  24 Units SubCUTAneous Q12H    And    methylPREDNISolone (PF) (SOLU-MEDROL) injection 30 mg  30 mg IntraVENous Q6H    glucose chewable tablet 16 g  4 Tab Oral PRN  glucagon (GLUCAGEN) injection 1 mg  1 mg IntraMUSCular PRN    insulin lispro (HUMALOG) injection   SubCUTAneous AC&HS    bumetanide (BUMEX) injection 1 mg  1 mg IntraVENous DAILY    guaiFENesin ER (MUCINEX) tablet 1,200 mg  1,200 mg Oral BID    famotidine (PEPCID) tablet 20 mg  20 mg Oral BID    atorvastatin (LIPITOR) tablet 40 mg  40 mg Oral DAILY    zinc sulfate (ZINCATE) 220 (50) mg capsule 1 Cap  1 Cap Oral DAILY    diphenhydrAMINE (BENADRYL) injection 50 mg  50 mg IntraVENous Q4H PRN    acetaminophen (TYLENOL) tablet 650 mg  650 mg Oral Q6H PRN    sodium chloride (NS) flush 5-40 mL  5-40 mL IntraVENous Q8H    sodium chloride (NS) flush 5-40 mL  5-40 mL IntraVENous PRN    ondansetron (ZOFRAN) injection 4 mg  4 mg IntraVENous Q4H PRN    enoxaparin (LOVENOX) injection 40 mg  40 mg SubCUTAneous Q24H    ascorbic acid (vitamin C) (VITAMIN C) tablet 500 mg  500 mg Oral BID    aspirin delayed-release tablet 81 mg  81 mg Oral DAILY    calcium-vitamin D (OS-TRISTEN) 500 mg-200 unit tablet  1 Tab Oral DAILY    cetirizine (ZYRTEC) tablet 10 mg  10 mg Oral DAILY    fluticasone propionate (FLONASE) 50 mcg/actuation nasal spray 2 Spray  2 Spray Both Nostrils DAILY PRN    therapeutic multivitamin (THERAGRAN) tablet 1 Tab  1 Tab Oral DAILY    levothyroxine (SYNTHROID) tablet 100 mcg  100 mcg Oral ACB    losartan (COZAAR) tablet 50 mg  50 mg Oral DAILY    montelukast (SINGULAIR) tablet 10 mg  10 mg Oral QHS    sertraline (ZOLOFT) tablet 50 mg  50 mg Oral DAILY    traZODone (DESYREL) tablet 100 mg  100 mg Oral QHS    ipratropium-albuterol (COMBIVENT RESPIMAT) 20 mcg-100 mcg inhalation spray  1 Puff Inhalation Q6H RT    fluticasone-vilanterol (BREO ELLIPTA) 200mcg-25mcg/puff  1 Puff Inhalation DAILY    albuterol (PROVENTIL HFA, VENTOLIN HFA, PROAIR HFA) inhaler 2 Puff  2 Puff Inhalation Q4H PRN    pantoprazole (PROTONIX) tablet 40 mg  40 mg Oral ACB    sodium chloride (NS) flush 5-10 mL  5-10 mL IntraVENous PRN          LAB AND IMAGING FINDINGS:     Lab Results   Component Value Date/Time    WBC 14.7 (H) 05/22/2020 04:39 AM    HGB 11.9 05/22/2020 04:39 AM    PLATELET 971 55/99/6454 04:39 AM     Lab Results   Component Value Date/Time    Sodium 136 05/22/2020 04:39 AM    Potassium 4.2 05/22/2020 04:39 AM    Chloride 103 05/22/2020 04:39 AM    CO2 30 05/22/2020 04:39 AM    BUN 21 (H) 05/22/2020 04:39 AM    Creatinine 0.84 05/22/2020 04:39 AM    Calcium 8.3 (L) 05/22/2020 04:39 AM    Magnesium 2.0 05/22/2020 04:39 AM    Phosphorus 1.8 (L) 05/15/2020 02:38 AM      Lab Results   Component Value Date/Time    AST (SGOT) 61 (H) 05/22/2020 04:39 AM    Alk. phosphatase 99 05/22/2020 04:39 AM    Protein, total 6.0 (L) 05/22/2020 04:39 AM    Albumin 2.6 (L) 05/22/2020 04:39 AM    Globulin 3.4 05/22/2020 04:39 AM     Lab Results   Component Value Date/Time    INR 1.1 05/19/2020 06:07 AM    Prothrombin time 11.5 (H) 05/19/2020 06:07 AM    aPTT 24.5 12/20/2013 11:00 AM      Lab Results   Component Value Date/Time    Ferritin 273 05/22/2020 04:39 AM      Lab Results   Component Value Date/Time    pH 7.45 05/19/2020 10:22 AM    PCO2 37 05/19/2020 10:22 AM    PO2 84 05/19/2020 10:22 AM     No components found for: [place]   Lab Results   Component Value Date/Time    CK 79 11/13/2011 08:45 PM    CK - MB <0.5 (L) 11/13/2011 08:45 PM                Total time: 35  Counseling / coordination time, spent as noted above: 30  > 50% counseling / coordination?: yes    Prolonged service was provided for  []30 min   []75 min in face to face time in the presence of the patient, spent as noted above. Time Start:   Time End:   Note: this can only be billed with 68097 (initial) or 22466 (follow up). If multiple start / stop times, list each separately.

## 2020-05-22 NOTE — PROGRESS NOTES
[facility] RESIDENCY PROGRAM  PROGRESS NOTE     5/22/2020  PCP: Ame Del Castillo MD       Assessment/Plan:     Amelie Jimenez is a 69 y.o. female with history of HTN, type 2DM, GERD, hypothyroidism, depression, asthma, and hyperlipidemia who is admitted for AHRF 2/2 COVID 19.     Overnight events: NAEO      AHRF 2/2 COVID 23: COVID+ on 5/4, worsening SOB on admission and new O2 requirement (RA baseline). POA CXR with opacification in lower lobes b/l L>R, ABG pH 7.41, pCO2 31, pO2 64, O2 sat 93, bicarb 19, LA 1.8, S/p outpatient tx of azithro and plaquenil ending 5/10  - Follow COVID inflammatory markers      - Testing every day - Much improved  - Bcx NGTD  - Doxy 7 day course completed  - Pulmonology following, appreciate rec's-        -s/p Actemra 5/15. (Quant-gold, HIV, hepatitis panel) Negative       -Bumex 1mg daily       -Solumedrol Q6H  - Proning  - Zinc, Vit C, Lipitor, Mucinex  - Zyrtec, Breo-Ellipta, Combivent Q6H, Singulair  - Supplemental O2 as needed- on 60L high flow       Sinus Arrhythmia- Intermittent on tele, many PACs, leading to bradycardia. POA EKG with Left anterior fascicular block, Left ventricular hypertrophy.  - Discontinued beta blocker  - Monitor Mag and K, Replete prn    Type 2 DM: HgA1C 10.4 in 11/2019. This admission A1C 13.1. Pt very sensitivity to solumedrol- linked order with NPH.  - Hold home glipizide  - Lantus 34U and Lispro 12U with meals with SSI ACHS- Resistant scale  - NPH 24 BID     Asthma  - Continue Singulair 10mg qhs     HTN  - Continue home BP med losartan 50mg daily  - HOLDING atenolol 25mg qhs     Hypothyroid: TSH 3.570 in 11/2019  - Continue home synthroid 100mcg daily     Depression  - Continue Zoloft 50mg dialy and trazadone 100mg qhs to help with sleep     GERD  - Will substitute home prilosec for protonix daily     Hypokalemia: RESOLVED.  Likely 2/2 diarrhea prior to arrival and diuretics inpatient. K 2.7 on admission.   - Repletion prn Hyperlipidemia: Lipid panel , , HDL 59, LDL 89 in 2019  - Lipitor 40mg daily for additional anti-inflammatory benefit      Rowena Leavitt discussed with Dr. Jayden Baez. Subjective:   Pt states doing well this am, no acute concerns. No concerns overnight. Denies cp, n, v, c, d. Objective:   Physical examination  Patient Vitals for the past 24 hrs:   Temp Pulse Resp BP SpO2   20 1000  74 21  98 %   20 0900  89 13  95 %   20 0800 98.7 °F (37.1 °C) 68 23 103/53 91 %   20 0700  (!) 59      20 0434 97.9 °F (36.6 °C) 65 16 103/53 94 %   20 0056 98.4 °F (36.9 °C) 65 18 104/56 97 %   20 2136 99.4 °F (37.4 °C) 62 14 (!) 93/36 98 %   20 1601     94 %   20 1553 99.4 °F (37.4 °C) 72 12 117/65 96 %   20 1500  81      20 1200 99 °F (37.2 °C) 75 17 101/60 93 %      Temp (24hrs), Av.8 °F (37.1 °C), Min:97.9 °F (36.6 °C), Max:99.4 °F (37.4 °C)     O2 Flow Rate (L/min): 60 l/min   O2 Device: Humidifier, Hi flow nasal cannula    Date 20 - 20 - 20 0659   Shift 1929-2440 2246-3227 24 Hour Total 5115-9349 2689-6572 24 Hour Total   INTAKE   P.O. 360 175 535        P.O. 360 175 535      Shift Total(mL/kg) 360(4.3) 175(2.1) 535(6.3)      OUTPUT   Urine(mL/kg/hr) 800(0.8) 1225(1.2) (1)        Urine Voided 800 1225 5      Shift Total(mL/kg) 800(9.5) 1225(14.4) 202(23.7)      NET -296 -1586 -2435      Weight (kg) 84.4 85.3 85.3 85.3 85.3 85.3     Pt is currently a COVID infected pt. I did not go in the room to decrease exposure and conserve PPE. The attending physician will exam in the pt. Talked on the phone.     General:   Alert, cooperative, speaking in full sentences     Data Review:     CBC:  Recent Labs     20  0439 20  0354 20  0338   WBC 14.7* 18.3* 18.4*   HGB 11.9 11.8 11.4*   HCT 36.2 35.1 34.5*    673 785     Metabolic Panel:  Recent Labs 05/22/20  0439 05/21/20  0354 05/20/20  0338    134* 137   K 4.2 3.9 4.0    99 102   CO2 30 31 30   BUN 21* 30* 16   CREA 0.84 0.91 0.74   * 255* 201*   CA 8.3* 8.4* 8.6   MG 2.0 1.9 1.9   ALB 2.6* 2.5* 2.4*   TBILI 0.5 0.5 0.4   SGOT 61* 49* 41*   ALT 99* 87* 75     Micro:  Lab Results   Component Value Date/Time    Culture result: NO GROWTH 6 DAYS 05/13/2020 04:29 AM    Culture result:  05/11/2020 12:35 AM     MRSA NOT PRESENT. Apparent Staphylococus aureus (not MRSA noted). Culture result:  05/11/2020 12:35 AM         Screening of patient nares for MRSA is for surveillance purposes and, if positive, to facilitate isolation considerations in high risk settings. It is not intended for automatic decolonization interventions per se as regimens are not sufficiently effective to warrant routine use. Imaging:  Xr Chest Port    Result Date: 5/10/2020  EXAM: XR CHEST PORT INDICATION: sob, +COVID COMPARISON: 2013 FINDINGS: A portable AP radiograph of the chest was obtained at 2145 hours. The patient is on a cardiac monitor. There is opacification in the lower lobes bilaterally, left greater than right. The cardiac and mediastinal contours and pulmonary vascularity are normal.  The bones and soft tissues are grossly within normal limits. IMPRESSION: Opacification in the lower lobes bilaterally, left greater than right. CXR AP Port: 5/15/2020    FINDINGS:  AP portable upright view of the chest demonstrates a stable  cardiopericardial  silhouette. The lungs are adequately expanded. Increased peripheral parenchymal  opacities greater on the left than on the right. Increased interstitial opacity  as well. . The osseous structures are unremarkable. Patient is on a cardiac  monitor.      IMPRESSION  IMPRESSION:  Interval progression of extensive bilateral parenchymal disease greater on the  left than on the right.     Medications reviewed  Current Facility-Administered Medications   Medication Dose Route Frequency    dextrose (D50W) injection syrg 12.5-25 g  12.5-25 g IntraVENous PRN    insulin lispro (HUMALOG) injection 12 Units  12 Units SubCUTAneous TIDAC    insulin glargine (LANTUS) injection 36 Units  36 Units SubCUTAneous DAILY    insulin NPH (NOVOLIN N, HUMULIN N) injection 24 Units  24 Units SubCUTAneous Q12H    And    methylPREDNISolone (PF) (SOLU-MEDROL) injection 30 mg  30 mg IntraVENous Q6H    glucose chewable tablet 16 g  4 Tab Oral PRN    glucagon (GLUCAGEN) injection 1 mg  1 mg IntraMUSCular PRN    insulin lispro (HUMALOG) injection   SubCUTAneous AC&HS    bumetanide (BUMEX) injection 1 mg  1 mg IntraVENous DAILY    guaiFENesin ER (MUCINEX) tablet 1,200 mg  1,200 mg Oral BID    famotidine (PEPCID) tablet 20 mg  20 mg Oral BID    atorvastatin (LIPITOR) tablet 40 mg  40 mg Oral DAILY    zinc sulfate (ZINCATE) 220 (50) mg capsule 1 Cap  1 Cap Oral DAILY    diphenhydrAMINE (BENADRYL) injection 50 mg  50 mg IntraVENous Q4H PRN    acetaminophen (TYLENOL) tablet 650 mg  650 mg Oral Q6H PRN    sodium chloride (NS) flush 5-40 mL  5-40 mL IntraVENous Q8H    sodium chloride (NS) flush 5-40 mL  5-40 mL IntraVENous PRN    ondansetron (ZOFRAN) injection 4 mg  4 mg IntraVENous Q4H PRN    enoxaparin (LOVENOX) injection 40 mg  40 mg SubCUTAneous Q24H    ascorbic acid (vitamin C) (VITAMIN C) tablet 500 mg  500 mg Oral BID    aspirin delayed-release tablet 81 mg  81 mg Oral DAILY    calcium-vitamin D (OS-TRISTEN) 500 mg-200 unit tablet  1 Tab Oral DAILY    cetirizine (ZYRTEC) tablet 10 mg  10 mg Oral DAILY    fluticasone propionate (FLONASE) 50 mcg/actuation nasal spray 2 Spray  2 Spray Both Nostrils DAILY PRN    therapeutic multivitamin (THERAGRAN) tablet 1 Tab  1 Tab Oral DAILY    levothyroxine (SYNTHROID) tablet 100 mcg  100 mcg Oral ACB    losartan (COZAAR) tablet 50 mg  50 mg Oral DAILY    montelukast (SINGULAIR) tablet 10 mg  10 mg Oral QHS    sertraline (ZOLOFT) tablet 50 mg 50 mg Oral DAILY    traZODone (DESYREL) tablet 100 mg  100 mg Oral QHS    ipratropium-albuterol (COMBIVENT RESPIMAT) 20 mcg-100 mcg inhalation spray  1 Puff Inhalation Q6H RT    fluticasone-vilanterol (BREO ELLIPTA) 200mcg-25mcg/puff  1 Puff Inhalation DAILY    albuterol (PROVENTIL HFA, VENTOLIN HFA, PROAIR HFA) inhaler 2 Puff  2 Puff Inhalation Q4H PRN    pantoprazole (PROTONIX) tablet 40 mg  40 mg Oral ACB    sodium chloride (NS) flush 5-10 mL  5-10 mL IntraVENous PRN         Signed:   Kyrie Johnson MD   Resident, Family Medicine      Attending note: Attending note to follow. ..

## 2020-05-22 NOTE — PROGRESS NOTES
Bedside, Verbal and  shift change report given to HEATHER Melendez RN (oncoming nurse) by Kelley Coronado (offgoing nurse). Report included the following information SBAR, Kardex, Intake/Output, MAR, Recent Results and Cardiac Rhythm Sinus Arrythmia.

## 2020-05-22 NOTE — PROGRESS NOTES
Problem: Mobility Impaired (Adult and Pediatric)  Goal: *Acute Goals and Plan of Care (Insert Text)  Description: FUNCTIONAL STATUS PRIOR TO ADMISSION: Patient was independent and active without use of DME.    HOME SUPPORT PRIOR TO ADMISSION: The patient lived with  but did not require assist.    Physical Therapy Goals  Initiated 5/18/2020  1. Patient will move from supine to sit and sit to supine  in bed with independence within 7 day(s). 2.  Patient will transfer from bed to chair and chair to bed with independence using the least restrictive device within 7 day(s). 3.  Patient will perform sit to stand with independence within 7 day(s). 4.  Patient will ambulate with independence for 150 feet with the least restrictive device within 7 day(s). 5.  Patient will ascend/descend 4 stairs with 1 handrail(s) with modified independence within 7 day(s). Outcome: Progressing Towards Goal   PHYSICAL THERAPY TREATMENT  Patient: Humble Ya (67 y.o. female)  Date: 5/22/2020  Diagnosis: Respiratory failure with hypoxia ([facility] [address] [J96.91]   COVID-19       Precautions: Fall, Contact(Droplet plus)  Chart, physical therapy assessment, plan of care and goals were reviewed. ASSESSMENT  Patient continues with skilled PT services and is progressing towards goals. Pt's saturations improved today mid-high 90s with OOB mobility. O2 has been weaned from 60 to 50L/min through [employer]. Agreeable to several short gait trials within limited distance d/t high flow O2 device. Pt progressed from single distance(~6ft) to double(12ft) without difficulty. In total pt ambulated nearly 90ft within the room. Provided ample rest breaks with seated ADLs and exercises between. Pt pleased with progress today. Understands limited therapy staff on the weekend and agreeable to be OOB as tolerated with nursing.     Current Level of Function Impacting Discharge (mobility/balance): O2 needs    Other factors to consider for discharge: O2 needs higher than NC can support         PLAN :  Patient continues to benefit from skilled intervention to address the above impairments. Continue treatment per established plan of care. to address goals. Recommendation for discharge: (in order for the patient to meet his/her long term goals)  To be determined: likely home with HHPT     This discharge recommendation:  Has been made in collaboration with the attending provider and/or case management    IF patient discharges home will need the following DME: to be determined (TBD)       SUBJECTIVE:   Patient stated [place] [id number] and he was so happy that he said I sounded so much better.     OBJECTIVE DATA SUMMARY:   Critical Behavior:  Neurologic State: Alert  Orientation Level: Oriented X4  Cognition: Appropriate decision making  Safety/Judgement: Awareness of environment, Driving appropriateness, Fall prevention, Good awareness of safety precautions, Home safety, Insight into deficits  Functional Mobility Training:  Bed Mobility:  Rolling: Supervision  Supine to Sit: Supervision              Transfers:  Sit to Stand: Stand-by assistance;Supervision  Stand to Sit: Stand-by assistance                             Balance:  Sitting: Intact  Standing: Impaired  Standing - Static: Good  Standing - Dynamic : Fair  Ambulation/Gait Training:  Distance (ft): 12 Feet (ft)(in total ~90ft forward and backwards in room limited by O2)  Assistive Device: Gait belt(HHA)  Ambulation - Level of Assistance: Contact guard assistance                 Base of Support: Widened                 Activity Tolerance:   Good, desaturates with exertion and requires oxygen, and requires rest breaks  Please refer to the flowsheet for vital signs taken during this treatment. After treatment patient left in no apparent distress:   Sitting in chair and Call bell within reach    COMMUNICATION/COLLABORATION:   The patients plan of care was discussed with: Registered nurse.      Samira Parks Kailee Brito, PT   Time Calculation: 43 mins

## 2020-05-23 LAB
ALBUMIN SERPL-MCNC: 2.5 G/DL (ref 3.5–5)
ALBUMIN/GLOB SERPL: 0.8 {RATIO} (ref 1.1–2.2)
ALP SERPL-CCNC: 89 U/L (ref 45–117)
ALT SERPL-CCNC: 80 U/L (ref 12–78)
ANION GAP SERPL CALC-SCNC: 3 MMOL/L (ref 5–15)
AST SERPL-CCNC: 30 U/L (ref 15–37)
BASOPHILS # BLD: 0 K/UL (ref 0–0.1)
BASOPHILS NFR BLD: 0 % (ref 0–1)
BILIRUB SERPL-MCNC: 0.5 MG/DL (ref 0.2–1)
BUN SERPL-MCNC: 27 MG/DL (ref 6–20)
BUN/CREAT SERPL: 32 (ref 12–20)
CALCIUM SERPL-MCNC: 8.3 MG/DL (ref 8.5–10.1)
CHLORIDE SERPL-SCNC: 100 MMOL/L (ref 97–108)
CO2 SERPL-SCNC: 29 MMOL/L (ref 21–32)
CREAT SERPL-MCNC: 0.85 MG/DL (ref 0.55–1.02)
CRP SERPL-MCNC: <0.29 MG/DL (ref 0–0.6)
D DIMER PPP FEU-MCNC: 0.61 MG/L FEU (ref 0–0.65)
DIFFERENTIAL METHOD BLD: ABNORMAL
EOSINOPHIL # BLD: 0 K/UL (ref 0–0.4)
EOSINOPHIL NFR BLD: 0 % (ref 0–7)
ERYTHROCYTE [DISTWIDTH] IN BLOOD BY AUTOMATED COUNT: 12.7 % (ref 11.5–14.5)
FERRITIN SERPL-MCNC: 239 NG/ML (ref 8–252)
GLOBULIN SER CALC-MCNC: 3.3 G/DL (ref 2–4)
GLUCOSE BLD STRIP.AUTO-MCNC: 275 MG/DL (ref 65–100)
GLUCOSE BLD STRIP.AUTO-MCNC: 276 MG/DL (ref 65–100)
GLUCOSE BLD STRIP.AUTO-MCNC: 324 MG/DL (ref 65–100)
GLUCOSE BLD STRIP.AUTO-MCNC: 377 MG/DL (ref 65–100)
GLUCOSE SERPL-MCNC: 299 MG/DL (ref 65–100)
HCT VFR BLD AUTO: 34.5 % (ref 35–47)
HGB BLD-MCNC: 11.3 G/DL (ref 11.5–16)
IL6 SERPL-MCNC: 25.9 PG/ML (ref 0–15.5)
IMM GRANULOCYTES # BLD AUTO: 0 K/UL (ref 0–0.04)
IMM GRANULOCYTES NFR BLD AUTO: 0 % (ref 0–0.5)
LDH SERPL L TO P-CCNC: 237 U/L (ref 81–246)
LYMPHOCYTES # BLD: 1.7 K/UL (ref 0.8–3.5)
LYMPHOCYTES NFR BLD: 10 % (ref 12–49)
MAGNESIUM SERPL-MCNC: 2.1 MG/DL (ref 1.6–2.4)
MCH RBC QN AUTO: 28.5 PG (ref 26–34)
MCHC RBC AUTO-ENTMCNC: 32.8 G/DL (ref 30–36.5)
MCV RBC AUTO: 86.9 FL (ref 80–99)
MONOCYTES # BLD: 1 K/UL (ref 0–1)
MONOCYTES NFR BLD: 6 % (ref 5–13)
NEUTS SEG # BLD: 14.3 K/UL (ref 1.8–8)
NEUTS SEG NFR BLD: 84 % (ref 32–75)
NRBC # BLD: 0 K/UL (ref 0–0.01)
NRBC BLD-RTO: 0 PER 100 WBC
PLATELET # BLD AUTO: 306 K/UL (ref 150–400)
PMV BLD AUTO: 10.6 FL (ref 8.9–12.9)
POTASSIUM SERPL-SCNC: 4.5 MMOL/L (ref 3.5–5.1)
PROT SERPL-MCNC: 5.8 G/DL (ref 6.4–8.2)
RBC # BLD AUTO: 3.97 M/UL (ref 3.8–5.2)
RBC MORPH BLD: ABNORMAL
SERVICE CMNT-IMP: ABNORMAL
SODIUM SERPL-SCNC: 132 MMOL/L (ref 136–145)
WBC # BLD AUTO: 17 K/UL (ref 3.6–11)

## 2020-05-23 PROCEDURE — 82728 ASSAY OF FERRITIN: CPT

## 2020-05-23 PROCEDURE — 83735 ASSAY OF MAGNESIUM: CPT

## 2020-05-23 PROCEDURE — 74011250637 HC RX REV CODE- 250/637: Performed by: INTERNAL MEDICINE

## 2020-05-23 PROCEDURE — 74011250637 HC RX REV CODE- 250/637: Performed by: STUDENT IN AN ORGANIZED HEALTH CARE EDUCATION/TRAINING PROGRAM

## 2020-05-23 PROCEDURE — 94660 CPAP INITIATION&MGMT: CPT

## 2020-05-23 PROCEDURE — 74011250636 HC RX REV CODE- 250/636: Performed by: STUDENT IN AN ORGANIZED HEALTH CARE EDUCATION/TRAINING PROGRAM

## 2020-05-23 PROCEDURE — 74011636637 HC RX REV CODE- 636/637: Performed by: STUDENT IN AN ORGANIZED HEALTH CARE EDUCATION/TRAINING PROGRAM

## 2020-05-23 PROCEDURE — 94640 AIRWAY INHALATION TREATMENT: CPT

## 2020-05-23 PROCEDURE — 94664 DEMO&/EVAL PT USE INHALER: CPT

## 2020-05-23 PROCEDURE — 85025 COMPLETE CBC W/AUTO DIFF WBC: CPT

## 2020-05-23 PROCEDURE — 86140 C-REACTIVE PROTEIN: CPT

## 2020-05-23 PROCEDURE — 77010033711 HC HIGH FLOW OXYGEN

## 2020-05-23 PROCEDURE — 65660000000 HC RM CCU STEPDOWN

## 2020-05-23 PROCEDURE — 80053 COMPREHEN METABOLIC PANEL: CPT

## 2020-05-23 PROCEDURE — 83520 IMMUNOASSAY QUANT NOS NONAB: CPT

## 2020-05-23 PROCEDURE — 83615 LACTATE (LD) (LDH) ENZYME: CPT

## 2020-05-23 PROCEDURE — 85379 FIBRIN DEGRADATION QUANT: CPT

## 2020-05-23 PROCEDURE — 36415 COLL VENOUS BLD VENIPUNCTURE: CPT

## 2020-05-23 PROCEDURE — 74011250636 HC RX REV CODE- 250/636: Performed by: INTERNAL MEDICINE

## 2020-05-23 PROCEDURE — 74011000250 HC RX REV CODE- 250: Performed by: STUDENT IN AN ORGANIZED HEALTH CARE EDUCATION/TRAINING PROGRAM

## 2020-05-23 PROCEDURE — 82962 GLUCOSE BLOOD TEST: CPT

## 2020-05-23 PROCEDURE — 77030038269 HC DRN EXT URIN PURWCK BARD -A

## 2020-05-23 RX ADMIN — INSULIN LISPRO 10 UNITS: 100 INJECTION, SOLUTION INTRAVENOUS; SUBCUTANEOUS at 17:15

## 2020-05-23 RX ADMIN — IPRATROPIUM BROMIDE AND ALBUTEROL 1 PUFF: 20; 100 SPRAY, METERED RESPIRATORY (INHALATION) at 03:36

## 2020-05-23 RX ADMIN — HUMAN INSULIN 24 UNITS: 100 INJECTION, SUSPENSION SUBCUTANEOUS at 06:22

## 2020-05-23 RX ADMIN — GUAIFENESIN 1200 MG: 600 TABLET, EXTENDED RELEASE ORAL at 21:02

## 2020-05-23 RX ADMIN — SERTRALINE HYDROCHLORIDE 50 MG: 50 TABLET ORAL at 08:44

## 2020-05-23 RX ADMIN — ZINC SULFATE 220 MG (50 MG) CAPSULE 1 CAPSULE: CAPSULE at 08:44

## 2020-05-23 RX ADMIN — FLUTICASONE FUROATE AND VILANTEROL TRIFENATATE 1 PUFF: 200; 25 POWDER RESPIRATORY (INHALATION) at 08:55

## 2020-05-23 RX ADMIN — ASPIRIN 81 MG: 81 TABLET, COATED ORAL at 08:44

## 2020-05-23 RX ADMIN — GUAIFENESIN 1200 MG: 600 TABLET, EXTENDED RELEASE ORAL at 08:43

## 2020-05-23 RX ADMIN — INSULIN LISPRO 12 UNITS: 100 INJECTION, SOLUTION INTRAVENOUS; SUBCUTANEOUS at 17:14

## 2020-05-23 RX ADMIN — INSULIN LISPRO 7 UNITS: 100 INJECTION, SOLUTION INTRAVENOUS; SUBCUTANEOUS at 08:45

## 2020-05-23 RX ADMIN — CETIRIZINE HYDROCHLORIDE 10 MG: 10 TABLET, FILM COATED ORAL at 09:02

## 2020-05-23 RX ADMIN — METHYLPREDNISOLONE SODIUM SUCCINATE 30 MG: 40 INJECTION, POWDER, FOR SOLUTION INTRAMUSCULAR; INTRAVENOUS at 11:54

## 2020-05-23 RX ADMIN — INSULIN HUMAN 28 UNITS: 100 INJECTION, SUSPENSION SUBCUTANEOUS at 17:14

## 2020-05-23 RX ADMIN — PANTOPRAZOLE SODIUM 40 MG: 40 TABLET, DELAYED RELEASE ORAL at 06:23

## 2020-05-23 RX ADMIN — OYSTER SHELL CALCIUM WITH VITAMIN D 1 TABLET: 500; 200 TABLET, FILM COATED ORAL at 08:44

## 2020-05-23 RX ADMIN — BUMETANIDE 1 MG: 0.25 INJECTION INTRAMUSCULAR; INTRAVENOUS at 09:02

## 2020-05-23 RX ADMIN — INSULIN LISPRO 12 UNITS: 100 INJECTION, SOLUTION INTRAVENOUS; SUBCUTANEOUS at 12:07

## 2020-05-23 RX ADMIN — TRAZODONE HYDROCHLORIDE 100 MG: 100 TABLET ORAL at 21:02

## 2020-05-23 RX ADMIN — INSULIN LISPRO 12 UNITS: 100 INJECTION, SOLUTION INTRAVENOUS; SUBCUTANEOUS at 08:44

## 2020-05-23 RX ADMIN — MONTELUKAST SODIUM 10 MG: 10 TABLET, FILM COATED ORAL at 21:02

## 2020-05-23 RX ADMIN — INSULIN LISPRO 10 UNITS: 100 INJECTION, SOLUTION INTRAVENOUS; SUBCUTANEOUS at 12:08

## 2020-05-23 RX ADMIN — FAMOTIDINE 20 MG: 20 TABLET, FILM COATED ORAL at 08:44

## 2020-05-23 RX ADMIN — OXYCODONE HYDROCHLORIDE AND ACETAMINOPHEN 500 MG: 500 TABLET ORAL at 21:02

## 2020-05-23 RX ADMIN — FAMOTIDINE 20 MG: 20 TABLET, FILM COATED ORAL at 21:02

## 2020-05-23 RX ADMIN — INSULIN GLARGINE 36 UNITS: 100 INJECTION, SOLUTION SUBCUTANEOUS at 08:44

## 2020-05-23 RX ADMIN — METHYLPREDNISOLONE SODIUM SUCCINATE 30 MG: 40 INJECTION, POWDER, FOR SOLUTION INTRAMUSCULAR; INTRAVENOUS at 17:17

## 2020-05-23 RX ADMIN — ATORVASTATIN CALCIUM 40 MG: 20 TABLET, FILM COATED ORAL at 08:43

## 2020-05-23 RX ADMIN — Medication 10 ML: at 06:22

## 2020-05-23 RX ADMIN — ENOXAPARIN SODIUM 40 MG: 40 INJECTION SUBCUTANEOUS at 21:02

## 2020-05-23 RX ADMIN — IPRATROPIUM BROMIDE AND ALBUTEROL 1 PUFF: 20; 100 SPRAY, METERED RESPIRATORY (INHALATION) at 08:55

## 2020-05-23 RX ADMIN — THERA TABS 1 TABLET: TAB at 08:43

## 2020-05-23 RX ADMIN — OXYCODONE HYDROCHLORIDE AND ACETAMINOPHEN 500 MG: 500 TABLET ORAL at 08:44

## 2020-05-23 RX ADMIN — LEVOTHYROXINE SODIUM 100 MCG: 0.1 TABLET ORAL at 06:23

## 2020-05-23 RX ADMIN — METHYLPREDNISOLONE SODIUM SUCCINATE 30 MG: 40 INJECTION, POWDER, FOR SOLUTION INTRAMUSCULAR; INTRAVENOUS at 06:22

## 2020-05-23 RX ADMIN — INSULIN LISPRO 4 UNITS: 100 INJECTION, SOLUTION INTRAVENOUS; SUBCUTANEOUS at 21:07

## 2020-05-23 RX ADMIN — IPRATROPIUM BROMIDE AND ALBUTEROL 1 PUFF: 20; 100 SPRAY, METERED RESPIRATORY (INHALATION) at 13:36

## 2020-05-23 RX ADMIN — Medication 10 ML: at 14:00

## 2020-05-23 RX ADMIN — IPRATROPIUM BROMIDE AND ALBUTEROL 1 PUFF: 20; 100 SPRAY, METERED RESPIRATORY (INHALATION) at 21:02

## 2020-05-23 NOTE — PROGRESS NOTES
1900  Bedside and Verbal shift change report given to Eden Hernandez RN (oncoming nurse) by Vania ESQUIVEL (offgoing nurse). Report included the following information SBAR, Kardex, Procedure Summary, Intake/Output, MAR, Accordion, Recent Results and Cardiac Rhythm NSR . Visit Vitals  /59 (BP 1 Location: Right arm, BP Patient Position: At rest)   Pulse 60   Temp 98.6 °F (37 °C)   Resp 20   Ht 5' 4\" (1.626 m)   Wt 85.3 kg (188 lb)   SpO2 95%   BMI 32.27 kg/m²     0700  Bedside and Verbal shift change report given to Shira ESQUIVEL (oncoming nurse) by Eden Hernandez RN (offgoing nurse). Report included the following information SBAR, Kardex, Procedure Summary, Intake/Output, MAR, Accordion, Recent Results and Cardiac Rhythm NSR.

## 2020-05-23 NOTE — PROGRESS NOTES
Called and updated pt's  Yue Brought ([phone]) regarding pt's progress. All questions were answered.      Verona Guzman MD

## 2020-05-23 NOTE — PROGRESS NOTES
[facility]  Pulmonary, Critical Care, and Sleep Medicine      Name: Rubén Samuel MRN: [id number]   : 1945 Hospital: [address] [facility]   Date: 2020        IMPRESSION:   · Acute hypoxemic respiratory failure  · COVID19 +. Started on Caitlin pulse , had to stop this on  due to increased O2 requirements. Given actemra 5/15  · Mild intermittent asthma  · Chronic back pain  · DM  · HTN      RECOMMENDATIONS:   · Supplemental O2 as needed to keep sats > 88%. Wean [place] to keep sats >88%  · Continue diuresis. Strict I&O. Watch creat and bicarb. · Completed course of Doxy  · Trend inflammatory markers  · C/W Solu-Medrol   · Diligent BG control  · Continue combivent, breo, singulair, zyrtec, mucinex   · Continue vit C, zinc, lipitor  · Continue pepcid  · Encouraged pt to lie prone at least 4 hours per day and to use inc tommy q 1 hr w/a  · OOB to chair  · Palliative care following    Pt is critically ill and at high risk of decompensation  CCT: 33 minutes     Subjective: This patient has been seen and evaluated at the request of Dr. Israel Fleming for [place] 19 pneumonia and resp failure. Patient is a 69 y.o. female diagnosed one week ago with Covid 19. Pt with fevers, diarrhea, cough, and sob. She has been isolating at home but has had work done within the home and believes that she may have gotten an infection from one of the workers there.  has also become infected. Pt states that her symptoms have remained stable to slightly improved over the past few days but has noted that her oxygen sats have been falling and presented to ED for evaluation. Fevers seem to have resolved. Main symptoms are now diarrhea, sob, and cough. Pt w h/o asthma which by description appears to be mild intermittent and is followed by Dr Barbara Singh in [place]. Controlled with montelukast and (rare) albuterol MDI.     Never smoker      Interval history:  Tmax 100  O2 sats 95% on HFNC 40L, 80%  Fluid balance: -40        ROS: feeling well today. O2 sats drop with exertion. Past Medical History:   Diagnosis Date    Anxiety     Arthritis     Asthma     Chronic pain     back,right hip    Depression     Diabetes ([address]     type 2    GERD (gastroesophageal reflux disease)     History of vascular access device 05/18/2020    4 Fr midline, 12 cm L basilic, blood draws    Hypercholesteremia     Hypertension     Hypothyroid     Nausea & vomiting     Reflux     Sleep apnea     wears dental appliance- Somnident    Thyroid disease     Unspecified adverse effect of anesthesia     delayed awakening      Past Surgical History:   Procedure Laterality Date    HX COLONOSCOPY  2012    normal repeat 2022, diverticulosis of sigmoid colon, Dr. Elkin Singer HX HEENT  1/08/14    bilateral cataract surgery1/8/14    HX KNEE REPLACEMENT      right    HX OTHER SURGICAL  2007    cardiac cath- No CAD found    NEUROLOGICAL PROCEDURE UNLISTED  1999    vascular brain surgery      Prior to Admission medications    Medication Sig Start Date End Date Taking? Authorizing Provider   guaiFENesin ER (Mucinex) 600 mg ER tablet Take 600 mg by mouth two (2) times a day. Yes Provider, Historical   omeprazole (PRILOSEC) 20 mg capsule Take 20 mg by mouth daily. Yes Provider, Historical   calcium-vitamin D (OYSTER SHELL) 500 mg(1,250mg) -200 unit per tablet Take 1 Tab by mouth daily. Yes Provider, Historical   traZODone (DESYREL) 100 mg tablet TAKE 1 TABLET BY MOUTH EVERY DAY AT NIGHT 3/25/20  Yes Sheldon Bender NP   glipiZIDE SR (GLUCOTROL XL) 10 mg CR tablet TAKE 1 TABLET BY MOUTH TWICE A DAY 2/15/20  Yes Sheldon Bender NP   simvastatin (ZOCOR) 10 mg tablet Take 1 Tab by mouth nightly. 10/8/19  Yes Sheldon Bender NP   losartan (COZAAR) 50 mg tablet TAKE 1 TABLET BY MOUTH EVERY DAY. 10/8/19  Yes Sheldon Bender NP   levothyroxine (SYNTHROID) 100 mcg tablet Take 1 Tab by mouth Daily (before breakfast). 10/8/19  Yes Libra Bender NP   atenolol (TENORMIN) 25 mg tablet Take 1 Tab by mouth nightly. 10/8/19  Yes Libra Bender NP   sertraline (ZOLOFT) 50 mg tablet TAKE 1 TABLET BY MOUTH EVERY DAY 9/4/19  Yes Libra Bender NP   aspirin delayed-release 81 mg tablet Take 81 mg by mouth daily. Yes Provider, Historical   fluticasone (FLONASE) 50 mcg/actuation nasal spray 2 Sprays by Both Nostrils route daily as needed for Rhinitis or Allergies. Yes Provider, Historical   albuterol (PROAIR HFA) 90 mcg/actuation inhaler Take 2 Puffs by inhalation every four (4) hours as needed for Wheezing. Yes Provider, Historical   Cetirizine (ZYRTEC) 10 mg cap Take 1 Tab by mouth every evening. Yes Provider, Historical   multivitamins-minerals-lutein (CENTRUM SILVER) tab Take 1 Tab by mouth daily. Yes Provider, Historical   glucosamine-chondroit-vit c-mn (GLUCOSAMINE CHONDROITIN MAXSTR) 500-400 mg capsule Take 1 Cap by mouth daily. Yes Other, MD [place]   varicella-zoster recombinant, PF, (SHINGRIX, PF,) 50 mcg/0.5 mL susr injection 0.5mL by IntraMUSCular route once now and then repeat in 2-6 months 10/8/19   Nata Echevarria NP     Allergies   Allergen Reactions    Latex Rash    Prednisone Anaphylaxis     Throat swelling. Can take methylpredisone po or IV without problems.     Augmentin [Amoxicillin-Pot Clavulanate] Nausea and Vomiting    Biaxin [Clarithromycin] Nausea and Vomiting    Metformin Diarrhea    Parafon Forte Dsc [Chlorzoxazone] Nausea and Vomiting      Social History     Tobacco Use    Smoking status: Never Smoker    Smokeless tobacco: Never Used   Substance Use Topics    Alcohol use: No     Frequency: Never     Comment: 2x year      Family History   Problem Relation Age of Onset    Elevated Lipids Mother     Dementia Mother     Osteoporosis Mother     Lung Disease Father         copd    Delayed Awakening Father     Post-op Nausea/Vomiting Father     Hypertension Sister    Jonah Burgos Diabetes Sister         type 2    Breast Cancer Maternal Aunt         Current Facility-Administered Medications   Medication Dose Route Frequency    insulin NPH (NOVOLIN N, HUMULIN N) injection 28 Units  28 Units SubCUTAneous Q12H    And    methylPREDNISolone (PF) (SOLU-MEDROL) injection 30 mg  30 mg IntraVENous Q6H    insulin lispro (HUMALOG) injection 12 Units  12 Units SubCUTAneous TIDAC    insulin glargine (LANTUS) injection 36 Units  36 Units SubCUTAneous DAILY    insulin lispro (HUMALOG) injection   SubCUTAneous AC&HS    bumetanide (BUMEX) injection 1 mg  1 mg IntraVENous DAILY    guaiFENesin ER (MUCINEX) tablet 1,200 mg  1,200 mg Oral BID    famotidine (PEPCID) tablet 20 mg  20 mg Oral BID    atorvastatin (LIPITOR) tablet 40 mg  40 mg Oral DAILY    zinc sulfate (ZINCATE) 220 (50) mg capsule 1 Cap  1 Cap Oral DAILY    sodium chloride (NS) flush 5-40 mL  5-40 mL IntraVENous Q8H    enoxaparin (LOVENOX) injection 40 mg  40 mg SubCUTAneous Q24H    ascorbic acid (vitamin C) (VITAMIN C) tablet 500 mg  500 mg Oral BID    aspirin delayed-release tablet 81 mg  81 mg Oral DAILY    calcium-vitamin D (OS-TRISTEN) 500 mg-200 unit tablet  1 Tab Oral DAILY    cetirizine (ZYRTEC) tablet 10 mg  10 mg Oral DAILY    therapeutic multivitamin (THERAGRAN) tablet 1 Tab  1 Tab Oral DAILY    levothyroxine (SYNTHROID) tablet 100 mcg  100 mcg Oral ACB    losartan (COZAAR) tablet 50 mg  50 mg Oral DAILY    montelukast (SINGULAIR) tablet 10 mg  10 mg Oral QHS    sertraline (ZOLOFT) tablet 50 mg  50 mg Oral DAILY    traZODone (DESYREL) tablet 100 mg  100 mg Oral QHS    ipratropium-albuterol (COMBIVENT RESPIMAT) 20 mcg-100 mcg inhalation spray  1 Puff Inhalation Q6H RT    fluticasone-vilanterol (BREO ELLIPTA) 200mcg-25mcg/puff  1 Puff Inhalation DAILY    pantoprazole (PROTONIX) tablet 40 mg  40 mg Oral ACB         Objective:   Vital Signs:    Visit Vitals  /48 (BP 1 Location: Right arm, BP Patient Position:  At rest)   Pulse 70   Temp 99.1 °F (37.3 °C)   Resp 18   Ht 5' 4\" (1.626 m)   Wt 85.3 kg (188 lb)   SpO2 93%   BMI 32.27 kg/m²       O2 Device: Hi flow nasal cannula, Heated   O2 Flow Rate (L/min): 40 l/min   Temp (24hrs), Av.3 °F (37.4 °C), Min:98.6 °F (37 °C), Max:100 °F (37.8 °C)       Intake/Output:   Last shift:      No intake/output data recorded. Last 3 shifts:  1901 -  0700  In: 1885 [P.O.:1865; I.V.:20]  Out: 2975 [Urine:2975]    Intake/Output Summary (Last 24 hours) at 2020 1146  Last data filed at 2020 8801  Gross per 24 hour   Intake 1230 ml   Output 900 ml   Net 330 ml      Physical Exam:   General:  Alert, cooperative, no distress,    Head:  Normocephalic   Eyes:  EOMI   Nose: HFNC in place   Throat:    Neck:    Lungs:   Deferred   Chest wall:     Heart:  RRR (on tele)   Abdomen:      Extremities: deferred   Pulses:    Skin:    Lymph nodes:    Neurologic: Oriented x 3       Data review:     Recent Results (from the past 24 hour(s))   GLUCOSE, POC    Collection Time: 20  5:05 PM   Result Value Ref Range    Glucose (POC) 379 (H) 65 - 100 mg/dL    Performed by Diego Caro    GLUCOSE, POC    Collection Time: 20  8:54 PM   Result Value Ref Range    Glucose (POC) 239 (H) 65 - 100 mg/dL    Performed by Hyun Winston    CBC WITH AUTOMATED DIFF    Collection Time: 20  3:39 AM   Result Value Ref Range    WBC 17.0 (H) 3.6 - 11.0 K/uL    RBC 3.97 3.80 - 5.20 M/uL    HGB 11.3 (L) 11.5 - 16.0 g/dL    HCT 34.5 (L) 35.0 - 47.0 %    MCV 86.9 80.0 - 99.0 FL    MCH 28.5 26.0 - 34.0 PG    MCHC 32.8 30.0 - 36.5 g/dL    RDW 12.7 11.5 - 14.5 %    PLATELET 656 178 - 626 K/uL    MPV 10.6 8.9 - 12.9 FL    NRBC 0.0 0  WBC    ABSOLUTE NRBC 0.00 0.00 - 0.01 K/uL    NEUTROPHILS 84 (H) 32 - 75 %    LYMPHOCYTES 10 (L) 12 - 49 %    MONOCYTES 6 5 - 13 %    EOSINOPHILS 0 0 - 7 %    BASOPHILS 0 0 - 1 %    IMMATURE GRANULOCYTES 0 0.0 - 0.5 %    ABS.  NEUTROPHILS 14.3 (H) 1.8 - 8.0 K/UL ABS. LYMPHOCYTES 1.7 0.8 - 3.5 K/UL    ABS. MONOCYTES 1.0 0.0 - 1.0 K/UL    ABS. EOSINOPHILS 0.0 0.0 - 0.4 K/UL    ABS. BASOPHILS 0.0 0.0 - 0.1 K/UL    ABS. IMM. GRANS. 0.0 0.00 - 0.04 K/UL    DF MANUAL      RBC COMMENTS NORMOCYTIC, NORMOCHROMIC     D DIMER    Collection Time: 05/23/20  3:39 AM   Result Value Ref Range    D-dimer 0.61 0.00 - 0.65 mg/L FEU   MAGNESIUM    Collection Time: 05/23/20  3:39 AM   Result Value Ref Range    Magnesium 2.1 1.6 - 2.4 mg/dL   C REACTIVE PROTEIN, QT    Collection Time: 05/23/20  3:39 AM   Result Value Ref Range    C-Reactive protein <0.29 0.00 - 0.60 mg/dL   LD    Collection Time: 05/23/20  3:39 AM   Result Value Ref Range     81 - 853 U/L   METABOLIC PANEL, COMPREHENSIVE    Collection Time: 05/23/20  3:39 AM   Result Value Ref Range    Sodium 132 (L) 136 - 145 mmol/L    Potassium 4.5 3.5 - 5.1 mmol/L    Chloride 100 97 - 108 mmol/L    CO2 29 21 - 32 mmol/L    Anion gap 3 (L) 5 - 15 mmol/L    Glucose 299 (H) 65 - 100 mg/dL    BUN 27 (H) 6 - 20 MG/DL    Creatinine 0.85 0.55 - 1.02 MG/DL    BUN/Creatinine ratio 32 (H) 12 - 20      GFR est AA >60 >60 ml/min/1.73m2    GFR est non-AA >60 >60 ml/min/1.73m2    Calcium 8.3 (L) 8.5 - 10.1 MG/DL    Bilirubin, total 0.5 0.2 - 1.0 MG/DL    ALT (SGPT) 80 (H) 12 - 78 U/L    AST (SGOT) 30 15 - 37 U/L    Alk.  phosphatase 89 45 - 117 U/L    Protein, total 5.8 (L) 6.4 - 8.2 g/dL    Albumin 2.5 (L) 3.5 - 5.0 g/dL    Globulin 3.3 2.0 - 4.0 g/dL    A-G Ratio 0.8 (L) 1.1 - 2.2     FERRITIN    Collection Time: 05/23/20  6:33 AM   Result Value Ref Range    Ferritin 239 8 - 252 NG/ML   GLUCOSE, POC    Collection Time: 05/23/20  8:37 AM   Result Value Ref Range    Glucose (POC) 275 (H) 65 - 100 mg/dL    Performed by Mery Rausch        Imaging:  I have personally reviewed the patients radiographs and have reviewed the reports:  CXR 5/18: Stable bilateral infiltrates  CXR 5/19:  No change in bilateral infiltrates  CXR 5/21: Slight improvement in bilateral infiltrates        Favio Cummings.  Nakia Powers, [place]

## 2020-05-23 NOTE — PROGRESS NOTES
Bedside and Verbal shift change report given to Shira (oncoming nurse) by Milan Cueva (offgoing nurse). Report included the following information SBAR, Kardex and Cardiac Rhythm nSR.     0845 patient resting quietly in bed, VS stable, cozaar held as patients BP is on softer side and dont want to drop BP as patient is also getting bumex (residents aware that cozaar was held). Patient states \"My breathing is better today, yesterday was a good day as well. I walked yesterday, I'm starting to get my strength back. \" Patient more mobile in bed from when this RN last cared for patient on 5/19. Patient also showing enthusiasm, attempting to use incentive spirometer and states \"yes, after lunch I would like to get into chair. \"    1140 patient OOB and in chair, . Spoke to family practice about this, give 10 units insulin. 1530 patient continuing to tolerate being OOB and in chair, continuing to wean oxygen. fi02 now at 40L and 50%    Bedside and Verbal shift change report given to Milan Cueva (oncoming nurse) by Aden Villegas (offgoing nurse).  Report included the following information SBAR, Kardex and Cardiac Rhythm NSr.

## 2020-05-23 NOTE — PROGRESS NOTES
Problem: Falls - Risk of  Goal: *Absence of Falls  Description: Document Blossom Led Fall Risk and appropriate interventions in the flowsheet.   Outcome: Progressing Towards Goal  Note: Fall Risk Interventions:  Mobility Interventions: Bed/chair exit alarm, Patient to call before getting OOB, PT Consult for mobility concerns, Utilize walker, cane, or other assistive device         Medication Interventions: Bed/chair exit alarm, Patient to call before getting OOB, Teach patient to arise slowly    Elimination Interventions: Bed/chair exit alarm, Call light in reach, Patient to call for help with toileting needs              Problem: Patient Education: Go to Patient Education Activity  Goal: Patient/Family Education  Outcome: Progressing Towards Goal     Problem: Patient Education: Go to Patient Education Activity  Goal: Patient/Family Education  Outcome: Progressing Towards Goal     Problem: Pneumonia: Discharge Outcomes  Goal: *Demonstrates progressive activity  Outcome: Progressing Towards Goal  Goal: *Describes follow-up/return visits to physicians  Outcome: Progressing Towards Goal  Goal: *Tolerating diet  Outcome: Progressing Towards Goal  Goal: *Verbalizes name, dosage, time, side effects, and number of days to continue medications  Outcome: Progressing Towards Goal  Goal: *Influenza immunization  Outcome: Progressing Towards Goal  Goal: *Pneumococcal immunization  Outcome: Progressing Towards Goal  Goal: *Respiratory status at baseline  Outcome: Progressing Towards Goal  Goal: *Vital signs within defined limits  Outcome: Progressing Towards Goal  Goal: *Describes available resources and support systems  Outcome: Progressing Towards Goal  Goal: *Optimal pain control at patient's stated goal  Outcome: Progressing Towards Goal     Problem: Diabetes Self-Management  Goal: *Disease process and treatment process  Description: Define diabetes and identify own type of diabetes; list 3 options for treating diabetes. Outcome: Progressing Towards Goal  Goal: *Incorporating nutritional management into lifestyle  Description: Describe effect of type, amount and timing of food on blood glucose; list 3 methods for planning meals. Outcome: Progressing Towards Goal  Goal: *Incorporating physical activity into lifestyle  Description: State effect of exercise on blood glucose levels. Outcome: Progressing Towards Goal  Goal: *Developing strategies to promote health/change behavior  Description: Define the ABC's of diabetes; identify appropriate screenings, schedule and personal plan for screenings. Outcome: Progressing Towards Goal  Goal: *Using medications safely  Description: State effect of diabetes medications on diabetes; name diabetes medication taking, action and side effects. Outcome: Progressing Towards Goal  Goal: *Monitoring blood glucose, interpreting and using results  Description: Identify recommended blood glucose targets  and personal targets. Outcome: Progressing Towards Goal  Goal: *Prevention, detection, treatment of acute complications  Description: List symptoms of hyper- and hypoglycemia; describe how to treat low blood sugar and actions for lowering  high blood glucose level. Outcome: Progressing Towards Goal  Goal: *Prevention, detection and treatment of chronic complications  Description: Define the natural course of diabetes and describe the relationship of blood glucose levels to long term complications of diabetes.   Outcome: Progressing Towards Goal  Goal: *Developing strategies to address psychosocial issues  Description: Describe feelings about living with diabetes; identify support needed and support network  Outcome: Progressing Towards Goal  Goal: *Insulin pump training  Outcome: Progressing Towards Goal  Goal: *Sick day guidelines  Outcome: Progressing Towards Goal  Goal: *Patient Specific Goal (EDIT GOAL, INSERT TEXT)  Outcome: Progressing Towards Goal     Problem: Patient Education: Go to Patient Education Activity  Goal: Patient/Family Education  Outcome: Progressing Towards Goal     Problem: Risk for Spread of Infection  Goal: Prevent transmission of infectious organism to others  Description: Prevent the transmission of infectious organisms to other patients, staff members, and visitors. Outcome: Progressing Towards Goal     Problem: Patient Education:  Go to Education Activity  Goal: Patient/Family Education  Outcome: Progressing Towards Goal     Problem: Pressure Injury - Risk of  Goal: *Prevention of pressure injury  Description: Document Anoop Scale and appropriate interventions in the flowsheet. Outcome: Progressing Towards Goal  Note: Pressure Injury Interventions:       Moisture Interventions: Check for incontinence Q2 hours and as needed, Absorbent underpads, Apply protective barrier, creams and emollients, Internal/External urinary devices    Activity Interventions: Increase time out of bed, Pressure redistribution bed/mattress(bed type), PT/OT evaluation    Mobility Interventions: Float heels, Turn and reposition approx. every two hours(pillow and wedges)    Nutrition Interventions: Document food/fluid/supplement intake, Discuss nutritional consult with provider    Friction and Shear Interventions: Apply protective barrier, creams and emollients                Problem: Patient Education: Go to Patient Education Activity  Goal: Patient/Family Education  Outcome: Progressing Towards Goal     Problem: Pressure Injury - Risk of  Goal: *Prevention of pressure injury  Description: Document Anoop Scale and appropriate interventions in the flowsheet.   Outcome: Progressing Towards Goal  Note: Pressure Injury Interventions:       Moisture Interventions: Check for incontinence Q2 hours and as needed, Absorbent underpads, Apply protective barrier, creams and emollients, Internal/External urinary devices    Activity Interventions: Increase time out of bed, Pressure redistribution bed/mattress(bed type), PT/OT evaluation    Mobility Interventions: Float heels, Turn and reposition approx.  every two hours(pillow and wedges)    Nutrition Interventions: Document food/fluid/supplement intake, Discuss nutritional consult with provider    Friction and Shear Interventions: Apply protective barrier, creams and emollients                Problem: Patient Education: Go to Patient Education Activity  Goal: Patient/Family Education  Outcome: Progressing Towards Goal

## 2020-05-24 LAB
ALBUMIN SERPL-MCNC: 2.8 G/DL (ref 3.5–5)
ALBUMIN/GLOB SERPL: 0.9 {RATIO} (ref 1.1–2.2)
ALP SERPL-CCNC: 90 U/L (ref 45–117)
ALT SERPL-CCNC: 75 U/L (ref 12–78)
ANION GAP SERPL CALC-SCNC: 6 MMOL/L (ref 5–15)
AST SERPL-CCNC: 28 U/L (ref 15–37)
BASOPHILS # BLD: 0 K/UL (ref 0–0.1)
BASOPHILS NFR BLD: 0 % (ref 0–1)
BILIRUB SERPL-MCNC: 0.6 MG/DL (ref 0.2–1)
BUN SERPL-MCNC: 26 MG/DL (ref 6–20)
BUN/CREAT SERPL: 33 (ref 12–20)
CALCIUM SERPL-MCNC: 8.8 MG/DL (ref 8.5–10.1)
CHLORIDE SERPL-SCNC: 98 MMOL/L (ref 97–108)
CO2 SERPL-SCNC: 29 MMOL/L (ref 21–32)
CREAT SERPL-MCNC: 0.79 MG/DL (ref 0.55–1.02)
CRP SERPL-MCNC: <0.29 MG/DL (ref 0–0.6)
D DIMER PPP FEU-MCNC: 0.52 MG/L FEU (ref 0–0.65)
DIFFERENTIAL METHOD BLD: ABNORMAL
EOSINOPHIL # BLD: 0.2 K/UL (ref 0–0.4)
EOSINOPHIL NFR BLD: 1 % (ref 0–7)
ERYTHROCYTE [DISTWIDTH] IN BLOOD BY AUTOMATED COUNT: 12.8 % (ref 11.5–14.5)
FERRITIN SERPL-MCNC: 232 NG/ML (ref 8–252)
GLOBULIN SER CALC-MCNC: 3 G/DL (ref 2–4)
GLUCOSE BLD STRIP.AUTO-MCNC: 241 MG/DL (ref 65–100)
GLUCOSE BLD STRIP.AUTO-MCNC: 243 MG/DL (ref 65–100)
GLUCOSE BLD STRIP.AUTO-MCNC: 261 MG/DL (ref 65–100)
GLUCOSE BLD STRIP.AUTO-MCNC: 316 MG/DL (ref 65–100)
GLUCOSE SERPL-MCNC: 202 MG/DL (ref 65–100)
HCT VFR BLD AUTO: 35.8 % (ref 35–47)
HGB BLD-MCNC: 11.9 G/DL (ref 11.5–16)
IMM GRANULOCYTES # BLD AUTO: 0.4 K/UL (ref 0–0.04)
IMM GRANULOCYTES NFR BLD AUTO: 2 % (ref 0–0.5)
LDH SERPL L TO P-CCNC: 267 U/L (ref 81–246)
LYMPHOCYTES # BLD: 1.8 K/UL (ref 0.8–3.5)
LYMPHOCYTES NFR BLD: 10 % (ref 12–49)
MAGNESIUM SERPL-MCNC: 2.2 MG/DL (ref 1.6–2.4)
MCH RBC QN AUTO: 28.7 PG (ref 26–34)
MCHC RBC AUTO-ENTMCNC: 33.2 G/DL (ref 30–36.5)
MCV RBC AUTO: 86.3 FL (ref 80–99)
MONOCYTES # BLD: 0.7 K/UL (ref 0–1)
MONOCYTES NFR BLD: 4 % (ref 5–13)
NEUTS SEG # BLD: 14.4 K/UL (ref 1.8–8)
NEUTS SEG NFR BLD: 83 % (ref 32–75)
NRBC # BLD: 0 K/UL (ref 0–0.01)
NRBC BLD-RTO: 0 PER 100 WBC
PLATELET # BLD AUTO: 298 K/UL (ref 150–400)
PMV BLD AUTO: 10.8 FL (ref 8.9–12.9)
POTASSIUM SERPL-SCNC: 4.5 MMOL/L (ref 3.5–5.1)
PROT SERPL-MCNC: 5.8 G/DL (ref 6.4–8.2)
RBC # BLD AUTO: 4.15 M/UL (ref 3.8–5.2)
RBC MORPH BLD: ABNORMAL
SERVICE CMNT-IMP: ABNORMAL
SODIUM SERPL-SCNC: 133 MMOL/L (ref 136–145)
WBC # BLD AUTO: 17.5 K/UL (ref 3.6–11)

## 2020-05-24 PROCEDURE — 77030038269 HC DRN EXT URIN PURWCK BARD -A

## 2020-05-24 PROCEDURE — 74011250636 HC RX REV CODE- 250/636: Performed by: INTERNAL MEDICINE

## 2020-05-24 PROCEDURE — 74011250637 HC RX REV CODE- 250/637: Performed by: INTERNAL MEDICINE

## 2020-05-24 PROCEDURE — 94760 N-INVAS EAR/PLS OXIMETRY 1: CPT

## 2020-05-24 PROCEDURE — 74011636637 HC RX REV CODE- 636/637: Performed by: STUDENT IN AN ORGANIZED HEALTH CARE EDUCATION/TRAINING PROGRAM

## 2020-05-24 PROCEDURE — 85025 COMPLETE CBC W/AUTO DIFF WBC: CPT

## 2020-05-24 PROCEDURE — 94640 AIRWAY INHALATION TREATMENT: CPT

## 2020-05-24 PROCEDURE — 86140 C-REACTIVE PROTEIN: CPT

## 2020-05-24 PROCEDURE — 83615 LACTATE (LD) (LDH) ENZYME: CPT

## 2020-05-24 PROCEDURE — 74011250636 HC RX REV CODE- 250/636: Performed by: STUDENT IN AN ORGANIZED HEALTH CARE EDUCATION/TRAINING PROGRAM

## 2020-05-24 PROCEDURE — 74011250637 HC RX REV CODE- 250/637: Performed by: STUDENT IN AN ORGANIZED HEALTH CARE EDUCATION/TRAINING PROGRAM

## 2020-05-24 PROCEDURE — 83520 IMMUNOASSAY QUANT NOS NONAB: CPT

## 2020-05-24 PROCEDURE — 65660000000 HC RM CCU STEPDOWN

## 2020-05-24 PROCEDURE — 36415 COLL VENOUS BLD VENIPUNCTURE: CPT

## 2020-05-24 PROCEDURE — 94660 CPAP INITIATION&MGMT: CPT

## 2020-05-24 PROCEDURE — 80053 COMPREHEN METABOLIC PANEL: CPT

## 2020-05-24 PROCEDURE — 82728 ASSAY OF FERRITIN: CPT

## 2020-05-24 PROCEDURE — 74011000250 HC RX REV CODE- 250: Performed by: STUDENT IN AN ORGANIZED HEALTH CARE EDUCATION/TRAINING PROGRAM

## 2020-05-24 PROCEDURE — 85379 FIBRIN DEGRADATION QUANT: CPT

## 2020-05-24 PROCEDURE — 83735 ASSAY OF MAGNESIUM: CPT

## 2020-05-24 PROCEDURE — 77010033711 HC HIGH FLOW OXYGEN

## 2020-05-24 PROCEDURE — 94664 DEMO&/EVAL PT USE INHALER: CPT

## 2020-05-24 PROCEDURE — 82962 GLUCOSE BLOOD TEST: CPT

## 2020-05-24 PROCEDURE — 94762 N-INVAS EAR/PLS OXIMTRY CONT: CPT

## 2020-05-24 RX ORDER — INSULIN GLARGINE 100 [IU]/ML
42 INJECTION, SOLUTION SUBCUTANEOUS DAILY
Status: DISCONTINUED | OUTPATIENT
Start: 2020-05-24 | End: 2020-05-25

## 2020-05-24 RX ORDER — INSULIN LISPRO 100 [IU]/ML
18 INJECTION, SOLUTION INTRAVENOUS; SUBCUTANEOUS
Status: DISCONTINUED | OUTPATIENT
Start: 2020-05-24 | End: 2020-05-25

## 2020-05-24 RX ADMIN — THERA TABS 1 TABLET: TAB at 08:01

## 2020-05-24 RX ADMIN — LOSARTAN POTASSIUM 50 MG: 50 TABLET, FILM COATED ORAL at 08:01

## 2020-05-24 RX ADMIN — BUMETANIDE 1 MG: 0.25 INJECTION INTRAMUSCULAR; INTRAVENOUS at 07:57

## 2020-05-24 RX ADMIN — Medication 10 ML: at 05:33

## 2020-05-24 RX ADMIN — LEVOTHYROXINE SODIUM 100 MCG: 0.1 TABLET ORAL at 05:32

## 2020-05-24 RX ADMIN — METHYLPREDNISOLONE SODIUM SUCCINATE 30 MG: 40 INJECTION, POWDER, FOR SOLUTION INTRAMUSCULAR; INTRAVENOUS at 16:52

## 2020-05-24 RX ADMIN — METHYLPREDNISOLONE SODIUM SUCCINATE 30 MG: 40 INJECTION, POWDER, FOR SOLUTION INTRAMUSCULAR; INTRAVENOUS at 11:53

## 2020-05-24 RX ADMIN — OXYCODONE HYDROCHLORIDE AND ACETAMINOPHEN 500 MG: 500 TABLET ORAL at 22:24

## 2020-05-24 RX ADMIN — INSULIN GLARGINE 42 UNITS: 100 INJECTION, SOLUTION SUBCUTANEOUS at 08:56

## 2020-05-24 RX ADMIN — METHYLPREDNISOLONE SODIUM SUCCINATE 30 MG: 40 INJECTION, POWDER, FOR SOLUTION INTRAMUSCULAR; INTRAVENOUS at 00:25

## 2020-05-24 RX ADMIN — INSULIN LISPRO 4 UNITS: 100 INJECTION, SOLUTION INTRAVENOUS; SUBCUTANEOUS at 11:54

## 2020-05-24 RX ADMIN — Medication 10 ML: at 16:53

## 2020-05-24 RX ADMIN — FAMOTIDINE 20 MG: 20 TABLET, FILM COATED ORAL at 08:01

## 2020-05-24 RX ADMIN — OYSTER SHELL CALCIUM WITH VITAMIN D 1 TABLET: 500; 200 TABLET, FILM COATED ORAL at 08:01

## 2020-05-24 RX ADMIN — ATORVASTATIN CALCIUM 40 MG: 20 TABLET, FILM COATED ORAL at 08:01

## 2020-05-24 RX ADMIN — Medication 10 ML: at 00:26

## 2020-05-24 RX ADMIN — FLUTICASONE FUROATE AND VILANTEROL TRIFENATATE 1 PUFF: 200; 25 POWDER RESPIRATORY (INHALATION) at 08:02

## 2020-05-24 RX ADMIN — ZINC SULFATE 220 MG (50 MG) CAPSULE 1 CAPSULE: CAPSULE at 08:01

## 2020-05-24 RX ADMIN — ASPIRIN 81 MG: 81 TABLET, COATED ORAL at 08:01

## 2020-05-24 RX ADMIN — INSULIN HUMAN 28 UNITS: 100 INJECTION, SUSPENSION SUBCUTANEOUS at 05:32

## 2020-05-24 RX ADMIN — IPRATROPIUM BROMIDE AND ALBUTEROL 1 PUFF: 20; 100 SPRAY, METERED RESPIRATORY (INHALATION) at 08:02

## 2020-05-24 RX ADMIN — CETIRIZINE HYDROCHLORIDE 10 MG: 10 TABLET, FILM COATED ORAL at 08:01

## 2020-05-24 RX ADMIN — FAMOTIDINE 20 MG: 20 TABLET, FILM COATED ORAL at 22:24

## 2020-05-24 RX ADMIN — INSULIN LISPRO 2 UNITS: 100 INJECTION, SOLUTION INTRAVENOUS; SUBCUTANEOUS at 22:26

## 2020-05-24 RX ADMIN — IPRATROPIUM BROMIDE AND ALBUTEROL 1 PUFF: 20; 100 SPRAY, METERED RESPIRATORY (INHALATION) at 20:11

## 2020-05-24 RX ADMIN — INSULIN LISPRO 7 UNITS: 100 INJECTION, SOLUTION INTRAVENOUS; SUBCUTANEOUS at 07:56

## 2020-05-24 RX ADMIN — TRAZODONE HYDROCHLORIDE 100 MG: 100 TABLET ORAL at 22:24

## 2020-05-24 RX ADMIN — SERTRALINE HYDROCHLORIDE 50 MG: 50 TABLET ORAL at 08:01

## 2020-05-24 RX ADMIN — OXYCODONE HYDROCHLORIDE AND ACETAMINOPHEN 500 MG: 500 TABLET ORAL at 08:01

## 2020-05-24 RX ADMIN — ENOXAPARIN SODIUM 40 MG: 40 INJECTION SUBCUTANEOUS at 22:23

## 2020-05-24 RX ADMIN — GUAIFENESIN 1200 MG: 600 TABLET, EXTENDED RELEASE ORAL at 22:24

## 2020-05-24 RX ADMIN — PANTOPRAZOLE SODIUM 40 MG: 40 TABLET, DELAYED RELEASE ORAL at 05:37

## 2020-05-24 RX ADMIN — IPRATROPIUM BROMIDE AND ALBUTEROL 1 PUFF: 20; 100 SPRAY, METERED RESPIRATORY (INHALATION) at 15:34

## 2020-05-24 RX ADMIN — INSULIN LISPRO 10 UNITS: 100 INJECTION, SOLUTION INTRAVENOUS; SUBCUTANEOUS at 16:53

## 2020-05-24 RX ADMIN — INSULIN HUMAN 28 UNITS: 100 INJECTION, SUSPENSION SUBCUTANEOUS at 16:52

## 2020-05-24 RX ADMIN — METHYLPREDNISOLONE SODIUM SUCCINATE 30 MG: 40 INJECTION, POWDER, FOR SOLUTION INTRAMUSCULAR; INTRAVENOUS at 05:34

## 2020-05-24 RX ADMIN — IPRATROPIUM BROMIDE AND ALBUTEROL 1 PUFF: 20; 100 SPRAY, METERED RESPIRATORY (INHALATION) at 03:52

## 2020-05-24 RX ADMIN — MONTELUKAST SODIUM 10 MG: 10 TABLET, FILM COATED ORAL at 22:24

## 2020-05-24 RX ADMIN — INSULIN LISPRO 18 UNITS: 100 INJECTION, SOLUTION INTRAVENOUS; SUBCUTANEOUS at 07:55

## 2020-05-24 RX ADMIN — GUAIFENESIN 1200 MG: 600 TABLET, EXTENDED RELEASE ORAL at 08:01

## 2020-05-24 RX ADMIN — INSULIN LISPRO 18 UNITS: 100 INJECTION, SOLUTION INTRAVENOUS; SUBCUTANEOUS at 16:53

## 2020-05-24 RX ADMIN — Medication 10 ML: at 22:23

## 2020-05-24 RX ADMIN — INSULIN LISPRO 18 UNITS: 100 INJECTION, SOLUTION INTRAVENOUS; SUBCUTANEOUS at 11:53

## 2020-05-24 NOTE — PROGRESS NOTES
1900  Bedside and Verbal shift change report given to [address] (oncoming nurse) by Char Aguirre (offgoing nurse). Report included the following information SBAR, Kardex, Procedure Summary, Intake/Output, MAR, Accordion, Recent Results and Cardiac Rhythm NSR- Sinus Quang Bail. Initial assessment done. Assisted patient back to the bed. X1 assist. No complaints. Will monitor. Visit Vitals  /61   Pulse 62   Temp 98.7 °F (37.1 °C)   Resp 15   Ht 5' 4\" (1.626 m)   Wt 89.2 kg (196 lb 9.6 oz)   SpO2 93%   BMI 33.75 kg/m²     0700  Bedside and Verbal shift change report given to April RN (oncoming nurse) by Oz Flores RN (offgoing nurse). Report included the following information SBAR, Kardex, Procedure Summary, Intake/Output, MAR, Accordion and Recent Results.

## 2020-05-24 NOTE — PROGRESS NOTES
Problem: Falls - Risk of  Goal: *Absence of Falls  Description: Document Jer Patrick Fall Risk and appropriate interventions in the flowsheet. Outcome: Progressing Towards Goal  Note: Fall Risk Interventions:  Mobility Interventions: Bed/chair exit alarm, Patient to call before getting OOB, PT Consult for mobility concerns         Medication Interventions: Bed/chair exit alarm, Patient to call before getting OOB, Teach patient to arise slowly    Elimination Interventions: Bed/chair exit alarm, Patient to call for help with toileting needs, Stay With Me (per policy)              Problem: Patient Education: Go to Patient Education Activity  Goal: Patient/Family Education  Outcome: Progressing Towards Goal     Problem: Patient Education: Go to Patient Education Activity  Goal: Patient/Family Education  Outcome: Progressing Towards Goal     Problem: Pneumonia: Discharge Outcomes  Goal: *Demonstrates progressive activity  Outcome: Progressing Towards Goal  Goal: *Describes follow-up/return visits to physicians  Outcome: Progressing Towards Goal  Goal: *Tolerating diet  Outcome: Progressing Towards Goal  Goal: *Verbalizes name, dosage, time, side effects, and number of days to continue medications  Outcome: Progressing Towards Goal  Goal: *Influenza immunization  Outcome: Progressing Towards Goal  Goal: *Pneumococcal immunization  Outcome: Progressing Towards Goal  Goal: *Respiratory status at baseline  Outcome: Progressing Towards Goal  Goal: *Vital signs within defined limits  Outcome: Progressing Towards Goal  Goal: *Describes available resources and support systems  Outcome: Progressing Towards Goal  Goal: *Optimal pain control at patient's stated goal  Outcome: Progressing Towards Goal     Problem: Diabetes Self-Management  Goal: *Disease process and treatment process  Description: Define diabetes and identify own type of diabetes; list 3 options for treating diabetes.   Outcome: Progressing Towards Goal  Goal: *Incorporating nutritional management into lifestyle  Description: Describe effect of type, amount and timing of food on blood glucose; list 3 methods for planning meals. Outcome: Progressing Towards Goal  Goal: *Incorporating physical activity into lifestyle  Description: State effect of exercise on blood glucose levels. Outcome: Progressing Towards Goal  Goal: *Developing strategies to promote health/change behavior  Description: Define the ABC's of diabetes; identify appropriate screenings, schedule and personal plan for screenings. Outcome: Progressing Towards Goal  Goal: *Using medications safely  Description: State effect of diabetes medications on diabetes; name diabetes medication taking, action and side effects. Outcome: Progressing Towards Goal  Goal: *Monitoring blood glucose, interpreting and using results  Description: Identify recommended blood glucose targets  and personal targets. Outcome: Progressing Towards Goal  Goal: *Prevention, detection, treatment of acute complications  Description: List symptoms of hyper- and hypoglycemia; describe how to treat low blood sugar and actions for lowering  high blood glucose level. Outcome: Progressing Towards Goal  Goal: *Prevention, detection and treatment of chronic complications  Description: Define the natural course of diabetes and describe the relationship of blood glucose levels to long term complications of diabetes.   Outcome: Progressing Towards Goal  Goal: *Developing strategies to address psychosocial issues  Description: Describe feelings about living with diabetes; identify support needed and support network  Outcome: Progressing Towards Goal  Goal: *Insulin pump training  Outcome: Progressing Towards Goal  Goal: *Sick day guidelines  Outcome: Progressing Towards Goal  Goal: *Patient Specific Goal (EDIT GOAL, INSERT TEXT)  Outcome: Progressing Towards Goal     Problem: Patient Education: Go to Patient Education Activity  Goal: Patient/Family Education  Outcome: Progressing Towards Goal     Problem: Risk for Spread of Infection  Goal: Prevent transmission of infectious organism to others  Description: Prevent the transmission of infectious organisms to other patients, staff members, and visitors. Outcome: Progressing Towards Goal     Problem: Patient Education:  Go to Education Activity  Goal: Patient/Family Education  Outcome: Progressing Towards Goal     Problem: Pressure Injury - Risk of  Goal: *Prevention of pressure injury  Description: Document Anoop Scale and appropriate interventions in the flowsheet. Outcome: Progressing Towards Goal  Note: Pressure Injury Interventions:       Moisture Interventions: Absorbent underpads, Check for incontinence Q2 hours and as needed, Assess need for specialty bed, Internal/External urinary devices    Activity Interventions: Increase time out of bed, Pressure redistribution bed/mattress(bed type), PT/OT evaluation    Mobility Interventions: Pressure redistribution bed/mattress (bed type), PT/OT evaluation, Turn and reposition approx.  every two hours(pillow and wedges)    Nutrition Interventions: Document food/fluid/supplement intake, Discuss nutritional consult with provider    Friction and Shear Interventions: Lift sheet, Lift team/patient mobility team, Foam dressings/transparent film/skin sealants                Problem: Patient Education: Go to Patient Education Activity  Goal: Patient/Family Education  Outcome: Progressing Towards Goal     Problem: Patient Education: Go to Patient Education Activity  Goal: Patient/Family Education  Outcome: Progressing Towards Goal

## 2020-05-24 NOTE — PROGRESS NOTES
[facility] RESIDENCY PROGRAM  PROGRESS NOTE     5/23/2020  PCP: Darya Rivera MD       Assessment/Plan:     Breonna Fox is a 69 y.o. female with history of HTN, type 2DM, GERD, hypothyroidism, depression, asthma, and hyperlipidemia who is admitted for AHRF 2/2 COVID 19.     Overnight events: NAEO      AHRF 2/2 COVID 23: COVID+ on 5/4, worsening SOB on admission and new O2 requirement (RA baseline). POA CXR with opacification in lower lobes b/l L>R, ABG pH 7.41, pCO2 31, pO2 64, O2 sat 93, bicarb 19, LA 1.8, S/p outpatient tx of azithro and plaquenil ending 5/10  - Will stop COVID inflammatory markers as they are near nml and past risk of cytokine storm      - Bcx NGTD  - Doxy 7 day course completed  - Pulmonology following, appreciate rec's-        -s/p Actemra 5/15. (Quant-gold, HIV, hepatitis panel) Negative       -Bumex 1mg daily       -Solumedrol 30mg Q6H  - Proning  - Zinc, Vit C, Lipitor, Mucinex  - Zyrtec, Breo-Ellipta, Combivent Q6H, Singulair  - Supplemental O2 as needed- on 60L high flow       Sinus Arrhythmia- Intermittent on tele, many PACs, leading to bradycardia. POA EKG with Left anterior fascicular block, Left ventricular hypertrophy.  - Discontinued beta blocker  - Monitor Mag and K, Replete prn    Type 2 DM: HgA1C 10.4 in 11/2019. This admission A1C 13.1. Pt very sensitivity to solumedrol- linked order with NPH.  - Hold home glipizide  - Lantus 42U and Lispro 18U with meals with SSI ACHS- Resistant scale  - NPH 28 BID timed with steroids     Asthma  - Continue Singulair 10mg qhs     HTN  - Continue home BP med losartan 50mg daily  - HOLDING atenolol 25mg qhs     Hypothyroid: TSH 3.570 in 11/2019  - Continue home synthroid 100mcg daily     Depression  - Continue Zoloft 50mg dialy and trazadone 100mg qhs to help with sleep     GERD  - Will substitute home prilosec for protonix daily     Hypokalemia: RESOLVED.  Likely 2/2 diarrhea prior to arrival and diuretics inpatient. K 2.7 on admission.   - Repletion prn     Hyperlipidemia: Lipid panel , , HDL 59, LDL 89 in 2019  - Lipitor 40mg daily for additional anti-inflammatory benefit      Ryan Brown discussed with Dr. Ariana Ivan. Subjective:   Pt states doing well this am, no acute concerns. Feels like her breathing is doing well, however she has not gotten up yet this am. States she is very hungry. No concerns overnight. Denies cp, n, v, c, d. Objective:   Physical examination  Patient Vitals for the past 24 hrs:   Temp Pulse Resp BP SpO2   20 2153     96 %   20 2058 99.7 °F (37.6 °C) 65      20 1914 100 °F (37.8 °C) (!) 101 23 130/77 90 %   20 1610     94 %   20 1547 99 °F (37.2 °C) 81 18 122/63 92 %   20 1526  86      20 1200     94 %   20 1155 99.3 °F (37.4 °C) 75 18 110/61 96 %   20 0855     93 %   20 0839 99.1 °F (37.3 °C) 70 18 108/48 95 %   20 0707  70      20 0334 98.6 °F (37 °C) 60 20 103/59 95 %   20 0002    108/52    20 2355 99.1 °F (37.3 °C) 67 15 96/46 97 %   20 2306  69         Temp (24hrs), Av.3 °F (37.4 °C), Min:98.6 °F (37 °C), Max:100 °F (37.8 °C)     O2 Flow Rate (L/min): 40 l/min   O2 Device: Hi flow nasal cannula    Date 20 - 20 - 20 0659   Shift 2937-9325 24 Hour Total 0108-3047 0858-1052 24 Hour Total   INTAKE   P.O. 250 1690 720  720     P. O. 250 1690 720  720   I. V.(mL/kg/hr) 20 20        I.V. 20 20      Shift Total(mL/kg) 270(3.2) 1710(20.1) 720(8.4)  720(8.7)   OUTPUT   Urine(mL/kg/hr) 600 1750 1650(1.6) 150 1800     Urine Voided 600 1750 7642 [id number] [id number]     Urine Occurrence(s)    1 x 1 x   Stool          Stool Occurrence(s)  1 x  1 x 1 x   Shift Total(mL/kg) 600(7) 1750(20.5) 1650(19.3) 150(1.8) 1800(21.7)   NET -330 -40 -930 -150 -1080   Weight (kg) 85.3 85.3 85.3 83 83     Pt is currently a COVID infected pt.  I did not go in the room to decrease exposure and conserve PPE. The attending physician will exam in the pt. Talked on the phone. General:   Alert, cooperative, speaking in full sentences     Data Review:     CBC:  Recent Labs     05/23/20 0339 05/22/20 0439 05/21/20  0354   WBC 17.0* 14.7* 18.3*   HGB 11.3* 11.9 11.8   HCT 34.5* 36.2 35.1    138 783     Metabolic Panel:  Recent Labs     05/23/20 0339 05/22/20 0439 05/21/20  0354   * 136 134*   K 4.5 4.2 3.9    103 99   CO2 29 30 31   BUN 27* 21* 30*   CREA 0.85 0.84 0.91   * 171* 255*   CA 8.3* 8.3* 8.4*   MG 2.1 2.0 1.9   ALB 2.5* 2.6* 2.5*   TBILI 0.5 0.5 0.5   SGOT 30 61* 49*   ALT 80* 99* 87*     Micro:  Lab Results   Component Value Date/Time    Culture result: NO GROWTH 6 DAYS 05/13/2020 04:29 AM    Culture result:  05/11/2020 12:35 AM     MRSA NOT PRESENT. Apparent Staphylococus aureus (not MRSA noted). Culture result:  05/11/2020 12:35 AM         Screening of patient nares for MRSA is for surveillance purposes and, if positive, to facilitate isolation considerations in high risk settings. It is not intended for automatic decolonization interventions per se as regimens are not sufficiently effective to warrant routine use. Imaging:  Xr Chest Port    Result Date: 5/10/2020  EXAM: XR CHEST PORT INDICATION: sob, +COVID COMPARISON: 2013 FINDINGS: A portable AP radiograph of the chest was obtained at 2145 hours. The patient is on a cardiac monitor. There is opacification in the lower lobes bilaterally, left greater than right. The cardiac and mediastinal contours and pulmonary vascularity are normal.  The bones and soft tissues are grossly within normal limits. IMPRESSION: Opacification in the lower lobes bilaterally, left greater than right. CXR AP Port: 5/15/2020    FINDINGS:  AP portable upright view of the chest demonstrates a stable  cardiopericardial  silhouette. The lungs are adequately expanded.   Increased peripheral parenchymal  opacities greater on the left than on the right. Increased interstitial opacity  as well. . The osseous structures are unremarkable. Patient is on a cardiac  monitor.      IMPRESSION  IMPRESSION:  Interval progression of extensive bilateral parenchymal disease greater on the  left than on the right.     Medications reviewed  Current Facility-Administered Medications   Medication Dose Route Frequency    insulin NPH (NOVOLIN N, HUMULIN N) injection 28 Units  28 Units SubCUTAneous Q12H    And    methylPREDNISolone (PF) (SOLU-MEDROL) injection 30 mg  30 mg IntraVENous Q6H    dextrose (D50W) injection syrg 12.5-25 g  12.5-25 g IntraVENous PRN    insulin lispro (HUMALOG) injection 12 Units  12 Units SubCUTAneous TIDAC    insulin glargine (LANTUS) injection 36 Units  36 Units SubCUTAneous DAILY    glucose chewable tablet 16 g  4 Tab Oral PRN    glucagon (GLUCAGEN) injection 1 mg  1 mg IntraMUSCular PRN    insulin lispro (HUMALOG) injection   SubCUTAneous AC&HS    bumetanide (BUMEX) injection 1 mg  1 mg IntraVENous DAILY    guaiFENesin ER (MUCINEX) tablet 1,200 mg  1,200 mg Oral BID    famotidine (PEPCID) tablet 20 mg  20 mg Oral BID    atorvastatin (LIPITOR) tablet 40 mg  40 mg Oral DAILY    zinc sulfate (ZINCATE) 220 (50) mg capsule 1 Cap  1 Cap Oral DAILY    diphenhydrAMINE (BENADRYL) injection 50 mg  50 mg IntraVENous Q4H PRN    acetaminophen (TYLENOL) tablet 650 mg  650 mg Oral Q6H PRN    sodium chloride (NS) flush 5-40 mL  5-40 mL IntraVENous Q8H    sodium chloride (NS) flush 5-40 mL  5-40 mL IntraVENous PRN    ondansetron (ZOFRAN) injection 4 mg  4 mg IntraVENous Q4H PRN    enoxaparin (LOVENOX) injection 40 mg  40 mg SubCUTAneous Q24H    ascorbic acid (vitamin C) (VITAMIN C) tablet 500 mg  500 mg Oral BID    aspirin delayed-release tablet 81 mg  81 mg Oral DAILY    calcium-vitamin D (OS-TRISTEN) 500 mg-200 unit tablet  1 Tab Oral DAILY    cetirizine (ZYRTEC) tablet 10 mg  10 mg Oral DAILY    fluticasone propionate (FLONASE) 50 mcg/actuation nasal spray 2 Spray  2 Spray Both Nostrils DAILY PRN    therapeutic multivitamin (THERAGRAN) tablet 1 Tab  1 Tab Oral DAILY    levothyroxine (SYNTHROID) tablet 100 mcg  100 mcg Oral ACB    losartan (COZAAR) tablet 50 mg  50 mg Oral DAILY    montelukast (SINGULAIR) tablet 10 mg  10 mg Oral QHS    sertraline (ZOLOFT) tablet 50 mg  50 mg Oral DAILY    traZODone (DESYREL) tablet 100 mg  100 mg Oral QHS    ipratropium-albuterol (COMBIVENT RESPIMAT) 20 mcg-100 mcg inhalation spray  1 Puff Inhalation Q6H RT    fluticasone-vilanterol (BREO ELLIPTA) 200mcg-25mcg/puff  1 Puff Inhalation DAILY    albuterol (PROVENTIL HFA, VENTOLIN HFA, PROAIR HFA) inhaler 2 Puff  2 Puff Inhalation Q4H PRN    pantoprazole (PROTONIX) tablet 40 mg  40 mg Oral ACB    sodium chloride (NS) flush 5-10 mL  5-10 mL IntraVENous PRN         Signed:   Dorothy David MD   Resident, Family Medicine      Attending note: Attending note to follow. ..

## 2020-05-25 LAB
ALBUMIN SERPL-MCNC: 2.6 G/DL (ref 3.5–5)
ALBUMIN/GLOB SERPL: 0.8 {RATIO} (ref 1.1–2.2)
ALP SERPL-CCNC: 86 U/L (ref 45–117)
ALT SERPL-CCNC: 71 U/L (ref 12–78)
ANION GAP SERPL CALC-SCNC: 4 MMOL/L (ref 5–15)
AST SERPL-CCNC: 29 U/L (ref 15–37)
BASOPHILS # BLD: 0 K/UL (ref 0–0.1)
BASOPHILS NFR BLD: 0 % (ref 0–1)
BILIRUB SERPL-MCNC: 0.5 MG/DL (ref 0.2–1)
BUN SERPL-MCNC: 31 MG/DL (ref 6–20)
BUN/CREAT SERPL: 36 (ref 12–20)
CALCIUM SERPL-MCNC: 8.2 MG/DL (ref 8.5–10.1)
CHLORIDE SERPL-SCNC: 99 MMOL/L (ref 97–108)
CO2 SERPL-SCNC: 29 MMOL/L (ref 21–32)
CREAT SERPL-MCNC: 0.85 MG/DL (ref 0.55–1.02)
DIFFERENTIAL METHOD BLD: ABNORMAL
EOSINOPHIL # BLD: 0 K/UL (ref 0–0.4)
EOSINOPHIL NFR BLD: 0 % (ref 0–7)
ERYTHROCYTE [DISTWIDTH] IN BLOOD BY AUTOMATED COUNT: 12.7 % (ref 11.5–14.5)
GLOBULIN SER CALC-MCNC: 3.4 G/DL (ref 2–4)
GLUCOSE BLD STRIP.AUTO-MCNC: 212 MG/DL (ref 65–100)
GLUCOSE BLD STRIP.AUTO-MCNC: 229 MG/DL (ref 65–100)
GLUCOSE BLD STRIP.AUTO-MCNC: 283 MG/DL (ref 65–100)
GLUCOSE BLD STRIP.AUTO-MCNC: 332 MG/DL (ref 65–100)
GLUCOSE SERPL-MCNC: 283 MG/DL (ref 65–100)
HCT VFR BLD AUTO: 35.4 % (ref 35–47)
HGB BLD-MCNC: 11.7 G/DL (ref 11.5–16)
IL6 SERPL-MCNC: 16.6 PG/ML (ref 0–15.5)
IL6 SERPL-MCNC: 18.8 PG/ML (ref 0–15.5)
IMM GRANULOCYTES # BLD AUTO: 0 K/UL
IMM GRANULOCYTES NFR BLD AUTO: 0 %
LYMPHOCYTES # BLD: 2.1 K/UL (ref 0.8–3.5)
LYMPHOCYTES NFR BLD: 12 % (ref 12–49)
MAGNESIUM SERPL-MCNC: 2.2 MG/DL (ref 1.6–2.4)
MCH RBC QN AUTO: 28.7 PG (ref 26–34)
MCHC RBC AUTO-ENTMCNC: 33.1 G/DL (ref 30–36.5)
MCV RBC AUTO: 87 FL (ref 80–99)
METAMYELOCYTES NFR BLD MANUAL: 2 %
MONOCYTES # BLD: 0.4 K/UL (ref 0–1)
MONOCYTES NFR BLD: 2 % (ref 5–13)
NEUTS SEG # BLD: 14.7 K/UL (ref 1.8–8)
NEUTS SEG NFR BLD: 84 % (ref 32–75)
NRBC # BLD: 0 K/UL (ref 0–0.01)
NRBC BLD-RTO: 0 PER 100 WBC
PLATELET # BLD AUTO: 283 K/UL (ref 150–400)
PMV BLD AUTO: 10.9 FL (ref 8.9–12.9)
POTASSIUM SERPL-SCNC: 4.3 MMOL/L (ref 3.5–5.1)
PROT SERPL-MCNC: 6 G/DL (ref 6.4–8.2)
RBC # BLD AUTO: 4.07 M/UL (ref 3.8–5.2)
RBC MORPH BLD: ABNORMAL
SERVICE CMNT-IMP: ABNORMAL
SODIUM SERPL-SCNC: 132 MMOL/L (ref 136–145)
WBC # BLD AUTO: 17.5 K/UL (ref 3.6–11)

## 2020-05-25 PROCEDURE — 65660000000 HC RM CCU STEPDOWN

## 2020-05-25 PROCEDURE — 77030038269 HC DRN EXT URIN PURWCK BARD -A

## 2020-05-25 PROCEDURE — 97530 THERAPEUTIC ACTIVITIES: CPT

## 2020-05-25 PROCEDURE — 36415 COLL VENOUS BLD VENIPUNCTURE: CPT

## 2020-05-25 PROCEDURE — 83735 ASSAY OF MAGNESIUM: CPT

## 2020-05-25 PROCEDURE — 94640 AIRWAY INHALATION TREATMENT: CPT

## 2020-05-25 PROCEDURE — 94762 N-INVAS EAR/PLS OXIMTRY CONT: CPT

## 2020-05-25 PROCEDURE — 74011636637 HC RX REV CODE- 636/637: Performed by: STUDENT IN AN ORGANIZED HEALTH CARE EDUCATION/TRAINING PROGRAM

## 2020-05-25 PROCEDURE — 97116 GAIT TRAINING THERAPY: CPT

## 2020-05-25 PROCEDURE — 74011250636 HC RX REV CODE- 250/636: Performed by: STUDENT IN AN ORGANIZED HEALTH CARE EDUCATION/TRAINING PROGRAM

## 2020-05-25 PROCEDURE — 80053 COMPREHEN METABOLIC PANEL: CPT

## 2020-05-25 PROCEDURE — 94760 N-INVAS EAR/PLS OXIMETRY 1: CPT

## 2020-05-25 PROCEDURE — 74011250637 HC RX REV CODE- 250/637: Performed by: STUDENT IN AN ORGANIZED HEALTH CARE EDUCATION/TRAINING PROGRAM

## 2020-05-25 PROCEDURE — 74011000250 HC RX REV CODE- 250: Performed by: STUDENT IN AN ORGANIZED HEALTH CARE EDUCATION/TRAINING PROGRAM

## 2020-05-25 PROCEDURE — 82962 GLUCOSE BLOOD TEST: CPT

## 2020-05-25 PROCEDURE — 94664 DEMO&/EVAL PT USE INHALER: CPT

## 2020-05-25 PROCEDURE — 74011250636 HC RX REV CODE- 250/636: Performed by: INTERNAL MEDICINE

## 2020-05-25 PROCEDURE — 85025 COMPLETE CBC W/AUTO DIFF WBC: CPT

## 2020-05-25 PROCEDURE — 77010033711 HC HIGH FLOW OXYGEN

## 2020-05-25 PROCEDURE — 74011250637 HC RX REV CODE- 250/637: Performed by: INTERNAL MEDICINE

## 2020-05-25 PROCEDURE — 94660 CPAP INITIATION&MGMT: CPT

## 2020-05-25 RX ORDER — INSULIN LISPRO 100 [IU]/ML
22 INJECTION, SOLUTION INTRAVENOUS; SUBCUTANEOUS
Status: DISCONTINUED | OUTPATIENT
Start: 2020-05-25 | End: 2020-05-26

## 2020-05-25 RX ORDER — INSULIN GLARGINE 100 [IU]/ML
48 INJECTION, SOLUTION SUBCUTANEOUS DAILY
Status: DISCONTINUED | OUTPATIENT
Start: 2020-05-25 | End: 2020-05-27

## 2020-05-25 RX ADMIN — IPRATROPIUM BROMIDE AND ALBUTEROL 1 PUFF: 20; 100 SPRAY, METERED RESPIRATORY (INHALATION) at 21:08

## 2020-05-25 RX ADMIN — INSULIN LISPRO 4 UNITS: 100 INJECTION, SOLUTION INTRAVENOUS; SUBCUTANEOUS at 17:05

## 2020-05-25 RX ADMIN — GUAIFENESIN 1200 MG: 600 TABLET, EXTENDED RELEASE ORAL at 21:07

## 2020-05-25 RX ADMIN — INSULIN LISPRO 2 UNITS: 100 INJECTION, SOLUTION INTRAVENOUS; SUBCUTANEOUS at 21:07

## 2020-05-25 RX ADMIN — MONTELUKAST SODIUM 10 MG: 10 TABLET, FILM COATED ORAL at 21:06

## 2020-05-25 RX ADMIN — GUAIFENESIN 1200 MG: 600 TABLET, EXTENDED RELEASE ORAL at 09:18

## 2020-05-25 RX ADMIN — OXYCODONE HYDROCHLORIDE AND ACETAMINOPHEN 500 MG: 500 TABLET ORAL at 09:17

## 2020-05-25 RX ADMIN — OYSTER SHELL CALCIUM WITH VITAMIN D 1 TABLET: 500; 200 TABLET, FILM COATED ORAL at 09:17

## 2020-05-25 RX ADMIN — ZINC SULFATE 220 MG (50 MG) CAPSULE 1 CAPSULE: CAPSULE at 09:18

## 2020-05-25 RX ADMIN — FAMOTIDINE 20 MG: 20 TABLET, FILM COATED ORAL at 21:06

## 2020-05-25 RX ADMIN — BUMETANIDE 1 MG: 0.25 INJECTION INTRAMUSCULAR; INTRAVENOUS at 09:17

## 2020-05-25 RX ADMIN — OXYCODONE HYDROCHLORIDE AND ACETAMINOPHEN 500 MG: 500 TABLET ORAL at 21:07

## 2020-05-25 RX ADMIN — LEVOTHYROXINE SODIUM 100 MCG: 0.1 TABLET ORAL at 06:40

## 2020-05-25 RX ADMIN — SERTRALINE HYDROCHLORIDE 50 MG: 50 TABLET ORAL at 09:17

## 2020-05-25 RX ADMIN — PANTOPRAZOLE SODIUM 40 MG: 40 TABLET, DELAYED RELEASE ORAL at 06:40

## 2020-05-25 RX ADMIN — METHYLPREDNISOLONE SODIUM SUCCINATE 30 MG: 40 INJECTION, POWDER, FOR SOLUTION INTRAMUSCULAR; INTRAVENOUS at 00:14

## 2020-05-25 RX ADMIN — Medication 10 ML: at 06:39

## 2020-05-25 RX ADMIN — METHYLPREDNISOLONE SODIUM SUCCINATE 30 MG: 40 INJECTION, POWDER, FOR SOLUTION INTRAMUSCULAR; INTRAVENOUS at 21:07

## 2020-05-25 RX ADMIN — TRAZODONE HYDROCHLORIDE 100 MG: 100 TABLET ORAL at 21:07

## 2020-05-25 RX ADMIN — METHYLPREDNISOLONE SODIUM SUCCINATE 30 MG: 40 INJECTION, POWDER, FOR SOLUTION INTRAMUSCULAR; INTRAVENOUS at 12:27

## 2020-05-25 RX ADMIN — ASPIRIN 81 MG: 81 TABLET, COATED ORAL at 09:17

## 2020-05-25 RX ADMIN — INSULIN GLARGINE 48 UNITS: 100 INJECTION, SOLUTION SUBCUTANEOUS at 09:18

## 2020-05-25 RX ADMIN — METHYLPREDNISOLONE SODIUM SUCCINATE 30 MG: 40 INJECTION, POWDER, FOR SOLUTION INTRAMUSCULAR; INTRAVENOUS at 06:40

## 2020-05-25 RX ADMIN — INSULIN LISPRO 10 UNITS: 100 INJECTION, SOLUTION INTRAVENOUS; SUBCUTANEOUS at 12:28

## 2020-05-25 RX ADMIN — INSULIN HUMAN 22 UNITS: 100 INJECTION, SUSPENSION SUBCUTANEOUS at 21:07

## 2020-05-25 RX ADMIN — INSULIN LISPRO 22 UNITS: 100 INJECTION, SOLUTION INTRAVENOUS; SUBCUTANEOUS at 09:18

## 2020-05-25 RX ADMIN — ENOXAPARIN SODIUM 40 MG: 40 INJECTION SUBCUTANEOUS at 21:07

## 2020-05-25 RX ADMIN — CETIRIZINE HYDROCHLORIDE 10 MG: 10 TABLET, FILM COATED ORAL at 09:18

## 2020-05-25 RX ADMIN — ATORVASTATIN CALCIUM 40 MG: 20 TABLET, FILM COATED ORAL at 09:18

## 2020-05-25 RX ADMIN — INSULIN LISPRO 22 UNITS: 100 INJECTION, SOLUTION INTRAVENOUS; SUBCUTANEOUS at 17:05

## 2020-05-25 RX ADMIN — THERA TABS 1 TABLET: TAB at 09:17

## 2020-05-25 RX ADMIN — FLUTICASONE FUROATE AND VILANTEROL TRIFENATATE 1 PUFF: 200; 25 POWDER RESPIRATORY (INHALATION) at 08:27

## 2020-05-25 RX ADMIN — INSULIN LISPRO 7 UNITS: 100 INJECTION, SOLUTION INTRAVENOUS; SUBCUTANEOUS at 09:19

## 2020-05-25 RX ADMIN — INSULIN LISPRO 22 UNITS: 100 INJECTION, SOLUTION INTRAVENOUS; SUBCUTANEOUS at 12:27

## 2020-05-25 RX ADMIN — FAMOTIDINE 20 MG: 20 TABLET, FILM COATED ORAL at 09:17

## 2020-05-25 RX ADMIN — Medication 10 ML: at 14:00

## 2020-05-25 RX ADMIN — Medication 10 ML: at 21:07

## 2020-05-25 RX ADMIN — INSULIN HUMAN 32 UNITS: 100 INJECTION, SUSPENSION SUBCUTANEOUS at 06:39

## 2020-05-25 RX ADMIN — LOSARTAN POTASSIUM 50 MG: 50 TABLET, FILM COATED ORAL at 09:17

## 2020-05-25 RX ADMIN — IPRATROPIUM BROMIDE AND ALBUTEROL 1 PUFF: 20; 100 SPRAY, METERED RESPIRATORY (INHALATION) at 08:26

## 2020-05-25 RX ADMIN — IPRATROPIUM BROMIDE AND ALBUTEROL 1 PUFF: 20; 100 SPRAY, METERED RESPIRATORY (INHALATION) at 03:32

## 2020-05-25 RX ADMIN — IPRATROPIUM BROMIDE AND ALBUTEROL 1 PUFF: 20; 100 SPRAY, METERED RESPIRATORY (INHALATION) at 15:57

## 2020-05-25 NOTE — PROGRESS NOTES
[facility] RESIDENCY PROGRAM  PROGRESS NOTE     5/25/2020  PCP: Fallon Taveras MD       Assessment/Plan:     Kathie Goff is a 69 y.o. female with history of HTN, type 2DM, GERD, hypothyroidism, depression, asthma, and hyperlipidemia who is admitted for AHRF 2/2 COVID 19.     Overnight events: NAEO      AHRF 2/2 COVID 23: COVID+ on 5/4, worsening SOB on admission and new O2 requirement (RA baseline). POA CXR with opacification in lower lobes b/l L>R, ABG pH 7.41, pCO2 31, pO2 64, O2 sat 93, bicarb 19, LA 1.8, S/p outpatient tx of azithro and plaquenil ending 5/10  - COVID inflammatory markers have normalized, no longer following      - Bcx NGTD  - Doxy 7 day course completed  - Pulmonology following, appreciate rec's-        -s/p Actemra 5/15. (Quant-gold, HIV, hepatitis panel) Negative       -Bumex 1mg daily, Will continue to monitor sodium       -Solumedrol 30mg Q6H  - Proning  - Zinc, Vit C, Lipitor, Mucinex  - Zyrtec, Breo-Ellipta, Combivent Q6H, Singulair  - Supplemental O2 as needed- on 40L high flow       Sinus Arrhythmia- Intermittent on tele, many PACs, leading to bradycardia. POA EKG with Left anterior fascicular block, Left ventricular hypertrophy.  - Discontinued beta blocker  - Monitor Mag and K, Replete prn    Type 2 DM: HgA1C 10.4 in 11/2019. This admission A1C 13.1. Pt very sensitivity to solumedrol- linked order with NPH.  - Hold home glipizide  - Lantus 48U and Lispro 22U with meals with SSI ACHS- Resistant scale  - NPH 32 BID timed with steroids     Asthma  - Continue Singulair 10mg qhs     HTN  - Continue home BP med losartan 50mg daily  - HOLDING atenolol 25mg qhs     Hypothyroid: TSH 3.570 in 11/2019  - Continue home synthroid 100mcg daily     Depression  - Continue Zoloft 50mg dialy and trazadone 100mg qhs to help with sleep     GERD  - Will substitute home prilosec for protonix daily     Hypokalemia: RESOLVED.  Likely 2/2 diarrhea prior to arrival and diuretics inpatient. K 2.7 on admission.   - Repletion prn     Hyperlipidemia: Lipid panel , , HDL 59, LDL 89 in 2019  - Lipitor 40mg daily for additional anti-inflammatory benefit      Joycelyn Alvarez discussed with Dr. Krystina Sen. Subjective:   Pt states doing well this am, no acute concerns. No concerns overnight. Denies cp, n, v, c, d. Objective:   Physical examination  Patient Vitals for the past 24 hrs:   Temp Pulse Resp BP SpO2   20 0351 98.6 °F (37 °C) (!) 57 18 92/53 94 %   20 0200  65 25 103/58 91 %   20 0000  64 20 (!) 102/37 96 %   20 2314 99.3 °F (37.4 °C) 71 18 115/49 97 %   20 2200  73 14 134/62 96 %   20 2008 99.2 °F (37.3 °C) 71 16 115/55 96 %   20  71 16 115/55 96 %   20 1800  71 14 104/46 96 %   20 1650 99.5 °F (37.5 °C) 73 14 105/54 95 %   20 1534     93 %   20 1416  80      20 1140  83 15  93 %   20 1138     93 %   20 1127 99.3 °F (37.4 °C) 79 14 112/56 93 %   20 0825     93 %   20 0754 99.1 °F (37.3 °C) 65 17 120/69 90 %   20 0705  (!) 59         Temp (24hrs), Av.2 °F (37.3 °C), Min:98.6 °F (37 °C), Max:99.5 °F (37.5 °C)     O2 Flow Rate (L/min): 40 l/min   O2 Device: Hi flow nasal cannula    Date 20 - 20 - 20 0659   Shift 2621-40631859 24 Hour Total 9242-3501 7490-4370 24 Hour Total   INTAKE   P.O. 1440  1440        P. O. 1440  1440      Shift Total(mL/kg) 1440(16.1)  1440(16.1)      OUTPUT   Urine(mL/kg/hr) 9506(9.0)  2500        Urine Voided 1150  1150        Urine Occurrence(s) 1 x  1 x        Urine Output (mL) (External Female Catheter 20) 1350  1350      Stool           Stool Occurrence(s) 1 x  1 x      Shift Total(mL/kg) 0373(95)  3302(77)      NET -1060  -1060      Weight (kg) 89.2 89.2 89.2 89.2 89.2 89.2     Pt is currently a COVID infected pt.  I did not go in the room to decrease exposure and conserve PPE. The attending physician will exam in the pt. Talked on the phone. General:   Alert, cooperative, speaking in full sentences     Data Review:     CBC:  Recent Labs     05/25/20  0334 05/24/20  0356 05/23/20 0339   WBC 17.5* 17.5* 17.0*   HGB 11.7 11.9 11.3*   HCT 35.4 35.8 34.5*    153 010     Metabolic Panel:  Recent Labs     05/25/20  0334 05/24/20  0356 05/23/20  0339   * 133* 132*   K 4.3 4.5 4.5   CL 99 98 100   CO2 29 29 29   BUN 31* 26* 27*   CREA 0.85 0.79 0.85   * 202* 299*   CA 8.2* 8.8 8.3*   MG 2.2 2.2 2.1   ALB 2.6* 2.8* 2.5*   TBILI 0.5 0.6 0.5   SGOT 29 28 30   ALT 71 75 80*     Micro:  Lab Results   Component Value Date/Time    Culture result: NO GROWTH 6 DAYS 05/13/2020 04:29 AM    Culture result:  05/11/2020 12:35 AM     MRSA NOT PRESENT. Apparent Staphylococus aureus (not MRSA noted). Culture result:  05/11/2020 12:35 AM         Screening of patient nares for MRSA is for surveillance purposes and, if positive, to facilitate isolation considerations in high risk settings. It is not intended for automatic decolonization interventions per se as regimens are not sufficiently effective to warrant routine use. Imaging:  Xr Chest Port    Result Date: 5/10/2020  EXAM: XR CHEST PORT INDICATION: sob, +COVID COMPARISON: 2013 FINDINGS: A portable AP radiograph of the chest was obtained at 2145 hours. The patient is on a cardiac monitor. There is opacification in the lower lobes bilaterally, left greater than right. The cardiac and mediastinal contours and pulmonary vascularity are normal.  The bones and soft tissues are grossly within normal limits. IMPRESSION: Opacification in the lower lobes bilaterally, left greater than right. CXR AP Port: 5/15/2020    FINDINGS:  AP portable upright view of the chest demonstrates a stable  cardiopericardial  silhouette. The lungs are adequately expanded.   Increased peripheral parenchymal  opacities greater on the left than on the right. Increased interstitial opacity  as well. . The osseous structures are unremarkable. Patient is on a cardiac  monitor.      IMPRESSION  IMPRESSION:  Interval progression of extensive bilateral parenchymal disease greater on the  left than on the right.     Medications reviewed  Current Facility-Administered Medications   Medication Dose Route Frequency    insulin NPH (NOVOLIN N, HUMULIN N) injection 32 Units  32 Units SubCUTAneous Q12H    And    methylPREDNISolone (PF) (SOLU-MEDROL) injection 30 mg  30 mg IntraVENous Q6H    insulin lispro (HUMALOG) injection 22 Units  22 Units SubCUTAneous TIDAC    insulin glargine (LANTUS) injection 48 Units  48 Units SubCUTAneous DAILY    dextrose (D50W) injection syrg 12.5-25 g  12.5-25 g IntraVENous PRN    glucose chewable tablet 16 g  4 Tab Oral PRN    glucagon (GLUCAGEN) injection 1 mg  1 mg IntraMUSCular PRN    insulin lispro (HUMALOG) injection   SubCUTAneous AC&HS    bumetanide (BUMEX) injection 1 mg  1 mg IntraVENous DAILY    guaiFENesin ER (MUCINEX) tablet 1,200 mg  1,200 mg Oral BID    famotidine (PEPCID) tablet 20 mg  20 mg Oral BID    atorvastatin (LIPITOR) tablet 40 mg  40 mg Oral DAILY    zinc sulfate (ZINCATE) 220 (50) mg capsule 1 Cap  1 Cap Oral DAILY    diphenhydrAMINE (BENADRYL) injection 50 mg  50 mg IntraVENous Q4H PRN    acetaminophen (TYLENOL) tablet 650 mg  650 mg Oral Q6H PRN    sodium chloride (NS) flush 5-40 mL  5-40 mL IntraVENous Q8H    sodium chloride (NS) flush 5-40 mL  5-40 mL IntraVENous PRN    ondansetron (ZOFRAN) injection 4 mg  4 mg IntraVENous Q4H PRN    enoxaparin (LOVENOX) injection 40 mg  40 mg SubCUTAneous Q24H    ascorbic acid (vitamin C) (VITAMIN C) tablet 500 mg  500 mg Oral BID    aspirin delayed-release tablet 81 mg  81 mg Oral DAILY    calcium-vitamin D (OS-TRISTEN) 500 mg-200 unit tablet  1 Tab Oral DAILY    cetirizine (ZYRTEC) tablet 10 mg  10 mg Oral DAILY    fluticasone propionate (FLONASE) 50 mcg/actuation nasal spray 2 Spray  2 Spray Both Nostrils DAILY PRN    therapeutic multivitamin (THERAGRAN) tablet 1 Tab  1 Tab Oral DAILY    levothyroxine (SYNTHROID) tablet 100 mcg  100 mcg Oral ACB    losartan (COZAAR) tablet 50 mg  50 mg Oral DAILY    montelukast (SINGULAIR) tablet 10 mg  10 mg Oral QHS    sertraline (ZOLOFT) tablet 50 mg  50 mg Oral DAILY    traZODone (DESYREL) tablet 100 mg  100 mg Oral QHS    ipratropium-albuterol (COMBIVENT RESPIMAT) 20 mcg-100 mcg inhalation spray  1 Puff Inhalation Q6H RT    fluticasone-vilanterol (BREO ELLIPTA) 200mcg-25mcg/puff  1 Puff Inhalation DAILY    albuterol (PROVENTIL HFA, VENTOLIN HFA, PROAIR HFA) inhaler 2 Puff  2 Puff Inhalation Q4H PRN    pantoprazole (PROTONIX) tablet 40 mg  40 mg Oral ACB    sodium chloride (NS) flush 5-10 mL  5-10 mL IntraVENous PRN         Signed:   Celine Tobar MD   Resident, Family Medicine      Attending note: Attending note to follow. ..

## 2020-05-25 NOTE — PROGRESS NOTES
Problem: Mobility Impaired (Adult and Pediatric)  Goal: *Acute Goals and Plan of Care (Insert Text)  Description: FUNCTIONAL STATUS PRIOR TO ADMISSION: Patient was independent and active without use of DME.    HOME SUPPORT PRIOR TO ADMISSION: The patient lived with  but did not require assist.    Physical Therapy Goals  Initiated 5/18/2020  1. Patient will move from supine to sit and sit to supine  in bed with independence within 7 day(s). 2.  Patient will transfer from bed to chair and chair to bed with independence using the least restrictive device within 7 day(s). 3.  Patient will perform sit to stand with independence within 7 day(s). 4.  Patient will ambulate with independence for 150 feet with the least restrictive device within 7 day(s). 5.  Patient will ascend/descend 4 stairs with 1 handrail(s) with modified independence within 7 day(s). Outcome: Progressing Towards Goal  Note:   PHYSICAL THERAPY TREATMENT  Patient: Josie Gautam (63 y.o. female)  Date: 5/25/2020  Diagnosis: Respiratory failure with hypoxia ([facility] [address] [J96.91]   COVID-19       Precautions: Fall, Contact(Droplet plus)  Chart, physical therapy assessment, plan of care and goals were reviewed. ASSESSMENT  Patient continues with skilled PT services and is progressing towards goals. Patient now on 22L at 45% for HighFlow. She was able to tolerate 3 bouts of 15 feet ambulation trials with seated rest breaks between for recovery. Sats 87-89 with activity, and with seated rest break able to recover with in 2 min to 91-92%. Still with decreased endurance but continues to progress, ambulation distance limited by HighFLow tubing. .     Current Level of Function Impacting Discharge (mobility/balance): SBA for trasnfers, CGA for ambulation    Other factors to consider for discharge:          PLAN :  Patient continues to benefit from skilled intervention to address the above impairments.   Continue treatment per established plan of care.  to address goals. Recommendation for discharge: (in order for the patient to meet his/her long term goals)  Physical therapy at least 2 days/week in the home     This discharge recommendation:  A follow-up discussion with the attending provider and/or case management is planned    IF patient discharges home will need the following DME: to be determined (TBD)       SUBJECTIVE:   Patient stated my nose is so dry.     OBJECTIVE DATA SUMMARY:   Critical Behavior:  Neurologic State: Alert  Orientation Level: Oriented X4  Cognition: Appropriate decision making, Appropriate for age attention/concentration, Appropriate safety awareness, Follows commands  Safety/Judgement: Awareness of environment, Driving appropriateness, Fall prevention, Good awareness of safety precautions, Home safety, Insight into deficits  Functional Mobility Training:  Bed Mobility:                    Transfers:  Sit to Stand: Stand-by assistance  Stand to Sit: Stand-by assistance        Bed to Chair: Stand-by assistance                    Balance:  Sitting: Intact  Standing: Impaired  Standing - Static: Fair  Standing - Dynamic : Fair  Ambulation/Gait Training:  Distance (ft): 15 Feet (ft)  Assistive Device: Gait belt  Ambulation - Level of Assistance: Contact guard assistance              Therapeutic Exercises:     Pain Rating:  None reported    Activity Tolerance:   Fair  Please refer to the flowsheet for vital signs taken during this treatment. After treatment patient left in no apparent distress:   Sitting in chair and Call bell within reach    COMMUNICATION/COLLABORATION:   The patients plan of care was discussed with: Registered nurse.      Suki Hawley PT, DPT   Time Calculation: 23 mins

## 2020-05-25 NOTE — PROGRESS NOTES
SHIFT CHANGE:  1930 Bedside and Verbal shift change report given to Janessa ESQUIVEL (oncoming nurse) by April (offgoing nurse). Report included the following information SBAR, Kardex, MAR and Recent Results. SHIFT SUMMARY:  No new issues overnight. END OF SHIFT REPORT:  0730  Bedside and Verbal shift change report given to Shira (oncoming nurse) by Nacho Mast (offgoing nurse). Report included the following information SBAR, Kardex, MAR and Recent Results.

## 2020-05-25 NOTE — PROGRESS NOTES
Bedside and Verbal shift change report given to Shira (oncoming nurse) by Analisa Rios (offgoing nurse). Report included the following information SBAR, Kardex and Cardiac Rhythm NSr.     1200 able to wean patients oxygen throughout the day, no on 22L, 45% fi02 and sats in mid 90s, RR even and unlabored. 1630 midline dressing changed. 1800 patient doing exceedingly well on mid flow NC, saturations between %/ RR even and unlabored. Patient states \"I am just so happy, I feel like I'm making progress, its been a really good day for me. \" encouraged patient to keep doing incentive spirometry, working on mobility with RN and PT, and sitting in chair as much as tolerated. Patient spent majority of day in chair    Bedside and Verbal shift change report given to Erick Jara (oncoming nurse) by Michelle Lewis (offgoing nurse). Report included the following information SBAR, Kardex and Cardiac Rhythm NSR.

## 2020-05-25 NOTE — PROGRESS NOTES
Nutrition Assessment:    RECOMMENDATIONS/INTERVENTION(S):   1. Continue CHO consistent diet. 2. Continue Glucerna TID (660 kcal, 30 gm protein) to promote adequate PO intake. 3. Monitor BG, PO intakes, GI function, labs, and weight trends. ASSESSMENT:   5/25: F/u. Spoke with pt via phone. Pt reports great appetite. Says she ate most of her breakfast this AM (recorded intake 80%) and is hungry waiting for lunch. Recorded intakes % past few days. Likes Glucerna and is drinking them all. No c/o N/V. Labs- Na 132, BG [id number]. Meds- Vit.c, Bumex, Os-tamy, insulin regimen, MVI, solu-medrol, zinc sulfate. 5/20: F/u. Spoke with pt over phone. On consistent CHO diet. Pt states she ate 1/2 of her pancakes, most of fruit cup, a bite of sausage, and 100% Glucerna shake for breakfast this morning. Is feeling hungry today. Yesterday she states she had nothing to eat all day secondary to being on Bipap. Intakes documented in chart confirm this. Prior to yesterday she states intakes were fair, depending on the food she received. C/o food tasting \"like poison\" previously but today food tastes better. States she is drinking all of the Glucerna shakes. Now on High flow O2. Labs: -201, POC -205-223. Meds: Vit C, statin, Bumex, Ca+/Vit D, lantus, Solumedrol, Humalog, MVI, zinc.      5/14: F/u. COVID-19, spoke with pt over phone. Pt reports appetite improving, able to consume 30% breakfast this AM. Meal intake per EMR shows 15-65% intake. Pt enjoys Glucerna shake, does not like Gelatein. Pt requesting Glucerna with every meal, will order to promote PO intakes. BG/, 196, 181, 167; on insulin regimen. Pt denies n/v. LBM 5/11. Labs - Meds - ascorbic acid, calcium-vitamin D, insulin glargine, insulin lispro, levothyroxine, pantoprazole, multivitamin, zinc sulfate, KCl.     5/11: 70 y/o F admitted with respiratory failure with hypoxia.  MST screen >2: recently lost weight without trying, unsure weight loss. PMH - DM, primary hypertension, obesity, depression. COVID-19 +. Charting remotely, spoke with pt over phone. Pt reports poor appetite 2/2 not feeling well and was able to consume <50% breakfast per report. Pt has been experiencing poor appetite/ intake x1 week. Hgb A1c 13.1 (5/11). BG/, 269, 183, 192; on insulin regimen. Pt typically with good appetite, consuming x3 meals/d without frequent snacking.  lb. BMI 32.87 c/w obesity. Pt reports  lb, experiencing recent 10 lb weight loss. Pt denies n/v. C/o diarrhea. LBM 5/11 - loose. Skin without PI. Pt agreeable to trial ONS to promote adequate PO intakes. Labs - K+ 3.1 L, Ca 7.4 L, Hgb A1c 13.1 (5/11). Meds -  NS 75 ml/hr, ascorbic acid, calcium-vitamin D, insulin lispro, levothyroxine, pantoprazole, multivitamin, zinc sulfate, KCl. Diet Order: Consistent carb  % Eaten:    Patient Vitals for the past 72 hrs:   % Diet Eaten   05/25/20 0844 80 %   05/24/20 1650 100 %   05/24/20 1416 75 %   05/24/20 0825 75 %   05/23/20 0839 75 %   05/22/20 1715 75 %   05/22/20 1356 75 %       Pertinent Medications: [x] Reviewed    Labs: [x] Reviewed    Anthropometrics: Height: 5' 4\" (162.6 cm) Weight: 89.2 kg (196 lb 9.6 oz)    IBW (%IBW):   ( ) UBW (%UBW):   (  %)      BMI: Body mass index is 33.75 kg/m². This BMI is indicative of:   [] Underweight    [] Normal    [] Overweight    [x]  Class II Obesity    []  Extreme Obesity (BMI>40)  Estimated Nutrition Needs (Based on): 8718 Kcals/day(REE 1362 x 1.3) , 85 g(69 - 87 gm (0.8 - 1.0 gm/kg)) Protein  Carbohydrate: At Least 130 g/day  Fluids: 1771 mL/day (1 ml/kcal)    Last BM: 5/24  [x]Active     []Hyperactive  []Hypoactive       [] Absent   BS  Skin: No PI    [] Intact   [] Incision  [] Breakdown   [] DTI   [] Tears/Excoriation/Abrasion  []Edema  [] Other:    Wt Readings from Last 30 Encounters:   05/24/20 89.2 kg (196 lb 9.6 oz)   10/08/19 91.2 kg (201 lb)   02/13/19 89.6 kg (197 lb 8 oz) 12/26/18 90.3 kg (199 lb)   01/20/14 95.3 kg (210 lb)   12/20/13 94.8 kg (209 lb)   11/20/13 95.3 kg (210 lb)   11/13/11 84.8 kg (187 lb)      NUTRITION DIAGNOSES:   Problem:  Inadequate energy intake     Etiology: related to decreased ability to consume sufficient energy     Signs/Symptoms: as evidenced by pt reports poor appetite with <50% meal intake and untintentional 10 lb weight loss       5/14: Nutrition Dx improving.   5/20: Nutrition Dx improving with consumption of 3 Glucerna shakes/day + fair PO. No further weight loss since admission. 5/25: Nutrition dx resolved- good po of meals + ONS.     NUTRITION INTERVENTIONS:  Meals/Snacks: General/healthful diet   Supplements: Commercial supplement              GOAL:   PO intake >75% meals + ONS next 5-7 days    Cultural, Catholic, or Ethnic Dietary Needs: None     EDUCATION & DISCHARGE NEEDS:    [x] None Identified   [] Identified and Education Provided/Documented   [] Identified and Pt declined/was not appropriate      [x] Interdisciplinary Care Plan Reviewed/Documented    [x] Discharge Needs:    CHO consistent diet    [] No Nutrition Related Discharge Needs    NUTRITION RISK:   Pt Is At Nutrition Risk  [x]     No Nutrition Risk Identified  []       PT SEEN FOR:    []  MD Consult: []Calorie Count      []Diabetic Diet Education        []Diet Education     []Electrolyte Management     []General Nutrition Management and Supplements     []Management of Tube Feeding     []TPN Recommendations    []  RN Referral:  []MST score >=2     []Enteral/Parenteral Nutrition PTA     []Pregnant: Gestational DM or Multigestation                 [] Pressure Ulcer    []  Low BMI      []  Length of Stay       [] Dysphagia Diet         [] Ventilator  [x]  Follow-up     Previous Recommendations:   [x] Implemented          [] Not Implemented          [] Not Applicable    Previous Goal:   [x] Met              [] Progressing Towards Goal              [] Not Progressing Towards Goal   [] Not Applicable            Carline Fairbanks, [address] [facility]  Pager [phone]  Phone [phone]

## 2020-05-26 ENCOUNTER — APPOINTMENT (OUTPATIENT)
Dept: GENERAL RADIOLOGY | Age: 75
DRG: 177 | End: 2020-05-26
Attending: EMERGENCY MEDICINE
Payer: MEDICARE

## 2020-05-26 LAB
ALBUMIN SERPL-MCNC: 2.8 G/DL (ref 3.5–5)
ALBUMIN/GLOB SERPL: 0.9 {RATIO} (ref 1.1–2.2)
ALP SERPL-CCNC: 79 U/L (ref 45–117)
ALT SERPL-CCNC: 80 U/L (ref 12–78)
ANION GAP SERPL CALC-SCNC: 6 MMOL/L (ref 5–15)
AST SERPL-CCNC: 38 U/L (ref 15–37)
BASOPHILS # BLD: 0 K/UL (ref 0–0.1)
BASOPHILS NFR BLD: 0 % (ref 0–1)
BILIRUB SERPL-MCNC: 0.5 MG/DL (ref 0.2–1)
BUN SERPL-MCNC: 29 MG/DL (ref 6–20)
BUN/CREAT SERPL: 36 (ref 12–20)
CALCIUM SERPL-MCNC: 8.3 MG/DL (ref 8.5–10.1)
CHLORIDE SERPL-SCNC: 100 MMOL/L (ref 97–108)
CO2 SERPL-SCNC: 30 MMOL/L (ref 21–32)
COHGB MFR BLD: 1.3 % (ref 1–2)
COHGB MFR BLD: 1.5 % (ref 1–2)
CREAT SERPL-MCNC: 0.8 MG/DL (ref 0.55–1.02)
DIFFERENTIAL METHOD BLD: ABNORMAL
EOSINOPHIL # BLD: 0 K/UL (ref 0–0.4)
EOSINOPHIL NFR BLD: 0 % (ref 0–7)
ERYTHROCYTE [DISTWIDTH] IN BLOOD BY AUTOMATED COUNT: 13 % (ref 11.5–14.5)
GLOBULIN SER CALC-MCNC: 3 G/DL (ref 2–4)
GLUCOSE BLD STRIP.AUTO-MCNC: 113 MG/DL (ref 65–100)
GLUCOSE BLD STRIP.AUTO-MCNC: 176 MG/DL (ref 65–100)
GLUCOSE BLD STRIP.AUTO-MCNC: 398 MG/DL (ref 65–100)
GLUCOSE BLD STRIP.AUTO-MCNC: 87 MG/DL (ref 65–100)
GLUCOSE SERPL-MCNC: 120 MG/DL (ref 65–100)
HCT VFR BLD AUTO: 37.6 % (ref 35–47)
HGB BLD OXIMETRY-MCNC: 13.6 G/DL (ref 14–17)
HGB BLD OXIMETRY-MCNC: 14 G/DL (ref 14–17)
HGB BLD-MCNC: 12.3 G/DL (ref 11.5–16)
IMM GRANULOCYTES # BLD AUTO: 0 K/UL (ref 0–0.04)
IMM GRANULOCYTES NFR BLD AUTO: 0 % (ref 0–0.5)
LYMPHOCYTES # BLD: 2.3 K/UL (ref 0.8–3.5)
LYMPHOCYTES NFR BLD: 15 % (ref 12–49)
MAGNESIUM SERPL-MCNC: 2.6 MG/DL (ref 1.6–2.4)
MCH RBC QN AUTO: 28.6 PG (ref 26–34)
MCHC RBC AUTO-ENTMCNC: 32.7 G/DL (ref 30–36.5)
MCV RBC AUTO: 87.4 FL (ref 80–99)
METHGB MFR BLD: 0.5 % (ref 0–1.4)
METHGB MFR BLD: 0.7 % (ref 0–1.4)
MONOCYTES # BLD: 0.2 K/UL (ref 0–1)
MONOCYTES NFR BLD: 1 % (ref 5–13)
NEUTS SEG # BLD: 12.5 K/UL (ref 1.8–8)
NEUTS SEG NFR BLD: 84 % (ref 32–75)
NRBC # BLD: 0 K/UL (ref 0–0.01)
NRBC BLD-RTO: 0 PER 100 WBC
OXYHGB MFR BLD: 64 % (ref 94–97)
OXYHGB MFR BLD: 73 % (ref 94–97)
PLATELET # BLD AUTO: 281 K/UL (ref 150–400)
PMV BLD AUTO: 10.8 FL (ref 8.9–12.9)
POTASSIUM SERPL-SCNC: 4 MMOL/L (ref 3.5–5.1)
PROT SERPL-MCNC: 5.8 G/DL (ref 6.4–8.2)
RBC # BLD AUTO: 4.3 M/UL (ref 3.8–5.2)
RBC MORPH BLD: ABNORMAL
SAO2 % BLD: 65 % (ref 95–99)
SAO2 % BLD: 74 % (ref 95–99)
SERVICE CMNT-IMP: ABNORMAL
SERVICE CMNT-IMP: NORMAL
SODIUM SERPL-SCNC: 136 MMOL/L (ref 136–145)
WBC # BLD AUTO: 15 K/UL (ref 3.6–11)

## 2020-05-26 PROCEDURE — 74011250637 HC RX REV CODE- 250/637: Performed by: STUDENT IN AN ORGANIZED HEALTH CARE EDUCATION/TRAINING PROGRAM

## 2020-05-26 PROCEDURE — 82375 ASSAY CARBOXYHB QUANT: CPT

## 2020-05-26 PROCEDURE — 74011250637 HC RX REV CODE- 250/637: Performed by: INTERNAL MEDICINE

## 2020-05-26 PROCEDURE — 85025 COMPLETE CBC W/AUTO DIFF WBC: CPT

## 2020-05-26 PROCEDURE — 94660 CPAP INITIATION&MGMT: CPT

## 2020-05-26 PROCEDURE — 77030038269 HC DRN EXT URIN PURWCK BARD -A

## 2020-05-26 PROCEDURE — 83735 ASSAY OF MAGNESIUM: CPT

## 2020-05-26 PROCEDURE — 97530 THERAPEUTIC ACTIVITIES: CPT

## 2020-05-26 PROCEDURE — 94640 AIRWAY INHALATION TREATMENT: CPT

## 2020-05-26 PROCEDURE — 74011250636 HC RX REV CODE- 250/636: Performed by: STUDENT IN AN ORGANIZED HEALTH CARE EDUCATION/TRAINING PROGRAM

## 2020-05-26 PROCEDURE — 82962 GLUCOSE BLOOD TEST: CPT

## 2020-05-26 PROCEDURE — 65660000000 HC RM CCU STEPDOWN

## 2020-05-26 PROCEDURE — 71045 X-RAY EXAM CHEST 1 VIEW: CPT

## 2020-05-26 PROCEDURE — 77010033711 HC HIGH FLOW OXYGEN

## 2020-05-26 PROCEDURE — 80053 COMPREHEN METABOLIC PANEL: CPT

## 2020-05-26 PROCEDURE — 97116 GAIT TRAINING THERAPY: CPT

## 2020-05-26 PROCEDURE — 36415 COLL VENOUS BLD VENIPUNCTURE: CPT

## 2020-05-26 PROCEDURE — 74011636637 HC RX REV CODE- 636/637: Performed by: STUDENT IN AN ORGANIZED HEALTH CARE EDUCATION/TRAINING PROGRAM

## 2020-05-26 PROCEDURE — 74011250636 HC RX REV CODE- 250/636: Performed by: INTERNAL MEDICINE

## 2020-05-26 PROCEDURE — 74011000250 HC RX REV CODE- 250: Performed by: STUDENT IN AN ORGANIZED HEALTH CARE EDUCATION/TRAINING PROGRAM

## 2020-05-26 RX ORDER — INSULIN LISPRO 100 [IU]/ML
16 INJECTION, SOLUTION INTRAVENOUS; SUBCUTANEOUS
Status: DISCONTINUED | OUTPATIENT
Start: 2020-05-26 | End: 2020-05-27

## 2020-05-26 RX ORDER — INSULIN LISPRO 100 [IU]/ML
18 INJECTION, SOLUTION INTRAVENOUS; SUBCUTANEOUS
Status: DISCONTINUED | OUTPATIENT
Start: 2020-05-26 | End: 2020-05-26

## 2020-05-26 RX ADMIN — METHYLPREDNISOLONE SODIUM SUCCINATE 30 MG: 40 INJECTION, POWDER, FOR SOLUTION INTRAMUSCULAR; INTRAVENOUS at 22:46

## 2020-05-26 RX ADMIN — OXYCODONE HYDROCHLORIDE AND ACETAMINOPHEN 500 MG: 500 TABLET ORAL at 22:46

## 2020-05-26 RX ADMIN — ATORVASTATIN CALCIUM 40 MG: 20 TABLET, FILM COATED ORAL at 09:22

## 2020-05-26 RX ADMIN — ASPIRIN 81 MG: 81 TABLET, COATED ORAL at 09:22

## 2020-05-26 RX ADMIN — Medication 10 ML: at 22:45

## 2020-05-26 RX ADMIN — LOSARTAN POTASSIUM 50 MG: 50 TABLET, FILM COATED ORAL at 09:22

## 2020-05-26 RX ADMIN — ENOXAPARIN SODIUM 40 MG: 40 INJECTION SUBCUTANEOUS at 22:46

## 2020-05-26 RX ADMIN — INSULIN HUMAN 18 UNITS: 100 INJECTION, SUSPENSION SUBCUTANEOUS at 09:20

## 2020-05-26 RX ADMIN — GUAIFENESIN 1200 MG: 600 TABLET, EXTENDED RELEASE ORAL at 09:22

## 2020-05-26 RX ADMIN — IPRATROPIUM BROMIDE AND ALBUTEROL 1 PUFF: 20; 100 SPRAY, METERED RESPIRATORY (INHALATION) at 03:40

## 2020-05-26 RX ADMIN — INSULIN GLARGINE 48 UNITS: 100 INJECTION, SOLUTION SUBCUTANEOUS at 09:20

## 2020-05-26 RX ADMIN — LEVOTHYROXINE SODIUM 100 MCG: 0.1 TABLET ORAL at 09:22

## 2020-05-26 RX ADMIN — IPRATROPIUM BROMIDE AND ALBUTEROL 1 PUFF: 20; 100 SPRAY, METERED RESPIRATORY (INHALATION) at 18:12

## 2020-05-26 RX ADMIN — FAMOTIDINE 20 MG: 20 TABLET, FILM COATED ORAL at 09:22

## 2020-05-26 RX ADMIN — METHYLPREDNISOLONE SODIUM SUCCINATE 30 MG: 40 INJECTION, POWDER, FOR SOLUTION INTRAMUSCULAR; INTRAVENOUS at 09:18

## 2020-05-26 RX ADMIN — FLUTICASONE FUROATE AND VILANTEROL TRIFENATATE 1 PUFF: 200; 25 POWDER RESPIRATORY (INHALATION) at 12:10

## 2020-05-26 RX ADMIN — Medication 10 ML: at 18:12

## 2020-05-26 RX ADMIN — FAMOTIDINE 20 MG: 20 TABLET, FILM COATED ORAL at 22:46

## 2020-05-26 RX ADMIN — OYSTER SHELL CALCIUM WITH VITAMIN D 1 TABLET: 500; 200 TABLET, FILM COATED ORAL at 09:22

## 2020-05-26 RX ADMIN — PANTOPRAZOLE SODIUM 40 MG: 40 TABLET, DELAYED RELEASE ORAL at 09:22

## 2020-05-26 RX ADMIN — INSULIN LISPRO 12 UNITS: 100 INJECTION, SOLUTION INTRAVENOUS; SUBCUTANEOUS at 18:11

## 2020-05-26 RX ADMIN — OXYCODONE HYDROCHLORIDE AND ACETAMINOPHEN 500 MG: 500 TABLET ORAL at 09:22

## 2020-05-26 RX ADMIN — INSULIN LISPRO 16 UNITS: 100 INJECTION, SOLUTION INTRAVENOUS; SUBCUTANEOUS at 18:11

## 2020-05-26 RX ADMIN — SERTRALINE HYDROCHLORIDE 50 MG: 50 TABLET ORAL at 09:22

## 2020-05-26 RX ADMIN — IPRATROPIUM BROMIDE AND ALBUTEROL 1 PUFF: 20; 100 SPRAY, METERED RESPIRATORY (INHALATION) at 09:24

## 2020-05-26 RX ADMIN — BUMETANIDE 1 MG: 0.25 INJECTION INTRAMUSCULAR; INTRAVENOUS at 09:19

## 2020-05-26 RX ADMIN — CETIRIZINE HYDROCHLORIDE 10 MG: 10 TABLET, FILM COATED ORAL at 09:22

## 2020-05-26 RX ADMIN — MONTELUKAST SODIUM 10 MG: 10 TABLET, FILM COATED ORAL at 22:45

## 2020-05-26 RX ADMIN — GUAIFENESIN 1200 MG: 600 TABLET, EXTENDED RELEASE ORAL at 22:46

## 2020-05-26 RX ADMIN — THERA TABS 1 TABLET: TAB at 09:22

## 2020-05-26 RX ADMIN — INSULIN LISPRO 3 UNITS: 100 INJECTION, SOLUTION INTRAVENOUS; SUBCUTANEOUS at 12:10

## 2020-05-26 RX ADMIN — ZINC SULFATE 220 MG (50 MG) CAPSULE 1 CAPSULE: CAPSULE at 09:22

## 2020-05-26 RX ADMIN — IPRATROPIUM BROMIDE AND ALBUTEROL 1 PUFF: 20; 100 SPRAY, METERED RESPIRATORY (INHALATION) at 20:39

## 2020-05-26 NOTE — ROUTINE PROCESS
Bedside shift change report given to Jazmin Berumen (oncoming nurse) by Daphne Edward (offgoing nurse). Report included the following information SBAR, Kardex, Intake/Output, MAR, Accordion and Recent Results.

## 2020-05-26 NOTE — PROGRESS NOTES
Problem: Mobility Impaired (Adult and Pediatric)  Goal: *Acute Goals and Plan of Care (Insert Text)  Description: FUNCTIONAL STATUS PRIOR TO ADMISSION: Patient was independent and active without use of DME.    HOME SUPPORT PRIOR TO ADMISSION: The patient lived with  but did not require assist.    Physical Therapy Goals  Initiated 5/18/2020  1. Patient will move from supine to sit and sit to supine  in bed with independence within 7 day(s). 2.  Patient will transfer from bed to chair and chair to bed with independence using the least restrictive device within 7 day(s). 3.  Patient will perform sit to stand with independence within 7 day(s). 4.  Patient will ambulate with independence for 150 feet with the least restrictive device within 7 day(s). 5.  Patient will ascend/descend 4 stairs with 1 handrail(s) with modified independence within 7 day(s). Outcome: Progressing Towards Goal  Note:   PHYSICAL THERAPY TREATMENT  Patient: Humble Ya (89 y.o. female)  Date: 5/26/2020  Diagnosis: Respiratory failure with hypoxia ([facility] [address] [J96.91]   COVID-19       Precautions: Fall, Contact(Droplet plus)  Chart, physical therapy assessment, plan of care and goals were reviewed. ASSESSMENT  Patient continues with skilled PT services and is progressing towards goals. Patient is continuing to improve, now on 9L midflow NC tubing and she was able to perform 3 bouts of 30 feet ambulation with seated rest break between. Sats with activity 94-96%. She only required 1 min duration for recovery, she is able to recover quicker compared to yesterday and is now on less oxygen. Continues to progress. Current Level of Function Impacting Discharge (mobility/balance): SBA for gait    Other factors to consider for discharge:          PLAN :  Patient continues to benefit from skilled intervention to address the above impairments. Continue treatment per established plan of care. to address goals.     Recommendation for discharge: (in order for the patient to meet his/her long term goals)  Physical therapy at least 2 days/week in the home     This discharge recommendation:  A follow-up discussion with the attending provider and/or case management is planned    IF patient discharges home will need the following DME: to be determined (TBD)       SUBJECTIVE:   Patient stated i am doing so much better today.     OBJECTIVE DATA SUMMARY:   Critical Behavior:  Neurologic State: Alert, Appropriate for age, Eyes open spontaneously  Orientation Level: Oriented X4  Cognition: Appropriate decision making, Appropriate for age attention/concentration, Appropriate safety awareness, Follows commands  Safety/Judgement: Awareness of environment, Driving appropriateness, Fall prevention, Good awareness of safety precautions, Home safety, Insight into deficits  Functional Mobility Training:  Bed Mobility:                    Transfers:  Sit to Stand: Modified independent  Stand to Sit: Modified independent        Bed to Chair: Modified independent                    Balance:     Ambulation/Gait Training:  Distance (ft): 30 Feet (ft)  Assistive Device: Gait belt  Ambulation - Level of Assistance: Stand-by assistance                        Therapeutic Exercises:   Patient perform LE exercises as HEP outside therapy  Pain Rating:  None reported    Activity Tolerance:   Good  Please refer to the flowsheet for vital signs taken during this treatment. After treatment patient left in no apparent distress:   Sitting in chair, Call bell within reach, and Bed / chair alarm activated    COMMUNICATION/COLLABORATION:   The patients plan of care was discussed with: Registered nurse.      Erna Renee PT, DPT   Time Calculation: 30 mins

## 2020-05-26 NOTE — PROGRESS NOTES
5/26/2020  2:45 PM  Chart reviewed--due to covid infection, I did not go in the room. Patient seems to be slowly improving. Noted that PT has recommended she have home health for PT when she goes home; previous notes indicate that she and her  are open to this option. Await new OT eval, and even so this may not be the final eval for this patient. Pulm note states she was weaned down to 9L O2. CM will continue to follow and assist with discharge planning. Transition Plan of Care  1. Patient gradually improving   2. PT recommending home health;await new OT eval since this is a change  3. Home with family assistance and home health if recommended   4. Follow up with PCP, specialists as necessary   5. CM to continue to follow.      JW [place]

## 2020-05-26 NOTE — PROGRESS NOTES
[facility]  Pulmonary, Critical Care, and Sleep Medicine      Name: Kathie Calderon MRN: [id number]   : 1945 Hospital: [address] [facility]   Date: 2020        IMPRESSION:   · Acute hypoxemic respiratory failure  · COVID19 +. Started on Caitlin pulse , had to stop this on  due to increased O2 requirements. Given actemra 5/15  · Mild intermittent asthma  · Chronic back pain  · DM  · HTN      RECOMMENDATIONS:   · Supplemental O2 as needed to keep sats > 88%. Wean midflow to keep sats >88%  · Restart Caitlin. Discussed with RT and Dr. Demetrius Cuba. Carboxy-Hgb ordered. · Continue diuresis. Strict I&O. Watch creat and bicarb. · Completed course of Doxy  · Check CXR  · Trending inflammatory markers - all improved  · Wean Solu-Medrol   · Diligent BG control  · Continue combivent, breo, singulair, zyrtec, mucinex   · Continue vit C, zinc, lipitor  · Continue pepcid  · Encouraged pt to lie prone at least 4 hours per day and to use inc tommy q 1 hr w/a  · OOB to chair  · PT  · Palliative care following       Subjective: This patient has been seen and evaluated at the request of Dr. Estevan Rosario for [place] 19 pneumonia and resp failure. Patient is a 69 y.o. female diagnosed one week ago with Covid 19. Pt with fevers, diarrhea, cough, and sob. She has been isolating at home but has had work done within the home and believes that she may have gotten an infection from one of the workers there.  has also become infected. Pt states that her symptoms have remained stable to slightly improved over the past few days but has noted that her oxygen sats have been falling and presented to ED for evaluation. Fevers seem to have resolved. Main symptoms are now diarrhea, sob, and cough. Pt w h/o asthma which by description appears to be mild intermittent and is followed by Dr Meme Bertrand in [place]. Controlled with montelukast and (rare) albuterol MDI.     Never smoker      Interval history:  Sats 97% on 11L; I weaned down to 9L  Tmax 99.5  O2 sats 95% on HFNC 40L, 80%  Fluid balance: -40  CRP, d-dimer, ferritin all improved - WNL      ROS: Reports improvement in dyspnea/SOB today. Denies fever or chills. Minimal cough. Still with expected weakness/fatigue. Denies CP. Denies LE pain/swelling. Discussed resuming Caitlin and patient is agreeable. Past Medical History:   Diagnosis Date    Anxiety     Arthritis     Asthma     Chronic pain     back,right hip    Depression     Diabetes ([address]     type 2    GERD (gastroesophageal reflux disease)     History of vascular access device 05/18/2020    4 Fr midline, 12 cm L basilic, blood draws    Hypercholesteremia     Hypertension     Hypothyroid     Nausea & vomiting     Reflux     Sleep apnea     wears dental appliance- Somnident    Thyroid disease     Unspecified adverse effect of anesthesia     delayed awakening      Past Surgical History:   Procedure Laterality Date    HX COLONOSCOPY  2012    normal repeat 2022, diverticulosis of sigmoid colon, Dr. Kate Roots HX HEENT  1/08/14    bilateral cataract surgery1/8/14    HX KNEE REPLACEMENT      right    HX OTHER SURGICAL  2007    cardiac cath- No CAD found    NEUROLOGICAL PROCEDURE UNLISTED  1999    vascular brain surgery      Prior to Admission medications    Medication Sig Start Date End Date Taking? Authorizing Provider   guaiFENesin ER (Mucinex) 600 mg ER tablet Take 600 mg by mouth two (2) times a day. Yes Provider, Historical   omeprazole (PRILOSEC) 20 mg capsule Take 20 mg by mouth daily. Yes Provider, Historical   calcium-vitamin D (OYSTER SHELL) 500 mg(1,250mg) -200 unit per tablet Take 1 Tab by mouth daily.    Yes Provider, Historical   traZODone (DESYREL) 100 mg tablet TAKE 1 TABLET BY MOUTH EVERY DAY AT NIGHT 3/25/20  Yes Raquel Bender NP   glipiZIDE SR (GLUCOTROL XL) 10 mg CR tablet TAKE 1 TABLET BY MOUTH TWICE A DAY 2/15/20  Yes Kira Topete NP simvastatin (ZOCOR) 10 mg tablet Take 1 Tab by mouth nightly. 10/8/19  Yes Bender, Jovon Pak, NP   losartan (COZAAR) 50 mg tablet TAKE 1 TABLET BY MOUTH EVERY DAY. 10/8/19  Yes Bender, Jovon Pak, NP   levothyroxine (SYNTHROID) 100 mcg tablet Take 1 Tab by mouth Daily (before breakfast). 10/8/19  Yes Bender, Jovon Pak, NP   atenolol (TENORMIN) 25 mg tablet Take 1 Tab by mouth nightly. 10/8/19  Yes Bender, Jovon Pak, NP   sertraline (ZOLOFT) 50 mg tablet TAKE 1 TABLET BY MOUTH EVERY DAY 9/4/19  Yes Bender Jovon Pak, NP   aspirin delayed-release 81 mg tablet Take 81 mg by mouth daily. Yes Provider, Historical   fluticasone (FLONASE) 50 mcg/actuation nasal spray 2 Sprays by Both Nostrils route daily as needed for Rhinitis or Allergies. Yes Provider, Historical   albuterol (PROAIR HFA) 90 mcg/actuation inhaler Take 2 Puffs by inhalation every four (4) hours as needed for Wheezing. Yes Provider, Historical   Cetirizine (ZYRTEC) 10 mg cap Take 1 Tab by mouth every evening. Yes Provider, Historical   multivitamins-minerals-lutein (CENTRUM SILVER) tab Take 1 Tab by mouth daily. Yes Provider, Historical   glucosamine-chondroit-vit c-mn (GLUCOSAMINE CHONDROITIN MAXSTR) 500-400 mg capsule Take 1 Cap by mouth daily. Yes Other, MD [place]   varicella-zoster recombinant, PF, (SHINGRIX, PF,) 50 mcg/0.5 mL susr injection 0.5mL by IntraMUSCular route once now and then repeat in 2-6 months 10/8/19   Rama Persaud NP     Allergies   Allergen Reactions    Latex Rash    Prednisone Anaphylaxis     Throat swelling. Can take methylpredisone po or IV without problems.     Augmentin [Amoxicillin-Pot Clavulanate] Nausea and Vomiting    Biaxin [Clarithromycin] Nausea and Vomiting    Metformin Diarrhea    Parafon Forte Dsc [Chlorzoxazone] Nausea and Vomiting      Social History     Tobacco Use    Smoking status: Never Smoker    Smokeless tobacco: Never Used   Substance Use Topics    Alcohol use: No     Frequency: Never     Comment: 2x year      Family History   Problem Relation Age of Onset    Elevated Lipids Mother     Dementia Mother     Osteoporosis Mother     Lung Disease Father         copd    Delayed Awakening Father     Post-op Nausea/Vomiting Father     Hypertension Sister     Diabetes Sister         type 2    Breast Cancer Maternal Aunt         Current Facility-Administered Medications   Medication Dose Route Frequency    insulin NPH (NOVOLIN N, HUMULIN N) injection 14 Units  14 Units SubCUTAneous Q12H    And    methylPREDNISolone (PF) (SOLU-MEDROL) injection 30 mg  30 mg IntraVENous Q12H    insulin glargine (LANTUS) injection 48 Units  48 Units SubCUTAneous DAILY    insulin lispro (HUMALOG) injection   SubCUTAneous AC&HS    bumetanide (BUMEX) injection 1 mg  1 mg IntraVENous DAILY    guaiFENesin ER (MUCINEX) tablet 1,200 mg  1,200 mg Oral BID    famotidine (PEPCID) tablet 20 mg  20 mg Oral BID    atorvastatin (LIPITOR) tablet 40 mg  40 mg Oral DAILY    zinc sulfate (ZINCATE) 220 (50) mg capsule 1 Cap  1 Cap Oral DAILY    sodium chloride (NS) flush 5-40 mL  5-40 mL IntraVENous Q8H    enoxaparin (LOVENOX) injection 40 mg  40 mg SubCUTAneous Q24H    ascorbic acid (vitamin C) (VITAMIN C) tablet 500 mg  500 mg Oral BID    aspirin delayed-release tablet 81 mg  81 mg Oral DAILY    calcium-vitamin D (OS-TRISTEN) 500 mg-200 unit tablet  1 Tab Oral DAILY    cetirizine (ZYRTEC) tablet 10 mg  10 mg Oral DAILY    therapeutic multivitamin (THERAGRAN) tablet 1 Tab  1 Tab Oral DAILY    levothyroxine (SYNTHROID) tablet 100 mcg  100 mcg Oral ACB    losartan (COZAAR) tablet 50 mg  50 mg Oral DAILY    montelukast (SINGULAIR) tablet 10 mg  10 mg Oral QHS    sertraline (ZOLOFT) tablet 50 mg  50 mg Oral DAILY    traZODone (DESYREL) tablet 100 mg  100 mg Oral QHS    ipratropium-albuterol (COMBIVENT RESPIMAT) 20 mcg-100 mcg inhalation spray  1 Puff Inhalation Q6H RT    fluticasone-vilanterol (BREO ELLIPTA) 200mcg-25mcg/puff  1 Puff Inhalation DAILY    pantoprazole (PROTONIX) tablet 40 mg  40 mg Oral ACB         Objective:   Vital Signs:    Visit Vitals  /64 (BP 1 Location: Right arm, BP Patient Position: At rest)   Pulse 65   Temp 98.9 °F (37.2 °C)   Resp 11   Ht 5' 4\" (1.626 m)   Wt 89.5 kg (197 lb 4.8 oz)   SpO2 97%   BMI 33.87 kg/m²       O2 Device: (midfow)   O2 Flow Rate (L/min): 11 l/min   Temp (24hrs), Av °F (37.2 °C), Min:98.2 °F (36.8 °C), Max:99.5 °F (37.5 °C)       Intake/Output:   Last shift:       07 - 1900  In: -   Out: 280 [Urine:280]  Last 3 shifts: 1901 -  0700  In: 1020 [P.O.:1020]  Out: 3650 [Urine:3650]    Intake/Output Summary (Last 24 hours) at 2020 0958  Last data filed at 2020 8814  Gross per 24 hour   Intake    Output 2930 ml   Net -2930 ml      Physical Exam:   General:  Alert, cooperative, no acute distress, appears stated age. Head:  Normocephalic, without obvious abnormality, atraumatic. Eyes:  Conjunctivae/corneas clear. EOMs intact. Nose: Nares normal. Septum midline. Mucosa normal. No drainage or sinus tenderness. Throat: Lips, mucosa, and tongue normal. Teeth and gums normal.   Neck: Supple, symmetrical, trachea midline, no adenopathy. Lungs:   Clear anteriorly, mild basilar crackles posterior, mildly diminished   Chest wall:  No tenderness or deformity. Heart:  Regular rate and rhythm, S1, S2 normal, no murmur, click, rub or gallop. Abdomen:   Soft, non-tender. Bowel sounds normal. No masses,  No organomegaly. Extremities: Extremities normal, atraumatic, no cyanosis or edema. Pulses: 2+ and symmetric all extremities.    Skin: Skin color, texture, turgor normal. No rashes or lesions   Lymph nodes: Cervical, supraclavicular nodes normal.   Neurologic: Grossly nonfocal         Data review:     Recent Results (from the past 24 hour(s))   GLUCOSE, POC    Collection Time: 20 11:55 AM   Result Value Ref Range    Glucose (POC) 332 (H) 65 - 100 mg/dL    Performed by [employer] (PCT)    GLUCOSE, POC    Collection Time: 05/25/20  4:39 PM   Result Value Ref Range    Glucose (POC) 229 (H) 65 - 100 mg/dL    Performed by [employer] (PCT)    GLUCOSE, POC    Collection Time: 05/25/20  8:56 PM   Result Value Ref Range    Glucose (POC) 212 (H) 65 - 100 mg/dL    Performed by Rodrigo Oliveira    CBC WITH AUTOMATED DIFF    Collection Time: 05/26/20  3:27 AM   Result Value Ref Range    WBC 15.0 (H) 3.6 - 11.0 K/uL    RBC 4.30 3.80 - 5.20 M/uL    HGB 12.3 11.5 - 16.0 g/dL    HCT 37.6 35.0 - 47.0 %    MCV 87.4 80.0 - 99.0 FL    MCH 28.6 26.0 - 34.0 PG    MCHC 32.7 30.0 - 36.5 g/dL    RDW 13.0 11.5 - 14.5 %    PLATELET 563 108 - 046 K/uL    MPV 10.8 8.9 - 12.9 FL    NRBC 0.0 0  WBC    ABSOLUTE NRBC 0.00 0.00 - 0.01 K/uL    NEUTROPHILS 84 (H) 32 - 75 %    LYMPHOCYTES 15 12 - 49 %    MONOCYTES 1 (L) 5 - 13 %    EOSINOPHILS 0 0 - 7 %    BASOPHILS 0 0 - 1 %    IMMATURE GRANULOCYTES 0 0.0 - 0.5 %    ABS. NEUTROPHILS 12.5 (H) 1.8 - 8.0 K/UL    ABS. LYMPHOCYTES 2.3 0.8 - 3.5 K/UL    ABS. MONOCYTES 0.2 0.0 - 1.0 K/UL    ABS. EOSINOPHILS 0.0 0.0 - 0.4 K/UL    ABS. BASOPHILS 0.0 0.0 - 0.1 K/UL    ABS. IMM.  GRANS. 0.0 0.00 - 0.04 K/UL    DF MANUAL      RBC COMMENTS NORMOCYTIC, NORMOCHROMIC     MAGNESIUM    Collection Time: 05/26/20  3:27 AM   Result Value Ref Range    Magnesium 2.6 (H) 1.6 - 2.4 mg/dL   METABOLIC PANEL, COMPREHENSIVE    Collection Time: 05/26/20  3:27 AM   Result Value Ref Range    Sodium 136 136 - 145 mmol/L    Potassium 4.0 3.5 - 5.1 mmol/L    Chloride 100 97 - 108 mmol/L    CO2 30 21 - 32 mmol/L    Anion gap 6 5 - 15 mmol/L    Glucose 120 (H) 65 - 100 mg/dL    BUN 29 (H) 6 - 20 MG/DL    Creatinine 0.80 0.55 - 1.02 MG/DL    BUN/Creatinine ratio 36 (H) 12 - 20      GFR est AA >60 >60 ml/min/1.73m2    GFR est non-AA >60 >60 ml/min/1.73m2    Calcium 8.3 (L) 8.5 - 10.1 MG/DL    Bilirubin, total 0.5 0.2 - 1.0 MG/DL    ALT (SGPT) 80 (H) 12 - 78 U/L    AST (SGOT) 38 (H) 15 - 37 U/L    Alk.  phosphatase 79 45 - 117 U/L    Protein, total 5.8 (L) 6.4 - 8.2 g/dL    Albumin 2.8 (L) 3.5 - 5.0 g/dL    Globulin 3.0 2.0 - 4.0 g/dL    A-G Ratio 0.9 (L) 1.1 - 2.2     GLUCOSE, POC    Collection Time: 05/26/20  8:30 AM   Result Value Ref Range    Glucose (POC) 113 (H) 65 - 100 mg/dL    Performed by Shaggy Calvin (PCT)        Imaging:  I have personally reviewed the patients radiographs and have reviewed the reports:  CXR 5/18: Stable bilateral infiltrates  CXR 5/19:  No change in bilateral infiltrates  CXR 5/21: Slight improvement in bilateral infiltrates        Winsome Stephenson, [address]

## 2020-05-26 NOTE — PROGRESS NOTES
Investigational therapy documentation and INOPulse protocol for RT-- restart INOPulse with original approval number as below    -- Request received for investigational therapy with INOPulse under Intermediate Emergency Access Program with IRB approval: # [id number]  -- Criteria met: COVID positive pneumonia with hypoxia on supplemental O2 between 2-10 L/min by nasal cannula: on 9 L/min  -- Signed informed consent on chart verified- obtained by Dr. Man Gilbert for RT-MIAH placed- per protocol   -- Treatment with INOPulse at 20 ppm (125 mcg dose programmed) for 8-24 hours/day for 3-14 days as indicated   -- Goals of therapy: minimum of 3 days, approved for use for 14 days: do not discontinue until 72 hours of therapy are complete (antiviral effect)   -- After starting Miah from Day 0, start weaning down O2 on EVERY SHIFT as tolerated for SpO2 90% on 2 L/min nasal cannula O2  -- Stop Miah when at least 72 hours of treatment are complete and patient is on 2 L/min of O2   -- Advise patient to rest/sleep in prone position as much as able   -- Since the connecting tubing is short, it is OK for patient to switch to longer only O2 NC to use the bathroom etc. But otherwise should be using Miah as much as able   -- MetHb measurement daily  -- Optimize volume status with diuretics as indicated guided by daily NT ProBNP  -- Patient will be followed by [facility] ([phone]) while on Wendy Sarabia MD

## 2020-05-26 NOTE — PROGRESS NOTES
Caitlin therapy initiated @ 1248  Initial Met Hb = 0.5%  NC @ 9L with humidity  RR = 24, O2 sat=96%  S/N [id number]  Cannula lot# [id number]  Cartridge indentifier [id number]  Cartridge lot # [id number]

## 2020-05-26 NOTE — PROGRESS NOTES
Problem: Falls - Risk of  Goal: *Absence of Falls  Description: Document [place] Fall Risk and appropriate interventions in the flowsheet.   Outcome: Progressing Towards Goal  Note: Fall Risk Interventions:  Mobility Interventions: Bed/chair exit alarm, Patient to call before getting OOB         Medication Interventions: Bed/chair exit alarm, Patient to call before getting OOB    Elimination Interventions: Bed/chair exit alarm, Call light in reach

## 2020-05-26 NOTE — PROGRESS NOTES
1900  Bedside and Verbal shift change report given to [facility] (oncoming nurse) by Sanchez Cantor (offgoing nurse). Report included the following information SBAR, Kardex, Procedure Summary, Intake/Output, MAR, Accordion, Recent Results and Cardiac Rhythm NSR. Visit Vitals  /62 (BP 1 Location: Right arm, BP Patient Position: At rest)   Pulse 68   Temp 99.3 °F (37.4 °C)   Resp 23   Ht 5' 4\" (1.626 m)   Wt 89.5 kg (197 lb 4.8 oz)   SpO2 100%   BMI 33.87 kg/m²     2330  Bedside and Verbal shift change report given to [facility] (oncoming nurse) by Amanda Luciano RN (offgoing nurse). Report included the following information SBAR, Kardex, Procedure Summary, Intake/Output, MAR, Accordion and Recent Results.

## 2020-05-26 NOTE — PROGRESS NOTES
[facility] RESIDENCY PROGRAM  PROGRESS NOTE     5/27/2020  PCP: Allyson Pradhan MD       Assessment/Plan:     Scarlet Flannery is a 69 y.o. female with history of HTN, type 2DM, GERD, hypothyroidism, depression, asthma, and hyperlipidemia who is admitted for AHRF 2/2 COVID 19.     Overnight events: BG last night to 87, treated with crackers and improved. AHRF 2/2 COVID 19: COVID+ on 5/4. S/p outpatient tx of azithro and plaquenil ending 5/10. No home O2 requirement. COVID inflammatory markers have normalized after peaking ~5/14. Actemra given x1 on 5/15 with screening Quant-gold, HIV, hepatitis panel Negative. S/p Doxy 8 day course (5/12-5/19). Bcx NG 6 days.   - Pulmonology following, appreciate rec's-        -Bumex 1mg daily, due to hyponatremia will discuss cessation of Bumex       -Solumedrol 30mg weaned to Q12H       -Restarted Caitlin trial 5/26  - Proning  - Zinc, Vit C, Lipitor, Mucinex  - Zyrtec, Breo-Ellipta, Combivent Q6H, Singulair  - Supplemental O2 as needed- on 9L NC       Sinus Arrhythmia- Intermittent on tele, many PACs, leading to bradycardia. POA EKG with Left anterior fascicular block, Left ventricular hypertrophy.  - Discontinued beta blocker  - Monitor Mag and K, Replete prn    Type 2 DM: HgA1C 10.4 in 11/2019. This admission A1C 13.1. Pt very sensitivity to solumedrol.  - Hold home glipizide  - Lantus 34U and Lispro 8U with meals with SSI ACHS- Resistant scale  - NPH 14U BID      Asthma  - Continue Singulair 10mg qhs     HTN  - Continue home BP med losartan 50mg daily  - HOLDING atenolol 25mg qhs     Hypothyroid: TSH 3.570 in 11/2019  - Continue home synthroid 100mcg daily     Depression  - Continue Zoloft 50mg daily and trazadone 100mg qhs to help with sleep     GERD  - Will substitute home prilosec for protonix daily     Hypokalemia: RESOLVED.  Likely 2/2 diarrhea prior to arrival and diuretics inpatient. K 2.7 on admission.   - Repletion prn     Hyperlipidemia: Lipid panel , , HDL 59, LDL 89 in 2019  - Lipitor 40mg daily for additional anti-inflammatory benefit      Amelie Jimenez discussed with Dr. Adebayo Rosenthal. Subjective:   Pt states doing well this am, no acute concerns. Continues to states that when walking in the room she is not desating. States with the BG 87 she felt a little sweaty. Denies cp, n, v, c, d. Objective:   Physical examination  Patient Vitals for the past 24 hrs:   Temp Pulse Resp BP SpO2   20 0821 98.8 °F (37.1 °C) 74 16 113/59 98 %   20 0744  67      20 0415 98.8 °F (37.1 °C) 66 18 100/60 97 %   20 2350 98.6 °F (37 °C) 94 20 107/55 97 %   20 2348  78      20 2014 99.6 °F (37.6 °C) 81 21 93/62 98 %   20 1619 99.5 °F (37.5 °C) 78 21 93/46 97 %   20 1500  78      20 1147 96.8 °F (36 °C) 87 21 108/73 92 %      Temp (24hrs), Av.7 °F (37.1 °C), Min:96.8 °F (36 °C), Max:99.6 °F (37.6 °C)     O2 Flow Rate (L/min): 9 l/min   O2 Device: (midflow)    Date 20 - 20 - 20 0659   Shift 9397-73241859 24 Hour Total 2399-0133 0010-0263 24 Hour Total   INTAKE   P.O. 240  240 120  120     P. O. 240  240 120  120   Shift Total(mL/kg) 240(2.7)  240(2.7) 120(1.4)  120(1.4)   OUTPUT   Urine(mL/kg/hr) 740(0.7)  740(0.4) 50  50     Urine Voided 740  740 50  50     Urine Occurrence(s)  2 x 2 x      Stool           Stool Occurrence(s) 1 x  1 x      Shift Total(mL/kg) 740(8.3)  740(8.4) 50(0.6)  50(0.6)   NET -500  -500 70  70   Weight (kg) 89.5 87.7 87.7 87.7 87.7 87.7     Pt is currently a COVID infected pt. I did not go in the room to decrease exposure and conserve PPE. The attending physician will exam in the pt. Talked on the phone.     General:   Alert, cooperative, speaking in full sentences     Data Review:     CBC:  Recent Labs     20  0144 20  0327 20  0334   WBC 15.2* 15.0* 17.5*   HGB 12.3 12.3 11.7   HCT 36.6 37.6 35.4  769 694     Metabolic Panel:  Recent Labs     05/27/20  0144 05/26/20  0327 05/25/20  0334   * 136 132*   K 3.4* 4.0 4.3   CL 98 100 99   CO2 27 30 29   BUN 33* 29* 31*   CREA 0.91 0.80 0.85   * 120* 283*   CA 8.2* 8.3* 8.2*   MG 2.2 2.6* 2.2   ALB 2.9* 2.8* 2.6*   TBILI 0.5 0.5 0.5   * 80* 71     Micro:  Lab Results   Component Value Date/Time    Culture result: NO GROWTH 6 DAYS 05/13/2020 04:29 AM    Culture result:  05/11/2020 12:35 AM     MRSA NOT PRESENT. Apparent Staphylococus aureus (not MRSA noted). Culture result:  05/11/2020 12:35 AM         Screening of patient nares for MRSA is for surveillance purposes and, if positive, to facilitate isolation considerations in high risk settings. It is not intended for automatic decolonization interventions per se as regimens are not sufficiently effective to warrant routine use. Imaging:  Xr Chest Port    Result Date: 5/10/2020  EXAM: XR CHEST PORT INDICATION: sob, +COVID COMPARISON: 2013 FINDINGS: A portable AP radiograph of the chest was obtained at 2145 hours. The patient is on a cardiac monitor. There is opacification in the lower lobes bilaterally, left greater than right. The cardiac and mediastinal contours and pulmonary vascularity are normal.  The bones and soft tissues are grossly within normal limits. IMPRESSION: Opacification in the lower lobes bilaterally, left greater than right. CXR AP Port: 5/15/2020    FINDINGS:  AP portable upright view of the chest demonstrates a stable  cardiopericardial  silhouette. The lungs are adequately expanded. Increased peripheral parenchymal  opacities greater on the left than on the right. Increased interstitial opacity  as well. . The osseous structures are unremarkable. Patient is on a cardiac  monitor.      IMPRESSION  IMPRESSION:  Interval progression of extensive bilateral parenchymal disease greater on the  left than on the right.     Medications reviewed  Current Facility-Administered Medications   Medication Dose Route Frequency    insulin glargine (LANTUS) injection 34 Units  34 Units SubCUTAneous DAILY    insulin lispro (HUMALOG) injection 8 Units  8 Units SubCUTAneous TIDAC    insulin NPH (NOVOLIN N, HUMULIN N) injection 14 Units  14 Units SubCUTAneous Q12H    And    methylPREDNISolone (PF) (SOLU-MEDROL) injection 30 mg  30 mg IntraVENous Q12H    dextrose (D50W) injection syrg 12.5-25 g  12.5-25 g IntraVENous PRN    glucose chewable tablet 16 g  4 Tab Oral PRN    glucagon (GLUCAGEN) injection 1 mg  1 mg IntraMUSCular PRN    insulin lispro (HUMALOG) injection   SubCUTAneous AC&HS    guaiFENesin ER (MUCINEX) tablet 1,200 mg  1,200 mg Oral BID    famotidine (PEPCID) tablet 20 mg  20 mg Oral BID    atorvastatin (LIPITOR) tablet 40 mg  40 mg Oral DAILY    zinc sulfate (ZINCATE) 220 (50) mg capsule 1 Cap  1 Cap Oral DAILY    diphenhydrAMINE (BENADRYL) injection 50 mg  50 mg IntraVENous Q4H PRN    acetaminophen (TYLENOL) tablet 650 mg  650 mg Oral Q6H PRN    sodium chloride (NS) flush 5-40 mL  5-40 mL IntraVENous Q8H    sodium chloride (NS) flush 5-40 mL  5-40 mL IntraVENous PRN    ondansetron (ZOFRAN) injection 4 mg  4 mg IntraVENous Q4H PRN    enoxaparin (LOVENOX) injection 40 mg  40 mg SubCUTAneous Q24H    ascorbic acid (vitamin C) (VITAMIN C) tablet 500 mg  500 mg Oral BID    aspirin delayed-release tablet 81 mg  81 mg Oral DAILY    calcium-vitamin D (OS-TRISTEN) 500 mg-200 unit tablet  1 Tab Oral DAILY    cetirizine (ZYRTEC) tablet 10 mg  10 mg Oral DAILY    fluticasone propionate (FLONASE) 50 mcg/actuation nasal spray 2 Spray  2 Spray Both Nostrils DAILY PRN    therapeutic multivitamin (THERAGRAN) tablet 1 Tab  1 Tab Oral DAILY    levothyroxine (SYNTHROID) tablet 100 mcg  100 mcg Oral ACB    losartan (COZAAR) tablet 50 mg  50 mg Oral DAILY    montelukast (SINGULAIR) tablet 10 mg  10 mg Oral QHS    sertraline (ZOLOFT) tablet 50 mg  50 mg Oral DAILY  traZODone (DESYREL) tablet 100 mg  100 mg Oral QHS    ipratropium-albuterol (COMBIVENT RESPIMAT) 20 mcg-100 mcg inhalation spray  1 Puff Inhalation Q6H RT    fluticasone-vilanterol (BREO ELLIPTA) 200mcg-25mcg/puff  1 Puff Inhalation DAILY    albuterol (PROVENTIL HFA, VENTOLIN HFA, PROAIR HFA) inhaler 2 Puff  2 Puff Inhalation Q4H PRN    pantoprazole (PROTONIX) tablet 40 mg  40 mg Oral ACB    sodium chloride (NS) flush 5-10 mL  5-10 mL IntraVENous PRN         Signed:   Smiley Mcmahon MD   Resident, Family Medicine      Attending note: Attending note to follow. ..

## 2020-05-26 NOTE — PROGRESS NOTES
0700- Bedside and Verbal shift change report given to ALISTAIR Marti (oncoming nurse) by Suki Lee (offgoing nurse). Report included the following information SBAR, Kardex, Intake/Output and MAR.     2873- Per Family Practice, hold meal time insulin and give lantus and NPH    0930- Encouraged IS use. Educated on use, pt verbalizes understanding    1752- , orders received to give 28units SSI per FP      1900- Bedside and Verbal shift change report given to Janessa ESQUIVEL (oncoming nurse) by Sherif Ferrell RN (offgoing nurse). Report included the following information SBAR, Kardex, ED Summary, Intake/Output and MAR.

## 2020-05-27 LAB
ALBUMIN SERPL-MCNC: 2.9 G/DL (ref 3.5–5)
ALBUMIN/GLOB SERPL: 0.9 {RATIO} (ref 1.1–2.2)
ALP SERPL-CCNC: 84 U/L (ref 45–117)
ALT SERPL-CCNC: 113 U/L (ref 12–78)
ANION GAP SERPL CALC-SCNC: 7 MMOL/L (ref 5–15)
AST SERPL-CCNC: 62 U/L (ref 15–37)
BASOPHILS # BLD: 0 K/UL (ref 0–0.1)
BASOPHILS NFR BLD: 0 % (ref 0–1)
BILIRUB SERPL-MCNC: 0.5 MG/DL (ref 0.2–1)
BNP SERPL-MCNC: 56 PG/ML
BNP SERPL-MCNC: 57 PG/ML
BUN SERPL-MCNC: 33 MG/DL (ref 6–20)
BUN/CREAT SERPL: 36 (ref 12–20)
CALCIUM SERPL-MCNC: 8.2 MG/DL (ref 8.5–10.1)
CHLORIDE SERPL-SCNC: 98 MMOL/L (ref 97–108)
CO2 SERPL-SCNC: 27 MMOL/L (ref 21–32)
CREAT SERPL-MCNC: 0.91 MG/DL (ref 0.55–1.02)
DIFFERENTIAL METHOD BLD: ABNORMAL
EOSINOPHIL # BLD: 0 K/UL (ref 0–0.4)
EOSINOPHIL NFR BLD: 0 % (ref 0–7)
ERYTHROCYTE [DISTWIDTH] IN BLOOD BY AUTOMATED COUNT: 13.2 % (ref 11.5–14.5)
GLOBULIN SER CALC-MCNC: 3.3 G/DL (ref 2–4)
GLUCOSE BLD STRIP.AUTO-MCNC: 131 MG/DL (ref 65–100)
GLUCOSE BLD STRIP.AUTO-MCNC: 161 MG/DL (ref 65–100)
GLUCOSE BLD STRIP.AUTO-MCNC: 183 MG/DL (ref 65–100)
GLUCOSE BLD STRIP.AUTO-MCNC: 314 MG/DL (ref 65–100)
GLUCOSE BLD STRIP.AUTO-MCNC: 318 MG/DL (ref 65–100)
GLUCOSE SERPL-MCNC: 122 MG/DL (ref 65–100)
HCT VFR BLD AUTO: 36.6 % (ref 35–47)
HGB BLD-MCNC: 12.3 G/DL (ref 11.5–16)
IMM GRANULOCYTES # BLD AUTO: 0 K/UL
IMM GRANULOCYTES NFR BLD AUTO: 0 %
LYMPHOCYTES # BLD: 2 K/UL (ref 0.8–3.5)
LYMPHOCYTES NFR BLD: 13 % (ref 12–49)
MAGNESIUM SERPL-MCNC: 2.2 MG/DL (ref 1.6–2.4)
MCH RBC QN AUTO: 29.1 PG (ref 26–34)
MCHC RBC AUTO-ENTMCNC: 33.6 G/DL (ref 30–36.5)
MCV RBC AUTO: 86.7 FL (ref 80–99)
MONOCYTES # BLD: 0.8 K/UL (ref 0–1)
MONOCYTES NFR BLD: 5 % (ref 5–13)
NEUTS SEG # BLD: 12.4 K/UL (ref 1.8–8)
NEUTS SEG NFR BLD: 82 % (ref 32–75)
NRBC # BLD: 0 K/UL (ref 0–0.01)
NRBC BLD-RTO: 0 PER 100 WBC
PLATELET # BLD AUTO: 292 K/UL (ref 150–400)
PMV BLD AUTO: 11 FL (ref 8.9–12.9)
POTASSIUM SERPL-SCNC: 3.4 MMOL/L (ref 3.5–5.1)
PROT SERPL-MCNC: 6.2 G/DL (ref 6.4–8.2)
RBC # BLD AUTO: 4.22 M/UL (ref 3.8–5.2)
RBC MORPH BLD: ABNORMAL
SERVICE CMNT-IMP: ABNORMAL
SODIUM SERPL-SCNC: 132 MMOL/L (ref 136–145)
WBC # BLD AUTO: 15.2 K/UL (ref 3.6–11)

## 2020-05-27 PROCEDURE — 74011250636 HC RX REV CODE- 250/636: Performed by: STUDENT IN AN ORGANIZED HEALTH CARE EDUCATION/TRAINING PROGRAM

## 2020-05-27 PROCEDURE — 74011636637 HC RX REV CODE- 636/637: Performed by: STUDENT IN AN ORGANIZED HEALTH CARE EDUCATION/TRAINING PROGRAM

## 2020-05-27 PROCEDURE — 74011250637 HC RX REV CODE- 250/637: Performed by: INTERNAL MEDICINE

## 2020-05-27 PROCEDURE — 80053 COMPREHEN METABOLIC PANEL: CPT

## 2020-05-27 PROCEDURE — 82962 GLUCOSE BLOOD TEST: CPT

## 2020-05-27 PROCEDURE — 74011250637 HC RX REV CODE- 250/637: Performed by: STUDENT IN AN ORGANIZED HEALTH CARE EDUCATION/TRAINING PROGRAM

## 2020-05-27 PROCEDURE — 77010033711 HC HIGH FLOW OXYGEN

## 2020-05-27 PROCEDURE — 97116 GAIT TRAINING THERAPY: CPT

## 2020-05-27 PROCEDURE — 97530 THERAPEUTIC ACTIVITIES: CPT

## 2020-05-27 PROCEDURE — 94660 CPAP INITIATION&MGMT: CPT

## 2020-05-27 PROCEDURE — 65660000000 HC RM CCU STEPDOWN

## 2020-05-27 PROCEDURE — 83880 ASSAY OF NATRIURETIC PEPTIDE: CPT

## 2020-05-27 PROCEDURE — 74011000250 HC RX REV CODE- 250: Performed by: STUDENT IN AN ORGANIZED HEALTH CARE EDUCATION/TRAINING PROGRAM

## 2020-05-27 PROCEDURE — 94640 AIRWAY INHALATION TREATMENT: CPT

## 2020-05-27 PROCEDURE — 36415 COLL VENOUS BLD VENIPUNCTURE: CPT

## 2020-05-27 PROCEDURE — 74011250636 HC RX REV CODE- 250/636: Performed by: INTERNAL MEDICINE

## 2020-05-27 PROCEDURE — 94664 DEMO&/EVAL PT USE INHALER: CPT

## 2020-05-27 PROCEDURE — 85025 COMPLETE CBC W/AUTO DIFF WBC: CPT

## 2020-05-27 PROCEDURE — 83735 ASSAY OF MAGNESIUM: CPT

## 2020-05-27 RX ORDER — INSULIN LISPRO 100 [IU]/ML
16 INJECTION, SOLUTION INTRAVENOUS; SUBCUTANEOUS
Status: DISCONTINUED | OUTPATIENT
Start: 2020-05-27 | End: 2020-05-29

## 2020-05-27 RX ORDER — INSULIN LISPRO 100 [IU]/ML
8 INJECTION, SOLUTION INTRAVENOUS; SUBCUTANEOUS
Status: DISCONTINUED | OUTPATIENT
Start: 2020-05-27 | End: 2020-05-27

## 2020-05-27 RX ORDER — INSULIN GLARGINE 100 [IU]/ML
34 INJECTION, SOLUTION SUBCUTANEOUS DAILY
Status: DISCONTINUED | OUTPATIENT
Start: 2020-05-27 | End: 2020-05-30 | Stop reason: HOSPADM

## 2020-05-27 RX ORDER — INSULIN LISPRO 100 [IU]/ML
6 INJECTION, SOLUTION INTRAVENOUS; SUBCUTANEOUS
Status: DISCONTINUED | OUTPATIENT
Start: 2020-05-27 | End: 2020-05-27

## 2020-05-27 RX ADMIN — OYSTER SHELL CALCIUM WITH VITAMIN D 1 TABLET: 500; 200 TABLET, FILM COATED ORAL at 09:18

## 2020-05-27 RX ADMIN — IPRATROPIUM BROMIDE AND ALBUTEROL 1 PUFF: 20; 100 SPRAY, METERED RESPIRATORY (INHALATION) at 21:32

## 2020-05-27 RX ADMIN — INSULIN LISPRO 10 UNITS: 100 INJECTION, SOLUTION INTRAVENOUS; SUBCUTANEOUS at 18:06

## 2020-05-27 RX ADMIN — LOSARTAN POTASSIUM 50 MG: 50 TABLET, FILM COATED ORAL at 09:18

## 2020-05-27 RX ADMIN — IPRATROPIUM BROMIDE AND ALBUTEROL 1 PUFF: 20; 100 SPRAY, METERED RESPIRATORY (INHALATION) at 01:39

## 2020-05-27 RX ADMIN — PANTOPRAZOLE SODIUM 40 MG: 40 TABLET, DELAYED RELEASE ORAL at 06:46

## 2020-05-27 RX ADMIN — INSULIN GLARGINE 34 UNITS: 100 INJECTION, SOLUTION SUBCUTANEOUS at 09:15

## 2020-05-27 RX ADMIN — IPRATROPIUM BROMIDE AND ALBUTEROL 1 PUFF: 20; 100 SPRAY, METERED RESPIRATORY (INHALATION) at 09:27

## 2020-05-27 RX ADMIN — INSULIN HUMAN 14 UNITS: 100 INJECTION, SUSPENSION SUBCUTANEOUS at 09:16

## 2020-05-27 RX ADMIN — GUAIFENESIN 1200 MG: 600 TABLET, EXTENDED RELEASE ORAL at 09:19

## 2020-05-27 RX ADMIN — INSULIN LISPRO 16 UNITS: 100 INJECTION, SOLUTION INTRAVENOUS; SUBCUTANEOUS at 18:05

## 2020-05-27 RX ADMIN — ENOXAPARIN SODIUM 40 MG: 40 INJECTION SUBCUTANEOUS at 21:22

## 2020-05-27 RX ADMIN — OXYCODONE HYDROCHLORIDE AND ACETAMINOPHEN 500 MG: 500 TABLET ORAL at 22:14

## 2020-05-27 RX ADMIN — INSULIN LISPRO 10 UNITS: 100 INJECTION, SOLUTION INTRAVENOUS; SUBCUTANEOUS at 13:13

## 2020-05-27 RX ADMIN — THERA TABS 1 TABLET: TAB at 09:18

## 2020-05-27 RX ADMIN — INSULIN LISPRO 8 UNITS: 100 INJECTION, SOLUTION INTRAVENOUS; SUBCUTANEOUS at 13:12

## 2020-05-27 RX ADMIN — Medication 10 ML: at 22:14

## 2020-05-27 RX ADMIN — MONTELUKAST SODIUM 10 MG: 10 TABLET, FILM COATED ORAL at 21:24

## 2020-05-27 RX ADMIN — ASPIRIN 81 MG: 81 TABLET, COATED ORAL at 09:18

## 2020-05-27 RX ADMIN — FAMOTIDINE 20 MG: 20 TABLET, FILM COATED ORAL at 21:23

## 2020-05-27 RX ADMIN — METHYLPREDNISOLONE SODIUM SUCCINATE 30 MG: 40 INJECTION, POWDER, FOR SOLUTION INTRAMUSCULAR; INTRAVENOUS at 09:22

## 2020-05-27 RX ADMIN — FLUTICASONE FUROATE AND VILANTEROL TRIFENATATE 1 PUFF: 200; 25 POWDER RESPIRATORY (INHALATION) at 09:29

## 2020-05-27 RX ADMIN — IPRATROPIUM BROMIDE AND ALBUTEROL 1 PUFF: 20; 100 SPRAY, METERED RESPIRATORY (INHALATION) at 15:56

## 2020-05-27 RX ADMIN — INSULIN LISPRO 8 UNITS: 100 INJECTION, SOLUTION INTRAVENOUS; SUBCUTANEOUS at 09:16

## 2020-05-27 RX ADMIN — ATORVASTATIN CALCIUM 40 MG: 20 TABLET, FILM COATED ORAL at 09:18

## 2020-05-27 RX ADMIN — INSULIN HUMAN 14 UNITS: 100 INJECTION, SUSPENSION SUBCUTANEOUS at 21:23

## 2020-05-27 RX ADMIN — Medication 10 ML: at 14:00

## 2020-05-27 RX ADMIN — LEVOTHYROXINE SODIUM 100 MCG: 0.1 TABLET ORAL at 06:46

## 2020-05-27 RX ADMIN — ZINC SULFATE 220 MG (50 MG) CAPSULE 1 CAPSULE: CAPSULE at 09:18

## 2020-05-27 RX ADMIN — POTASSIUM BICARBONATE 20 MEQ: 782 TABLET, EFFERVESCENT ORAL at 06:46

## 2020-05-27 RX ADMIN — TRAZODONE HYDROCHLORIDE 100 MG: 100 TABLET ORAL at 22:14

## 2020-05-27 RX ADMIN — FAMOTIDINE 20 MG: 20 TABLET, FILM COATED ORAL at 09:18

## 2020-05-27 RX ADMIN — CETIRIZINE HYDROCHLORIDE 10 MG: 10 TABLET, FILM COATED ORAL at 09:18

## 2020-05-27 RX ADMIN — BUMETANIDE 1 MG: 0.25 INJECTION INTRAMUSCULAR; INTRAVENOUS at 09:23

## 2020-05-27 RX ADMIN — Medication 10 ML: at 06:47

## 2020-05-27 RX ADMIN — INSULIN LISPRO 3 UNITS: 100 INJECTION, SOLUTION INTRAVENOUS; SUBCUTANEOUS at 07:30

## 2020-05-27 RX ADMIN — METHYLPREDNISOLONE SODIUM SUCCINATE 30 MG: 40 INJECTION, POWDER, FOR SOLUTION INTRAMUSCULAR; INTRAVENOUS at 21:23

## 2020-05-27 RX ADMIN — GUAIFENESIN 1200 MG: 600 TABLET, EXTENDED RELEASE ORAL at 21:23

## 2020-05-27 RX ADMIN — SERTRALINE HYDROCHLORIDE 50 MG: 50 TABLET ORAL at 09:18

## 2020-05-27 RX ADMIN — TRAZODONE HYDROCHLORIDE 100 MG: 100 TABLET ORAL at 01:37

## 2020-05-27 RX ADMIN — OXYCODONE HYDROCHLORIDE AND ACETAMINOPHEN 500 MG: 500 TABLET ORAL at 09:18

## 2020-05-27 NOTE — ADT AUTH CERT NOTES
concurrent review for 5/24 by Mallory Haynes RN  
 
   
Review Entered Review Status 5/25/2020 15:29 In Primary  
   
Criteria Review 5/24/20  inpt stepdown 
  
Meds:  asa 81mg po qd   Lipitor 40mg po qd   bumix 1mg iv qd   os tamy 1 qd   lovenox 40mg sq sd   Pepcid 20mg po bid   breo 1 puff qd   mucinex 1200 po bid   Humalog ac and hs sq ss   combivent 1puff qd  synthroid 100mcg po qd    cozaar 50mg po qd   protonix 40mg po qd    solumedrol 30mg iv q12   hph 22 unit q12     
  
Family practice note 5/23/20  inpt stepdown Meds:  asa 81mg po qd   Lipitor 40mg po qd   bumix 1mg iv qd   os tamy 1 qd   lovenox 40mg sq sd   Pepcid 20mg po bid   breo 1 puff qd   mucinex 1200 po bid   Humalog ac and hs sq ss   combivent 1puff qd  synthroid 100mcg po qd    cozaar 50mg po qd   protonix 40mg po qd    solumedrol 30mg iv q12   hph 22 unit q12 Pulmonary note IMPRESSION: 
·           Acute hypoxemic respiratory failure ·           COVID19 +. Started on Caitlin pulse 5/12, had to stop this on 5/16 due to increased O2 requirements. Given actemra 5/15 ·           Mild intermittent asthma ·           Chronic back pain ·           DM 
·           HTN 
  
RECOMMENDATIONS: 
·           Supplemental O2 as needed to keep sats > 88%. Wean Hi Hakan to keep sats >88% ·           Continue diuresis. Strict I&O. Watch creat and bicarb. ·           Completed course of Doxy ·           Trend inflammatory markers ·           C/W Solu-Medrol ·           Diligent BG control ·           Continue combivent, breo, singulair, zyrtec, mucinex ·           Continue vit C, zinc, lipitor ·           Continue pepcid ·           Encouraged pt to lie prone at least 4 hours per day and to use inc tommy q 1 hr w/a 
·           OOB to chair ·           Palliative care following BP       108/48 (BP 1 Location: Right arm, BP Patient Position: At rest) Pulse   70 Temp   99.1 °F (37.3 °C) Resp    18 SpO2   93% 
  
  
 O2 Device: Hi flow nasal cannula, Heated O2 Flow Rate (L/min): 40 l/min 
  
WBC: 17.0 (H) HGB: 11.3 (L) HCT: 34.5 (L) PLATELET: 479 D-dimer: 0.61 Sodium: 132 (L) Anion gap: 3 (L) Glucose: 299 (H) BUN: 27 (H) Creatinine: 0.85 BUN/Creatinine ratio: 32 (H) Calcium: 8.3 (L) Protein, total: 5.8 (L) Albumin: 2.5 (L) A-G Ratio: 0.8 (L) ALT (SGPT): 80 (H) LD: 26 
  
Family note Assessment and Plan Johanne Boles is a 69 y.o. female HTN, type 2DM, GERD, hypothyroidism, depression, asthma, and hyperlipidemia who is admitted for AHRF 2/2 COVID 19. 
  
24 Hour Events: No acute events. AHRF 2/2 COVID 19: COVID+ on 5/4, worsening SOB on admission and new O2 requirement (RA baseline). POA CXR with opacification in lower lobes b/l L>R. S/p OP azithro + plaquenil ending 5/10 
- COVID inflammatory markers: every other day - Bumex 1mg daily, Solumedrol Q6H - per Pulm - Bcx NGTD 
- s/p Doxy 7/7 days - s/p Actemra 5/15 
- Neg Quant-gold, HIV, hepatitis panel - Zinc, Vit C, Lipitor, Mucinex - Zyrtec, Breo-Ellipta, Combivent Q6H, Singulair - Supplemental O2 for sats >88% - to wean- now on 40L high flow - Pulm following: will appreciate further recs - Palliative following Sinus Arrhythmia- Intermittent on tele, many PACs, leading to bradycardia. POA EKG with Left anterior fascicular block, Left ventricular hypertrophy. 
- Discontinued beta blocker - Monitor Mag and K, Replete prn 
  
DM2: HgA1C 10.4 in 11/2019. This admission A1C 13.1. Pt very sensitivity to solumedrol- linked order with NPH. 
- Hold home glipizide - Lantus 36U + Lispro 12U with meals with SSI ACHS- Resistant scale 
- NPH 28 BID Asthma 
- Home Singulair 10mg qhs HTN 
- Home losartan 50mg daily 
- HOLDING atenolol 25mg qhs HLD: Lipid panel 11/19 [TChol 172, , HDL 59, LDL 89] - Lipitor 40mg daily for additional anti-inflammatory benefit Hypothyroid: TSH 3.570 in 11/2019 - Continue home synthroid 100mcg daily Depression 
- Home Zoloft 50mg dialy + trazadone 100mg qhs for sleep GERD 
- Will substitute home prilosec for protonix daily Hypokalemia: RESOLVED. Likely 2/2 diarrhea prior to arrival and diuretics inpatient. K 2.7 on admission.  
- Repletion prn  
  
100 °F (37.8 °C)          (!) 101  23        130/77 90 % O2 Flow Rate (L/min): 40 l/min O2 Device: Hi flow nasal cannula D-dimer: 0.52 Sodium: 133 (L) Glucose: 202 (H) BUN: 26 (H) Creatinine: 0.79 
BUN/Creatinine ratio: 33 (H) Protein, total: 5.8 (L) Albumin: 2.8 (L) A-G Ratio: 0.9 (L) 
LD: 267 (H) Ferritin: 232 C-Reactive protein: <0.29 WBC: 17.5 (H) HGB: 11.9 HCT: 35.8 PLATELET: 529  
   
concurrent review 5/23 by Jose Benson RN  
 
   
Review Entered Review Status 5/25/2020 15:23 In Primary  
   
Criteria Review 5/23/20  inpt stepdown Meds:  asa 81mg po qd   Lipitor 40mg po qd   bumix 1mg iv qd   os tamy 1 qd   lovenox 40mg sq sd   Pepcid 20mg po bid   breo 1 puff qd   mucinex 1200 po bid   Humalog ac and hs sq ss   combivent 1puff qd  synthroid 100mcg po qd    cozaar 50mg po qd   protonix 40mg po qd    solumedrol 30mg iv q12   hph 22 unit q12 Pulmonary note IMPRESSION: 
·           Acute hypoxemic respiratory failure ·           COVID19 +. Started on Caitlin pulse 5/12, had to stop this on 5/16 due to increased O2 requirements. Given actemra 5/15 ·           Mild intermittent asthma ·           Chronic back pain ·           DM 
·           HTN 
  
RECOMMENDATIONS: 
·           Supplemental O2 as needed to keep sats > 88%. Wean Hi Hakan to keep sats >88% ·           Continue diuresis. Strict I&O. Watch creat and bicarb. ·           Completed course of Doxy ·           Trend inflammatory markers ·           C/W Solu-Medrol ·           Diligent BG control ·           Continue combivent, breo, singulair, zyrtec, mucinex ·           Continue vit C, zinc, lipitor ·           Continue pepcid ·           Encouraged pt to lie prone at least 4 hours per day and to use inc tommy q 1 hr w/a 
·           OOB to chair ·           Palliative care following BP       108/48 (BP 1 Location: Right arm, BP Patient Position: At rest) Pulse   70 Temp   99.1 °F (37.3 °C) Resp    18 SpO2   93% 
  
  
O2 Device: Hi flow nasal cannula, Heated O2 Flow Rate (L/min): 40 l/min 
  
WBC: 17.0 (H) HGB: 11.3 (L) HCT: 34.5 (L) PLATELET: 579 D-dimer: 0.61 Sodium: 132 (L) Anion gap: 3 (L) Glucose: 299 (H) BUN: 27 (H) Creatinine: 0.85 BUN/Creatinine ratio: 32 (H) Calcium: 8.3 (L) Protein, total: 5.8 (L) Albumin: 2.5 (L) A-G Ratio: 0.8 (L) ALT (SGPT): 80 (H) LD: 26 
  
Family note Assessment and Plan 
  
Scarlet Flannery is a 69 y.o. female HTN, type 2DM, GERD, hypothyroidism, depression, asthma, and hyperlipidemia who is admitted for AHRF 2/2 COVID 23. 
  
24 Hour Events: No acute events.   
  
AHRF 2/2 COVID 19: COVID+ on 5/4, worsening SOB on admission and new O2 requirement (RA baseline). POA CXR with opacification in lower lobes b/l L>R. S/p OP azithro + plaquenil ending 5/10 
- COVID inflammatory markers: every other day - Bumex 1mg daily, Solumedrol Q6H - per Pulm - Bcx NGTD 
- s/p Doxy 7/7 days - s/p Actemra 5/15 
- Neg Quant-gold, HIV, hepatitis panel - Zinc, Vit C, Lipitor, Mucinex - Zyrtec, Breo-Ellipta, Combivent Q6H, Singulair - Supplemental O2 for sats >88% - to wean- now on 40L high flow - Pulm following: will appreciate further recs - Palliative following 
  
Sinus Arrhythmia- Intermittent on tele, many PACs, leading to bradycardia. POA EKG with Left anterior fascicular block, Left ventricular hypertrophy. 
- Discontinued beta blocker - Monitor Mag and K, Replete prn 
  
DM2: HgA1C 10.4 in 11/2019. This admission A1C 13. 1. Pt very sensitivity to solumedrol- linked order with NPH. 
- Hold home glipizide - Lantus 36U + Lispro 12U with meals with SSI ACHS- Resistant scale 
- NPH 28 BID 
  
Asthma 
- Home Singulair 10mg qhs 
  
HTN 
- Home losartan 50mg daily 
- HOLDING atenolol 25mg qhs  
  
HLD: Lipid panel 11/19 [TChol 172, , HDL 59, LDL 89] - Lipitor 40mg daily for additional anti-inflammatory benefit 
  
Hypothyroid: TSH 3.570 in 11/2019 
- Continue home synthroid 100mcg daily 
  
Depression 
- Home Zoloft 50mg dialy + trazadone 100mg qhs for sleep 
  
GERD 
- Will substitute home prilosec for protonix daily 
  
Hypokalemia: RESOLVED. Likely 2/2 diarrhea prior to arrival and diuretics inpatient. K 2.7 on admission.  
- Repletion prn  
  
   
   
Pneumonia - Care Day 13 (5/22/2020) by Tameka Ryan RN  
 
   
Review Entered Review Status 5/22/2020 15:18 Completed  
   
Criteria Review Care Day: 13 Care Date: 5/22/2020 Level of Care: Step Down Guideline Day 3 Clinical Status ( ) * Hemodynamic stability 5/22/2020 15:18:04 EDT by Willam Sandifer 99. 3   59   23   104/56   91% 60l hi flow   
(X) * Afebrile, or temperature acceptable for next level of care ( ) * Tachypnea absent 5/22/2020 15:18:04 EDT by Willam Sandifer rr 23   
( ) * Hypoxemia absent 5/22/2020 15:18:04 EDT by Maria Guadalupe Diane   
  91% 60l hi flow ( ) * Mental status at baseline ( ) * Antibiotic regimen acceptable for next level of care ( ) * Discharge plans and education understood Activity ( ) * Ambulatory Routes ( ) * Oral hydration, medications, and diet Interventions ( ) * Oxygen absent or at baseline need (X) WBC   
5/22/2020 15:18:04 EDT by Maria Guadalupe Diane   
  WBC: 14.7 (H) * Milestone Additional Notes 5/22/20  inpt stepdown 99.3   59   23   104/56   91% 60l hi flow WBC: 14.7 (H) HGB: 11.9 HCT: 36.2 PLATELET: 562 Anion gap: 3 (L) Glucose: 171 (H) BUN: 21 (H) Creatinine: 0.84 BUN/Creatinine ratio: 25 (H) Calcium: 8.3 (L) Protein, total: 6.0 (L) Albumin: 2.6 (L)  
A-G Ratio: 0.8 (L) ALT (SGPT): 99 (H) AST: 61 (H) LD: 270 (H) Ferritin: 273 C-Reactive protein: <0.29 Meds:  Tylenol 650m gpo q6 prn  albuterol 2 puff q4 prn    asa 81mg po qd  temormin 25mg po qhs   lovenox 40mg  sq qd   breoellipta  1 puff daily   Humalog ss AC &HS  combivent  1 puff q6   cozaar 50mgm po qd     protonix 40mg po qd    mucine 1200mg po bid     bumix 1mg iv qd   lantus 30 units qd sq   NPH sq q12 24 units     solumedrol 30mg iv q6 Pulmonary note IMPRESSION:  
· Acute hypoxemic respiratory failure · COVID19 +.  Started on Caitlin pulse 5/12, had to stop this on 5/16 due to increased O2 requirements. Given actemra 5/15 · Mild intermittent asthma · Chronic back pain · DM  
· HTN  
   
RECOMMENDATIONS:  
· Supplemental O2 as needed to keep sats > 88%. Wean Hi Hakan to keep sats >88%: weaned to [facility] · Continue diuresis.  Strict I&O. Watch creat and bicarb. · Completed course of Doxy · Trend inflammatory markers · C/W Solu-Medrol given lack of improvement and inability to wean O2 thus far; weaned yesterday · Diligent BG control · Continue combivent, breo, singulair, zyrtec, mucinex · Continue vit C, zinc, lipitor · Continue pepcid · Encouraged pt to lie prone at least 4 hours per day and to use inc tommy q 1 hr w/a  
· OOB to chair · Palliative care following  
   
Pt is critically ill and at high risk of decompensation CCT: 30+ minutes  
 Family practice note Assessment/Plan:  
   
Johanne Boles is a 69 y.o. female with history of HTN, type 2DM, GERD, hypothyroidism, depression, asthma, and hyperlipidemia who is admitted for AHRF 2/2 COVID 19.  
   
Overnight events: NAEO  
   
   
AHRF 2/2 COVID 19: COVID+ on 5/4, worsening SOB on admission and new O2 requirement (RA baseline).  POA CXR with opacification in lower lobes b/l L>R, ABG pH 7.41, pCO2 31, pO2 64, O2 sat 93, bicarb 19, LA 1.8, S/p outpatient tx of azithro and plaquenil ending 5/10  
- Follow COVID inflammatory markers  
    - Testing every day - Much improved - Bcx NGTD  
- Doxy 7 day course completed - Pulmonology following, appreciate rec's-   
     -s/p Actemra 5/15. (Quant-gold, HIV, hepatitis panel) Negative      -Bumex 1mg daily      -Solumedrol Q6H  
- Proning - Zinc, Vit C, Lipitor, Mucinex - Zyrtec, Breo-Ellipta, Combivent Q6H, Singulair - Supplemental O2 as needed- on 60L high flow   
    
Sinus Arrhythmia- Intermittent on tele, many PACs, leading to bradycardia. POA EKG with Left anterior fascicular block, Left ventricular hypertrophy.  
- Discontinued beta blocker - Monitor Mag and K, Replete prn  
   
Type 2 DM: HgA1C 10.4 in 11/2019. This admission A1C 13.1. Pt very sensitivity to solumedrol- linked order with NPH.  
- Hold home glipizide - Lantus 34U and Lispro 12U with meals with SSI ACHS- Resistant scale  
- NPH 24 BID  
   
Asthma  
- Continue Singulair 10mg qhs  
   
HTN  
- Continue home BP med losartan 50mg daily  
- HOLDING atenolol 25mg qhs   
   
Hypothyroid: TSH 3.570 in 11/2019  
- Continue home synthroid 100mcg daily  
   
Depression  
- Continue Zoloft 50mg dialy and trazadone 100mg qhs to help with sleep  
   
GERD  
- Will substitute home prilosec for protonix daily  
   
Hypokalemia: RESOLVED. Likely 2/2 diarrhea prior to arrival and diuretics inpatient. K 2.7 on admission.   
- Repletion prn   
   
Hyperlipidemia: Lipid panel , , HDL 59, LDL 89 in 11/2019  
- Lipitor 40mg daily for additional anti-inflammatory benefit  
   
   
Ivelisse Carlton discussed with Dr. Amada William.  
   
Subjective:  
Pt states doing well this am, no acute concerns. No concerns overnight. Denies cp, n, v, c, d.  
  
   
Pneumonia - Care Day 12 (5/21/2020) by Mallory Haynes RN  
 
   
Review Entered Review Status 5/21/2020 15:33 Completed  
   
Criteria Review Care Day: 12 Care Date: 5/21/2020 Level of Care: Step Down Guideline Day 3 Clinical Status ( ) * Hemodynamic stability 5/21/2020 15:33:59 EDT by Karen Barillas   
  99      59   20   93/45   93% 60l hi flow   
(X) * Afebrile, or temperature acceptable for next level of care ( ) * Tachypnea absent 5/21/2020 15:33:59 EDT by Karen Barillas   
  RR 20   
( ) * Hypoxemia absent 5/21/2020 15:33:59 EDT by Karen Barillas   
  93% 60l hi flow ( ) * Mental status at baseline ( ) * Antibiotic regimen acceptable for next level of care ( ) * Discharge plans and education understood Activity ( ) * Ambulatory Routes ( ) * Oral hydration, medications, and diet 5/21/2020 15:33:59 EDT by Callie Qureshi see below Interventions ( ) * Oxygen absent or at baseline need 5/21/2020 15:33:59 EDT by Mick Pierce hi flow (X) WBC   
5/21/2020 15:33:59 EDT by Karen Barillas   
  WBC: 18.3 (H) Medications ( ) Oral antibiotics 5/21/2020 15:33:59 EDT by Karen Barillas   
  vibramycin 100mm g iv q12 * Milestone Additional Notes 5/21/20 inpt stepdown 99      59   20   93/45   93% 60l hi flow WBC: 18.3 (H) HGB: 11.8 HCT: 35.1 PLATELET: 887 D-dimer: 0.91 (H) Sodium: 134 (L) Anion gap: 4 (L) Glucose: 255 (H) BUN: 30 (H) Creatinine: 0.91  
BUN/Creatinine ratio: 33 (H) Calcium: 8.4 (L) Protein, total: 6.2 (L) Albumin: 2.5 (L) A-G Ratio: 0.7 (L) ALT (SGPT): 87 (H) AST: 49 (H) LD: 304 (H) Procalcitonin: 0.10 Ferritin: 288 (H) C-Reactive protein: 0.55 Meds:  Tylenol 650m gpo q6 prn  albuterol 2 puff q4 prn    asa 81mg po qd  temormin 25mg po qhs   vibramycin 100mm g iv q12  lovenox 40mg  sq qd   breoellipta  1 puff daily   Humalog ss AC &HS  combivent  1 puff q6   cozaar 50mgm po qd     protonix 40mg po qd    mucine 1200mg po bid     bumix 1mg iv qd   lantus 30 units qd sq   NPH sq q12 24 units     solumedrol 30mg iv q6     
  
  
 Family practice note Assessment/Plan:  
Josie Gautam is a 69 y.o. female with history of HTN, type 2DM, GERD, hypothyroidism, depression, asthma, and hyperlipidemia who is admitted for AHRF 2/2 COVID 19. Overnight events: Tele with occasional PACs. CXR improved  
  AHRF 2/2 COVID 19: COVID+ on 5/4, worsening SOB on admission and new O2 requirement (RA baseline). POA CXR with opacification in lower lobes b/l L>R, ABG pH 7.41, pCO2 31, pO2 64, O2 sat 93, bicarb 19, LA 1.8, S/p outpatient tx of azithro and plaquenil ending 5/10  
- Follow COVID inflammatory markers  
    - Testing every day - Much improved - Bcx NGTD  
- Doxy 7 day course completed - Pulmonology following, appreciate rec's-   
     -s/p Actemra. Quant-gold, HIV, hepatitis panel NEGATIVE. - Proning - Zinc, Vit C, Lipitor, Mucinex - Zyrtec, Breo-Ellipta, Combivent Q6H, Singulair - Supplemental O2 as needed- on 60L high flow   
  Sinus Arrhythmia- Intermittent on tele, many PACs, leading to bradycardia. POA EKG with Left anterior fascicular block, Left ventricular hypertrophy.  
- Discontinued beta blocker - Monitor Mag and K, Replete prn  
 Type 2 DM: HgA1C 10.4 in 11/2019. This admission A1C 13.1. Decreased PO intake. - Hold home glipizide - Lantus 34U and Lispro 12U with meals with SSI ACHS- Resistant scale  
- NPH 28 BID  
 Asthma  
- Continue Singulair 10mg qhs  
 HTN  
- Continue home BP med losartan 50mg daily  
- HOLDING atenolol 25mg qhs   
 Hypothyroid: TSH 3.570 in 11/2019  
- Continue home synthroid 100mcg daily  
 Depression  
- Continue Zoloft 50mg dialy and trazadone 100mg qhs to help with sleep  
 GERD  
- Will substitute home prilosec for protonix daily  
 Hypokalemia: RESOLVED. Likely 2/2 diarrhea prior to arrival and diuretics inpatient. K 2.7 on admission.   
- Repletion prn   
 Hyperlipidemia: Lipid panel , , HDL 59, LDL 89 in 11/2019 - Lipitor 40mg daily for additional anti-inflammatory benefit  
Adelfo Lewis discussed with Dr. Marbieth Chan.  
 Subjective:  
Pt states doing well this am but still tired. Feels hungry and has been eating well. No concerns overnight. Denies cp, n, v, c, d. Pulmonary note IMPRESSION:  
· Acute hypoxemic respiratory failure · COVID19 +.  Started on Caitlin pulse 5/12, had to stop this on 5/16 due to increased O2 requirements. Given actemra 5/15 · Mild intermittent asthma · Chronic back pain · DM  
· HTN  
   
RECOMMENDATIONS:  
· Supplemental O2 as needed to keep sats > 88%. Wean Hi Hakan to keep sats >88%; BiPap PRN.    
· Continue diuresis.  Strict I&O. Watch creat and bicarb. · Completed course of Doxy · Trend inflammatory markers · C/W Solu-Medrol given lack of improvement and inability to wean O2 thus far: weaned slightly · Diligent BG control · Continue combivent, breo, singulair, zyrtec, mucinex · Continue vit C, zinc, lipitor · Continue pepcid · Encouraged pt to lie prone at least 4 hours per day and to use inc tommy q 1 hr w/a  
· OOB to chair · Palliative care following  
   
  
  
   
Pneumonia - Care Day 11 (5/20/2020) by Javad Scott RN  
 
   
Review Entered Review Status 5/21/2020 15:24 Completed  
   
Criteria Review Care Day: 11 Care Date: 5/20/2020 Level of Care: Step Down Guideline Day 2 Clinical Status ( ) * No CO2 retention or acidosis   
(X) * No requirement for mechanical ventilation 5/21/2020 15:24:45 EDT by Billie Parrish   
  92% 60l hi flow   
(X) * Hypotension absent   
(X) * Fever absent or reduced ( ) * No hypoxia on room air or oxygenation improved 5/21/2020 15:24:45 EDT by Billie Parrish   
  92% 60l hi flow ( ) * Mental status improved or at baseline Activity ( ) * Increased activity Routes   
(X) Oral hydration, medications 5/21/2020 15:24:45 EDT by Darline Garcia see below (X) Usual diet Interventions (X) Incentive spirometry (X) Pulse oximetry   
(X) Head of bed at 30 degrees (X) Possible oxygen Medications (X) IV or oral antibiotics 5/21/2020 15:24:45 EDT by Sam Hawkins   
  vibramycin 100mm g iv q12 * Milestone Additional Notes 5/20/20 inpt stepdown 99.8    51   218   97/50   92% 60l hi flow WBC: 18.4 (H) HGB: 11.4 (L) HCT: 34.5 (L) PLATELET: 793 D-dimer: 1.11 (H) Glucose: 201 (H) BUN: 16  
Creatinine: 0.74 BUN/Creatinine ratio: 22 (H) Protein, total: 5.8 (L) Albumin: 2.4 (L)  
A-G Ratio: 0.7 (L) AST: 41 (H) Meds:  Tylenol 650m gpo q6 prn  albuterol 2 puff q4 prn    asa 81mg po qd  temormin 25mg po qhs   vibramycin 100mm g iv q12  lovenox 40mg  sq qd   breoellipta  1 puff daily   Humalog ss AC &HS  combivent  1 puff q6   cozaar 50mgm po qd     protonix 40mg po qd    mucine 1200mg po bid     bumix 1mg iv qd   lantus 30 units qd sq   NPH sq q12 24 units     solumedrol 30mg iv q6     
  
Family practice note Assessment/Plan:  
   
Vika Murry is a 69 y.o. female with history of HTN, type 2DM, GERD, hypothyroidism, depression, asthma, and hyperlipidemia who is admitted for AHRF 2/2 COVID 19.  
   
Overnight events: Tele with intermittent sinus arrhythmias, concerning for Mobitz II.  
   
   
AHRF 2/2 COVID 19: COVID+ on 5/4, worsening SOB on admission and new O2 requirement (RA baseline). POA CXR with opacification in lower lobes b/l L>R, ABG pH 7.41, pCO2 31, pO2 64, O2 sat 93, bicarb 19, LA 1.8, S/p outpatient tx of azithro and plaquenil ending 5/10  
- Follow COVID inflammatory markers  
    - Testing every day - Currently flat/improved over last several days - Bcx NGTD  
- Doxy 7 day course completed - Pulmonology following, appreciate rec's-   
     -s/p Actemra. Quant-gold, HIV, hepatitis panel NEGATIVE. - Proning - Zinc, Vit C, Lipitor, Mucinex - Zyrtec, Breo-Ellipta, Combivent Q6H, Singulair - Supplemental O2 as needed- If continues to desat on 40L high flow, may need mechanical ventilation   
    
Sinus Arrhythmia- Intermittent on tele, many PACs. POA EKG with Left anterior fascicular block, Left ventricular hypertrophy.  
- Discontinue beta blocker - Monitor Mag and K, Replete prn - Attempt to capture active arrhythmia on EKG  
   
Type 2 DM: HgA1C 10.4 in 11/2019. This admission A1C 13.1. Decreased PO intake. - Hold home glipizide - Lantus 30U and Lispro 8U with meals with SSI ACHS- Resistant scale  
- NPH 28 BID  
   
Asthma  
- Continue home Ventolin, patient has brought in her home inhaler - Continue Singulair 10mg qhs  
   
HTN  
- Continue home BP med losartan 50mg daily  
- HOLDING atenolol 25mg qhs   
   
Hypothyroid: TSH 3.570 in 11/2019  
- Continue home synthroid 100mcg daily  
   
Depression  
- Continue Zoloft 50mg dialy and trazadone 100mg qhs to help with sleep  
   
GERD  
- Will substitute home prilosec for protonix daily  
   
Hypokalemia: RESOLVED. Likely 2/2 diarrhea prior to arrival and diuretics inpatient. K 2.7 on admission.   
- Repletion prn   
   
Hyperlipidemia: Lipid panel , , HDL 59, LDL 89 in 11/2019  
- Lipitor 40mg daily for additional anti-inflammatory benefit  
   
   
Patricia Dejesus discussed with Dr. Tan Fairbanks.  
   
Subjective:  
Pt taking a break from BiPAP this am, tolerating room air. No concerns overnight. Denies cp, n, v, c, d. Pulmonary note IMPRESSION:  
· Acute hypoxemic respiratory failure · COVID19 +.  Started on Caitlin pulse 5/12, had to stop this on 5/16 due to increased O2 requirements. Given actemra 5/15 · Mild intermittent asthma · Chronic back pain · DM  
· HTN  
   
RECOMMENDATIONS:  
· Supplemental O2 as needed to keep sats > 88%. Wean Hi Hakan to keep sats >88%; BiPap PRN. Dayana Abernathy to 50L on HFNC his AM. · Continue diuresis.  Strict I&O. Watch creat and bicarb. · Completed course of Doxy · Trend inflammatory markers; check tomororw · Check CXR tomorrow AM  
· C/W Solu-Medrol given lack of improvement and inability to wean O2 thus far · Diligent BG control · Continue combivent, breo, singulair, zyrtec, mucinex · Continue vit C, zinc, lipitor · Continue pepcid · Encouraged pt to lie prone at least 4 hours per day and to use inc tommy q 1 hr w/a  
· OOB to chair · Palliative care following  
   
  
  
   
Pneumonia - Care Day 10 (5/19/2020) by Polly Alfaro RN  
 
   
Review Entered Review Status 5/19/2020 14:16 Completed  
   
Criteria Review Care Day: 10 Care Date: 5/19/2020 Level of Care: Step Down Guideline Day 2 Clinical Status ( ) * No CO2 retention or acidosis   
(X) * No requirement for mechanical ventilation 5/19/2020 14:16:05 EDT by Uday Garcia   
  84% on 60lhi flow   placed on bipap  90%   
(X) * Hypotension absent   
(X) * Fever absent or reduced ( ) * No hypoxia on room air or oxygenation improved ( ) * Mental status improved or at baseline Activity ( ) * Increased activity 5/19/2020 14:16:05 EDT by Uday Garcia   
  bed rest   
Routes   
(X) Oral hydration, medications   
(X) Usual diet Interventions (X) Incentive spirometry (X) Pulse oximetry   
(X) Head of bed at 30 degrees (X) Possible oxygen 5/19/2020 14:16:05 EDT by Uday Garcia   
  84% on 60lhi flow   placed on bipap  90% Medications (X) IV or oral antibiotics 5/19/2020 14:16:05 EDT by Uday Garcia   
  vibramycin 100mm g iv q12 * Milestone Additional Notes 5/19/20  inpt Phineas Gist 98.6  63   25   130/71   84% on 60lhi flow   placed on bipap  90% ABG's   
5/19/2020 10:22  
pH: 7.45 PCO2: 37  
PO2: 84 BICARBONATE: 25  
O2 SAT: 97 BASE EXCESS: 1.4 Sample source: ARTERIAL  
SITE: RIGHT RADIAL ANGÉLICA'S TEST: YES  
FIO2: 100 IPAP/PIP: 18  
EPAP/CPAP/PEEP: 10  
O2 METHOD: BIPAP  
SET RATE: 4  
WBC: 10.6 HGB: 11.1 (L) HCT: 33.2 (L) PLATELET: 378 Sodium: 135 (L) Glucose: 429 (H) BUN: 19 Creatinine: 0.85 BUN/Creatinine ratio: 22 (H) Calcium: 8.4 (L)  
GFR est non-AA: >60  
GFR est AA: >60 Protein, total: 5.9 (L) Albumin: 2.2 (L)  
A-G Ratio: 0.6 (L) ALT (SGPT): 98 (H) AST: 68 (H) LD: 416 (H) C-Reactive protein: 1.74 (H) Chest xray  IMPRESSION: Continued evidence of bilateral infiltration. Meds:  Tylenol 650m gpo q6 prn  albuterol 2 puff q4 prn    asa 81mg po qd  temormin 25mg po qhs   vibramycin 100mm g iv q12  lovenox 40mg  sq qd   breoellipta  1 puff daily   Humalog ss AC &HS  combivent  1 puff q6   cozaar 50mgm po qd     protonix 40mg po qd    mucine 1200mg po bid     bumix 1mg iv qd   lantus 30 units qd sq   NPH sq q12 23 units     solumedrol 40mg iv q6     
  
Pulmonary note IMPRESSION:  
· Acute hypoxemic respiratory failure · COVID19 +.  Started on Caitlin pulse 5/12, had to stop this on 5/16 due to increased O2 requirements. Given actemra 5/15 · Mild intermittent asthma · Chronic back pain · DM  
· HTN  
   
RECOMMENDATIONS:  
· Supplemental O2 as needed to keep sats > 88%. Will place on BiPap now and wean as sats improve. · ABG · Continue diuresis.  Strict I&O · Continue doxy: can stop after today's dose · Trend inflammatory markers · Check CXR  
· C/W Solu-Medrol given lack of improvement and inability to wean O2 thus far · Diligent BG control · Continue combivent, breo, singulair, zyrtec, mucinex · Continue vit C, zinc, lipitor · Continue pepcid · Encouraged pt to lie prone at least 4 hours per day and to use inc tommy q 1 hr w/a  
· OOB to chair  
   
Pt is critically ill and at high risk of decompensation CCT: 30+ minutes  
   
  
Family practice note Assessment/Plan:  
   
Félix Middleton is a 69 y.o. female with history of HTN, type 2DM, GERD, hypothyroidism, depression, asthma, and hyperlipidemia who is admitted for AHRF 2/2 COVID 23.  
   
 Overnight events: Steroid induced hyperglycemia to 493. Treated with NPH  
   
   
AHRF 2/2 COVID 19: COVID+ on 5/4, worsening SOB on admission and new O2 requirement (RA baseline). POA CXR with opacification in lower lobes b/l L>R, ABG pH 7.41, pCO2 31, pO2 64, O2 sat 93, bicarb 19, LA 1.8, S/p outpatient tx of azithro and plaquenil ending 5/10  
- Follow COVID inflammatory markers  
    - Testing every other day - Currently flat/improved over last several days  
    - Repeat CXR today - Bcx NGTD  
- Doxy for empiric coverage (Started 5/12). No azithro or levaquin due to prolonged qtc. - Pulmonology following, appreciate rec's-   
     -s/p Actemra. Quant-gold, HIV, hepatitis panel NEGATIVE. - Proning, pt endorses compliance - Zinc, Vit C, Lipitor, Mucinex - Zyrtec, Breo-Ellipta, Combivent Q6H  
- Supplemental O2 as needed- If continues to desat on 40L high flow, may need mechanical ventilation   
    
Type 2 DM: HgA1C 10.4 in 11/2019. This admission A1C 13.1. Decreased PO intake. - Hold home glipizide - Lantus 30U and SSI ACHS- Resistant scale  
- NPH 23 BID currently, will follow glucose this am and adjust accordingly.  
   
Asthma  
- Continue home Ventolin, patient has brought in her home inhaler - Continue Singulair 10mg qhs  
   
HTN  
- Continue home BP meds of atenolol 25mg qhs, losartan 50mg daily  
   
Hypothyroid: TSH 3.570 in 11/2019  
- Continue home synthroid 100mcg daily  
   
Depression  
- Continue Zoloft 50mg dialy and trazadone 100mg qhs to help with sleep  
   
GERD  
- Will substitute home prilosec for protonix daily  
   
Hypokalemia: RESOLVED.  Likely 2/2 diarrhea prior to arrival and diuretics inpatient. K 2.7 on admission.   
- Repletion prn   
   
Hyperlipidemia: Lipid panel , , HDL 59, LDL 89 in 11/2019  
- Lipitor 40mg daily for additional anti-inflammatory benefit

## 2020-05-27 NOTE — PROGRESS NOTES
Bedside and Verbal shift change report given to Coral Lombardo (oncoming nurse) by Cem Cervantes (offgoing nurse).  Report included the following information SBAR, Kardex, Intake/Output, MAR, Recent Results and Cardiac Rhythm NSR.     1800 April Carlton ESQUIVEL assuming care of pt    1830 update given to Aftab Matias [phone] via phone

## 2020-05-27 NOTE — PROGRESS NOTES
1850 Bedside and Verbal shift change report given to Vania Walter RN (oncoming nurse) by Marilee Nageotte RN (offgoing nurse). Report included the following information SBAR, Kardex and Cardiac Rhythm NSR.     1806 patient sitting in chair, eating dinner no complaints of.     1913 Bedside and Verbal shift change report given to Xavi Cruz (oncoming nurse) by Vania Vincent RN (offgoing nurse). Report included the following information SBAR and Cardiac Rhythm NSR.

## 2020-05-27 NOTE — PROGRESS NOTES
Problem: Mobility Impaired (Adult and Pediatric)  Goal: *Acute Goals and Plan of Care (Insert Text)  Description: FUNCTIONAL STATUS PRIOR TO ADMISSION: Patient was independent and active without use of DME.    HOME SUPPORT PRIOR TO ADMISSION: The patient lived with  but did not require assist.    Physical Therapy Goals  Initiated 5/18/2020  1. Patient will move from supine to sit and sit to supine  in bed with independence within 7 day(s). 2.  Patient will transfer from bed to chair and chair to bed with independence using the least restrictive device within 7 day(s). 3.  Patient will perform sit to stand with independence within 7 day(s). 4.  Patient will ambulate with independence for 150 feet with the least restrictive device within 7 day(s). 5.  Patient will ascend/descend 4 stairs with 1 handrail(s) with modified independence within 7 day(s). Outcome: Progressing Towards Goal  Note:   PHYSICAL THERAPY TREATMENT  Patient: Ryan Brown (89 y.o. female)  Date: 5/27/2020  Diagnosis: Respiratory failure with hypoxia ([facility] [address] [J96.91]   COVID-19       Precautions: Fall, Contact(Droplet plus)  Chart, physical therapy assessment, plan of care and goals were reviewed. ASSESSMENT  Patient continues with skilled PT services and is progressing towards goals. Patient now tolerating 6L NC today. She was able to increase her ambulation distance to 60 feet and she was able to perform this 4x which is increased from 3x yesterday so increased endurance noted. Lowest saturations on 6L was 88% but she quickly recovered to 91% within 1 min with rest and pursed lip breathing. Sats mostly 90-93% with activity. She continues to progress, demonstrating improved endurance, and requiring less oxygen requirement. .     Current Level of Function Impacting Discharge (mobility/balance): MOD I-Supervision, 60 feet    Other factors to consider for discharge:          PLAN :  Patient continues to benefit from skilled intervention to address the above impairments. Continue treatment per established plan of care. to address goals. Recommendation for discharge: (in order for the patient to meet his/her long term goals)  Physical therapy at least 2 days/week in the home     This discharge recommendation:  A follow-up discussion with the attending provider and/or case management is planned    IF patient discharges home will need the following DME: none       SUBJECTIVE:   Patient stated .    OBJECTIVE DATA SUMMARY:   Critical Behavior:  Neurologic State: Alert  Orientation Level: Oriented X4  Cognition: Appropriate decision making, Appropriate for age attention/concentration, Appropriate safety awareness, Follows commands  Safety/Judgement: Awareness of environment, Driving appropriateness, Fall prevention, Good awareness of safety precautions, Home safety, Insight into deficits  Functional Mobility Training:  Bed Mobility:                    Transfers:  Sit to Stand: Modified independent  Stand to Sit: Modified independent        Bed to Chair: Modified independent                    Balance:     Ambulation/Gait Training:  Distance (ft): 60 Feet (ft)  Assistive Device: Gait belt  Ambulation - Level of Assistance: Supervision                          Pain Rating:  None reported    Activity Tolerance:   Good  Please refer to the flowsheet for vital signs taken during this treatment. After treatment patient left in no apparent distress:   Sitting in chair and Call bell within reach    COMMUNICATION/COLLABORATION:   The patients plan of care was discussed with: Registered nurse.      Estevan Webb PT, DPT   Time Calculation: 35 mins

## 2020-05-27 NOTE — PROGRESS NOTES
Pulse Nitric changed at 1630.   Decreased to 5 lpm. Sating 93-94% HR-78  BS- Diminished Bilaterally  S/N [id number]  Cannula lot# [id number]  Cartridge indentifier [id number]  Cartridge lot # [id number]

## 2020-05-27 NOTE — PROGRESS NOTES
[facility]  Pulmonary, Critical Care, and Sleep Medicine      Name: Valery Patten MRN: [id number]   : 1945 Hospital: [address] [facility]   Date: 2020        IMPRESSION:   · Acute hypoxemic respiratory failure  · COVID19 +. Started on Caitlin pulse , had to stop this on  due to increased O2 requirements. Given actemra 5/15  · Mild intermittent asthma  · Chronic back pain  · DM  · HTN      RECOMMENDATIONS:   · Supplemental O2 as needed to keep sats > 88%. Wean midflow to keep sats >88%  · Continue Caitlin. Discussed with RT and Dr. Winthrop Cabot. Follow daily carboxy-Hgb and methemoglobin. · Continue diuresis. Strict I&O. Watch creat and bicarb. · Completed course of Doxy  · Trending inflammatory markers - all improved  · Weaning Solu-Medrol   · K repleted  · Diligent BG control  · Continue combivent, breo, singulair, zyrtec, mucinex   · Continue vit C, zinc, lipitor  · Continue pepcid  · Encouraged pt to lie prone at least 4 hours per day and to use inc tommy q 1 hr w/a  · OOB to chair  · PT  · Palliative care following       Subjective: This patient has been seen and evaluated at the request of Dr. Jaswant Christine for [place] 19 pneumonia and resp failure. Patient is a 69 y.o. female diagnosed one week ago with Covid 19. Pt with fevers, diarrhea, cough, and sob. She has been isolating at home but has had work done within the home and believes that she may have gotten an infection from one of the workers there.  has also become infected. Pt states that her symptoms have remained stable to slightly improved over the past few days but has noted that her oxygen sats have been falling and presented to ED for evaluation. Fevers seem to have resolved. Main symptoms are now diarrhea, sob, and cough. Pt w h/o asthma which by description appears to be mild intermittent and is followed by Dr Luis Lugo in [place]. Controlled with montelukast and (rare) albuterol MDI.     Never smoker      Interval history:  Sats 98% on 9L; I weaned down to 7L, then 6L with sats remaining 94-96%  Tmax 99.6  Na 132  K 3.4  CRP, d-dimer, ferritin all improved - WNL  CXR improved      ROS: Reports continued improvement in dyspnea/SOB. Denies fever or chills. Minimal cough. Still with expected weakness/fatigue. Denies CP. Denies LE pain/swelling. Past Medical History:   Diagnosis Date    Anxiety     Arthritis     Asthma     Chronic pain     back,right hip    Depression     Diabetes ([facility] [address]     type 2    GERD (gastroesophageal reflux disease)     History of vascular access device 05/18/2020    4 Fr midline, 12 cm L basilic, blood draws    Hypercholesteremia     Hypertension     Hypothyroid     Nausea & vomiting     Reflux     Sleep apnea     wears dental appliance- Somnident    Thyroid disease     Unspecified adverse effect of anesthesia     delayed awakening      Past Surgical History:   Procedure Laterality Date    HX COLONOSCOPY  2012    normal repeat 2022, diverticulosis of sigmoid colon, Dr. Olga Rust HX HEENT  1/08/14    bilateral cataract surgery1/8/14    HX KNEE REPLACEMENT      right    HX OTHER SURGICAL  2007    cardiac cath- No CAD found    NEUROLOGICAL PROCEDURE UNLISTED  1999    vascular brain surgery      Prior to Admission medications    Medication Sig Start Date End Date Taking? Authorizing Provider   guaiFENesin ER (Mucinex) 600 mg ER tablet Take 600 mg by mouth two (2) times a day. Yes Provider, Historical   omeprazole (PRILOSEC) 20 mg capsule Take 20 mg by mouth daily. Yes Provider, Historical   calcium-vitamin D (OYSTER SHELL) 500 mg(1,250mg) -200 unit per tablet Take 1 Tab by mouth daily.    Yes Provider, Historical   traZODone (DESYREL) 100 mg tablet TAKE 1 TABLET BY MOUTH EVERY DAY AT NIGHT 3/25/20  Yes Nancy Bender NP   glipiZIDE SR (GLUCOTROL XL) 10 mg CR tablet TAKE 1 TABLET BY MOUTH TWICE A DAY 2/15/20  Yes Nancy Bender NP   simvastatin (ZOCOR) 10 mg tablet Take 1 Tab by mouth nightly. 10/8/19  Yes Libra Bender NP   losartan (COZAAR) 50 mg tablet TAKE 1 TABLET BY MOUTH EVERY DAY. 10/8/19  Yes Libra Bender NP   levothyroxine (SYNTHROID) 100 mcg tablet Take 1 Tab by mouth Daily (before breakfast). 10/8/19  Yes Libra Bender NP   atenolol (TENORMIN) 25 mg tablet Take 1 Tab by mouth nightly. 10/8/19  Yes Libra Bender NP   sertraline (ZOLOFT) 50 mg tablet TAKE 1 TABLET BY MOUTH EVERY DAY 9/4/19  Yes Libra Bender NP   aspirin delayed-release 81 mg tablet Take 81 mg by mouth daily. Yes Provider, Historical   fluticasone (FLONASE) 50 mcg/actuation nasal spray 2 Sprays by Both Nostrils route daily as needed for Rhinitis or Allergies. Yes Provider, Historical   albuterol (PROAIR HFA) 90 mcg/actuation inhaler Take 2 Puffs by inhalation every four (4) hours as needed for Wheezing. Yes Provider, Historical   Cetirizine (ZYRTEC) 10 mg cap Take 1 Tab by mouth every evening. Yes Provider, Historical   multivitamins-minerals-lutein (CENTRUM SILVER) tab Take 1 Tab by mouth daily. Yes Provider, Historical   glucosamine-chondroit-vit c-mn (GLUCOSAMINE CHONDROITIN MAXSTR) 500-400 mg capsule Take 1 Cap by mouth daily. Yes Other, MD [place]   varicella-zoster recombinant, PF, (SHINGRIX, PF,) 50 mcg/0.5 mL susr injection 0.5mL by IntraMUSCular route once now and then repeat in 2-6 months 10/8/19   Nata Echevarria NP     Allergies   Allergen Reactions    Latex Rash    Prednisone Anaphylaxis     Throat swelling. Can take methylpredisone po or IV without problems.     Augmentin [Amoxicillin-Pot Clavulanate] Nausea and Vomiting    Biaxin [Clarithromycin] Nausea and Vomiting    Metformin Diarrhea    Parafon Forte Dsc [Chlorzoxazone] Nausea and Vomiting      Social History     Tobacco Use    Smoking status: Never Smoker    Smokeless tobacco: Never Used   Substance Use Topics    Alcohol use: No     Frequency: Never Comment: 2x year      Family History   Problem Relation Age of Onset    Elevated Lipids Mother     Dementia Mother     Osteoporosis Mother     Lung Disease Father         copd    Delayed Awakening Father     Post-op Nausea/Vomiting Father     Hypertension Sister     Diabetes Sister         type 2    Breast Cancer Maternal Aunt         Current Facility-Administered Medications   Medication Dose Route Frequency    insulin glargine (LANTUS) injection 34 Units  34 Units SubCUTAneous DAILY    insulin lispro (HUMALOG) injection 8 Units  8 Units SubCUTAneous TIDAC    insulin NPH (NOVOLIN N, HUMULIN N) injection 14 Units  14 Units SubCUTAneous Q12H    And    methylPREDNISolone (PF) (SOLU-MEDROL) injection 30 mg  30 mg IntraVENous Q12H    insulin lispro (HUMALOG) injection   SubCUTAneous AC&HS    guaiFENesin ER (MUCINEX) tablet 1,200 mg  1,200 mg Oral BID    famotidine (PEPCID) tablet 20 mg  20 mg Oral BID    atorvastatin (LIPITOR) tablet 40 mg  40 mg Oral DAILY    zinc sulfate (ZINCATE) 220 (50) mg capsule 1 Cap  1 Cap Oral DAILY    sodium chloride (NS) flush 5-40 mL  5-40 mL IntraVENous Q8H    enoxaparin (LOVENOX) injection 40 mg  40 mg SubCUTAneous Q24H    ascorbic acid (vitamin C) (VITAMIN C) tablet 500 mg  500 mg Oral BID    aspirin delayed-release tablet 81 mg  81 mg Oral DAILY    calcium-vitamin D (OS-TRISTEN) 500 mg-200 unit tablet  1 Tab Oral DAILY    cetirizine (ZYRTEC) tablet 10 mg  10 mg Oral DAILY    therapeutic multivitamin (THERAGRAN) tablet 1 Tab  1 Tab Oral DAILY    levothyroxine (SYNTHROID) tablet 100 mcg  100 mcg Oral ACB    losartan (COZAAR) tablet 50 mg  50 mg Oral DAILY    montelukast (SINGULAIR) tablet 10 mg  10 mg Oral QHS    sertraline (ZOLOFT) tablet 50 mg  50 mg Oral DAILY    traZODone (DESYREL) tablet 100 mg  100 mg Oral QHS    ipratropium-albuterol (COMBIVENT RESPIMAT) 20 mcg-100 mcg inhalation spray  1 Puff Inhalation Q6H RT    fluticasone-vilanterol (BREO ELLIPTA) 200mcg-25mcg/puff  1 Puff Inhalation DAILY    pantoprazole (PROTONIX) tablet 40 mg  40 mg Oral ACB         Objective:   Vital Signs:    Visit Vitals  /59 (BP 1 Location: Right arm, BP Patient Position: At rest)   Pulse 74   Temp 98.8 °F (37.1 °C)   Resp 16   Ht 5' 4\" (1.626 m)   Wt 87.7 kg (193 lb 4.8 oz)   SpO2 98%   BMI 33.18 kg/m²       O2 Device: (midflow)   O2 Flow Rate (L/min): 9 l/min   Temp (24hrs), Av.7 °F (37.1 °C), Min:96.8 °F (36 °C), Max:99.6 °F (37.6 °C)       Intake/Output:   Last shift:       07 - 1900  In: 120 [P.O.:120]  Out: 50 [Urine:50]  Last 3 shifts: 1901 -  0700  In: 240 [P.O.:240]  Out: 2290 [Urine:2290]    Intake/Output Summary (Last 24 hours) at 2020 1005  Last data filed at 2020 0791  Gross per 24 hour   Intake 360 ml   Output 510 ml   Net -150 ml      Physical Exam:   General:  Alert, cooperative, no acute distress, appears stated age. Head:  Normocephalic, without obvious abnormality, atraumatic. Eyes:  Conjunctivae/corneas clear. EOMs intact. Nose: Nares normal. Septum midline. Mucosa normal. No drainage or sinus tenderness. Throat: Lips, mucosa, and tongue normal. Teeth and gums normal.   Neck: Supple, symmetrical, trachea midline, no adenopathy. Lungs:   Clear anteriorly, trace basilar crackles posterior, mildly diminished   Chest wall:  No tenderness or deformity. Heart:  Regular rate and rhythm, S1, S2 normal, no murmur, click, rub or gallop. Abdomen:   Soft, non-tender. Bowel sounds normal. No masses,  No organomegaly. Extremities: Extremities normal, atraumatic, no cyanosis or edema. Pulses: 2+ and symmetric all extremities.    Skin: Skin color, texture, turgor normal. No rashes or lesions   Lymph nodes: Cervical, supraclavicular nodes normal.   Neurologic: Grossly nonfocal         Data review:     Recent Results (from the past 24 hour(s))   GLUCOSE, POC    Collection Time: 20 11:40 AM   Result Value Ref Range Glucose (POC) 176 (H) 65 - 100 mg/dL    Performed by Lucille Saavedra (PCT)    CARBOXY HEMOGLOBIN    Collection Time: 05/26/20 12:16 PM   Result Value Ref Range    Carboxy-Hgb 1.3 1 - 2 %    Methemoglobin 0.5 0 - 1.4 %    tHb 13.6 (L) 14 - 17 g/dL    Oxyhemoglobin 73 (LL) 94 - 97 %    O2 SATURATION 74 (L) 95 - 99 %   CARBOXY HEMOGLOBIN    Collection Time: 05/26/20  2:29 PM   Result Value Ref Range    Carboxy-Hgb 1.5 1 - 2 %    Methemoglobin 0.7 0 - 1.4 %    tHb 14 14 - 17 g/dL    Oxyhemoglobin 64 (LL) 94 - 97 %    O2 SATURATION 65 (L) 95 - 99 %   GLUCOSE, POC    Collection Time: 05/26/20  4:15 PM   Result Value Ref Range    Glucose (POC) 398 (H) 65 - 100 mg/dL    Performed by Lucille Saavedra (PCT)    GLUCOSE, POC    Collection Time: 05/26/20  9:35 PM   Result Value Ref Range    Glucose (POC) 87 65 - 100 mg/dL    Performed by Ewing Closs    GLUCOSE, POC    Collection Time: 05/27/20  1:36 AM   Result Value Ref Range    Glucose (POC) 131 (H) 65 - 100 mg/dL    Performed by Aldair Riddle    NT-PRO BNP    Collection Time: 05/27/20  1:44 AM   Result Value Ref Range    NT pro-BNP 57 <125 PG/ML   CBC WITH AUTOMATED DIFF    Collection Time: 05/27/20  1:44 AM   Result Value Ref Range    WBC 15.2 (H) 3.6 - 11.0 K/uL    RBC 4.22 3.80 - 5.20 M/uL    HGB 12.3 11.5 - 16.0 g/dL    HCT 36.6 35.0 - 47.0 %    MCV 86.7 80.0 - 99.0 FL    MCH 29.1 26.0 - 34.0 PG    MCHC 33.6 30.0 - 36.5 g/dL    RDW 13.2 11.5 - 14.5 %    PLATELET 084 547 - 248 K/uL    MPV 11.0 8.9 - 12.9 FL    NRBC 0.0 0  WBC    ABSOLUTE NRBC 0.00 0.00 - 0.01 K/uL    NEUTROPHILS 82 (H) 32 - 75 %    LYMPHOCYTES 13 12 - 49 %    MONOCYTES 5 5 - 13 %    EOSINOPHILS 0 0 - 7 %    BASOPHILS 0 0 - 1 %    IMMATURE GRANULOCYTES 0 %    ABS. NEUTROPHILS 12.4 (H) 1.8 - 8.0 K/UL    ABS. LYMPHOCYTES 2.0 0.8 - 3.5 K/UL    ABS. MONOCYTES 0.8 0.0 - 1.0 K/UL    ABS. EOSINOPHILS 0.0 0.0 - 0.4 K/UL    ABS. BASOPHILS 0.0 0.0 - 0.1 K/UL    ABS. IMM.  GRANS. 0.0 K/UL    DF MANUAL      RBC COMMENTS NORMOCYTIC, NORMOCHROMIC     MAGNESIUM    Collection Time: 05/27/20  1:44 AM   Result Value Ref Range    Magnesium 2.2 1.6 - 2.4 mg/dL   METABOLIC PANEL, COMPREHENSIVE    Collection Time: 05/27/20  1:44 AM   Result Value Ref Range    Sodium 132 (L) 136 - 145 mmol/L    Potassium 3.4 (L) 3.5 - 5.1 mmol/L    Chloride 98 97 - 108 mmol/L    CO2 27 21 - 32 mmol/L    Anion gap 7 5 - 15 mmol/L    Glucose 122 (H) 65 - 100 mg/dL    BUN 33 (H) 6 - 20 MG/DL    Creatinine 0.91 0.55 - 1.02 MG/DL    BUN/Creatinine ratio 36 (H) 12 - 20      GFR est AA >60 >60 ml/min/1.73m2    GFR est non-AA >60 >60 ml/min/1.73m2    Calcium 8.2 (L) 8.5 - 10.1 MG/DL    Bilirubin, total 0.5 0.2 - 1.0 MG/DL    ALT (SGPT) 113 (H) 12 - 78 U/L    AST (SGOT) 62 (H) 15 - 37 U/L    Alk.  phosphatase 84 45 - 117 U/L    Protein, total 6.2 (L) 6.4 - 8.2 g/dL    Albumin 2.9 (L) 3.5 - 5.0 g/dL    Globulin 3.3 2.0 - 4.0 g/dL    A-G Ratio 0.9 (L) 1.1 - 2.2     NT-PRO BNP    Collection Time: 05/27/20  1:44 AM   Result Value Ref Range    NT pro-BNP 56 <125 PG/ML   GLUCOSE, POC    Collection Time: 05/27/20  8:19 AM   Result Value Ref Range    Glucose (POC) 183 (H) 65 - 100 mg/dL    Performed by Yi Benjamin (PCT)        Imaging:  I have personally reviewed the patients radiographs and have reviewed the reports:  CXR 5/18: Stable bilateral infiltrates  CXR 5/19:  No change in bilateral infiltrates  CXR 5/21: Slight improvement in bilateral infiltrates  CXR 5/26: Improved aeration with persistent interstitial prominence        Carmita Hernandez, [address]

## 2020-05-27 NOTE — PROGRESS NOTES
visited Mrs. Shellie Roy for a palliative care follow up on the [facility]. [facility] unit. Mrs. Shellie Roy is on droplet plus precautions and is known to  from previous visits.  called into Mrs. Harris's room and introduced herself and extended support. Mrs. Shellie Roy greeted the  warmly and shared about the progress that she had been making over the past few weeks. She expressed immense gratitude to her medical team noting that they have literally saved her life. She spoke about the prayers, love, and support that so many have given her during this time and is grateful for her family who have been in constant contact throughout this journey. Mrs. Shellie Roy also shared about her juan and the ways in which God has been with her during this time. She thanked the  for checking in on her and is aware of the 's availability.  will follow up as able and/or needed  Jama Infante. Vicente Nunn.      Paging Service: [phone])

## 2020-05-27 NOTE — PROGRESS NOTES
SHIFT CHANGE:  1930 Bedside and Verbal shift change report given to Janessa ESQUIVEL (oncoming nurse) by Dondra Schirmer (offgoing nurse). Report included the following information SBAR, Kardex, MAR and Recent Results. SHIFT SUMMARY:  200  Spoke with family practice about patient's blood sugar being 87 at 2135, and 2200 humalog held. Also NPH insulin held as well, patient was sweaty and grumpy at that time asked for peanut butter and crackers which we provided. Will recheck blood sugar again, after snack and steroid administration. Will call family practice back with results. 0136  Blood glucose recheck 131. END OF SHIFT REPORT:  0730  Bedside and Verbal shift change report given to Salinas Siddiqui (oncoming nurse) by Chicho Novak (offgoing nurse). Report included the following information SBAR, Kardex, MAR and Recent Results.

## 2020-05-27 NOTE — PROGRESS NOTES
Transition of Care  RUR 24%    1- monitor for progress and arrange home health follow up  2-home with family assistance  3- PCP followups as well as specialists  4- coordinate transportation on discharge. 4:43 PM  Monitoring patient's chart and progress, o2 decreasing, working to keep sats over 88%. Pastoral support continues.      Care Management Interventions  PCP Verified by CM: Vinnie Wynn)  Transition of Care Consult (CM Consult): Discharge Planning  Current Support Network: Lives with Spouse  Discharge Location  Discharge Placement: Unable to determine at this time

## 2020-05-27 NOTE — PROGRESS NOTES
1905: Verbal shift change report given to Kelsey Rodriguez RN (oncoming nurse) by Vania WHITE RN (offgoing nurse). Report included the following information SBAR, Kardex, Intake/Output, MAR and Recent Results. 0600: No changes overnight. 0730: Bedside and Verbal shift change report given to Phillip Whittington (oncoming nurse) by Attila Ryan (offgoing nurse). Report included the following information SBAR, Kardex, Intake/Output and Recent Results.

## 2020-05-27 NOTE — DIABETES MGMT
[facility] SPECIALIST   Follow-up Progress Note    Presentation   Balbir Forman a 69 y. o. female admitted due to COVID-19 positive and was having worsening shortness of breath.  Patient was admitted 5/11/20 to ICU and transferred to stepdown today.  PMH is diabetes, hypothyroidism, HTN, neuropathy, LORENZO, obesity, asthma, depression and chronic pain.  Patient remains on isolation precautions due to COVID-19.       Patient has known Type 2 diabetes, diagnosed approximately 20 years ago.  Home medication regime is Glipizide 10 mg twice a day.  A1c 5/11/20: 13.1%     Consulted by Provider for advanced specialty nursing care related to inpatient diabetes management. Hyperglycemia management order set is in place. Subjective   \"I'm okay. \"    Patient remains on isolation precautions due to COVID-19 positive. Called patient's phone in room. Objective   Physical exam    General Alert, oriented. Conversant and cooperative  Vital Signs   Visit Vitals  /46 (BP 1 Location: Right arm, BP Patient Position: Head of bed elevated (Comment degrees))   Pulse 79   Temp 99 °F (37.2 °C)   Resp 14   Ht 5' 4\" (1.626 m)   Wt 87.7 kg (193 lb 4.8 oz)   SpO2 97%   BMI 33.18 kg/m²     Physical Exam deferred due to patient on isolation precautions for COVID-19.      Laboratory  Recent Results (from the past 24 hour(s))   CARBOXY HEMOGLOBIN    Collection Time: 05/26/20 12:16 PM   Result Value Ref Range    Carboxy-Hgb 1.3 1 - 2 %    Methemoglobin 0.5 0 - 1.4 %    tHb 13.6 (L) 14 - 17 g/dL    Oxyhemoglobin 73 (LL) 94 - 97 %    O2 SATURATION 74 (L) 95 - 99 %   CARBOXY HEMOGLOBIN    Collection Time: 05/26/20  2:29 PM   Result Value Ref Range    Carboxy-Hgb 1.5 1 - 2 %    Methemoglobin 0.7 0 - 1.4 %    tHb 14 14 - 17 g/dL    Oxyhemoglobin 64 (LL) 94 - 97 %    O2 SATURATION 65 (L) 95 - 99 %   GLUCOSE, POC    Collection Time: 05/26/20  4:15 PM   Result Value Ref Range    Glucose (POC) 398 (H) 65 - 100 mg/dL    Performed by Alethea Garcia (PCT)    GLUCOSE, POC    Collection Time: 05/26/20  9:35 PM   Result Value Ref Range    Glucose (POC) 87 65 - 100 mg/dL    Performed by Josette Melton    GLUCOSE, POC    Collection Time: 05/27/20  1:36 AM   Result Value Ref Range    Glucose (POC) 131 (H) 65 - 100 mg/dL    Performed by Aldair Riddle    NT-PRO BNP    Collection Time: 05/27/20  1:44 AM   Result Value Ref Range    NT pro-BNP 57 <125 PG/ML   CBC WITH AUTOMATED DIFF    Collection Time: 05/27/20  1:44 AM   Result Value Ref Range    WBC 15.2 (H) 3.6 - 11.0 K/uL    RBC 4.22 3.80 - 5.20 M/uL    HGB 12.3 11.5 - 16.0 g/dL    HCT 36.6 35.0 - 47.0 %    MCV 86.7 80.0 - 99.0 FL    MCH 29.1 26.0 - 34.0 PG    MCHC 33.6 30.0 - 36.5 g/dL    RDW 13.2 11.5 - 14.5 %    PLATELET 279 667 - 059 K/uL    MPV 11.0 8.9 - 12.9 FL    NRBC 0.0 0  WBC    ABSOLUTE NRBC 0.00 0.00 - 0.01 K/uL    NEUTROPHILS 82 (H) 32 - 75 %    LYMPHOCYTES 13 12 - 49 %    MONOCYTES 5 5 - 13 %    EOSINOPHILS 0 0 - 7 %    BASOPHILS 0 0 - 1 %    IMMATURE GRANULOCYTES 0 %    ABS. NEUTROPHILS 12.4 (H) 1.8 - 8.0 K/UL    ABS. LYMPHOCYTES 2.0 0.8 - 3.5 K/UL    ABS. MONOCYTES 0.8 0.0 - 1.0 K/UL    ABS. EOSINOPHILS 0.0 0.0 - 0.4 K/UL    ABS. BASOPHILS 0.0 0.0 - 0.1 K/UL    ABS. IMM.  GRANS. 0.0 K/UL    DF MANUAL      RBC COMMENTS NORMOCYTIC, NORMOCHROMIC     MAGNESIUM    Collection Time: 05/27/20  1:44 AM   Result Value Ref Range    Magnesium 2.2 1.6 - 2.4 mg/dL   METABOLIC PANEL, COMPREHENSIVE    Collection Time: 05/27/20  1:44 AM   Result Value Ref Range    Sodium 132 (L) 136 - 145 mmol/L    Potassium 3.4 (L) 3.5 - 5.1 mmol/L    Chloride 98 97 - 108 mmol/L    CO2 27 21 - 32 mmol/L    Anion gap 7 5 - 15 mmol/L    Glucose 122 (H) 65 - 100 mg/dL    BUN 33 (H) 6 - 20 MG/DL    Creatinine 0.91 0.55 - 1.02 MG/DL    BUN/Creatinine ratio 36 (H) 12 - 20      GFR est AA >60 >60 ml/min/1.73m2    GFR est non-AA >60 >60 ml/min/1.73m2    Calcium 8.2 (L) 8.5 - 10.1 MG/DL    Bilirubin, total 0.5 0.2 - 1.0 MG/DL    ALT (SGPT) 113 (H) 12 - 78 U/L    AST (SGOT) 62 (H) 15 - 37 U/L    Alk. phosphatase 84 45 - 117 U/L    Protein, total 6.2 (L) 6.4 - 8.2 g/dL    Albumin 2.9 (L) 3.5 - 5.0 g/dL    Globulin 3.3 2.0 - 4.0 g/dL    A-G Ratio 0.9 (L) 1.1 - 2.2     NT-PRO BNP    Collection Time: 05/27/20  1:44 AM   Result Value Ref Range    NT pro-BNP 56 <125 PG/ML   GLUCOSE, POC    Collection Time: 05/27/20  8:19 AM   Result Value Ref Range    Glucose (POC) 183 (H) 65 - 100 mg/dL    Performed by Gladys Fitzpatrick (PCT)    GLUCOSE, POC    Collection Time: 05/27/20 11:07 AM   Result Value Ref Range    Glucose (POC) 318 (H) 65 - 100 mg/dL    Performed by Gladys Fitzpatrick (PCT)         Blood glucose pattern        Evaluation   IMUS 89 year old female with known diabetes hasn't achieved inpatient blood glucose target of 140-180mg/dl. Patient started on Solumedrol 40 mg IV every 6 hours 5/18/20.  BG spiked to 493 the evening of 5/18/20 after first 2 doses of solumedrol given.  BG have ranged 87 - 398 over the past 24 hours.      Basal insulin is in use.  Lantus 12 units per day started 5/12/20.  Lantus has required adjusting several times over the few weeks.  Lantus 34 units per day is current order, first dose given this morning 5/27/20.  NPH every 12 hours added 5/19/20 to cover steroids, which has also required adjusting frequently. NPH 14 units every 12 hours, first dose given this morning 5/27/20.         Bolus insulin is in use. Humalog 8 units with meals ordered. Patient is eating meals.  Diabetic CHO consistent diet ordered.      Corrective insulin is in use.  Patient has required 18 units corrective insulin in the past 24 hours.      Inpatient blood glucose management has been impacted by     [x]? ?        Glucocorticoid use    Assessment and Plan   Nursing Diagnosis Risk for unstable blood glucose pattern   Nursing Intervention Domain 9288 Decision-making Support   Nursing Interventions Examined current inpatient diabetes control Explored factors facilitating and impeding inpatient management  Identified self-management practices impeding diabetes control  Explored corrective strategies with patient and responsible inpatient provider   Informed patient of rational for basal bolus insulin strategy while hospitalized       Recommendations     Continue current diabetes medication regime. Will continue to follow as patient may require adjustments.         [x]? ?        Will require adjustments as glucocorticoids are in use      Consider referrals     [x]? ?        Diabetes Self-Management Training through [facility] (Phone [phone] [facility] schedule appointment)       Time Spent     Total time spent with patient: 15 Minutes   I personally reviewed chart, notes, data and current medications in the medical record. I have personally examined and treated the patient at bedside during this period.      Abby Drain, CNS  Access via PerfectServe

## 2020-05-28 LAB
ALBUMIN SERPL-MCNC: 2.8 G/DL (ref 3.5–5)
ALBUMIN/GLOB SERPL: 1 {RATIO} (ref 1.1–2.2)
ALP SERPL-CCNC: 69 U/L (ref 45–117)
ALT SERPL-CCNC: 89 U/L (ref 12–78)
ANION GAP SERPL CALC-SCNC: 8 MMOL/L (ref 5–15)
AST SERPL-CCNC: 39 U/L (ref 15–37)
BASOPHILS # BLD: 0 K/UL (ref 0–0.1)
BASOPHILS NFR BLD: 0 % (ref 0–1)
BILIRUB SERPL-MCNC: 0.5 MG/DL (ref 0.2–1)
BNP SERPL-MCNC: 47 PG/ML
BUN SERPL-MCNC: 29 MG/DL (ref 6–20)
BUN/CREAT SERPL: 35 (ref 12–20)
CALCIUM SERPL-MCNC: 8 MG/DL (ref 8.5–10.1)
CHLORIDE SERPL-SCNC: 99 MMOL/L (ref 97–108)
CO2 SERPL-SCNC: 28 MMOL/L (ref 21–32)
CREAT SERPL-MCNC: 0.84 MG/DL (ref 0.55–1.02)
DIFFERENTIAL METHOD BLD: ABNORMAL
EOSINOPHIL # BLD: 0 K/UL (ref 0–0.4)
EOSINOPHIL NFR BLD: 0 % (ref 0–7)
ERYTHROCYTE [DISTWIDTH] IN BLOOD BY AUTOMATED COUNT: 13.2 % (ref 11.5–14.5)
GLOBULIN SER CALC-MCNC: 2.7 G/DL (ref 2–4)
GLUCOSE BLD STRIP.AUTO-MCNC: 104 MG/DL (ref 65–100)
GLUCOSE BLD STRIP.AUTO-MCNC: 189 MG/DL (ref 65–100)
GLUCOSE BLD STRIP.AUTO-MCNC: 220 MG/DL (ref 65–100)
GLUCOSE BLD STRIP.AUTO-MCNC: 223 MG/DL (ref 65–100)
GLUCOSE SERPL-MCNC: 235 MG/DL (ref 65–100)
HCT VFR BLD AUTO: 33.9 % (ref 35–47)
HGB BLD-MCNC: 11.1 G/DL (ref 11.5–16)
IMM GRANULOCYTES # BLD AUTO: 0.2 K/UL (ref 0–0.04)
IMM GRANULOCYTES NFR BLD AUTO: 2 % (ref 0–0.5)
LYMPHOCYTES # BLD: 1.8 K/UL (ref 0.8–3.5)
LYMPHOCYTES NFR BLD: 15 % (ref 12–49)
MAGNESIUM SERPL-MCNC: 2.4 MG/DL (ref 1.6–2.4)
MCH RBC QN AUTO: 29.4 PG (ref 26–34)
MCHC RBC AUTO-ENTMCNC: 32.7 G/DL (ref 30–36.5)
MCV RBC AUTO: 89.7 FL (ref 80–99)
MONOCYTES # BLD: 0.6 K/UL (ref 0–1)
MONOCYTES NFR BLD: 5 % (ref 5–13)
NEUTS SEG # BLD: 8.9 K/UL (ref 1.8–8)
NEUTS SEG NFR BLD: 78 % (ref 32–75)
NRBC # BLD: 0 K/UL (ref 0–0.01)
NRBC BLD-RTO: 0 PER 100 WBC
PLATELET # BLD AUTO: 194 K/UL (ref 150–400)
PMV BLD AUTO: 11.4 FL (ref 8.9–12.9)
POTASSIUM SERPL-SCNC: 3.9 MMOL/L (ref 3.5–5.1)
PROT SERPL-MCNC: 5.5 G/DL (ref 6.4–8.2)
RBC # BLD AUTO: 3.78 M/UL (ref 3.8–5.2)
SARS-COV-2, COV2: DETECTED
SERVICE CMNT-IMP: ABNORMAL
SODIUM SERPL-SCNC: 135 MMOL/L (ref 136–145)
SPECIMEN SOURCE, FCOV2M: ABNORMAL
WBC # BLD AUTO: 11.4 K/UL (ref 3.6–11)

## 2020-05-28 PROCEDURE — 74011250637 HC RX REV CODE- 250/637: Performed by: INTERNAL MEDICINE

## 2020-05-28 PROCEDURE — 87635 SARS-COV-2 COVID-19 AMP PRB: CPT

## 2020-05-28 PROCEDURE — 85025 COMPLETE CBC W/AUTO DIFF WBC: CPT

## 2020-05-28 PROCEDURE — 74011636637 HC RX REV CODE- 636/637: Performed by: STUDENT IN AN ORGANIZED HEALTH CARE EDUCATION/TRAINING PROGRAM

## 2020-05-28 PROCEDURE — 83880 ASSAY OF NATRIURETIC PEPTIDE: CPT

## 2020-05-28 PROCEDURE — 74011250637 HC RX REV CODE- 250/637: Performed by: STUDENT IN AN ORGANIZED HEALTH CARE EDUCATION/TRAINING PROGRAM

## 2020-05-28 PROCEDURE — 74011250636 HC RX REV CODE- 250/636: Performed by: INTERNAL MEDICINE

## 2020-05-28 PROCEDURE — 94640 AIRWAY INHALATION TREATMENT: CPT

## 2020-05-28 PROCEDURE — 97116 GAIT TRAINING THERAPY: CPT

## 2020-05-28 PROCEDURE — 83735 ASSAY OF MAGNESIUM: CPT

## 2020-05-28 PROCEDURE — 77010033711 HC HIGH FLOW OXYGEN

## 2020-05-28 PROCEDURE — 74011250636 HC RX REV CODE- 250/636: Performed by: STUDENT IN AN ORGANIZED HEALTH CARE EDUCATION/TRAINING PROGRAM

## 2020-05-28 PROCEDURE — 82962 GLUCOSE BLOOD TEST: CPT

## 2020-05-28 PROCEDURE — 80053 COMPREHEN METABOLIC PANEL: CPT

## 2020-05-28 PROCEDURE — 97530 THERAPEUTIC ACTIVITIES: CPT

## 2020-05-28 PROCEDURE — 65660000000 HC RM CCU STEPDOWN

## 2020-05-28 PROCEDURE — 36415 COLL VENOUS BLD VENIPUNCTURE: CPT

## 2020-05-28 RX ORDER — METHYLPREDNISOLONE 4 MG/1
16 TABLET ORAL 2 TIMES DAILY WITH MEALS
Status: DISCONTINUED | OUTPATIENT
Start: 2020-05-28 | End: 2020-05-29

## 2020-05-28 RX ORDER — METHYLPREDNISOLONE 4 MG/1
16 TABLET ORAL 2 TIMES DAILY WITH MEALS
Status: DISCONTINUED | OUTPATIENT
Start: 2020-05-28 | End: 2020-05-28

## 2020-05-28 RX ADMIN — INSULIN HUMAN 14 UNITS: 100 INJECTION, SUSPENSION SUBCUTANEOUS at 09:22

## 2020-05-28 RX ADMIN — LEVOTHYROXINE SODIUM 100 MCG: 0.1 TABLET ORAL at 09:17

## 2020-05-28 RX ADMIN — INSULIN GLARGINE 34 UNITS: 100 INJECTION, SOLUTION SUBCUTANEOUS at 09:21

## 2020-05-28 RX ADMIN — LOSARTAN POTASSIUM 50 MG: 50 TABLET, FILM COATED ORAL at 09:17

## 2020-05-28 RX ADMIN — Medication 10 ML: at 13:04

## 2020-05-28 RX ADMIN — MONTELUKAST SODIUM 10 MG: 10 TABLET, FILM COATED ORAL at 21:53

## 2020-05-28 RX ADMIN — ENOXAPARIN SODIUM 40 MG: 40 INJECTION SUBCUTANEOUS at 21:55

## 2020-05-28 RX ADMIN — INSULIN HUMAN 8 UNITS: 100 INJECTION, SUSPENSION SUBCUTANEOUS at 17:13

## 2020-05-28 RX ADMIN — GUAIFENESIN 1200 MG: 600 TABLET, EXTENDED RELEASE ORAL at 09:17

## 2020-05-28 RX ADMIN — THERA TABS 1 TABLET: TAB at 09:17

## 2020-05-28 RX ADMIN — OXYCODONE HYDROCHLORIDE AND ACETAMINOPHEN 500 MG: 500 TABLET ORAL at 09:17

## 2020-05-28 RX ADMIN — FAMOTIDINE 20 MG: 20 TABLET, FILM COATED ORAL at 09:17

## 2020-05-28 RX ADMIN — INSULIN LISPRO 2 UNITS: 100 INJECTION, SOLUTION INTRAVENOUS; SUBCUTANEOUS at 21:55

## 2020-05-28 RX ADMIN — PANTOPRAZOLE SODIUM 40 MG: 40 TABLET, DELAYED RELEASE ORAL at 09:17

## 2020-05-28 RX ADMIN — ASPIRIN 81 MG: 81 TABLET, COATED ORAL at 09:17

## 2020-05-28 RX ADMIN — Medication 10 ML: at 06:00

## 2020-05-28 RX ADMIN — INSULIN LISPRO 4 UNITS: 100 INJECTION, SOLUTION INTRAVENOUS; SUBCUTANEOUS at 17:14

## 2020-05-28 RX ADMIN — FAMOTIDINE 20 MG: 20 TABLET, FILM COATED ORAL at 21:54

## 2020-05-28 RX ADMIN — TRAZODONE HYDROCHLORIDE 100 MG: 100 TABLET ORAL at 21:53

## 2020-05-28 RX ADMIN — CETIRIZINE HYDROCHLORIDE 10 MG: 10 TABLET, FILM COATED ORAL at 09:17

## 2020-05-28 RX ADMIN — Medication 10 ML: at 21:55

## 2020-05-28 RX ADMIN — INSULIN LISPRO 16 UNITS: 100 INJECTION, SOLUTION INTRAVENOUS; SUBCUTANEOUS at 13:03

## 2020-05-28 RX ADMIN — ATORVASTATIN CALCIUM 40 MG: 20 TABLET, FILM COATED ORAL at 09:17

## 2020-05-28 RX ADMIN — INSULIN LISPRO 16 UNITS: 100 INJECTION, SOLUTION INTRAVENOUS; SUBCUTANEOUS at 17:13

## 2020-05-28 RX ADMIN — IPRATROPIUM BROMIDE AND ALBUTEROL 1 PUFF: 20; 100 SPRAY, METERED RESPIRATORY (INHALATION) at 16:30

## 2020-05-28 RX ADMIN — IPRATROPIUM BROMIDE AND ALBUTEROL 1 PUFF: 20; 100 SPRAY, METERED RESPIRATORY (INHALATION) at 21:56

## 2020-05-28 RX ADMIN — METHYLPREDNISOLONE SODIUM SUCCINATE 30 MG: 40 INJECTION, POWDER, FOR SOLUTION INTRAMUSCULAR; INTRAVENOUS at 09:18

## 2020-05-28 RX ADMIN — INSULIN LISPRO 3 UNITS: 100 INJECTION, SOLUTION INTRAVENOUS; SUBCUTANEOUS at 09:22

## 2020-05-28 RX ADMIN — FLUTICASONE FUROATE AND VILANTEROL TRIFENATATE 1 PUFF: 200; 25 POWDER RESPIRATORY (INHALATION) at 08:48

## 2020-05-28 RX ADMIN — ZINC SULFATE 220 MG (50 MG) CAPSULE 1 CAPSULE: CAPSULE at 09:17

## 2020-05-28 RX ADMIN — INSULIN LISPRO 16 UNITS: 100 INJECTION, SOLUTION INTRAVENOUS; SUBCUTANEOUS at 09:22

## 2020-05-28 RX ADMIN — OYSTER SHELL CALCIUM WITH VITAMIN D 1 TABLET: 500; 200 TABLET, FILM COATED ORAL at 09:17

## 2020-05-28 RX ADMIN — SERTRALINE HYDROCHLORIDE 50 MG: 50 TABLET ORAL at 09:17

## 2020-05-28 RX ADMIN — OXYCODONE HYDROCHLORIDE AND ACETAMINOPHEN 500 MG: 500 TABLET ORAL at 21:53

## 2020-05-28 RX ADMIN — GUAIFENESIN 1200 MG: 600 TABLET, EXTENDED RELEASE ORAL at 21:54

## 2020-05-28 RX ADMIN — IPRATROPIUM BROMIDE AND ALBUTEROL 1 PUFF: 20; 100 SPRAY, METERED RESPIRATORY (INHALATION) at 08:45

## 2020-05-28 NOTE — PROGRESS NOTES
[facility] RESIDENCY PROGRAM  PROGRESS NOTE     5/28/2020  PCP: Fatuma Aguilar MD       Assessment/Plan:     Vadim Hood is a 69 y.o. female with history of HTN, type 2DM, GERD, hypothyroidism, depression, asthma, and hyperlipidemia who is admitted for AHRF 2/2 COVID 19.     Overnight events: No acute events overnight      AHRF 2/2 COVID 19: COVID+ on 5/4 outpatient. PCP treated with 5 days of azithro + plaquenil ending 5/10. No home O2 requirement. COVID inflammatory markers have normalized after substantial peak on ~5/14. Actemra given x1 on 5/15 with screening Quant-gold, HIV, hepatitis panel Negative. S/p Doxycycline 8 day course (5/12-5/19). Pt required a prolonged course of Hi flow oxygen leading to BiPAP on 5/19-5/20 which was slowly weaned back down over many days. Bcx NG 6 days. She was also treated with Bumex for diuresis while on high flow oxygen. A clinical trial of Caitlin was conducted on 5/12-5/16 (held due to hi flow O2 requirement), 5/26-current.  - Pulmonology following, appreciate rec's-        -Solumedrol 30mg Q12H, Likely PO Prednisone today  - Proning  - Zinc, Vit C, Lipitor, Mucinex  - Zyrtec, Breo-Ellipta, Combivent Q6H, Singulair  - Supplemental O2 as needed- on 5L NC       Sinus Arrhythmia- Intermittent on tele, many PACs, leading to bradycardia. POA EKG with Left anterior fascicular block, Left ventricular hypertrophy.  - Discontinued beta blocker (Atenolol 25mg qhs)  - Monitor Mag and K, Replete prn    Type 2 DM: HgA1C 10.4 in 11/2019. This admission A1C 13.1.  Pt's BG's have been very sensitive to IV steroids.   - Hold home glipizide  - Lantus 34U and Lispro 16U with meals with SSI ACHS- Resistant scale  - NPH 14U BID      Asthma  - Continue Singulair 10mg qhs     HTN  - Continue home BP med losartan 50mg daily  - HOLDING atenolol 25mg qhs     Hypothyroid: TSH 3.570 in 11/2019  - Continue home synthroid 100mcg daily     Depression  - Continue Zoloft 50mg daily and trazadone 100mg qhs to help with sleep     GERD- Home med of prilosec  - Continue Protonix for GERD and steroid protection      Hypokalemia: RESOLVED. Likely 2/2 diarrhea prior to arrival and diuretics inpatient. K 2.7 on admission.   - Repletion prn     Hyperlipidemia: Lipid panel , , HDL 59, LDL 89 in 2019  - Lipitor 40mg daily for additional anti-inflammatory benefit      Rubén Bolus discussed with Dr. Rl Ruelas. Subjective:   Pt states doing well this am, no acute concerns. Continues to walk well in the room by herself and with PT. Denies cp, n, v, c, d. Objective:   Physical examination  Patient Vitals for the past 24 hrs:   Temp Pulse Resp BP SpO2   20 0330 98.6 °F (37 °C) 76 19 109/51 98 %   20 2351 99.4 °F (37.4 °C) 72 17 107/56 95 %   20 2305  76      20 2008 99.1 °F (37.3 °C) 70 15 108/56 97 %   20 1628     94 %   20 1548 99.1 °F (37.3 °C) 80 14 98/47 95 %   20 1523  82      20 1109 99 °F (37.2 °C) 79 14 101/46 97 %      Temp (24hrs), Av °F (37.2 °C), Min:98.6 °F (37 °C), Max:99.4 °F (37.4 °C)     O2 Flow Rate (L/min): 5 l/min   O2 Device: Nasal cannula, Other (comment)(Caitlin)    Date 20 - 20 0620 - 20 0659   Shift 1416-4090 0003-3585 24 Hour Total 9276-1839 2451-9906 24 Hour Total   INTAKE   P.O. 360 1040 1400        P. O. 360 1040 1400      Shift Total(mL/kg) 360(4.1) 1040(11.9) 1400(16)      OUTPUT   Urine(mL/kg/hr) 850(0.8) 1050(1) 1900(0.9)        Urine Voided 850 1050 1900        Urine Occurrence(s)  1 x 1 x      Stool           Stool Occurrence(s) 1 x  1 x      Shift Total(mL/kg) 850(9.7) 1050(12) 1900(21.7)      NET -206 -10 -500      Weight (kg) 87.7 87.7 87.7 87.7 87.7 87.7     Pt is currently a COVID infected pt. I did not go in the room to decrease exposure and conserve PPE. The attending physician will exam in the pt. Talked on the phone.     General:   Alert, cooperative, speaking in full sentences     Data Review:     CBC:  Recent Labs     05/28/20  0615 05/27/20  0144 05/26/20  0327   WBC 11.4* 15.2* 15.0*   HGB 11.1* 12.3 12.3   HCT 33.9* 36.6 37.6    403 230     Metabolic Panel:  Recent Labs     05/28/20  0615 05/27/20  0144 05/26/20  0327   * 132* 136   K 3.9 3.4* 4.0   CL 99 98 100   CO2 28 27 30   BUN 29* 33* 29*   CREA 0.84 0.91 0.80   * 122* 120*   CA 8.0* 8.2* 8.3*   MG 2.4 2.2 2.6*   ALB 2.8* 2.9* 2.8*   TBILI 0.5 0.5 0.5   ALT 89* 113* 80*     Micro:  Lab Results   Component Value Date/Time    Culture result: NO GROWTH 6 DAYS 05/13/2020 04:29 AM    Culture result:  05/11/2020 12:35 AM     MRSA NOT PRESENT. Apparent Staphylococus aureus (not MRSA noted). Culture result:  05/11/2020 12:35 AM         Screening of patient nares for MRSA is for surveillance purposes and, if positive, to facilitate isolation considerations in high risk settings. It is not intended for automatic decolonization interventions per se as regimens are not sufficiently effective to warrant routine use. Imaging:  Xr Chest Port    Result Date: 5/10/2020  EXAM: XR CHEST PORT INDICATION: sob, +COVID COMPARISON: 2013 FINDINGS: A portable AP radiograph of the chest was obtained at 2145 hours. The patient is on a cardiac monitor. There is opacification in the lower lobes bilaterally, left greater than right. The cardiac and mediastinal contours and pulmonary vascularity are normal.  The bones and soft tissues are grossly within normal limits. IMPRESSION: Opacification in the lower lobes bilaterally, left greater than right. CXR AP Port: 5/15/2020    FINDINGS:  AP portable upright view of the chest demonstrates a stable  cardiopericardial  silhouette. The lungs are adequately expanded. Increased peripheral parenchymal  opacities greater on the left than on the right. Increased interstitial opacity  as well. . The osseous structures are unremarkable.  Patient is on a cardiac  monitor.      IMPRESSION  IMPRESSION:  Interval progression of extensive bilateral parenchymal disease greater on the  left than on the right.     Medications reviewed  Current Facility-Administered Medications   Medication Dose Route Frequency    insulin glargine (LANTUS) injection 34 Units  34 Units SubCUTAneous DAILY    insulin lispro (HUMALOG) injection 16 Units  16 Units SubCUTAneous TIDAC    insulin NPH (NOVOLIN N, HUMULIN N) injection 14 Units  14 Units SubCUTAneous Q12H    And    methylPREDNISolone (PF) (SOLU-MEDROL) injection 30 mg  30 mg IntraVENous Q12H    dextrose (D50W) injection syrg 12.5-25 g  12.5-25 g IntraVENous PRN    glucose chewable tablet 16 g  4 Tab Oral PRN    glucagon (GLUCAGEN) injection 1 mg  1 mg IntraMUSCular PRN    insulin lispro (HUMALOG) injection   SubCUTAneous AC&HS    guaiFENesin ER (MUCINEX) tablet 1,200 mg  1,200 mg Oral BID    famotidine (PEPCID) tablet 20 mg  20 mg Oral BID    atorvastatin (LIPITOR) tablet 40 mg  40 mg Oral DAILY    zinc sulfate (ZINCATE) 220 (50) mg capsule 1 Cap  1 Cap Oral DAILY    diphenhydrAMINE (BENADRYL) injection 50 mg  50 mg IntraVENous Q4H PRN    acetaminophen (TYLENOL) tablet 650 mg  650 mg Oral Q6H PRN    sodium chloride (NS) flush 5-40 mL  5-40 mL IntraVENous Q8H    sodium chloride (NS) flush 5-40 mL  5-40 mL IntraVENous PRN    ondansetron (ZOFRAN) injection 4 mg  4 mg IntraVENous Q4H PRN    enoxaparin (LOVENOX) injection 40 mg  40 mg SubCUTAneous Q24H    ascorbic acid (vitamin C) (VITAMIN C) tablet 500 mg  500 mg Oral BID    aspirin delayed-release tablet 81 mg  81 mg Oral DAILY    calcium-vitamin D (OS-TRISTEN) 500 mg-200 unit tablet  1 Tab Oral DAILY    cetirizine (ZYRTEC) tablet 10 mg  10 mg Oral DAILY    fluticasone propionate (FLONASE) 50 mcg/actuation nasal spray 2 Spray  2 Spray Both Nostrils DAILY PRN    therapeutic multivitamin (THERAGRAN) tablet 1 Tab  1 Tab Oral DAILY    levothyroxine (SYNTHROID) tablet 100 mcg  100 mcg Oral ACB    losartan (COZAAR) tablet 50 mg  50 mg Oral DAILY    montelukast (SINGULAIR) tablet 10 mg  10 mg Oral QHS    sertraline (ZOLOFT) tablet 50 mg  50 mg Oral DAILY    traZODone (DESYREL) tablet 100 mg  100 mg Oral QHS    ipratropium-albuterol (COMBIVENT RESPIMAT) 20 mcg-100 mcg inhalation spray  1 Puff Inhalation Q6H RT    fluticasone-vilanterol (BREO ELLIPTA) 200mcg-25mcg/puff  1 Puff Inhalation DAILY    albuterol (PROVENTIL HFA, VENTOLIN HFA, PROAIR HFA) inhaler 2 Puff  2 Puff Inhalation Q4H PRN    pantoprazole (PROTONIX) tablet 40 mg  40 mg Oral ACB    sodium chloride (NS) flush 5-10 mL  5-10 mL IntraVENous PRN         Signed:   Lavinia Torres MD   Resident, Family Medicine      Attending note: Attending note to follow. ..

## 2020-05-28 NOTE — PROGRESS NOTES
1900: Verbal shift change report given to Srikanth Ashley RN (oncoming nurse) by Rina Girard (offgoing nurse). Report included the following information SBAR, Kardex, Intake/Output, MAR and Recent Results. 0645: No changes overnight. 0720: Verbal shift change report given to [facility] (oncoming nurse) by Srikanth Ashley RN (offgoing nurse). Report included the following information SBAR, Kardex, MAR and Recent Results.

## 2020-05-28 NOTE — PROGRESS NOTES
Transition of Care  RUR 24%    1 monitor for progress and arrange home health follow up   2 home with family assistance and pcp followups as well as specialists  3 coordinate transportation on discharge    4:26 PM  Review of chart regarding progress which is continuing to improve. Worked with therapy today, and HH being identified as a plan.     Care Management Interventions  PCP Verified by CM: Masoud Mcfarland)  Transition of Care Consult (CM Consult): Discharge Planning  Current Support Network: Lives with Spouse  Discharge Location  Discharge Placement: Unable to determine at this time

## 2020-05-28 NOTE — PROGRESS NOTES
Problem: Mobility Impaired (Adult and Pediatric)  Goal: *Acute Goals and Plan of Care (Insert Text)  Description: FUNCTIONAL STATUS PRIOR TO ADMISSION: Patient was independent and active without use of DME.    HOME SUPPORT PRIOR TO ADMISSION: The patient lived with  but did not require assist.    Physical Therapy Goals  Initiated 5/18/2020  1. Patient will move from supine to sit and sit to supine  in bed with independence within 7 day(s). 2.  Patient will transfer from bed to chair and chair to bed with independence using the least restrictive device within 7 day(s). 3.  Patient will perform sit to stand with independence within 7 day(s). 4.  Patient will ambulate with independence for 150 feet with the least restrictive device within 7 day(s). 5.  Patient will ascend/descend 4 stairs with 1 handrail(s) with modified independence within 7 day(s). Outcome: Progressing Towards Goal  Note:   PHYSICAL THERAPY TREATMENT  Patient: Darren Low (72 y.o. female)  Date: 5/28/2020  Diagnosis: Respiratory failure with hypoxia ([facility] [J96.91]   COVID-19       Precautions: Fall, Contact(Droplet plus)  Chart, physical therapy assessment, plan of care and goals were reviewed. ASSESSMENT  Patient continues with skilled PT services and is progressing towards goals. Patient tolerated today's session on room air and sats lowest at 86% when on the toilet. Patient with ambulation lowest O2 sats 89-90% on room air. At rest with recovery she is 94% on room air. No dyspnea noted.    Vitals    Heart rate(bpm) Oxygen saturation  Pre-activity          77                           93-95%  During activity     87                           89-90%  Post activity        82                            91-94%      Current Level of Function Impacting Discharge (mobility/balance): MOD I, working to activity tolerance and endurance able to tolerate activity today on room air    Other factors to consider for discharge: PLAN :  Patient continues to benefit from skilled intervention to address the above impairments. Continue treatment per established plan of care. to address goals. Recommendation for discharge: (in order for the patient to meet his/her long term goals)  Physical therapy at least 2 days/week in the home     This discharge recommendation:  A follow-up discussion with the attending provider and/or case management is planned    IF patient discharges home will need the following DME: to be determined (TBD)       SUBJECTIVE:   Patient stated .    OBJECTIVE DATA SUMMARY:   Critical Behavior:  Neurologic State: Alert, Eyes open spontaneously  Orientation Level: Oriented X4  Cognition: Appropriate decision making, Appropriate for age attention/concentration, Appropriate safety awareness, Follows commands  Safety/Judgement: Awareness of environment, Driving appropriateness, Fall prevention, Good awareness of safety precautions, Home safety, Insight into deficits  Functional Mobility Training:  Bed Mobility:                    Transfers:  Sit to Stand: Modified independent  Stand to Sit: Modified independent        Bed to Chair: Modified independent                    Balance:     Ambulation/Gait Training:  Distance (ft): 40 Feet (ft)  Assistive Device: Gait belt  Ambulation - Level of Assistance: Modified independent                      Pain Rating:  None reported    Activity Tolerance:   Good  Please refer to the flowsheet for vital signs taken during this treatment. After treatment patient left in no apparent distress:   Sitting in chair, Call bell within reach, and Bed / chair alarm activated    COMMUNICATION/COLLABORATION:   The patients plan of care was discussed with: Registered nurse.      Siri Matamoros PT, DPT   Time Calculation: 36 mins

## 2020-05-28 NOTE — PROGRESS NOTES
Bedside and Verbal shift change report given to [email or web address] (oncoming nurse) by Janell Charlton RN (offgoing nurse). Report included the following information SBAR, Kardex, Intake/Output, Accordion and Recent Results. SHIFT REPORT:    8871: 8 units NPH and 16mg PO prednisone given at this time     Bedside and Verbal shift change report given to [facility] (oncoming nurse) by Ivelisse Hatch RN  (offgoing nurse). Report included the following information SBAR, Kardex, Intake/Output, Accordion and Recent Results.

## 2020-05-28 NOTE — PROGRESS NOTES
Pulse Nitric changed at 0300    S/N [id number]  Cannula lot# [id number]  Cartridge indentifier [id number]  Cartridge lot # [id number]

## 2020-05-28 NOTE — PROGRESS NOTES
[facility] RESIDENCY PROGRAM  PROGRESS NOTE     5/29/2020  PCP: Ame Del Castillo MD       Assessment/Plan:     Amelie Jimenez is a 69 y.o. female with history of HTN, type 2DM, GERD, hypothyroidism, depression, asthma, and hyperlipidemia who is admitted for AHRF 2/2 COVID 19.     Overnight events: No acute events overnight      AHRF 2/2 COVID 19: COVID+ on 5/4 outpatient. PCP treated with 5 days of azithro + plaquenil ending 5/10. No home O2 requirement. COVID inflammatory markers have normalized after substantial peak on ~5/14. Actemra given x1 on 5/15 with screening Quant-gold, HIV, hepatitis panel Negative. S/p Doxycycline 8 day course (5/12-5/19). Pt required a prolonged course of Hi flow oxygen leading to BiPAP on 5/19-5/20 which was slowly weaned back down over many days. Bcx NG 6 days. She was also treated with Bumex for diuresis while on high flow oxygen. A clinical trial of Caitlin was conducted on 5/12-5/16 (held due to hi flow O2 requirement), 5/26-current.  - Pulmonology following, appreciate rec's-        -Medrol 16 mg BID  - Proning  - Zinc, Vit C, Lipitor, Mucinex  - Zyrtec, Breo-Ellipta, Combivent Q6H, Singulair  - Supplemental O2 as needed- on 5L NC       Sinus Arrhythmia- Intermittent on tele, many PACs, leading to bradycardia. POA EKG with Left anterior fascicular block, Left ventricular hypertrophy.  - Discontinued beta blocker (Atenolol 25mg qhs)  - Monitor Mag and K, Replete prn    Type 2 DM: HgA1C 10.4 in 11/2019. This admission A1C 13.1.  Pt's BG's have been very sensitive to IV steroids.   - Hold home glipizide  - Lantus 34U and Lispro 10U with meals with SSI ACHS- Resistant scale  - NPH 8U BID      Asthma  - Continue Singulair 10mg qhs     HTN  - Continue home BP med losartan 50mg daily  - HOLDING atenolol 25mg qhs     Hypothyroid: TSH 3.570 in 11/2019  - Continue home synthroid 100mcg daily     Depression  - Continue Zoloft 50mg daily and trazadone 100mg qhs to help with sleep     GERD- Home med of prilosec  - Continue Protonix for GERD and steroid protection      Hypokalemia: RESOLVED. Likely 2/2 diarrhea prior to arrival and diuretics inpatient. K 2.7 on admission.   - Repletion prn     Hyperlipidemia: Lipid panel , , HDL 59, LDL 89 in 2019  - Lipitor 40mg daily for additional anti-inflammatory benefit      Ryan Brown discussed with Dr. Ariana Ivan. Subjective:   Pt states doing well this am, no acute concerns. Continues to walk well in the room by herself and with PT Reports feeling \"Alive again,\" no SOB at rest, mild SOB on exertion, no new complaints. Denies cp, n, v, c, d. Objective:   Physical examination  Patient Vitals for the past 24 hrs:   Temp Pulse Resp BP SpO2   20 0427 98.7 °F (37.1 °C) 73 17 111/52 95 %   20 2249  68      20 2244 99.2 °F (37.3 °C) 64 12 114/50 96 %   20 2015 99.1 °F (37.3 °C) 99 24 127/54 93 %   20 1619 99.2 °F (37.3 °C) 76 16 102/53 94 %   20 1600  80 21 93/51 95 %   20 1500  79      20 1238 99.1 °F (37.3 °C) 80 14 106/55 91 %   20 1200  75 12 112/57 90 %   20 1100  78 19  93 %   20 1000  86 16 132/59 95 %   20 0900  (!) 119 20  92 %   20 0800 98.5 °F (36.9 °C) 63 19 124/63 98 %      Temp (24hrs), Av °F (37.2 °C), Min:98.5 °F (36.9 °C), Max:99.2 °F (37.3 °C)     O2 Flow Rate (L/min): 2 l/min   O2 Device: Nasal cannula(Caitlin)    Date 20 07 - 20 0620 07 - 05/30/20 0659   Shift 2555-8855 6395-7282 24 Hour Total 9515-8925 7812-6197 24 Hour Total   INTAKE   P.O. 860  860        P. O. 860  860      Shift Total(mL/kg) 860(10.2)  860(10.3)      OUTPUT   Urine(mL/kg/hr) 175(0.2) 500 675        Urine Voided 175 500 675        Urine Occurrence(s) 1 x 2 x 3 x      Stool           Stool Occurrence(s) 0 x 1 x 1 x      Shift Total(mL/kg) 175(2.1) 500(6) 675(8.1)       -500 185      Weight (kg) 84.2 83.7 83.7 83.7 83.7 83.7     Pt is currently a COVID infected pt. I did not go in the room to decrease exposure and conserve PPE. The attending physician will exam in the pt. Talked on the phone. General:   Alert, cooperative, speaking in full sentences     Data Review:     CBC:  Recent Labs     05/29/20  0128 05/28/20  0615 05/27/20  0144   WBC 16.2* 11.4* 15.2*   HGB 11.2* 11.1* 12.3   HCT 33.5* 33.9* 36.6    586 390     Metabolic Panel:  Recent Labs     05/29/20 0128 05/28/20  0615 05/27/20  0144    135* 132*   K 4.0 3.9 3.4*    99 98   CO2 29 28 27   BUN 22* 29* 33*   CREA 0.77 0.84 0.91   GLU 87 235* 122*   CA 8.4* 8.0* 8.2*   MG 2.3 2.4 2.2   ALB 2.8* 2.8* 2.9*   TBILI 0.5 0.5 0.5   ALT 87* 89* 113*     Micro:  Lab Results   Component Value Date/Time    Culture result: NO GROWTH 6 DAYS 05/13/2020 04:29 AM    Culture result:  05/11/2020 12:35 AM     MRSA NOT PRESENT. Apparent Staphylococus aureus (not MRSA noted). Culture result:  05/11/2020 12:35 AM         Screening of patient nares for MRSA is for surveillance purposes and, if positive, to facilitate isolation considerations in high risk settings. It is not intended for automatic decolonization interventions per se as regimens are not sufficiently effective to warrant routine use. Imaging:  Xr Chest Port    Result Date: 5/10/2020  EXAM: XR CHEST PORT INDICATION: sob, +COVID COMPARISON: 2013 FINDINGS: A portable AP radiograph of the chest was obtained at 2145 hours. The patient is on a cardiac monitor. There is opacification in the lower lobes bilaterally, left greater than right. The cardiac and mediastinal contours and pulmonary vascularity are normal.  The bones and soft tissues are grossly within normal limits. IMPRESSION: Opacification in the lower lobes bilaterally, left greater than right. CXR AP Port: 5/15/2020    FINDINGS:  AP portable upright view of the chest demonstrates a stable  cardiopericardial  silhouette.  The lungs are adequately expanded. Increased peripheral parenchymal  opacities greater on the left than on the right. Increased interstitial opacity  as well. . The osseous structures are unremarkable. Patient is on a cardiac  monitor.      IMPRESSION  IMPRESSION:  Interval progression of extensive bilateral parenchymal disease greater on the  left than on the right.     Medications reviewed  Current Facility-Administered Medications   Medication Dose Route Frequency    methylPREDNISolone (MEDROL) tablet 16 mg  16 mg Oral BID WITH MEALS    Or    insulin NPH (NOVOLIN N, HUMULIN N) injection 8 Units  8 Units SubCUTAneous BID WITH MEALS    insulin glargine (LANTUS) injection 34 Units  34 Units SubCUTAneous DAILY    insulin lispro (HUMALOG) injection 16 Units  16 Units SubCUTAneous TIDAC    dextrose (D50W) injection syrg 12.5-25 g  12.5-25 g IntraVENous PRN    glucose chewable tablet 16 g  4 Tab Oral PRN    glucagon (GLUCAGEN) injection 1 mg  1 mg IntraMUSCular PRN    insulin lispro (HUMALOG) injection   SubCUTAneous AC&HS    guaiFENesin ER (MUCINEX) tablet 1,200 mg  1,200 mg Oral BID    famotidine (PEPCID) tablet 20 mg  20 mg Oral BID    atorvastatin (LIPITOR) tablet 40 mg  40 mg Oral DAILY    zinc sulfate (ZINCATE) 220 (50) mg capsule 1 Cap  1 Cap Oral DAILY    diphenhydrAMINE (BENADRYL) injection 50 mg  50 mg IntraVENous Q4H PRN    acetaminophen (TYLENOL) tablet 650 mg  650 mg Oral Q6H PRN    sodium chloride (NS) flush 5-40 mL  5-40 mL IntraVENous Q8H    sodium chloride (NS) flush 5-40 mL  5-40 mL IntraVENous PRN    ondansetron (ZOFRAN) injection 4 mg  4 mg IntraVENous Q4H PRN    enoxaparin (LOVENOX) injection 40 mg  40 mg SubCUTAneous Q24H    ascorbic acid (vitamin C) (VITAMIN C) tablet 500 mg  500 mg Oral BID    aspirin delayed-release tablet 81 mg  81 mg Oral DAILY    calcium-vitamin D (OS-TRISTEN) 500 mg-200 unit tablet  1 Tab Oral DAILY    cetirizine (ZYRTEC) tablet 10 mg  10 mg Oral DAILY    fluticasone propionate (FLONASE) 50 mcg/actuation nasal spray 2 Spray  2 Spray Both Nostrils DAILY PRN    therapeutic multivitamin (THERAGRAN) tablet 1 Tab  1 Tab Oral DAILY    levothyroxine (SYNTHROID) tablet 100 mcg  100 mcg Oral ACB    losartan (COZAAR) tablet 50 mg  50 mg Oral DAILY    montelukast (SINGULAIR) tablet 10 mg  10 mg Oral QHS    sertraline (ZOLOFT) tablet 50 mg  50 mg Oral DAILY    traZODone (DESYREL) tablet 100 mg  100 mg Oral QHS    ipratropium-albuterol (COMBIVENT RESPIMAT) 20 mcg-100 mcg inhalation spray  1 Puff Inhalation Q6H RT    fluticasone-vilanterol (BREO ELLIPTA) 200mcg-25mcg/puff  1 Puff Inhalation DAILY    albuterol (PROVENTIL HFA, VENTOLIN HFA, PROAIR HFA) inhaler 2 Puff  2 Puff Inhalation Q4H PRN    pantoprazole (PROTONIX) tablet 40 mg  40 mg Oral ACB    sodium chloride (NS) flush 5-10 mL  5-10 mL IntraVENous PRN         Signed:   Bess Bob MD   Resident, Family Medicine      Attending note: Attending note to follow. ..

## 2020-05-28 NOTE — PROGRESS NOTES
Problem: Falls - Risk of  Goal: *Absence of Falls  Description: Document Gilma Sosa Fall Risk and appropriate interventions in the flowsheet. Outcome: Progressing Towards Goal  Note: Fall Risk Interventions:  Mobility Interventions: Bed/chair exit alarm, Patient to call before getting OOB, Utilize walker, cane, or other assistive device         Medication Interventions: Patient to call before getting OOB, Teach patient to arise slowly, Bed/chair exit alarm    Elimination Interventions: Bed/chair exit alarm, Call light in reach, Stay With Me (per policy)              Problem: Patient Education: Go to Patient Education Activity  Goal: Patient/Family Education  Outcome: Progressing Towards Goal     Problem: Patient Education: Go to Patient Education Activity  Goal: Patient/Family Education  Outcome: Progressing Towards Goal     Problem: Diabetes Self-Management  Goal: *Disease process and treatment process  Description: Define diabetes and identify own type of diabetes; list 3 options for treating diabetes. Outcome: Progressing Towards Goal  Goal: *Incorporating nutritional management into lifestyle  Description: Describe effect of type, amount and timing of food on blood glucose; list 3 methods for planning meals. Outcome: Progressing Towards Goal  Goal: *Incorporating physical activity into lifestyle  Description: State effect of exercise on blood glucose levels. Outcome: Progressing Towards Goal  Goal: *Developing strategies to promote health/change behavior  Description: Define the ABC's of diabetes; identify appropriate screenings, schedule and personal plan for screenings. Outcome: Progressing Towards Goal  Goal: *Using medications safely  Description: State effect of diabetes medications on diabetes; name diabetes medication taking, action and side effects.   Outcome: Progressing Towards Goal  Goal: *Monitoring blood glucose, interpreting and using results  Description: Identify recommended blood glucose targets  and personal targets. Outcome: Progressing Towards Goal  Goal: *Prevention, detection, treatment of acute complications  Description: List symptoms of hyper- and hypoglycemia; describe how to treat low blood sugar and actions for lowering  high blood glucose level. Outcome: Progressing Towards Goal  Goal: *Prevention, detection and treatment of chronic complications  Description: Define the natural course of diabetes and describe the relationship of blood glucose levels to long term complications of diabetes. Outcome: Progressing Towards Goal  Goal: *Developing strategies to address psychosocial issues  Description: Describe feelings about living with diabetes; identify support needed and support network  Outcome: Progressing Towards Goal     Problem: Patient Education: Go to Patient Education Activity  Goal: Patient/Family Education  Outcome: Progressing Towards Goal     Problem: Risk for Spread of Infection  Goal: Prevent transmission of infectious organism to others  Description: Prevent the transmission of infectious organisms to other patients, staff members, and visitors. Outcome: Progressing Towards Goal     Problem: Patient Education:  Go to Education Activity  Goal: Patient/Family Education  Outcome: Progressing Towards Goal     Problem: Pressure Injury - Risk of  Goal: *Prevention of pressure injury  Description: Document Anoop Scale and appropriate interventions in the flowsheet. Outcome: Progressing Towards Goal  Note: Pressure Injury Interventions:       Moisture Interventions: Absorbent underpads, Check for incontinence Q2 hours and as needed, Maintain skin hydration (lotion/cream), Minimize layers    Activity Interventions: Increase time out of bed, PT/OT evaluation    Mobility Interventions: HOB 30 degrees or less, Turn and reposition approx.  every two hours(pillow and wedges), PT/OT evaluation    Nutrition Interventions: Document food/fluid/supplement intake, Offer support with meals,snacks and hydration    Friction and Shear Interventions: HOB 30 degrees or less, Transferring/repositioning devices, Apply protective barrier, creams and emollients                Problem: Patient Education: Go to Patient Education Activity  Goal: Patient/Family Education  Outcome: Progressing Towards Goal     Problem: Patient Education: Go to Patient Education Activity  Goal: Patient/Family Education  Outcome: Progressing Towards Goal

## 2020-05-28 NOTE — PROGRESS NOTES
[facility]  Pulmonary, Critical Care, and Sleep Medicine      Name: Brian Hager MRN: [id number]   : 1945 [facility]: [facility]   Date: 2020        IMPRESSION:   · Acute hypoxemic respiratory failure  · COVID19 +. Started on Caitlin pulse , had to stop this on  due to increased O2 requirements. Given actemra 5/15  · Mild intermittent asthma  · Chronic back pain  · DM  · HTN      RECOMMENDATIONS:   · Supplemental O2 as needed to keep sats > 88%. Weaned down to 1L with sats 94% at rest.  · Can d/c Caitlin given significantly improved O2 requirement  · Continue diuresis. Strict I&O. Watch creat and bicarb. · Completed course of Doxy  · Trending inflammatory markers - all improved  · Repeat COVID test  · IV ICS switched to po Medrol. Patient reports allergy to prednisone (throat swelling). · Diligent BG control  · Continue combivent, breo, singulair, zyrtec, mucinex   · Continue vit C, zinc, lipitor  · Continue pepcid  · Encouraged pt to lie prone at least 4 hours per day and to use inc tommy q 1 hr w/a  · OOB to chair  · PT  · Palliative care following       Subjective: This patient has been seen and evaluated at the request of Dr. Thania Garcia for [place] 19 pneumonia and resp failure. Patient is a 69 y.o. female diagnosed one week ago with Covid 19. Pt with fevers, diarrhea, cough, and sob. She has been isolating at home but has had work done within the home and believes that she may have gotten an infection from one of the workers there.  has also become infected. Pt states that her symptoms have remained stable to slightly improved over the past few days but has noted that her oxygen sats have been falling and presented to ED for evaluation. Fevers seem to have resolved. Main symptoms are now diarrhea, sob, and cough. Pt w h/o asthma which by description appears to be mild intermittent and is followed by Dr Chaz Kenyon in [place].   Controlled with montelukast and (rare) albuterol MDI. Never smoker      Interval history:  Sats 94% on 1L during my exam  Tmax 99.4  WBC 11.4 - better  Na 135 - better  CRP, d-dimer, ferritin all improved - WNL  CXR improved      ROS: Reports continued improvement in dyspnea/SOB. Denies fever or chills. Minimal cough. Weakness/fatigue improving and ambulating around the room. Denies CP. Denies LE pain/swelling. Past Medical History:   Diagnosis Date    Anxiety     Arthritis     Asthma     Chronic pain     back,right hip    Depression     Diabetes ([facility] [address]     type 2    GERD (gastroesophageal reflux disease)     History of vascular access device 05/18/2020    4 Fr midline, 12 cm L basilic, blood draws    Hypercholesteremia     Hypertension     Hypothyroid     Nausea & vomiting     Reflux     Sleep apnea     wears dental appliance- Somnident    Thyroid disease     Unspecified adverse effect of anesthesia     delayed awakening      Past Surgical History:   Procedure Laterality Date    HX COLONOSCOPY  2012    normal repeat 2022, diverticulosis of sigmoid colon, Dr. Sonia Patino HX HEENT  1/08/14    bilateral cataract surgery1/8/14    HX KNEE REPLACEMENT      right    HX OTHER SURGICAL  2007    cardiac cath- No CAD found    NEUROLOGICAL PROCEDURE UNLISTED  1999    vascular brain surgery      Prior to Admission medications    Medication Sig Start Date End Date Taking? Authorizing Provider   guaiFENesin ER (Mucinex) 600 mg ER tablet Take 600 mg by mouth two (2) times a day. Yes Provider, Historical   omeprazole (PRILOSEC) 20 mg capsule Take 20 mg by mouth daily. Yes Provider, Historical   calcium-vitamin D (OYSTER SHELL) 500 mg(1,250mg) -200 unit per tablet Take 1 Tab by mouth daily.    Yes Provider, Historical   traZODone (DESYREL) 100 mg tablet TAKE 1 TABLET BY MOUTH EVERY DAY AT NIGHT 3/25/20  Yes Kacie Bender, NP   glipiZIDE SR (GLUCOTROL XL) 10 mg CR tablet TAKE 1 TABLET BY MOUTH TWICE A DAY 2/15/20  Yes Libra Bender NP   simvastatin (ZOCOR) 10 mg tablet Take 1 Tab by mouth nightly. 10/8/19  Yes Libra Bender NP   losartan (COZAAR) 50 mg tablet TAKE 1 TABLET BY MOUTH EVERY DAY. 10/8/19  Yes Libra Bender NP   levothyroxine (SYNTHROID) 100 mcg tablet Take 1 Tab by mouth Daily (before breakfast). 10/8/19  Yes Libra Bender NP   atenolol (TENORMIN) 25 mg tablet Take 1 Tab by mouth nightly. 10/8/19  Yes Libra Bender NP   sertraline (ZOLOFT) 50 mg tablet TAKE 1 TABLET BY MOUTH EVERY DAY 9/4/19  Yes Libra Bender NP   aspirin delayed-release 81 mg tablet Take 81 mg by mouth daily. Yes Provider, Historical   fluticasone (FLONASE) 50 mcg/actuation nasal spray 2 Sprays by Both Nostrils route daily as needed for Rhinitis or Allergies. Yes Provider, Historical   albuterol (PROAIR HFA) 90 mcg/actuation inhaler Take 2 Puffs by inhalation every four (4) hours as needed for Wheezing. Yes Provider, Historical   Cetirizine (ZYRTEC) 10 mg cap Take 1 Tab by mouth every evening. Yes Provider, Historical   multivitamins-minerals-lutein (CENTRUM SILVER) tab Take 1 Tab by mouth daily. Yes Provider, Historical   glucosamine-chondroit-vit c-mn (GLUCOSAMINE CHONDROITIN MAXSTR) 500-400 mg capsule Take 1 Cap by mouth daily. Yes Other, MD [place]   varicella-zoster recombinant, PF, (SHINGRIX, PF,) 50 mcg/0.5 mL susr injection 0.5mL by IntraMUSCular route once now and then repeat in 2-6 months 10/8/19   Nata Echevarria NP     Allergies   Allergen Reactions    Latex Rash    Prednisone Anaphylaxis     Throat swelling. Can take methylpredisone po or IV without problems.     Augmentin [Amoxicillin-Pot Clavulanate] Nausea and Vomiting    Biaxin [Clarithromycin] Nausea and Vomiting    Metformin Diarrhea    Parafon Forte Dsc [Chlorzoxazone] Nausea and Vomiting      Social History     Tobacco Use    Smoking status: Never Smoker    Smokeless tobacco: Never Used   Substance Use Topics  Alcohol use: No     Frequency: Never     Comment: 2x year      Family History   Problem Relation Age of Onset    Elevated Lipids Mother     Dementia Mother     Osteoporosis Mother     Lung Disease Father         copd    Delayed Awakening Father     Post-op Nausea/Vomiting Father     Hypertension Sister     Diabetes Sister         type 2    Breast Cancer Maternal Aunt         Current Facility-Administered Medications   Medication Dose Route Frequency    insulin glargine (LANTUS) injection 34 Units  34 Units SubCUTAneous DAILY    insulin lispro (HUMALOG) injection 16 Units  16 Units SubCUTAneous TIDAC    insulin NPH (NOVOLIN N, HUMULIN N) injection 14 Units  14 Units SubCUTAneous Q12H    And    methylPREDNISolone (PF) (SOLU-MEDROL) injection 30 mg  30 mg IntraVENous Q12H    insulin lispro (HUMALOG) injection   SubCUTAneous AC&HS    guaiFENesin ER (MUCINEX) tablet 1,200 mg  1,200 mg Oral BID    famotidine (PEPCID) tablet 20 mg  20 mg Oral BID    atorvastatin (LIPITOR) tablet 40 mg  40 mg Oral DAILY    zinc sulfate (ZINCATE) 220 (50) mg capsule 1 Cap  1 Cap Oral DAILY    sodium chloride (NS) flush 5-40 mL  5-40 mL IntraVENous Q8H    enoxaparin (LOVENOX) injection 40 mg  40 mg SubCUTAneous Q24H    ascorbic acid (vitamin C) (VITAMIN C) tablet 500 mg  500 mg Oral BID    aspirin delayed-release tablet 81 mg  81 mg Oral DAILY    calcium-vitamin D (OS-TRISTEN) 500 mg-200 unit tablet  1 Tab Oral DAILY    cetirizine (ZYRTEC) tablet 10 mg  10 mg Oral DAILY    therapeutic multivitamin (THERAGRAN) tablet 1 Tab  1 Tab Oral DAILY    levothyroxine (SYNTHROID) tablet 100 mcg  100 mcg Oral ACB    losartan (COZAAR) tablet 50 mg  50 mg Oral DAILY    montelukast (SINGULAIR) tablet 10 mg  10 mg Oral QHS    sertraline (ZOLOFT) tablet 50 mg  50 mg Oral DAILY    traZODone (DESYREL) tablet 100 mg  100 mg Oral QHS    ipratropium-albuterol (COMBIVENT RESPIMAT) 20 mcg-100 mcg inhalation spray  1 Puff Inhalation Q6H RT    fluticasone-vilanterol (BREO ELLIPTA) 200mcg-25mcg/puff  1 Puff Inhalation DAILY    pantoprazole (PROTONIX) tablet 40 mg  40 mg Oral ACB         Objective:   Vital Signs:    Visit Vitals  /63   Pulse 86   Temp 98.5 °F (36.9 °C)   Resp 20   Ht 5' 4\" (1.626 m)   Wt 87.7 kg (193 lb 4.8 oz)   SpO2 95%   BMI 33.18 kg/m²       O2 Device: Nasal cannula   O2 Flow Rate (L/min): 5 l/min   Temp (24hrs), Av °F (37.2 °C), Min:98.5 °F (36.9 °C), Max:99.4 °F (37.4 °C)       Intake/Output:   Last shift:       0701 -  1900  In: -   Out: 150 [Urine:150]  Last 3 shifts:  190 -  0700  In: 1400 [P.O.:1400]  Out: 1900 [Urine:1900]    Intake/Output Summary (Last 24 hours) at 2020 1040  Last data filed at 2020 0686  Gross per 24 hour   Intake 1280 ml   Output 1900 ml   Net -620 ml      Physical Exam:   General:  Alert, cooperative, no acute distress, appears stated age. Head:  Normocephalic, without obvious abnormality, atraumatic. Eyes:  Conjunctivae/corneas clear. EOMs intact. Nose: Nares normal. Septum midline. Mucosa normal. No drainage or sinus tenderness. Throat: Lips, mucosa, and tongue normal. Teeth and gums normal.   Neck: Supple, symmetrical, trachea midline, no adenopathy. Lungs:   CTAB. Resp nonlabored   Chest wall:  No tenderness or deformity. Heart:  Regular rate and rhythm, S1, S2 normal, no murmur, click, rub or gallop. Abdomen:   Soft, non-tender. Bowel sounds normal. No masses,  No organomegaly. Extremities: Extremities normal, atraumatic, no cyanosis or edema. Pulses: 2+ and symmetric all extremities.    Skin: Skin color, texture, turgor normal. No rashes or lesions   Lymph nodes: Cervical, supraclavicular nodes normal.   Neurologic: Grossly nonfocal         Data review:     Recent Results (from the past 24 hour(s))   GLUCOSE, POC    Collection Time: 20 11:07 AM   Result Value Ref Range    Glucose (POC) 318 (H) 65 - 100 mg/dL    Performed by Gosia Victoria Radha (PCT)    GLUCOSE, POC    Collection Time: 05/27/20  3:52 PM   Result Value Ref Range    Glucose (POC) 314 (H) 65 - 100 mg/dL    Performed by Veronica Williamson    GLUCOSE, POC    Collection Time: 05/27/20  9:21 PM   Result Value Ref Range    Glucose (POC) 161 (H) 65 - 100 mg/dL    Performed by [facility] [place]    METABOLIC PANEL, COMPREHENSIVE    Collection Time: 05/28/20  6:15 AM   Result Value Ref Range    Sodium 135 (L) 136 - 145 mmol/L    Potassium 3.9 3.5 - 5.1 mmol/L    Chloride 99 97 - 108 mmol/L    CO2 28 21 - 32 mmol/L    Anion gap 8 5 - 15 mmol/L    Glucose 235 (H) 65 - 100 mg/dL    BUN 29 (H) 6 - 20 MG/DL    Creatinine 0.84 0.55 - 1.02 MG/DL    BUN/Creatinine ratio 35 (H) 12 - 20      GFR est AA >60 >60 ml/min/1.73m2    GFR est non-AA >60 >60 ml/min/1.73m2    Calcium 8.0 (L) 8.5 - 10.1 MG/DL    Bilirubin, total 0.5 0.2 - 1.0 MG/DL    ALT (SGPT) 89 (H) 12 - 78 U/L    AST (SGOT) 39 (H) 15 - 37 U/L    Alk. phosphatase 69 45 - 117 U/L    Protein, total 5.5 (L) 6.4 - 8.2 g/dL    Albumin 2.8 (L) 3.5 - 5.0 g/dL    Globulin 2.7 2.0 - 4.0 g/dL    A-G Ratio 1.0 (L) 1.1 - 2.2     CBC WITH AUTOMATED DIFF    Collection Time: 05/28/20  6:15 AM   Result Value Ref Range    WBC 11.4 (H) 3.6 - 11.0 K/uL    RBC 3.78 (L) 3.80 - 5.20 M/uL    HGB 11.1 (L) 11.5 - 16.0 g/dL    HCT 33.9 (L) 35.0 - 47.0 %    MCV 89.7 80.0 - 99.0 FL    MCH 29.4 26.0 - 34.0 PG    MCHC 32.7 30.0 - 36.5 g/dL    RDW 13.2 11.5 - 14.5 %    PLATELET 269 438 - 572 K/uL    MPV 11.4 8.9 - 12.9 FL    NRBC 0.0 0  WBC    ABSOLUTE NRBC 0.00 0.00 - 0.01 K/uL    NEUTROPHILS 78 (H) 32 - 75 %    LYMPHOCYTES 15 12 - 49 %    MONOCYTES 5 5 - 13 %    EOSINOPHILS 0 0 - 7 %    BASOPHILS 0 0 - 1 %    IMMATURE GRANULOCYTES 2 (H) 0.0 - 0.5 %    ABS. NEUTROPHILS 8.9 (H) 1.8 - 8.0 K/UL    ABS. LYMPHOCYTES 1.8 0.8 - 3.5 K/UL    ABS. MONOCYTES 0.6 0.0 - 1.0 K/UL    ABS. EOSINOPHILS 0.0 0.0 - 0.4 K/UL    ABS. BASOPHILS 0.0 0.0 - 0.1 K/UL    ABS. IMM.  GRANS. 0.2 (H) 0.00 - 0.04 K/UL    DF AUTOMATED     MAGNESIUM    Collection Time: 05/28/20  6:15 AM   Result Value Ref Range    Magnesium 2.4 1.6 - 2.4 mg/dL   NT-PRO BNP    Collection Time: 05/28/20  6:15 AM   Result Value Ref Range    NT pro-BNP 47 <125 PG/ML   GLUCOSE, POC    Collection Time: 05/28/20  9:03 AM   Result Value Ref Range    Glucose (POC) 189 (H) 65 - 100 mg/dL    Performed by Lisa Vegas        Imaging:  I have personally reviewed the patients radiographs and have reviewed the reports:  CXR 5/18: Stable bilateral infiltrates  CXR 5/19:  No change in bilateral infiltrates  CXR 5/21: Slight improvement in bilateral infiltrates  CXR 5/26: Improved aeration with persistent interstitial prominence        Adrian Christopher, [address]

## 2020-05-28 NOTE — PROGRESS NOTES
Pt found off Caitlin and on room air with spo2 of 97%. RN documented room air at 1455. Caitlin pulse device removed from room. Will inform PAR.

## 2020-05-28 NOTE — PROGRESS NOTES
Spoke with Dr. Darby Cueva. Patient is to continue Caitlin until 72 hour carmelo.  Continue at 1L NC

## 2020-05-29 LAB
ALBUMIN SERPL-MCNC: 2.8 G/DL (ref 3.5–5)
ALBUMIN/GLOB SERPL: 1 {RATIO} (ref 1.1–2.2)
ALP SERPL-CCNC: 72 U/L (ref 45–117)
ALT SERPL-CCNC: 87 U/L (ref 12–78)
ANION GAP SERPL CALC-SCNC: 3 MMOL/L (ref 5–15)
AST SERPL-CCNC: 33 U/L (ref 15–37)
BASOPHILS # BLD: 0 K/UL (ref 0–0.1)
BASOPHILS NFR BLD: 0 % (ref 0–1)
BILIRUB SERPL-MCNC: 0.5 MG/DL (ref 0.2–1)
BNP SERPL-MCNC: 94 PG/ML
BUN SERPL-MCNC: 22 MG/DL (ref 6–20)
BUN/CREAT SERPL: 29 (ref 12–20)
CALCIUM SERPL-MCNC: 8.4 MG/DL (ref 8.5–10.1)
CHLORIDE SERPL-SCNC: 105 MMOL/L (ref 97–108)
CO2 SERPL-SCNC: 29 MMOL/L (ref 21–32)
COHGB MFR BLD: 1.6 % (ref 1–2)
COMMENT, HOLDF: NORMAL
CREAT SERPL-MCNC: 0.77 MG/DL (ref 0.55–1.02)
DIFFERENTIAL METHOD BLD: ABNORMAL
EOSINOPHIL # BLD: 0.1 K/UL (ref 0–0.4)
EOSINOPHIL NFR BLD: 0 % (ref 0–7)
ERYTHROCYTE [DISTWIDTH] IN BLOOD BY AUTOMATED COUNT: 13.2 % (ref 11.5–14.5)
GLOBULIN SER CALC-MCNC: 2.9 G/DL (ref 2–4)
GLUCOSE BLD STRIP.AUTO-MCNC: 183 MG/DL (ref 65–100)
GLUCOSE BLD STRIP.AUTO-MCNC: 198 MG/DL (ref 65–100)
GLUCOSE BLD STRIP.AUTO-MCNC: 316 MG/DL (ref 65–100)
GLUCOSE BLD STRIP.AUTO-MCNC: 78 MG/DL (ref 65–100)
GLUCOSE BLD STRIP.AUTO-MCNC: 82 MG/DL (ref 65–100)
GLUCOSE BLD STRIP.AUTO-MCNC: 89 MG/DL (ref 65–100)
GLUCOSE SERPL-MCNC: 87 MG/DL (ref 65–100)
HCT VFR BLD AUTO: 33.5 % (ref 35–47)
HGB BLD OXIMETRY-MCNC: 12.2 G/DL (ref 14–17)
HGB BLD-MCNC: 11.2 G/DL (ref 11.5–16)
IMM GRANULOCYTES # BLD AUTO: 0.2 K/UL (ref 0–0.04)
IMM GRANULOCYTES NFR BLD AUTO: 1 % (ref 0–0.5)
LYMPHOCYTES # BLD: 2.6 K/UL (ref 0.8–3.5)
LYMPHOCYTES NFR BLD: 16 % (ref 12–49)
MAGNESIUM SERPL-MCNC: 2.3 MG/DL (ref 1.6–2.4)
MCH RBC QN AUTO: 29.3 PG (ref 26–34)
MCHC RBC AUTO-ENTMCNC: 33.4 G/DL (ref 30–36.5)
MCV RBC AUTO: 87.7 FL (ref 80–99)
METHGB MFR BLD: 0.7 % (ref 0–1.4)
MONOCYTES # BLD: 1.1 K/UL (ref 0–1)
MONOCYTES NFR BLD: 7 % (ref 5–13)
NEUTS SEG # BLD: 12.3 K/UL (ref 1.8–8)
NEUTS SEG NFR BLD: 76 % (ref 32–75)
NRBC # BLD: 0 K/UL (ref 0–0.01)
NRBC BLD-RTO: 0 PER 100 WBC
OXYHGB MFR BLD: 96.3 % (ref 94–97)
PLATELET # BLD AUTO: 271 K/UL (ref 150–400)
PMV BLD AUTO: 10.9 FL (ref 8.9–12.9)
POTASSIUM SERPL-SCNC: 4 MMOL/L (ref 3.5–5.1)
PROT SERPL-MCNC: 5.7 G/DL (ref 6.4–8.2)
RBC # BLD AUTO: 3.82 M/UL (ref 3.8–5.2)
SAMPLES BEING HELD,HOLD: NORMAL
SAO2 % BLD: 99 % (ref 95–99)
SERVICE CMNT-IMP: ABNORMAL
SERVICE CMNT-IMP: NORMAL
SODIUM SERPL-SCNC: 137 MMOL/L (ref 136–145)
WBC # BLD AUTO: 16.2 K/UL (ref 3.6–11)

## 2020-05-29 PROCEDURE — 82962 GLUCOSE BLOOD TEST: CPT

## 2020-05-29 PROCEDURE — 85025 COMPLETE CBC W/AUTO DIFF WBC: CPT

## 2020-05-29 PROCEDURE — 65660000000 HC RM CCU STEPDOWN

## 2020-05-29 PROCEDURE — 74011250636 HC RX REV CODE- 250/636: Performed by: INTERNAL MEDICINE

## 2020-05-29 PROCEDURE — 80053 COMPREHEN METABOLIC PANEL: CPT

## 2020-05-29 PROCEDURE — 97116 GAIT TRAINING THERAPY: CPT

## 2020-05-29 PROCEDURE — 83880 ASSAY OF NATRIURETIC PEPTIDE: CPT

## 2020-05-29 PROCEDURE — 74011250637 HC RX REV CODE- 250/637: Performed by: INTERNAL MEDICINE

## 2020-05-29 PROCEDURE — 74011636637 HC RX REV CODE- 636/637: Performed by: FAMILY MEDICINE

## 2020-05-29 PROCEDURE — 94640 AIRWAY INHALATION TREATMENT: CPT

## 2020-05-29 PROCEDURE — 83735 ASSAY OF MAGNESIUM: CPT

## 2020-05-29 PROCEDURE — 77010033678 HC OXYGEN DAILY

## 2020-05-29 PROCEDURE — 82375 ASSAY CARBOXYHB QUANT: CPT

## 2020-05-29 PROCEDURE — 74011636637 HC RX REV CODE- 636/637: Performed by: STUDENT IN AN ORGANIZED HEALTH CARE EDUCATION/TRAINING PROGRAM

## 2020-05-29 PROCEDURE — 97530 THERAPEUTIC ACTIVITIES: CPT

## 2020-05-29 PROCEDURE — 74011250637 HC RX REV CODE- 250/637: Performed by: STUDENT IN AN ORGANIZED HEALTH CARE EDUCATION/TRAINING PROGRAM

## 2020-05-29 PROCEDURE — 36415 COLL VENOUS BLD VENIPUNCTURE: CPT

## 2020-05-29 PROCEDURE — 74011250636 HC RX REV CODE- 250/636: Performed by: STUDENT IN AN ORGANIZED HEALTH CARE EDUCATION/TRAINING PROGRAM

## 2020-05-29 RX ORDER — INSULIN LISPRO 100 [IU]/ML
10 INJECTION, SOLUTION INTRAVENOUS; SUBCUTANEOUS
Status: DISCONTINUED | OUTPATIENT
Start: 2020-05-29 | End: 2020-05-29

## 2020-05-29 RX ORDER — INSULIN LISPRO 100 [IU]/ML
10 INJECTION, SOLUTION INTRAVENOUS; SUBCUTANEOUS ONCE
Status: COMPLETED | OUTPATIENT
Start: 2020-05-29 | End: 2020-05-29

## 2020-05-29 RX ORDER — INSULIN LISPRO 100 [IU]/ML
8 INJECTION, SOLUTION INTRAVENOUS; SUBCUTANEOUS
Status: DISCONTINUED | OUTPATIENT
Start: 2020-05-29 | End: 2020-05-30 | Stop reason: HOSPADM

## 2020-05-29 RX ORDER — INSULIN LISPRO 100 [IU]/ML
8 INJECTION, SOLUTION INTRAVENOUS; SUBCUTANEOUS
Status: DISCONTINUED | OUTPATIENT
Start: 2020-05-29 | End: 2020-05-29

## 2020-05-29 RX ORDER — METHYLPREDNISOLONE 4 MG/1
16 TABLET ORAL EVERY 12 HOURS
Status: DISCONTINUED | OUTPATIENT
Start: 2020-05-30 | End: 2020-05-30 | Stop reason: HOSPADM

## 2020-05-29 RX ORDER — METHYLPREDNISOLONE 4 MG/1
16 TABLET ORAL 2 TIMES DAILY
Status: DISCONTINUED | OUTPATIENT
Start: 2020-05-30 | End: 2020-05-29

## 2020-05-29 RX ADMIN — OXYCODONE HYDROCHLORIDE AND ACETAMINOPHEN 500 MG: 500 TABLET ORAL at 21:31

## 2020-05-29 RX ADMIN — ZINC SULFATE 220 MG (50 MG) CAPSULE 1 CAPSULE: CAPSULE at 09:21

## 2020-05-29 RX ADMIN — ASPIRIN 81 MG: 81 TABLET, COATED ORAL at 09:22

## 2020-05-29 RX ADMIN — GUAIFENESIN 1200 MG: 600 TABLET, EXTENDED RELEASE ORAL at 21:31

## 2020-05-29 RX ADMIN — INSULIN LISPRO 3 UNITS: 100 INJECTION, SOLUTION INTRAVENOUS; SUBCUTANEOUS at 12:06

## 2020-05-29 RX ADMIN — Medication 10 ML: at 05:52

## 2020-05-29 RX ADMIN — OXYCODONE HYDROCHLORIDE AND ACETAMINOPHEN 500 MG: 500 TABLET ORAL at 09:21

## 2020-05-29 RX ADMIN — FAMOTIDINE 20 MG: 20 TABLET, FILM COATED ORAL at 09:22

## 2020-05-29 RX ADMIN — ENOXAPARIN SODIUM 40 MG: 40 INJECTION SUBCUTANEOUS at 21:31

## 2020-05-29 RX ADMIN — MONTELUKAST SODIUM 10 MG: 10 TABLET, FILM COATED ORAL at 21:31

## 2020-05-29 RX ADMIN — INSULIN LISPRO 10 UNITS: 100 INJECTION, SOLUTION INTRAVENOUS; SUBCUTANEOUS at 18:10

## 2020-05-29 RX ADMIN — SERTRALINE HYDROCHLORIDE 50 MG: 50 TABLET ORAL at 09:22

## 2020-05-29 RX ADMIN — INSULIN HUMAN 8 UNITS: 100 INJECTION, SUSPENSION SUBCUTANEOUS at 18:10

## 2020-05-29 RX ADMIN — FAMOTIDINE 20 MG: 20 TABLET, FILM COATED ORAL at 21:31

## 2020-05-29 RX ADMIN — Medication 10 ML: at 21:31

## 2020-05-29 RX ADMIN — OYSTER SHELL CALCIUM WITH VITAMIN D 1 TABLET: 500; 200 TABLET, FILM COATED ORAL at 09:22

## 2020-05-29 RX ADMIN — GUAIFENESIN 1200 MG: 600 TABLET, EXTENDED RELEASE ORAL at 09:21

## 2020-05-29 RX ADMIN — PANTOPRAZOLE SODIUM 40 MG: 40 TABLET, DELAYED RELEASE ORAL at 05:52

## 2020-05-29 RX ADMIN — Medication 10 ML: at 18:12

## 2020-05-29 RX ADMIN — CETIRIZINE HYDROCHLORIDE 10 MG: 10 TABLET, FILM COATED ORAL at 09:22

## 2020-05-29 RX ADMIN — METHYLPREDNISOLONE 16 MG: 4 TABLET ORAL at 09:20

## 2020-05-29 RX ADMIN — IPRATROPIUM BROMIDE AND ALBUTEROL 1 PUFF: 20; 100 SPRAY, METERED RESPIRATORY (INHALATION) at 01:35

## 2020-05-29 RX ADMIN — IPRATROPIUM BROMIDE AND ALBUTEROL 1 PUFF: 20; 100 SPRAY, METERED RESPIRATORY (INHALATION) at 08:45

## 2020-05-29 RX ADMIN — INSULIN LISPRO 8 UNITS: 100 INJECTION, SOLUTION INTRAVENOUS; SUBCUTANEOUS at 18:10

## 2020-05-29 RX ADMIN — INSULIN GLARGINE 34 UNITS: 100 INJECTION, SOLUTION SUBCUTANEOUS at 09:20

## 2020-05-29 RX ADMIN — ATORVASTATIN CALCIUM 40 MG: 20 TABLET, FILM COATED ORAL at 09:22

## 2020-05-29 RX ADMIN — THERA TABS 1 TABLET: TAB at 09:22

## 2020-05-29 RX ADMIN — IPRATROPIUM BROMIDE AND ALBUTEROL 1 PUFF: 20; 100 SPRAY, METERED RESPIRATORY (INHALATION) at 15:42

## 2020-05-29 RX ADMIN — FLUTICASONE FUROATE AND VILANTEROL TRIFENATATE 1 PUFF: 200; 25 POWDER RESPIRATORY (INHALATION) at 08:46

## 2020-05-29 RX ADMIN — IPRATROPIUM BROMIDE AND ALBUTEROL 1 PUFF: 20; 100 SPRAY, METERED RESPIRATORY (INHALATION) at 21:34

## 2020-05-29 RX ADMIN — LEVOTHYROXINE SODIUM 100 MCG: 0.1 TABLET ORAL at 05:51

## 2020-05-29 RX ADMIN — LOSARTAN POTASSIUM 50 MG: 50 TABLET, FILM COATED ORAL at 09:22

## 2020-05-29 RX ADMIN — TRAZODONE HYDROCHLORIDE 100 MG: 100 TABLET ORAL at 21:31

## 2020-05-29 NOTE — PROGRESS NOTES
1915: Verbal shift change report given to Rodney Dominguez RN (oncoming nurse) by [facility] (offgoing nurse). Report included the following information SBAR, Kardex, Intake/Output, MAR and Recent Results. 2000: Pt  per MD request. Will recheck again prior to bedtime Humalog dose. 2215: Pt called out stating she feels her BG getting low. 2115 BG was 89. Rechecked now and it was 82. Pt recalled that lowest BG for her was in the high 70s. Advised patient to take small sips of applejuice. Will continue to monitor. 0720: Verbal shift change report given to Joseph Vargas (oncoming nurse) by Rodney Dominguez RN (offgoing nurse). Report included the following information SBAR, Kardex, Intake/Output, MAR and Recent Results.

## 2020-05-29 NOTE — PROGRESS NOTES
Shift Change:    Bedside and Verbal shift change report given to Jose Patel RN (oncoming nurse) by [facility], RN (offgoing nurse). Report included the following information SBAR, Kardex, MAR, Recent Results and Cardiac Rhythm NSR with PVC's .       0815: Family practice aware BG 78. Scheduled NPH and Lantus given still given per MD order. 1700: Spoke with Dr. Delfino Ball with Family Practice about insulin orders. Medrol and insulin NPH are a linked order so when one is scanned and given the other shows up as see alternative. Medrol and NPH were given at the same time this AM. Will pass on to night shift to notify MD that NPH was given with Medrol per Dr. Delfino Ball.     1840: NPH and Medrol given together along with a total of 28 units Humalog. Dr. Delfino Ball aware. Will have night shift recheck blood sugar early       Shift Change:    Bedside and Verbal shift change report given to [facility], RN (oncoming nurse) by Jose Patel RN (offgoing nurse). Report included the following information SBAR, Kardex, MAR, Recent Results and Cardiac Rhythm NSR.

## 2020-05-29 NOTE — PROGRESS NOTES
Pulse Nitric changed at 05:00  Patient on 2L NC spo2 97  S/N [id number]  Cannula lot #[id number]  Cartilage identifier [id number]  Lot N : [id number]

## 2020-05-29 NOTE — DISCHARGE SUMMARY
[address] [place] [address]   Office [phone]  Fax [phone]       Discharge / Transfer / Off-Service Note     Name: Ivelisse Carlton MRN: [id number]  Sex: Female   YOB: 1945  Age: 69 y.o. PCP: Nima Umanzor MD     Date of admission: 5/10/2020  Date of discharge/transfer: 5/30/2020    Attending physician at admission: Madeleine George Attending physician at discharge/transfer: Nasir Edge  Resident physician at discharge/transfer: Rodríguez Farias MD     Consultants during hospitalization  IP CONSULT TO PULMONOLOGY  IP CONSULT TO [facility] - PROVIDER     Admission diagnoses   Respiratory failure with hypoxia ([address] [J96.91]    Recommended follow-up after discharge    1. Nancie Gomez MD  2. Pulmonology- Michael Most     Things to follow up on with PCP:   - Checking your blood sugar twice a day, in the morning before breakfast and before dinner. If your fasting blood sugar is over 250 please call your doctor for further guidance. - A1C in 3 months.   ----------------------------------------------------------------------------------------------------------------------------  You are well enough to be discharged from the hospital. However since you are still testing positive for Coronavirus we recommend that you PLEASE stay inside and quarantine for 14 days to prevent further spread of the coronavirus.  ------------------------------------------------------------------------------------------------------------------    Medication Changes:  START  1. Methylprednisolone 4mg tablets taper  Take 4 tablets twice a day for 2 days, followed by 3 tablets twice a day for 3 days, followed by 2 tablets twice a day for 3 days, followed by 1 tablet twice a day for 3 days, followed by 1 tablet daily for 3 days.       2. Ascorbic acid (Vitamin C) 500mg twice a day     3. Zinc Sulfate 22 (50) mg once daily     4.  Insulin Glargine (Lantus) 34 Units daily     Checking your Blood sugar twice a day, before breakfast and before dinner.     STOP  1. Atenolol 25mg nightly     2. Glipizide SR 10mg CR tablet twice a day     No other changes were made to your medications, please take all your other home medicines as previously prescribed. History of Present Illness    As per admitting provider, Dr. Marcelino Carter:   \"69year-old female with a past medical history significant for type 2 diabetes mellitus, hypothyroidism, obstructive sleep apnea, Asthma, Arthritis, diabetic peripheral neuropathy, primary hypertension, gastroesophageal reflux disease and recently diagnosed COVID-19 infection on 05/04/2020 via her primary care physician's office, was brought to the emergency room from home for an approximately 24-hour history of worsening shortness of breath and low oxygen saturation. Patient stated that on being diagnosed with the infection on 05/04/2020, was given a prescription for Zithromax and hydroxychloroquine by her primary care physician which she completed on this day of emergency room presentation of 05/10/2020. However, patient stated that on awakening on the morning of emergency room presentation, felt  persistently short of breath with oxygen saturation recorded in the 70s and 80s only. Patient denied associated headaches, dizziness, visual deficits, chest pains, palpitations, sore throat, cough, fevers, chills, abdominal pain, bladder or bowel irregularities. [facility]  Edvin Gonzalez a 69 y. o. female with history of HTN, type 2DM, GERD, hypothyroidism, depression, asthma, and hyperlipidemia who is admitted for AHRF 2/2 COVID 19.        AHRF 2/2 COVID 19: COVID+ on 5/4 outpatient. PCP treated with 5 days of azithro + plaquenil ending 5/10. No home O2 requirement. COVID inflammatory markers have normalized after substantial peak on ~5/14. Actemra given x1 on 5/15 with screening Quant-gold, HIV, hepatitis panel Negative.  S/p Doxycycline 8 day course (5/12-5/19). Pt required a prolonged course of Hi flow oxygen leading to BiPAP on 5/19-5/20 which was slowly weaned back down over many days. Bcx NG 6 days. She was also treated with Bumex for diuresis while on high flow oxygen as well as high dose methylpresnisolone. A clinical trial of Caitlin was conducted on 5/12-5/16 (held due to hi flow O2 requirement) and 5/26-5/29. At the time of discharge the pt maintained adequate saturation on room air however would dip into high 80's on exertion.   - START Methylprednisolone 4mg tablets taper. Take 4 tablets BID for 2 days, followed by 3 tablets BID for 3 days, followed by 2 tablets BID for 3 days, followed by 1 tablet BID for 3 days, followed by 1 tablet daily for 3 days.    - START  Ascorbic acid (Vitamin C) 500mg twice a day  - START  Zinc Sulfate 22 (50) mg once daily  - Supplemental O2 via oxygen concentrator provided at discharge     - Pt to purchase Pulse Ox and use O2 to maintain sats >90%  - Follow up with your pulmonologist Dr. Essence Thomas positive 5/28. Continue to quarantine for 14 mores days from discharge. Type 2 DM: HgA1C 10.4 in 11/2019. This admission A1C 13.1. The patient's BG's have been very sensitive to additional high dose methylprednisolone.   - STOP home glipizide  - START Lantus 34U   - Check your Blood sugar twice a day, before breakfast and before dinner.  - Repeat A1C in 3 months     Sinus Arrhythmia- Intermittent on tele, many PACs, leading to bradycardia.  POA EKG with Left anterior fascicular block, Left ventricular hypertrophy.  - STOP Atenolol 25mg qhs    Asthma  - Continue Singulair 10mg qhs and albuterol 2 puffs Q4H prn for wheezing     HTN  - Continue home BP med losartan 50mg daily  - STOP atenolol 25mg qhs      Hypothyroid: TSH 3.570 in 11/2019  - Continue home synthroid 100mcg daily     Depression  - Continue Zoloft 50mg daily and trazadone 100mg qhs to help with sleep     GERD-   - Continue home med of prilosec     Hyperlipidemia: Lipid panel , , HDL 59, LDL 89 in 11/2019  - Lipitor 40mg daily for additional anti-inflammatory benefit     Hypokalemia: RESOLVED. Likely 2/2 diarrhea prior to arrival and diuretics inpatient. K 2.7 on admission.        Condition at discharge: Stable on room air    Labs  Recent Labs     05/30/20  0234 05/29/20  0128 05/28/20  0615   WBC 11.1* 16.2* 11.4*   HGB 10.6* 11.2* 11.1*   HCT 31.9* 33.5* 33.9*    271 194     Recent Labs     05/30/20  0234 05/29/20  0128 05/28/20  0615    137 135*   K 4.4 4.0 3.9    105 99   CO2 26 29 28   BUN 24* 22* 29*   CREA 0.69 0.77 0.84   * 87 235*   CA 8.0* 8.4* 8.0*   MG 2.4 2.3 2.4     Recent Labs     05/30/20  0234 05/29/20  0128 05/28/20  0615   ALT 96* 87* 89*   AP 62 72 69   TBILI 0.4 0.5 0.5   TP 5.2* 5.7* 5.5*   ALB 2.6* 2.8* 2.8*   GLOB 2.6 2.9 2.7     Recent Labs     05/30/20  1207 05/30/20  0854 05/29/20  2216 05/29/20  2114 05/29/20 2002   GLUCPOC 170* 113* 82 89 198*       Micro:  Lab Results   Component Value Date/Time    Culture result: NO GROWTH 6 DAYS 05/13/2020 04:29 AM    Culture result:  05/11/2020 12:35 AM     MRSA NOT PRESENT. Apparent Staphylococus aureus (not MRSA noted). Culture result:  05/11/2020 12:35 AM         Screening of patient nares for MRSA is for surveillance purposes and, if positive, to facilitate isolation considerations in high risk settings. It is not intended for automatic decolonization interventions per se as regimens are not sufficiently effective to warrant routine use. Imaging:  Xr Chest Port    Result Date: 5/26/2020  INDICATION: . f/u COVID pna Additional history: COMPARISON: Previous chest xray, 5/21/2020. LIMITATIONS: Portable technique. Venus Ryan FINDINGS: Single frontal view of the chest. . Lines/tubes/surgical: Cardiac monitor leads overly the patient. Heart/mediastinum: Unremarkable. Lungs/pleura: Improved aeration with persistent interstitial prominence.  No visualized pleural effusion or pneumothorax. Additional Comments: Degenerative changes in the acromioclavicular joints. Degenerative changes in the spine. .    IMPRESSION: 1. Improved aeration. Xr Chest Port    Result Date: 5/21/2020  Portable chest single view dated 5/21/2020 Comparison chest dated 5/19/2020 History is on BiPAP/high flow A single portable AP upright view of the chest was obtained. The cardiac silhouette appears to be slightly enlarged. There is abnormal alveolar opacity involving both lungs. There has been slight interval improvement since the previous examination. Specifically, less hazy density is noted bilaterally. IMPRESSION: Slight interval improvement of the previously described bilateral infiltration. Xr Chest Port    Result Date: 5/19/2020  Portable chest single view dated 5/19/2020 Comparison chest dated 5/16/2020 History is high flow oxygen requirement A single portable AP upright view of the chest was obtained. The cardiac silhouette continues to be enlarged. The previously described bilateral infiltrates are again identified. IMPRESSION: Continued evidence of bilateral infiltration. Xr Chest Port    Result Date: 5/16/2020  INDICATION:  increased O2 requirements EXAM: Chest single view. COMPARISON: 5/15/2020. FINDINGS: A single frontal view of the chest at 1741 hours shows moderately severe bilateral lung infiltrates, stable to slightly improved since the previous study. .  The heart, mediastinum and pulmonary vasculature are stable . The bony thorax is unremarkable for age. Scar Hilt IMPRESSION: Stable to slightly improved moderately severe bilateral lung infiltrates. .  .      Xr Chest Port    Result Date: 5/15/2020  PORTABLE CHEST RADIOGRAPH/S: 5/15/2020 1:08 PM Clinical history: covid positive, worsening ards INDICATION:   covid positive, worsening ards COMPARISON: 12/12/2020 FINDINGS: AP portable upright view of the chest demonstrates a stable  cardiopericardial silhouette. The lungs are adequately expanded. Increased peripheral parenchymal opacities greater on the left than on the right. Increased interstitial opacity as well. . The osseous structures are unremarkable. Patient is on a cardiac monitor. IMPRESSION: Interval progression of extensive bilateral parenchymal disease greater on the left than on the right. Xr Chest Port    Result Date: 5/12/2020  Portable chest single view dated 5/12/2020 Comparison chest dated 5/10/2020 History is: Covid. A single portable AP upright view of the chest was obtained. Abnormal alveolar opacity is noted bilaterally. This represents a significant change since the previous examination. IMPRESSION: Presence of bilateral infiltration. Xr Chest Port    Result Date: 5/10/2020  EXAM: XR CHEST PORT INDICATION: sob, +COVID COMPARISON: 2013 FINDINGS: A portable AP radiograph of the chest was obtained at 2145 hours. The patient is on a cardiac monitor. There is opacification in the lower lobes bilaterally, left greater than right. The cardiac and mediastinal contours and pulmonary vascularity are normal.  The bones and soft tissues are grossly within normal limits. IMPRESSION: Opacification in the lower lobes bilaterally, left greater than right. Xr Us Guided Vascular Access    Result Date: 5/18/2020  INDICATION:   NO VENOUS ACCESS  EXAMINATION:  US GUIDE VAS ACCESS FINDINGS: Ultrasound was provided for Midline placement. IMPRESSION: Ultrasound guidance for Midline  placement.  GBP      Procedures / Diagnostic Studies  · COVID Testing 5/28- Positive    Chronic diagnoses   Problem List as of 5/30/2020 Date Reviewed: 5/10/2020          Codes Class Noted - Resolved    * (Principal) COVID-19 ICD-10-CM: U07.1  ICD-9-CM: 079.89  5/16/2020 - Present        Respiratory failure with hypoxia ([facility] ICD-10-CM: J96.91  ICD-9-CM: 518.81  5/10/2020 - Present        Type 2 diabetes with nephropathy ([facility] ICD-10-CM: E11.21  ICD-9-CM: 250.40, 583.81  10/8/2019 - Present        Severe obesity ([facility] ICD-10-CM: E66.01  ICD-9-CM: 278.01  2/13/2019 - Present        Dry cough ICD-10-CM: R05  ICD-9-CM: 786.2  11/14/2011 - Present        Pneumonia, organism unspecified(486) ICD-10-CM: J18.9  ICD-9-CM: 450  11/14/2011 - Present        Diabetes mellitus ([facility] ICD-10-CM: E11.9  ICD-9-CM: 250.00  11/14/2011 - Present        Unspecified essential hypertension ICD-10-CM: I10  ICD-9-CM: 401.9  11/14/2011 - Present        Fatigue ICD-10-CM: R53.83  ICD-9-CM: 780.79  11/14/2011 - Present        Hypothyroidism ICD-10-CM: E03.9  ICD-9-CM: 244.9  11/14/2011 - Present        Obstructive sleep apnea (adult) (pediatric) ICD-10-CM: G47.33  ICD-9-CM: 327.23  11/14/2011 - Present        Esophageal reflux ICD-10-CM: K21.9  ICD-9-CM: 530.81  11/14/2011 - Present              Discharge Medication List as of 5/30/2020  4:44 PM      START taking these medications    Details   ascorbic acid, vitamin C, (VITAMIN C) 500 mg tablet Take 1 Tab by mouth two (2) times a day for 14 days. , Print, Disp-28 Tab, R-0      zinc sulfate (ZINCATE) 220 (50) mg capsule Take 1 Cap by mouth daily. , Print, Disp-14 Cap, R-0      insulin glargine (LANTUS,BASAGLAR) 100 unit/mL (3 mL) inpn 34 Units by SubCUTAneous route daily. , Print, Disp-5 Pen, R-0      methylPREDNISolone (MEDROL) 4 mg tab Take 4 tablets twice a day for 2 days, followed by 3 tablets twice a day for 3 days, followed by 2 tablets twice a day for 3 days, followed by 1 tablet twice a day for 3 days, followed by 1 tablet daily for 3 days, Print, Disp-55 Tab, R-0         CONTINUE these medications which have CHANGED    Details   albuterol (ProAir HFA) 90 mcg/actuation inhaler Take 2 Puffs by inhalation every four (4) hours as needed for Wheezing., Print, Disp-1 Inhaler, R-0      lancets misc Please check you blood glucose before breakfast and before dinner daily, Print, Disp-100 Each, R-11      glucose blood VI test strips (blood glucose test) strip Please check blood glucose before breakfast and before dinner daily, Print, Disp-100 Strip, R-0      Blood-Glucose Meter (Blood Glucose Monitoring) monitoring kit Please check your blood glucose before breakfast and before dinner daily, Normal, Disp-1 Kit, R-0         CONTINUE these medications which have NOT CHANGED    Details   guaiFENesin ER (Mucinex) 600 mg ER tablet Take 600 mg by mouth two (2) times a day., Historical Med      omeprazole (PRILOSEC) 20 mg capsule Take 20 mg by mouth daily. , Historical Med      calcium-vitamin D (OYSTER SHELL) 500 mg(1,250mg) -200 unit per tablet Take 1 Tab by mouth daily. , Historical Med      traZODone (DESYREL) 100 mg tablet TAKE 1 TABLET BY MOUTH EVERY DAY AT NIGHT, Normal, Disp-90 Tab, R-1      simvastatin (ZOCOR) 10 mg tablet Take 1 Tab by mouth nightly., Normal, Disp-90 Tab, R-3      losartan (COZAAR) 50 mg tablet TAKE 1 TABLET BY MOUTH EVERY DAY., Normal, Disp-90 Tab, R-1      levothyroxine (SYNTHROID) 100 mcg tablet Take 1 Tab by mouth Daily (before breakfast). , Normal, Disp-90 Tab, R-3      sertraline (ZOLOFT) 50 mg tablet TAKE 1 TABLET BY MOUTH EVERY DAY, Normal, Disp-90 Tab, R-1      aspirin delayed-release 81 mg tablet Take 81 mg by mouth daily. , Historical Med      fluticasone (FLONASE) 50 mcg/actuation nasal spray 2 Sprays by Both Nostrils route daily as needed for Rhinitis or Allergies. , Historical Med      Cetirizine (ZYRTEC) 10 mg cap Take 1 Tab by mouth every evening., Historical Med      multivitamins-minerals-lutein (CENTRUM SILVER) tab Take 1 Tab by mouth daily. , Historical Med      glucosamine-chondroit-vit c-mn (GLUCOSAMINE CHONDROITIN MAXSTR) 500-400 mg capsule Take 1 Cap by mouth daily. , Historical Med      varicella-zoster recombinant, PF, (SHINGRIX, PF,) 50 mcg/0.5 mL susr injection 0.5mL by IntraMUSCular route once now and then repeat in 2-6 months, Print, Disp-0.5 mL, R-1         STOP taking these medications       glipiZIDE SR (GLUCOTROL XL) 10 mg CR tablet Comments:   Reason for Stopping:         folic acid/multivit-min/lutein (CENTRUM SILVER PO) Comments:   Reason for Stopping:         atenolol (TENORMIN) 25 mg tablet Comments:   Reason for Stopping:         montelukast (SINGULAIR) 10 mg tablet Comments:   Reason for Stopping:         CALCIUM CARB/VIT D3/MINERALS (CALCIUM-VITAMIN D PO) Comments:   Reason for Stopping:                Diet:  Diabetic diet. Activity:  As tolerated with supplemental O2 as needed to maintain sats >90%    Disposition: Home or Self Care    Discharge instructions to patient/family  Please seek medical attention for any new or worsening symptoms particularly fever, chest pain, shortness of breath, abdominal pain, nausea, vomiting    Follow up plans/appointments  Follow-up Information     Follow up With Specialties Details Why Contact Info    Lamont Ybarra MD Family Practice Call on 6/1/2020 @ 3PM for transition of care Virtual Visit.  Please call a day in advance to set up My Chart for Video Call. [facility]      Hilary Servin MD Pulmonary Disease, Critical Care Medicine Schedule an appointment as soon as possible for a visit Please follow up regarding your asthma and after COVID respiratory care [address] [place] [address]             Marciano Stark MD  Family Medicine Resident       For Billing    Chief Complaint   Patient presents with    O2/Oxygen     low    Concern For COVID-19 [facility]       Hospital Problems  Date Reviewed: 5/10/2020          Codes Class Noted POA    * (Principal) COVID-19 ICD-10-CM: U07.1  ICD-9-CM: 079.89  5/16/2020 Unknown        Respiratory failure with hypoxia [facility]) ICD-10-CM: J96.91  ICD-9-CM: 518.81  5/10/2020 Yes        Severe obesity ([facility] ICD-10-CM: E66.01  ICD-9-CM: 278.01  2/13/2019 Yes        Diabetes mellitus ([facility] ICD-10-CM: E11.9  ICD-9-CM: 250.00  11/14/2011 Yes        Hypothyroidism ICD-10-CM: E03.9  ICD-9-CM: 244.9  11/14/2011 Yes

## 2020-05-29 NOTE — DIABETES MGMT
LUZ Alaniz 53 SPECIALIST   Follow-up Progress Note    Presentation   Lalit Hughes a 69 y. o. female admitted due to COVID-19 positive and was having worsening shortness of breath.  Patient was admitted 5/11/20 to ICU and transferred to stepdown today.  PMH is diabetes, hypothyroidism, HTN, neuropathy, LORENZO, obesity, asthma, depression and chronic pain.  Patient remains on isolation precautions due to COVID-19.       Patient has known Type 2 diabetes, diagnosed approximately 20 years ago.  Home medication regime is Glipizide 10 mg twice a day.  A1c 5/11/20: 13.1%     Consulted by Provider for advanced specialty nursing care related to inpatient diabetes management. Hyperglycemia management order set is in place. Subjective   \"I'm okay. \"    Patient remains on isolation precautions due to COVID-19 positive. Called patient's phone in room. Objective   Physical exam    General Alert, oriented. Conversant and cooperative  Vital Signs   Visit Vitals  /52 (BP 1 Location: Right arm, BP Patient Position: At rest)   Pulse 78   Temp 99.3 °F (37.4 °C)   Resp 18   Ht 5' 4\" (1.626 m)   Wt 83.7 kg (184 lb 8 oz)   SpO2 94%   BMI 31.67 kg/m²     Physical Exam deferred due to patient on isolation precautions for COVID-19.      Laboratory  Recent Results (from the past 24 hour(s))   GLUCOSE, POC    Collection Time: 05/28/20  4:18 PM   Result Value Ref Range    Glucose (POC) 220 (H) 65 - 100 mg/dL    Performed by Anais Bay (PCT)    GLUCOSE, POC    Collection Time: 05/28/20  9:31 PM   Result Value Ref Range    Glucose (POC) 223 (H) 65 - 100 mg/dL    Performed by REYES DARIUS (PCT)    CBC WITH AUTOMATED DIFF    Collection Time: 05/29/20  1:28 AM   Result Value Ref Range    WBC 16.2 (H) 3.6 - 11.0 K/uL    RBC 3.82 3.80 - 5.20 M/uL    HGB 11.2 (L) 11.5 - 16.0 g/dL    HCT 33.5 (L) 35.0 - 47.0 %    MCV 87.7 80.0 - 99.0 FL    MCH 29.3 26.0 - 34.0 PG    MCHC 33.4 30.0 - 36.5 g/dL    RDW 13.2 11.5 - 14.5 % PLATELET 606 144 - 256 K/uL    MPV 10.9 8.9 - 12.9 FL    NRBC 0.0 0  WBC    ABSOLUTE NRBC 0.00 0.00 - 0.01 K/uL    NEUTROPHILS 76 (H) 32 - 75 %    LYMPHOCYTES 16 12 - 49 %    MONOCYTES 7 5 - 13 %    EOSINOPHILS 0 0 - 7 %    BASOPHILS 0 0 - 1 %    IMMATURE GRANULOCYTES 1 (H) 0.0 - 0.5 %    ABS. NEUTROPHILS 12.3 (H) 1.8 - 8.0 K/UL    ABS. LYMPHOCYTES 2.6 0.8 - 3.5 K/UL    ABS. MONOCYTES 1.1 (H) 0.0 - 1.0 K/UL    ABS. EOSINOPHILS 0.1 0.0 - 0.4 K/UL    ABS. BASOPHILS 0.0 0.0 - 0.1 K/UL    ABS. IMM. GRANS. 0.2 (H) 0.00 - 0.04 K/UL    DF AUTOMATED     MAGNESIUM    Collection Time: 05/29/20  1:28 AM   Result Value Ref Range    Magnesium 2.3 1.6 - 2.4 mg/dL   METABOLIC PANEL, COMPREHENSIVE    Collection Time: 05/29/20  1:28 AM   Result Value Ref Range    Sodium 137 136 - 145 mmol/L    Potassium 4.0 3.5 - 5.1 mmol/L    Chloride 105 97 - 108 mmol/L    CO2 29 21 - 32 mmol/L    Anion gap 3 (L) 5 - 15 mmol/L    Glucose 87 65 - 100 mg/dL    BUN 22 (H) 6 - 20 MG/DL    Creatinine 0.77 0.55 - 1.02 MG/DL    BUN/Creatinine ratio 29 (H) 12 - 20      GFR est AA >60 >60 ml/min/1.73m2    GFR est non-AA >60 >60 ml/min/1.73m2    Calcium 8.4 (L) 8.5 - 10.1 MG/DL    Bilirubin, total 0.5 0.2 - 1.0 MG/DL    ALT (SGPT) 87 (H) 12 - 78 U/L    AST (SGOT) 33 15 - 37 U/L    Alk. phosphatase 72 45 - 117 U/L    Protein, total 5.7 (L) 6.4 - 8.2 g/dL    Albumin 2.8 (L) 3.5 - 5.0 g/dL    Globulin 2.9 2.0 - 4.0 g/dL    A-G Ratio 1.0 (L) 1.1 - 2.2     NT-PRO BNP    Collection Time: 05/29/20  1:28 AM   Result Value Ref Range    NT pro-BNP 94 <125 PG/ML   SAMPLES BEING HELD    Collection Time: 05/29/20  1:28 AM   Result Value Ref Range    SAMPLES BEING HELD 1DRK GRN     COMMENT        Add-on orders for these samples will be processed based on acceptable specimen integrity and analyte stability, which may vary by analyte.    CARBOXY HEMOGLOBIN    Collection Time: 05/29/20  2:00 AM   Result Value Ref Range    Carboxy-Hgb 1.6 1 - 2 %    Methemoglobin 0.7 0 - 1.4 %    tHb 12.2 (L) 14 - 17 g/dL    Oxyhemoglobin 96.3 94 - 97 %    O2 SATURATION 99 95 - 99 %   GLUCOSE, POC    Collection Time: 05/29/20  8:06 AM   Result Value Ref Range    Glucose (POC) 78 65 - 100 mg/dL    Performed by Vickie Woodruff, POC    Collection Time: 05/29/20 11:50 AM   Result Value Ref Range    Glucose (POC) 183 (H) 65 - 100 mg/dL    Performed by King Mendes         Blood glucose pattern        Evaluation   KPZU 38 year old female with known diabetes hasn't achieved inpatient blood glucose target of 140-180mg/dl. BG have ranged 78 - 223 over the past 24 hours. Patient continues to get Solu-Medrol 40 mg twice a day with meals.      Basal insulin is in use.  Lantus 34 units per day is current order. [address] 8 units twice a day, given with Solu-Medrol ordered, which has been weaned down with the Solu-Medrol.         Bolus insulin is in use. Humalog 8 units with meals ordered. Patient is eating meals.  Diabetic CHO consistent diet ordered.      Corrective insulin is in use.  Patient has required 12 units corrective insulin in the past 24 hours.      Inpatient blood glucose management has been impacted by     [x]? ?        Glucocorticoid use    Impression:  While patient still has BG outside of target, the steroids are being weaned down as well as the NPH. This is appropriate as the NPH was used to counter act the hyperglycemia from the steroids. Continuing with the current Lantus, meal time and corrective insulin orders is appropriate and will be needed during admission even after steroids are discontinued. Given patient's A1c on 5/11/20 of 13.1%, patient did not have good control with only Glipizide 10 mg twice daily. Continuing Lantus at discharge has a higher likelihood of restoring insulin secretion over time.   Patient's renal function is good with a GFR > 60; therefore, it is likely that patient could benefit from adding Metformin, in addition to the Lantus, to help decrease insulin resistance. Assessment and Plan   Nursing Diagnosis Risk for unstable blood glucose pattern   Nursing Intervention Domain 6911 Decision-making Support   Nursing Interventions Examined current inpatient diabetes control   Explored factors facilitating and impeding inpatient management  Identified self-management practices impeding diabetes control  Explored corrective strategies with patient and responsible inpatient provider   Informed patient of rational for basal bolus insulin strategy while hospitalized       Recommendations     Inpatient:  Continue current diabetes medication regime. Will continue to follow as patient may require adjustments. Discharge:  Until seen by PCP:    1. Lantus 34 units per day    2.  Metformin 1000 mg twice daily. If patient experiences GI discomfort, Metformin can start at 500 mg twice daily and titrate up as patient tolerates symptoms. 3. Check blood glucose 4 times per day (before meals and at bedtime). 4. Discontinue glipizide     5. Do not correct hyperglycemia, call PCP.      [x]? ?        Will require adjustments as glucocorticoids are in use      Consider referrals     [x]? ?        Diabetes Self-Management Training through [facility] (Phone [phone] NQ schedule appointment)       Time Spent     Total time spent with patient: 15 Minutes   I personally reviewed chart, notes, data and current medications in the medical record. I have personally examined and treated the patient at bedside during this period.      Demetrio Rudolph, CNS  Access via Ratify

## 2020-05-29 NOTE — PROGRESS NOTES
[facility]  Pulmonary, Critical Care, and Sleep Medicine      Name: Mami Caro MRN: [id number]   : 1945 Hospital: [address] [facility]   Date: 2020        IMPRESSION:   · Acute hypoxemic respiratory failure  · COVID19 +. Started on Caitlin pulse , had to stop this on  due to increased O2 requirements. Given actemra 5/15  · Mild intermittent asthma  · Chronic back pain  · DM  · HTN      RECOMMENDATIONS:   · Supplemental O2 as needed to keep sats > 88%. Weaned down to 1L with sats 94% at rest.  · Can d/c Caitlin at 12:48 today  · Continue diuresis. Strict I&O. Watch creat and bicarb. · Completed course of Doxy  · Trending inflammatory markers - all improved  · Repeat COVID test positive  · Medrol po taper. Patient reports allergy to prednisone (throat swelling). · Diligent BG control  · Continue combivent, breo, singulair, zyrtec, mucinex   · Continue vit C, zinc, lipitor  · Continue pepcid  · IS  · OOB to chair  · PT  · Palliative care following       Subjective: This patient has been seen and evaluated at the request of Dr. Maine Harmon for [place] 19 pneumonia and resp failure. Patient is a 69 y.o. female diagnosed one week ago with Covid 19. Pt with fevers, diarrhea, cough, and sob. She has been isolating at home but has had work done within the home and believes that she may have gotten an infection from one of the workers there.  has also become infected. Pt states that her symptoms have remained stable to slightly improved over the past few days but has noted that her oxygen sats have been falling and presented to ED for evaluation. Fevers seem to have resolved. Main symptoms are now diarrhea, sob, and cough. Pt w h/o asthma which by description appears to be mild intermittent and is followed by Dr Alycia Lantigua in [place]. Controlled with montelukast and (rare) albuterol MDI.     Never smoker       Interval history:  Sats 93-94% on 2L during my exam  Tmax 99.2  WBC 16.2  Na 137 - better  CRP, d-dimer, ferritin all improved - WNL  CXR improved  5/28 repeat COVID test positive      ROS: Reports continued improvement in dyspnea/SOB. Denies fever or chills. Was able to ambulate around the room with PT on RA and only had mild brief dips in O2. Denies CP. Denies LE pain/swelling. Past Medical History:   Diagnosis Date    Anxiety     Arthritis     Asthma     Chronic pain     back,right hip    Depression     Diabetes ([facility] [address]     type 2    GERD (gastroesophageal reflux disease)     History of vascular access device 05/18/2020    4 Fr midline, 12 cm L basilic, blood draws    Hypercholesteremia     Hypertension     Hypothyroid     Nausea & vomiting     Reflux     Sleep apnea     wears dental appliance- Somnident    Thyroid disease     Unspecified adverse effect of anesthesia     delayed awakening      Past Surgical History:   Procedure Laterality Date    HX COLONOSCOPY  2012    normal repeat 2022, diverticulosis of sigmoid colon, Dr. Max Monique HX HEENT  1/08/14    bilateral cataract surgery1/8/14    HX KNEE REPLACEMENT      right    HX OTHER SURGICAL  2007    cardiac cath- No CAD found    NEUROLOGICAL PROCEDURE UNLISTED  1999    vascular brain surgery      Prior to Admission medications    Medication Sig Start Date End Date Taking? Authorizing Provider   guaiFENesin ER (Mucinex) 600 mg ER tablet Take 600 mg by mouth two (2) times a day. Yes Provider, Historical   omeprazole (PRILOSEC) 20 mg capsule Take 20 mg by mouth daily. Yes Provider, Historical   calcium-vitamin D (OYSTER SHELL) 500 mg(1,250mg) -200 unit per tablet Take 1 Tab by mouth daily.    Yes Provider, Historical   traZODone (DESYREL) 100 mg tablet TAKE 1 TABLET BY MOUTH EVERY DAY AT NIGHT 3/25/20  Yes Libra Bender, NP   glipiZIDE SR (GLUCOTROL XL) 10 mg CR tablet TAKE 1 TABLET BY MOUTH TWICE A DAY 2/15/20  Yes Libra Bender NP   simvastatin (ZOCOR) 10 mg tablet Take 1 Tab by mouth nightly. 10/8/19  Yes Kassidy Bender NP   losartan (COZAAR) 50 mg tablet TAKE 1 TABLET BY MOUTH EVERY DAY. 10/8/19  Yes Kassidy Bender NP   levothyroxine (SYNTHROID) 100 mcg tablet Take 1 Tab by mouth Daily (before breakfast). 10/8/19  Yes Kassidy Bender NP   atenolol (TENORMIN) 25 mg tablet Take 1 Tab by mouth nightly. 10/8/19  Yes Kassidy Bender NP   sertraline (ZOLOFT) 50 mg tablet TAKE 1 TABLET BY MOUTH EVERY DAY 9/4/19  Yes Kassidy Bender NP   aspirin delayed-release 81 mg tablet Take 81 mg by mouth daily. Yes Provider, Historical   fluticasone (FLONASE) 50 mcg/actuation nasal spray 2 Sprays by Both Nostrils route daily as needed for Rhinitis or Allergies. Yes Provider, Historical   albuterol (PROAIR HFA) 90 mcg/actuation inhaler Take 2 Puffs by inhalation every four (4) hours as needed for Wheezing. Yes Provider, Historical   Cetirizine (ZYRTEC) 10 mg cap Take 1 Tab by mouth every evening. Yes Provider, Historical   multivitamins-minerals-lutein (CENTRUM SILVER) tab Take 1 Tab by mouth daily. Yes Provider, Historical   glucosamine-chondroit-vit c-mn (GLUCOSAMINE CHONDROITIN MAXSTR) 500-400 mg capsule Take 1 Cap by mouth daily. Yes Other, MD [place]   varicella-zoster recombinant, PF, (SHINGRIX, PF,) 50 mcg/0.5 mL susr injection 0.5mL by IntraMUSCular route once now and then repeat in 2-6 months 10/8/19   Beau Upton NP     Allergies   Allergen Reactions    Latex Rash    Prednisone Anaphylaxis     Throat swelling. Can take methylpredisone po or IV without problems.     Augmentin [Amoxicillin-Pot Clavulanate] Nausea and Vomiting    Biaxin [Clarithromycin] Nausea and Vomiting    Metformin Diarrhea    Parafon Forte Dsc [Chlorzoxazone] Nausea and Vomiting      Social History     Tobacco Use    Smoking status: Never Smoker    Smokeless tobacco: Never Used   Substance Use Topics    Alcohol use: No     Frequency: Never     Comment: 2x year Family History   Problem Relation Age of Onset    Elevated Lipids Mother     Dementia Mother     Osteoporosis Mother     Lung Disease Father         copd    Delayed Awakening Father     Post-op Nausea/Vomiting Father     Hypertension Sister     Diabetes Sister         type 2    Breast Cancer Maternal Aunt         Current Facility-Administered Medications   Medication Dose Route Frequency    insulin lispro (HUMALOG) injection 8 Units  8 Units SubCUTAneous ACB&D    methylPREDNISolone (MEDROL) tablet 16 mg  16 mg Oral BID WITH MEALS    Or    insulin NPH (NOVOLIN N, HUMULIN N) injection 8 Units  8 Units SubCUTAneous BID WITH MEALS    insulin glargine (LANTUS) injection 34 Units  34 Units SubCUTAneous DAILY    insulin lispro (HUMALOG) injection   SubCUTAneous AC&HS    guaiFENesin ER (MUCINEX) tablet 1,200 mg  1,200 mg Oral BID    famotidine (PEPCID) tablet 20 mg  20 mg Oral BID    atorvastatin (LIPITOR) tablet 40 mg  40 mg Oral DAILY    zinc sulfate (ZINCATE) 220 (50) mg capsule 1 Cap  1 Cap Oral DAILY    sodium chloride (NS) flush 5-40 mL  5-40 mL IntraVENous Q8H    enoxaparin (LOVENOX) injection 40 mg  40 mg SubCUTAneous Q24H    ascorbic acid (vitamin C) (VITAMIN C) tablet 500 mg  500 mg Oral BID    aspirin delayed-release tablet 81 mg  81 mg Oral DAILY    calcium-vitamin D (OS-TRISTEN) 500 mg-200 unit tablet  1 Tab Oral DAILY    cetirizine (ZYRTEC) tablet 10 mg  10 mg Oral DAILY    therapeutic multivitamin (THERAGRAN) tablet 1 Tab  1 Tab Oral DAILY    levothyroxine (SYNTHROID) tablet 100 mcg  100 mcg Oral ACB    losartan (COZAAR) tablet 50 mg  50 mg Oral DAILY    montelukast (SINGULAIR) tablet 10 mg  10 mg Oral QHS    sertraline (ZOLOFT) tablet 50 mg  50 mg Oral DAILY    traZODone (DESYREL) tablet 100 mg  100 mg Oral QHS    ipratropium-albuterol (COMBIVENT RESPIMAT) 20 mcg-100 mcg inhalation spray  1 Puff Inhalation Q6H RT    fluticasone-vilanterol (BREO ELLIPTA) 200mcg-25mcg/puff  1 Puff Inhalation DAILY    pantoprazole (PROTONIX) tablet 40 mg  40 mg Oral ACB         Objective:   Vital Signs:    Visit Vitals  /47 (BP 1 Location: Right arm, BP Patient Position: At rest)   Pulse 69   Temp 98.9 °F (37.2 °C)   Resp 18   Ht 5' 4\" (1.626 m)   Wt 83.7 kg (184 lb 8 oz)   SpO2 93%   BMI 31.67 kg/m²       O2 Device: Nasal cannula(Caitlin tx)   O2 Flow Rate (L/min): 3 l/min   Temp (24hrs), Av °F (37.2 °C), Min:98.7 °F (37.1 °C), Max:99.2 °F (37.3 °C)       Intake/Output:   Last shift:      No intake/output data recorded. Last 3 shifts:  1901 -  0700  In: 1900 [P.O.:1900]  Out: 1725 [Urine:1725]    Intake/Output Summary (Last 24 hours) at 2020 0947  Last data filed at 2020 0200  Gross per 24 hour   Intake 360 ml   Output 525 ml   Net -165 ml      Physical Exam:   General:  Alert, cooperative, no acute distress, appears stated age. Head:  Normocephalic, without obvious abnormality, atraumatic. Eyes:  Conjunctivae/corneas clear. EOMs intact. Nose: Nares normal. Septum midline. Mucosa normal. No drainage or sinus tenderness. Throat: Lips, mucosa, and tongue normal. Teeth and gums normal.   Neck: Supple, symmetrical, trachea midline, no adenopathy. Lungs:   CTAB. Resp nonlabored   Chest wall:  No tenderness or deformity. Heart:  Regular rate and rhythm, S1, S2 normal, no murmur, click, rub or gallop. Abdomen:   Soft, non-tender. Bowel sounds normal. No masses,  No organomegaly. Extremities: Extremities normal, atraumatic, no cyanosis or edema. Pulses: 2+ and symmetric all extremities.    Skin: Skin color, texture, turgor normal. No rashes or lesions   Lymph nodes: Cervical, supraclavicular nodes normal.   Neurologic: Grossly nonfocal         Data review:     Recent Results (from the past 24 hour(s))   GLUCOSE, POC    Collection Time: 20 12:39 PM   Result Value Ref Range    Glucose (POC) 104 (H) 65 - 100 mg/dL    Performed by Abi Samano (PCT)    SARS-COV-2 Collection Time: 05/28/20  1:06 PM   Result Value Ref Range    Specimen source Nasopharyngeal      SARS-CoV-2 Detected (A) NOTD     GLUCOSE, POC    Collection Time: 05/28/20  4:18 PM   Result Value Ref Range    Glucose (POC) 220 (H) 65 - 100 mg/dL    Performed by Mamadou Kelly ([facility])    GLUCOSE, POC    Collection Time: 05/28/20  9:31 PM   Result Value Ref Range    Glucose (POC) 223 (H) 65 - 100 mg/dL    Performed by REYES DARIUS ([facility])    CBC WITH AUTOMATED DIFF    Collection Time: 05/29/20  1:28 AM   Result Value Ref Range    WBC 16.2 (H) 3.6 - 11.0 K/uL    RBC 3.82 3.80 - 5.20 M/uL    HGB 11.2 (L) 11.5 - 16.0 g/dL    HCT 33.5 (L) 35.0 - 47.0 %    MCV 87.7 80.0 - 99.0 FL    MCH 29.3 26.0 - 34.0 PG    MCHC 33.4 30.0 - 36.5 g/dL    RDW 13.2 11.5 - 14.5 %    PLATELET 792 586 - 608 K/uL    MPV 10.9 8.9 - 12.9 FL    NRBC 0.0 0  WBC    ABSOLUTE NRBC 0.00 0.00 - 0.01 K/uL    NEUTROPHILS 76 (H) 32 - 75 %    LYMPHOCYTES 16 12 - 49 %    MONOCYTES 7 5 - 13 %    EOSINOPHILS 0 0 - 7 %    BASOPHILS 0 0 - 1 %    IMMATURE GRANULOCYTES 1 (H) 0.0 - 0.5 %    ABS. NEUTROPHILS 12.3 (H) 1.8 - 8.0 K/UL    ABS. LYMPHOCYTES 2.6 0.8 - 3.5 K/UL    ABS. MONOCYTES 1.1 (H) 0.0 - 1.0 K/UL    ABS. EOSINOPHILS 0.1 0.0 - 0.4 K/UL    ABS. BASOPHILS 0.0 0.0 - 0.1 K/UL    ABS. IMM.  GRANS. 0.2 (H) 0.00 - 0.04 K/UL    DF AUTOMATED     MAGNESIUM    Collection Time: 05/29/20  1:28 AM   Result Value Ref Range    Magnesium 2.3 1.6 - 2.4 mg/dL   METABOLIC PANEL, COMPREHENSIVE    Collection Time: 05/29/20  1:28 AM   Result Value Ref Range    Sodium 137 136 - 145 mmol/L    Potassium 4.0 3.5 - 5.1 mmol/L    Chloride 105 97 - 108 mmol/L    CO2 29 21 - 32 mmol/L    Anion gap 3 (L) 5 - 15 mmol/L    Glucose 87 65 - 100 mg/dL    BUN 22 (H) 6 - 20 MG/DL    Creatinine 0.77 0.55 - 1.02 MG/DL    BUN/Creatinine ratio 29 (H) 12 - 20      GFR est AA >60 >60 ml/min/1.73m2    GFR est non-AA >60 >60 ml/min/1.73m2    Calcium 8.4 (L) 8.5 - 10.1 MG/DL    Bilirubin, total 0.5 0.2 - 1.0 MG/DL    ALT (SGPT) 87 (H) 12 - 78 U/L    AST (SGOT) 33 15 - 37 U/L    Alk. phosphatase 72 45 - 117 U/L    Protein, total 5.7 (L) 6.4 - 8.2 g/dL    Albumin 2.8 (L) 3.5 - 5.0 g/dL    Globulin 2.9 2.0 - 4.0 g/dL    A-G Ratio 1.0 (L) 1.1 - 2.2     NT-PRO BNP    Collection Time: 05/29/20  1:28 AM   Result Value Ref Range    NT pro-BNP 94 <125 PG/ML   SAMPLES BEING HELD    Collection Time: 05/29/20  1:28 AM   Result Value Ref Range    SAMPLES BEING HELD 1DRK GRN     COMMENT        Add-on orders for these samples will be processed based on acceptable specimen integrity and analyte stability, which may vary by analyte.    CARBOXY HEMOGLOBIN    Collection Time: 05/29/20  2:00 AM   Result Value Ref Range    Carboxy-Hgb 1.6 1 - 2 %    Methemoglobin 0.7 0 - 1.4 %    tHb 12.2 (L) 14 - 17 g/dL    Oxyhemoglobin 96.3 94 - 97 %    O2 SATURATION 99 95 - 99 %   GLUCOSE, POC    Collection Time: 05/29/20  8:06 AM   Result Value Ref Range    Glucose (POC) 78 65 - 100 mg/dL    Performed by [employer]        Imaging:  I have personally reviewed the patients radiographs and have reviewed the reports:  CXR 5/18: Stable bilateral infiltrates  CXR 5/19:  No change in bilateral infiltrates  CXR 5/21: Slight improvement in bilateral infiltrates  CXR 5/26: Improved aeration with persistent interstitial prominence        Andrew Delgado

## 2020-05-29 NOTE — PROGRESS NOTES
Problem: Falls - Risk of  Goal: *Absence of Falls  Description: Document Blossom Led Fall Risk and appropriate interventions in the flowsheet. Outcome: Progressing Towards Goal  Note: Fall Risk Interventions:  Mobility Interventions: Bed/chair exit alarm, Patient to call before getting OOB, Utilize walker, cane, or other assistive device         Medication Interventions: Patient to call before getting OOB, Teach patient to arise slowly    Elimination Interventions: Bed/chair exit alarm, Call light in reach, Stay With Me (per policy), Toileting schedule/hourly rounds              Problem: Patient Education: Go to Patient Education Activity  Goal: Patient/Family Education  Outcome: Progressing Towards Goal     Problem: Patient Education: Go to Patient Education Activity  Goal: Patient/Family Education  Outcome: Progressing Towards Goal     Problem: Diabetes Self-Management  Goal: *Disease process and treatment process  Description: Define diabetes and identify own type of diabetes; list 3 options for treating diabetes. Outcome: Progressing Towards Goal  Goal: *Incorporating nutritional management into lifestyle  Description: Describe effect of type, amount and timing of food on blood glucose; list 3 methods for planning meals. Outcome: Progressing Towards Goal  Goal: *Incorporating physical activity into lifestyle  Description: State effect of exercise on blood glucose levels. Outcome: Progressing Towards Goal  Goal: *Developing strategies to promote health/change behavior  Description: Define the ABC's of diabetes; identify appropriate screenings, schedule and personal plan for screenings. Outcome: Progressing Towards Goal  Goal: *Using medications safely  Description: State effect of diabetes medications on diabetes; name diabetes medication taking, action and side effects.   Outcome: Progressing Towards Goal  Goal: *Monitoring blood glucose, interpreting and using results  Description: Identify recommended blood glucose targets  and personal targets. Outcome: Progressing Towards Goal  Goal: *Prevention, detection, treatment of acute complications  Description: List symptoms of hyper- and hypoglycemia; describe how to treat low blood sugar and actions for lowering  high blood glucose level. Outcome: Progressing Towards Goal  Goal: *Prevention, detection and treatment of chronic complications  Description: Define the natural course of diabetes and describe the relationship of blood glucose levels to long term complications of diabetes. Outcome: Progressing Towards Goal  Goal: *Developing strategies to address psychosocial issues  Description: Describe feelings about living with diabetes; identify support needed and support network  Outcome: Progressing Towards Goal     Problem: Patient Education: Go to Patient Education Activity  Goal: Patient/Family Education  Outcome: Progressing Towards Goal     Problem: Risk for Spread of Infection  Goal: Prevent transmission of infectious organism to others  Description: Prevent the transmission of infectious organisms to other patients, staff members, and visitors. Outcome: Progressing Towards Goal  Note: Pt aware about putting mask on while others are in the room     Problem: Patient Education:  Go to Education Activity  Goal: Patient/Family Education  Outcome: Progressing Towards Goal     Problem: Pressure Injury - Risk of  Goal: *Prevention of pressure injury  Description: Document Anoop Scale and appropriate interventions in the flowsheet. Outcome: Progressing Towards Goal  Note: Pressure Injury Interventions:       Moisture Interventions: Absorbent underpads, Check for incontinence Q2 hours and as needed, Maintain skin hydration (lotion/cream), Minimize layers    Activity Interventions: Increase time out of bed    Mobility Interventions: Turn and reposition approx.  every two hours(pillow and wedges), Pressure redistribution bed/mattress (bed type), Chair cushion    Nutrition Interventions: Document food/fluid/supplement intake, Offer support with meals,snacks and hydration    Friction and Shear Interventions: HOB 30 degrees or less, Transferring/repositioning devices, Apply protective barrier, creams and emollients                Problem: Patient Education: Go to Patient Education Activity  Goal: Patient/Family Education  Outcome: Progressing Towards Goal     Problem: Patient Education: Go to Patient Education Activity  Goal: Patient/Family Education  Outcome: Progressing Towards Goal

## 2020-05-29 NOTE — PROGRESS NOTES
Transition of Care  RUR:    1- follow for progress and confirmed discharge tomorrow  2- send home health specific orders to [facility] once received  3-  to provide transportation home. 3:29 PM  [facility] and planning to see patient this weekend. 12:10 PM  Team anticipates being able to transition home tomorrow. CM spoke with  on the phone and he has researched and states preference of [facility] especially because his neighbor across the street has them and he has heard good feedback. Medicals sent in [employer] to refer, and coached him on what to expect for intake and start of care, which would be this weekend if discharged tomorrow. Spoke with Zhang Sales RN with [facility] who is going to call Mr. Louisa Nguyen today to educate what to expect. Care Management Interventions  PCP Verified by CM: Yes  Mode of Transport at Discharge:  Other (see comment)('s car)  Transition of Care Consult (CM Consult): [address]: No  Reason Outside [place]: (husbandn identified a preference that was different)  Discharge Durable Medical Equipment: No  Physical Therapy Consult: Yes  Occupational Therapy Consult: Yes  Current Support Network: Lives with Spouse  Confirm Follow Up Transport: Family  The Patient and/or Patient Representative was Provided with a Choice of Provider and Agrees with the Discharge Plan?: Yes  Name of the Patient Representative Who was Provided with a Choice of Provider and Agrees with the Discharge Plan: reviewed over the phone with both patient and  regarding preference due to isolation   Freedom of Choice List was Provided with Basic Dialogue that Supports the Patient's Individualized Plan of Care/Goals, Treatment Preferences and Shares the Quality Data Associated with the Providers?: Yes  Discharge Location  Discharge Placement: Home with home health

## 2020-05-29 NOTE — PROGRESS NOTES
Problem: Mobility Impaired (Adult and Pediatric)  Goal: *Acute Goals and Plan of Care (Insert Text)  Description: FUNCTIONAL STATUS PRIOR TO ADMISSION: Patient was independent and active without use of DME.    HOME SUPPORT PRIOR TO ADMISSION: The patient lived with  but did not require assist.    Physical Therapy Goals  Initiated 5/18/2020  1. Patient will move from supine to sit and sit to supine  in bed with independence within 7 day(s). 2.  Patient will transfer from bed to chair and chair to bed with independence using the least restrictive device within 7 day(s). 3.  Patient will perform sit to stand with independence within 7 day(s). 4.  Patient will ambulate with independence for 150 feet with the least restrictive device within 7 day(s). 5.  Patient will ascend/descend 4 stairs with 1 handrail(s) with modified independence within 7 day(s). Outcome: Progressing Towards Goal  Note:   PHYSICAL THERAPY TREATMENT  Patient: Migel Burleson (31 y.o. female)  Date: 5/29/2020  Diagnosis: Respiratory failure with hypoxia ([facility] [address] [J96.91]   COVID-19       Precautions: Fall, Contact(Droplet plus)  Chart, physical therapy assessment, plan of care and goals were reviewed. ASSESSMENT  Patient continues with skilled PT services and is progressing towards goals. Asked by pulmonology yesterday to titrate and ambulate on room air. Patient returned to O2 for nitrous treatment. Patient now with 72 hours passed and off treatment, again asked by pulmonology to titrate to room air. Patient able to tolerate activity with sats 88-90% with ambulation on room air. Performed 3 bouts of 60 feet ambulation, MOD I, with seated rest break between for recovery. Patient ambulated to bathroom with sats 86%, but quickly recovered to 91% (within 15-20 seconds). Patient remained on room air at end of session.   Vitals    Heart rate(bpm) Oxygen saturation  Pre-activity      70                                96%  During activity 88                                 88-91%  Post activity     85                                94%      Current Level of Function Impacting Discharge (mobility/balance): MOD I    Other factors to consider for discharge:          PLAN :  Patient continues to benefit from skilled intervention to address the above impairments. Continue treatment per established plan of care. to address goals. Recommendation for discharge: (in order for the patient to meet his/her long term goals)  Physical therapy at least 2 days/week in the home     This discharge recommendation:  A follow-up discussion with the attending provider and/or case management is planned    IF patient discharges home will need the following DME: none       SUBJECTIVE:   Patient stated .    OBJECTIVE DATA SUMMARY:   Critical Behavior:  Neurologic State: Alert  Orientation Level: Oriented X4  Cognition: Appropriate decision making, Appropriate for age attention/concentration, Appropriate safety awareness, Follows commands  Safety/Judgement: Awareness of environment, Driving appropriateness, Fall prevention, Good awareness of safety precautions, Home safety, Insight into deficits  Functional Mobility Training:  Bed Mobility:                    Transfers:  Sit to Stand: Modified independent  Stand to Sit: Modified independent        Bed to Chair: Modified independent                    Balance:     Ambulation/Gait Training:  Distance (ft): 60 Feet (ft)  Assistive Device: Gait belt  Ambulation - Level of Assistance: Modified independent                   Pain Rating:  None reported    Activity Tolerance:   Good  Please refer to the flowsheet for vital signs taken during this treatment. After treatment patient left in no apparent distress:   Sitting in chair, Call bell within reach, and Bed / chair alarm activated    COMMUNICATION/COLLABORATION:   The patients plan of care was discussed with: Registered nurse.      Siri Matamoros PT, DPT Time Calculation: 29 mins

## 2020-05-30 VITALS
OXYGEN SATURATION: 94 % | BODY MASS INDEX: 32.37 KG/M2 | DIASTOLIC BLOOD PRESSURE: 51 MMHG | RESPIRATION RATE: 12 BRPM | WEIGHT: 189.6 LBS | HEIGHT: 64 IN | SYSTOLIC BLOOD PRESSURE: 108 MMHG | HEART RATE: 92 BPM | TEMPERATURE: 99.3 F

## 2020-05-30 LAB
ALBUMIN SERPL-MCNC: 2.6 G/DL (ref 3.5–5)
ALBUMIN/GLOB SERPL: 1 {RATIO} (ref 1.1–2.2)
ALP SERPL-CCNC: 62 U/L (ref 45–117)
ALT SERPL-CCNC: 96 U/L (ref 12–78)
ANION GAP SERPL CALC-SCNC: 6 MMOL/L (ref 5–15)
AST SERPL-CCNC: 44 U/L (ref 15–37)
BASOPHILS # BLD: 0 K/UL (ref 0–0.1)
BASOPHILS NFR BLD: 0 % (ref 0–1)
BILIRUB SERPL-MCNC: 0.4 MG/DL (ref 0.2–1)
BNP SERPL-MCNC: 116 PG/ML
BUN SERPL-MCNC: 24 MG/DL (ref 6–20)
BUN/CREAT SERPL: 35 (ref 12–20)
CALCIUM SERPL-MCNC: 8 MG/DL (ref 8.5–10.1)
CHLORIDE SERPL-SCNC: 106 MMOL/L (ref 97–108)
CO2 SERPL-SCNC: 26 MMOL/L (ref 21–32)
CREAT SERPL-MCNC: 0.69 MG/DL (ref 0.55–1.02)
DIFFERENTIAL METHOD BLD: ABNORMAL
EOSINOPHIL # BLD: 0.1 K/UL (ref 0–0.4)
EOSINOPHIL NFR BLD: 1 % (ref 0–7)
ERYTHROCYTE [DISTWIDTH] IN BLOOD BY AUTOMATED COUNT: 13.4 % (ref 11.5–14.5)
GLOBULIN SER CALC-MCNC: 2.6 G/DL (ref 2–4)
GLUCOSE BLD STRIP.AUTO-MCNC: 113 MG/DL (ref 65–100)
GLUCOSE BLD STRIP.AUTO-MCNC: 170 MG/DL (ref 65–100)
GLUCOSE SERPL-MCNC: 154 MG/DL (ref 65–100)
HCT VFR BLD AUTO: 31.9 % (ref 35–47)
HGB BLD-MCNC: 10.6 G/DL (ref 11.5–16)
IMM GRANULOCYTES # BLD AUTO: 0.2 K/UL (ref 0–0.04)
IMM GRANULOCYTES NFR BLD AUTO: 1 % (ref 0–0.5)
LYMPHOCYTES # BLD: 1.9 K/UL (ref 0.8–3.5)
LYMPHOCYTES NFR BLD: 17 % (ref 12–49)
MAGNESIUM SERPL-MCNC: 2.4 MG/DL (ref 1.6–2.4)
MCH RBC QN AUTO: 29.4 PG (ref 26–34)
MCHC RBC AUTO-ENTMCNC: 33.2 G/DL (ref 30–36.5)
MCV RBC AUTO: 88.4 FL (ref 80–99)
MONOCYTES # BLD: 0.7 K/UL (ref 0–1)
MONOCYTES NFR BLD: 6 % (ref 5–13)
NEUTS SEG # BLD: 8.3 K/UL (ref 1.8–8)
NEUTS SEG NFR BLD: 75 % (ref 32–75)
NRBC # BLD: 0 K/UL (ref 0–0.01)
NRBC BLD-RTO: 0 PER 100 WBC
PLATELET # BLD AUTO: 224 K/UL (ref 150–400)
PMV BLD AUTO: 10.9 FL (ref 8.9–12.9)
POTASSIUM SERPL-SCNC: 4.4 MMOL/L (ref 3.5–5.1)
PROT SERPL-MCNC: 5.2 G/DL (ref 6.4–8.2)
RBC # BLD AUTO: 3.61 M/UL (ref 3.8–5.2)
SERVICE CMNT-IMP: ABNORMAL
SERVICE CMNT-IMP: ABNORMAL
SODIUM SERPL-SCNC: 138 MMOL/L (ref 136–145)
WBC # BLD AUTO: 11.1 K/UL (ref 3.6–11)

## 2020-05-30 PROCEDURE — 85025 COMPLETE CBC W/AUTO DIFF WBC: CPT

## 2020-05-30 PROCEDURE — 82962 GLUCOSE BLOOD TEST: CPT

## 2020-05-30 PROCEDURE — 83735 ASSAY OF MAGNESIUM: CPT

## 2020-05-30 PROCEDURE — 74011250637 HC RX REV CODE- 250/637: Performed by: STUDENT IN AN ORGANIZED HEALTH CARE EDUCATION/TRAINING PROGRAM

## 2020-05-30 PROCEDURE — 74011250636 HC RX REV CODE- 250/636: Performed by: STUDENT IN AN ORGANIZED HEALTH CARE EDUCATION/TRAINING PROGRAM

## 2020-05-30 PROCEDURE — 80053 COMPREHEN METABOLIC PANEL: CPT

## 2020-05-30 PROCEDURE — 74011250637 HC RX REV CODE- 250/637: Performed by: INTERNAL MEDICINE

## 2020-05-30 PROCEDURE — 74011636637 HC RX REV CODE- 636/637: Performed by: FAMILY MEDICINE

## 2020-05-30 PROCEDURE — 74011636637 HC RX REV CODE- 636/637: Performed by: STUDENT IN AN ORGANIZED HEALTH CARE EDUCATION/TRAINING PROGRAM

## 2020-05-30 PROCEDURE — 83880 ASSAY OF NATRIURETIC PEPTIDE: CPT

## 2020-05-30 PROCEDURE — 36415 COLL VENOUS BLD VENIPUNCTURE: CPT

## 2020-05-30 RX ORDER — ALBUTEROL SULFATE 90 UG/1
2 AEROSOL, METERED RESPIRATORY (INHALATION)
Qty: 1 INHALER | Refills: 0 | Status: SHIPPED | OUTPATIENT
Start: 2020-05-30

## 2020-05-30 RX ORDER — METHYLPREDNISOLONE 4 MG/1
TABLET ORAL
Qty: 55 TAB | Refills: 0 | Status: SHIPPED | OUTPATIENT
Start: 2020-05-30 | End: 2020-08-12 | Stop reason: ALTCHOICE

## 2020-05-30 RX ORDER — INSULIN PUMP SYRINGE, 3 ML
EACH MISCELLANEOUS
Qty: 1 KIT | Refills: 0 | Status: SHIPPED | OUTPATIENT
Start: 2020-05-30 | End: 2020-05-30 | Stop reason: SDUPTHER

## 2020-05-30 RX ORDER — ZINC SULFATE 50(220)MG
220 CAPSULE ORAL DAILY
Qty: 14 CAP | Refills: 0 | Status: SHIPPED | OUTPATIENT
Start: 2020-05-31 | End: 2020-08-12 | Stop reason: ALTCHOICE

## 2020-05-30 RX ORDER — ASCORBIC ACID 500 MG
500 TABLET ORAL 2 TIMES DAILY
Qty: 28 TAB | Refills: 0 | Status: SHIPPED | OUTPATIENT
Start: 2020-05-30 | End: 2020-06-13

## 2020-05-30 RX ORDER — INSULIN PUMP SYRINGE, 3 ML
EACH MISCELLANEOUS
Qty: 1 KIT | Refills: 0 | Status: SHIPPED | OUTPATIENT
Start: 2020-05-30

## 2020-05-30 RX ORDER — LANCETS
EACH MISCELLANEOUS
Qty: 100 EACH | Refills: 11 | Status: SHIPPED | OUTPATIENT
Start: 2020-05-30 | End: 2020-05-30 | Stop reason: SDUPTHER

## 2020-05-30 RX ORDER — METFORMIN HYDROCHLORIDE 500 MG/1
500 TABLET ORAL DAILY
Qty: 30 TAB | Refills: 0 | Status: SHIPPED | OUTPATIENT
Start: 2020-05-30 | End: 2020-05-30

## 2020-05-30 RX ORDER — INSULIN GLARGINE 100 [IU]/ML
34 INJECTION, SOLUTION SUBCUTANEOUS DAILY
Qty: 5 PEN | Refills: 0 | Status: SHIPPED | OUTPATIENT
Start: 2020-05-30 | End: 2020-06-01 | Stop reason: SDUPTHER

## 2020-05-30 RX ORDER — LANCETS
EACH MISCELLANEOUS
Qty: 100 EACH | Refills: 11 | Status: SHIPPED | OUTPATIENT
Start: 2020-05-30 | End: 2021-01-06 | Stop reason: SDUPTHER

## 2020-05-30 RX ADMIN — IPRATROPIUM BROMIDE AND ALBUTEROL 1 PUFF: 20; 100 SPRAY, METERED RESPIRATORY (INHALATION) at 13:38

## 2020-05-30 RX ADMIN — OXYCODONE HYDROCHLORIDE AND ACETAMINOPHEN 500 MG: 500 TABLET ORAL at 09:43

## 2020-05-30 RX ADMIN — INSULIN LISPRO 2 UNITS: 100 INJECTION, SOLUTION INTRAVENOUS; SUBCUTANEOUS at 13:38

## 2020-05-30 RX ADMIN — CETIRIZINE HYDROCHLORIDE 10 MG: 10 TABLET, FILM COATED ORAL at 09:43

## 2020-05-30 RX ADMIN — INSULIN HUMAN 8 UNITS: 100 INJECTION, SUSPENSION SUBCUTANEOUS at 05:46

## 2020-05-30 RX ADMIN — SERTRALINE HYDROCHLORIDE 50 MG: 50 TABLET ORAL at 09:46

## 2020-05-30 RX ADMIN — THERA TABS 1 TABLET: TAB at 09:43

## 2020-05-30 RX ADMIN — INSULIN LISPRO 8 UNITS: 100 INJECTION, SOLUTION INTRAVENOUS; SUBCUTANEOUS at 09:45

## 2020-05-30 RX ADMIN — ASPIRIN 81 MG: 81 TABLET, COATED ORAL at 09:43

## 2020-05-30 RX ADMIN — Medication 10 ML: at 13:37

## 2020-05-30 RX ADMIN — INSULIN GLARGINE 34 UNITS: 100 INJECTION, SOLUTION SUBCUTANEOUS at 09:43

## 2020-05-30 RX ADMIN — ZINC SULFATE 220 MG (50 MG) CAPSULE 1 CAPSULE: CAPSULE at 09:43

## 2020-05-30 RX ADMIN — METHYLPREDNISOLONE 16 MG: 4 TABLET ORAL at 05:46

## 2020-05-30 RX ADMIN — IPRATROPIUM BROMIDE AND ALBUTEROL 1 PUFF: 20; 100 SPRAY, METERED RESPIRATORY (INHALATION) at 02:15

## 2020-05-30 RX ADMIN — OYSTER SHELL CALCIUM WITH VITAMIN D 1 TABLET: 500; 200 TABLET, FILM COATED ORAL at 09:44

## 2020-05-30 RX ADMIN — ATORVASTATIN CALCIUM 40 MG: 20 TABLET, FILM COATED ORAL at 09:43

## 2020-05-30 RX ADMIN — GUAIFENESIN 1200 MG: 600 TABLET, EXTENDED RELEASE ORAL at 09:43

## 2020-05-30 RX ADMIN — FLUTICASONE FUROATE AND VILANTEROL TRIFENATATE 1 PUFF: 200; 25 POWDER RESPIRATORY (INHALATION) at 09:44

## 2020-05-30 RX ADMIN — FAMOTIDINE 20 MG: 20 TABLET, FILM COATED ORAL at 09:43

## 2020-05-30 RX ADMIN — PANTOPRAZOLE SODIUM 40 MG: 40 TABLET, DELAYED RELEASE ORAL at 05:46

## 2020-05-30 RX ADMIN — Medication 10 ML: at 05:47

## 2020-05-30 RX ADMIN — IPRATROPIUM BROMIDE AND ALBUTEROL 1 PUFF: 20; 100 SPRAY, METERED RESPIRATORY (INHALATION) at 09:49

## 2020-05-30 RX ADMIN — LOSARTAN POTASSIUM 50 MG: 50 TABLET, FILM COATED ORAL at 09:43

## 2020-05-30 RX ADMIN — LEVOTHYROXINE SODIUM 100 MCG: 0.1 TABLET ORAL at 05:46

## 2020-05-30 NOTE — DISCHARGE INSTRUCTIONS
HOME DISCHARGE INSTRUCTIONS    Jovon Diaz / [id number] : 1945    Admission date: 5/10/2020 Discharge date: 2020 12:29 PM     Please bring this form with you to show your care provider at your follow-up appointment. Primary care provider:  Gregorio Toure MD    Discharging provider:  Katarzyna Cunningham MD  - Family Medicine Resident  Marquis Nataliia MD - Family Medicine Attending      You have been admitted to the hospital with the following diagnoses:    ACUTE DIAGNOSES:  Respiratory failure with hypoxia ([facility] [address] [J96.91]    . . . . . . . . . . . . . . . . . . . . . . . . . . . . . . . . . . . . . . . . . . . . . . . . . . . . . . . . . . . . . . . . . . . . . . . .   You are well enough to be discharged from the hospital. However since you are still testing positive for Coronavirus we recommend that you PLEASE stay inside and quarantine for 14 days to prevent further spread of the coronavirus. . . . . . . . . . . . . . . . . . . . . . . . . . . . . . . . . . . . . . . . . . . . . . .  . . . . . . . . . . . . . . . . . . . . . . . MEDICATION CHANGES  START  1. Methylprednisolone 4mg tablets taper  Take 4 tablets twice a day for 2 days, followed by 3 tablets twice a day for 3 days, followed by 2 tablets twice a day for 3 days, followed by 1 tablet twice a day for 3 days, followed by 1 tablet daily for 3 days. 2. Ascorbic acid (Vitamin C) 500mg twice a day    3. Zinc Sulfate 22 (50) mg once daily    4. Insulin Glargine (Lantus) 34 Units daily    Checking your Blood sugar twice a day, before breakfast and before dinner. STOP  1. Atenolol 25mg nightly    2. Glipizide SR 10mg CR tablet twice a day    No other changes were made to your medications, please take all your other home medicines as previously prescribed. . . . . . . . . . . . . . . . . . . . . . . . . . . . . . . . . . . . . . . . . . . . . . . . . . . . . . . . . . . . . . . . . . . . . . . . [facility]      FOLLOW-UP CARE RECOMMENDATIONS:    Appointments  Follow-up Information     Follow up With Specialties Details Why Contact Info    Cameron Mujica MD Family Practice Call on 6/1/2020 @ 3PM for transition of care Virtual Visit. Please call a day in advance to set up My Chart for Video Call. [address] [facility]      Johney Dubin, MD Pulmonary Disease, Critical Care Medicine Schedule an appointment as soon as possible for a visit Please follow up regarding your asthma and after COVID respiratory care [address] [facility]  [address] [facility]  [phone]             Follow-up tests needed:   - Checking your blood sugar twice a day, in the morning before breakfast and before dinner. If your fasting blood sugar is over 250 please call your doctor for further guidance. - A1C in 3 months. Pending test results: At the time of your discharge the following test results are still pending: None. Please make sure you review these results with your outpatient follow-up provider(s). DIET/what to eat:  Diabetic Diet, consistent low carb    ACTIVITY:  Activity as tolerated. If you are planning to do any prolonged activity please use your supplemental oxygen. Wound care: None    Equipment needed:    Oxygen concentrator- Provided to you  Finger Pulse Oximeter- You must purchase on your own. Please do this and measure your O2 saturations, use the oxygen concentrator to keep your saturations >90%. Specific symptoms to watch for: chest pain, shortness of breath, fever, chills, nausea, vomiting, diarrhea, change in mentation, falling, weakness, bleeding. What to do if new or unexpected symptoms occur? If you experience any of the above symptoms (or should other concerns or questions arise after discharge) please call your primary care physician. Return to the emergency room if you cannot get hold of your doctor.     · It is very important that you keep your follow-up appointment(s). · Please bring discharge papers, medication list (and/or medication bottles) to your follow-up appointments for review by your outpatient provider(s). · Please check the list of medications and be sure it includes every medication (even non-prescription medications) that your provider wants you to take. · It is important that you take the medication exactly as they are prescribed. · Keep your medication in the bottles provided by the pharmacist and keep a list of the medication names, dosages, and times to be taken in your wallet. · Do not take other medications without consulting your doctor. · If you have any questions about your medications or other instructions, please talk to your nurse or care provider before you leave the hospital.     Information obtained by:     I understand that if any problems occur once I am at home I am to contact my physician. These instructions were explained to me and I had the opportunity to ask questions. I understand and acknowledge receipt of the instructions indicated above.                                                                                                                                                Physician's or R.N.'s Signature                                                                  Date/Time                                                                                                                                              Patient or Representative Signature                                                          Date/Time

## 2020-05-30 NOTE — PROGRESS NOTES
Patient up to bathroom. Upon returning to bed it was noted that her oxygen level had dropped to 87-89%, but returned to baseline within 1-2 minutes. Patient stated that she felt good.

## 2020-05-30 NOTE — PROGRESS NOTES
Patient ambulated to bathroom w/o difficulty. It was noted that patient's oxygen level dropped after returning to bed, but had returned to baseline within 2 minutes.

## 2020-05-30 NOTE — PROGRESS NOTES
Pt walked by RT Emory Schmidt. No home o2 needed, please be mindful that Pt HR increased during walk.       05/30/20 1502 05/30/20 1504 05/30/20 1506   RT Walking Oximetry   Stage Resting (Room Air) During Walk (Room Air) During Walk (Room Air)   SpO2 96 % 96 % 93 %   HR 83 bpm 118 bpm 122 bpm   Rate of Dyspnea 0 0 0   O2 Device None (Room air) None (Room air) None (Room air)   Distance Walked in Feet (ft)  --   --  356 ft      05/30/20 1508   RT Walking Oximetry   Stage After Walk   SpO2 96 %   HR 86 bpm   Rate of Dyspnea 0   O2 Device None (Room air)   Distance Walked in Feet (ft)  --

## 2020-05-30 NOTE — PROGRESS NOTES
[facility] RESIDENCY PROGRAM  PROGRESS NOTE     5/30/2020  PCP: Estella Vasquez MD       Assessment/Plan:     Mami Caro is a 69 y.o. female with history of HTN, type 2DM, GERD, hypothyroidism, depression, asthma, and hyperlipidemia who is admitted for AHRF 2/2 COVID 19.     Overnight events: No acute events overnight      AHRF 2/2 COVID 19: COVID+ on 5/4 outpatient. PCP treated with 5 days of azithro + plaquenil ending 5/10. No home O2 requirement. COVID inflammatory markers have normalized after substantial peak on ~5/14. Actemra given x1 on 5/15 with screening Quant-gold, HIV, hepatitis panel Negative. S/p Doxycycline 8 day course (5/12-5/19). Pt required a prolonged course of Hi flow oxygen leading to BiPAP on 5/19-5/20 which was slowly weaned back down over many days. Bcx NG 6 days. She was also treated with Bumex for diuresis while on high flow oxygen. A clinical trial of Caitlin was conducted on 5/12-5/16 (held due to hi flow O2 requirement), 5/26-5/29.  - Pulmonology following, appreciate rec's-        -Medrol 16 mg BID, taper slowly  - Proning  - Zinc, Vit C, Lipitor, Mucinex  - Zyrtec, Breo-Ellipta, Combivent Q6H, Singulair  - Supplemental O2 as needed to maintain sats >88%      Sinus Arrhythmia- Intermittent on tele, many PACs, leading to bradycardia. POA EKG with Left anterior fascicular block, Left ventricular hypertrophy.  - Discontinued beta blocker (Atenolol 25mg qhs)  - Monitor Mag and K, Replete prn    Type 2 DM: HgA1C 10.4 in 11/2019. This admission A1C 13.1. Pt's BG's have been very sensitive to methylprednisolone.    - Hold home glipizide  - Lantus 34U and Lispro 8U with meals with SSI ACHS- Resistant scale  - NPH 8U BID      Asthma  - Continue Singulair 10mg qhs     HTN  - Continue home BP med losartan 50mg daily  - HOLDING atenolol 25mg qhs     Hypothyroid: TSH 3.570 in 11/2019  - Continue home synthroid 100mcg daily     Depression  - Continue Zoloft 50mg daily and trazadone 100mg qhs to help with sleep     GERD- Home med of prilosec  - Continue Protonix for GERD and steroid protection      Hypokalemia: RESOLVED. Likely 2/2 diarrhea prior to arrival and diuretics inpatient. K 2.7 on admission.   - Repletion prn     Hyperlipidemia: Lipid panel , , HDL 59, LDL 89 in 2019  - Lipitor 40mg daily for additional anti-inflammatory benefit      Jovon Diaz discussed with Dr. Ryan Siddiqui. Subjective:   Doing well this morning without concerns or complaints. Excited to be able to go home soon. Denies cp, n, v, c, d. Objective:   Physical examination  Patient Vitals for the past 24 hrs:   Temp Pulse Resp BP SpO2   20 0334 98.5 °F (36.9 °C) 61 11 107/52 95 %   20 2324 98.6 °F (37 °C) 90 18 129/59 93 %   20  75 13  92 %   20 99 °F (37.2 °C) 97 16 147/56 (!) 88 %   20 1643 99.1 °F (37.3 °C) 76 18 106/47 98 %   20 1441     90 %   20 1151 99.3 °F (37.4 °C) 78 18 105/52 94 %   20 0806 98.9 °F (37.2 °C) 69 18 108/47 93 %      Temp (24hrs), Av.9 °F (37.2 °C), Min:98.5 °F (36.9 °C), Max:99.3 °F (37.4 °C)     O2 Flow Rate (L/min): 3 l/min   O2 Device: Room air    Date 20 - 20 0620 - 20 0659   Shift 0795-8921 8753-2025 24 Hour Total 1177-3847 7652-2962 24 Hour Total   INTAKE   P.O.         P. O.       Shift Total(mL/kg) 960(11.5) 925(10.8) 1885(21.9)      OUTPUT   Urine(mL/kg/hr) 250(0.2) 1800(1.7) (1)        Urine Voided 250 1800       Shift Total(mL/kg) 250(3) 1800(20.9) (23.8)       -195 -821      Weight (kg) 83.7 86 86 86 86 86     Pt is currently a COVID infected pt. I did not go in the room to decrease exposure and conserve PPE. The attending physician will exam in the pt. Talked on the phone.     General:   Alert, cooperative, speaking in full sentences     Data Review:     CBC:  Recent Labs     20  0234 20  0128 05/28/20  0615   WBC 11.1* 16.2* 11.4*   HGB 10.6* 11.2* 11.1*   HCT 31.9* 33.5* 33.9*    354 450     Metabolic Panel:  Recent Labs     05/30/20  0234 05/29/20  0128 05/28/20  0615    137 135*   K 4.4 4.0 3.9    105 99   CO2 26 29 28   BUN 24* 22* 29*   CREA 0.69 0.77 0.84   * 87 235*   CA 8.0* 8.4* 8.0*   MG 2.4 2.3 2.4   ALB 2.6* 2.8* 2.8*   TBILI 0.4 0.5 0.5   ALT 96* 87* 89*     Micro:  Lab Results   Component Value Date/Time    Culture result: NO GROWTH 6 DAYS 05/13/2020 04:29 AM    Culture result:  05/11/2020 12:35 AM     MRSA NOT PRESENT. Apparent Staphylococus aureus (not MRSA noted). Culture result:  05/11/2020 12:35 AM         Screening of patient nares for MRSA is for surveillance purposes and, if positive, to facilitate isolation considerations in high risk settings. It is not intended for automatic decolonization interventions per se as regimens are not sufficiently effective to warrant routine use. Imaging:  Xr Chest Port    Result Date: 5/10/2020  EXAM: XR CHEST PORT INDICATION: sob, +COVID COMPARISON: 2013 FINDINGS: A portable AP radiograph of the chest was obtained at 2145 hours. The patient is on a cardiac monitor. There is opacification in the lower lobes bilaterally, left greater than right. The cardiac and mediastinal contours and pulmonary vascularity are normal.  The bones and soft tissues are grossly within normal limits. IMPRESSION: Opacification in the lower lobes bilaterally, left greater than right. CXR AP Port: 5/15/2020    FINDINGS:  AP portable upright view of the chest demonstrates a stable  cardiopericardial  silhouette. The lungs are adequately expanded. Increased peripheral parenchymal  opacities greater on the left than on the right. Increased interstitial opacity  as well. . The osseous structures are unremarkable.  Patient is on a cardiac  monitor.      IMPRESSION  IMPRESSION:  Interval progression of extensive bilateral parenchymal disease greater on the  left than on the right.     Medications reviewed  Current Facility-Administered Medications   Medication Dose Route Frequency    insulin lispro (HUMALOG) injection 8 Units  8 Units SubCUTAneous ACB&D    methylPREDNISolone (MEDROL) tablet 16 mg  16 mg Oral Q12H    And    insulin NPH (NOVOLIN N, HUMULIN N) injection 8 Units  8 Units SubCUTAneous Q12H    insulin glargine (LANTUS) injection 34 Units  34 Units SubCUTAneous DAILY    dextrose (D50W) injection syrg 12.5-25 g  12.5-25 g IntraVENous PRN    glucose chewable tablet 16 g  4 Tab Oral PRN    glucagon (GLUCAGEN) injection 1 mg  1 mg IntraMUSCular PRN    insulin lispro (HUMALOG) injection   SubCUTAneous AC&HS    guaiFENesin ER (MUCINEX) tablet 1,200 mg  1,200 mg Oral BID    famotidine (PEPCID) tablet 20 mg  20 mg Oral BID    atorvastatin (LIPITOR) tablet 40 mg  40 mg Oral DAILY    zinc sulfate (ZINCATE) 220 (50) mg capsule 1 Cap  1 Cap Oral DAILY    diphenhydrAMINE (BENADRYL) injection 50 mg  50 mg IntraVENous Q4H PRN    acetaminophen (TYLENOL) tablet 650 mg  650 mg Oral Q6H PRN    sodium chloride (NS) flush 5-40 mL  5-40 mL IntraVENous Q8H    sodium chloride (NS) flush 5-40 mL  5-40 mL IntraVENous PRN    ondansetron (ZOFRAN) injection 4 mg  4 mg IntraVENous Q4H PRN    enoxaparin (LOVENOX) injection 40 mg  40 mg SubCUTAneous Q24H    ascorbic acid (vitamin C) (VITAMIN C) tablet 500 mg  500 mg Oral BID    aspirin delayed-release tablet 81 mg  81 mg Oral DAILY    calcium-vitamin D (OS-TRISTEN) 500 mg-200 unit tablet  1 Tab Oral DAILY    cetirizine (ZYRTEC) tablet 10 mg  10 mg Oral DAILY    fluticasone propionate (FLONASE) 50 mcg/actuation nasal spray 2 Spray  2 Spray Both Nostrils DAILY PRN    therapeutic multivitamin (THERAGRAN) tablet 1 Tab  1 Tab Oral DAILY    levothyroxine (SYNTHROID) tablet 100 mcg  100 mcg Oral ACB    losartan (COZAAR) tablet 50 mg  50 mg Oral DAILY    montelukast (SINGULAIR) tablet 10 mg  10 mg Oral QHS    sertraline (ZOLOFT) tablet 50 mg  50 mg Oral DAILY    traZODone (DESYREL) tablet 100 mg  100 mg Oral QHS    ipratropium-albuterol (COMBIVENT RESPIMAT) 20 mcg-100 mcg inhalation spray  1 Puff Inhalation Q6H RT    fluticasone-vilanterol (BREO ELLIPTA) 200mcg-25mcg/puff  1 Puff Inhalation DAILY    albuterol (PROVENTIL HFA, VENTOLIN HFA, PROAIR HFA) inhaler 2 Puff  2 Puff Inhalation Q4H PRN    pantoprazole (PROTONIX) tablet 40 mg  40 mg Oral ACB    sodium chloride (NS) flush 5-10 mL  5-10 mL IntraVENous PRN         Signed:   Alex Godinez MD   Resident, Family Medicine      Attending note: Attending note to follow. ..

## 2020-05-30 NOTE — PROGRESS NOTES
Bedside shift change report given to Abner Collier RN (oncoming nurse) by Jessica Delacruz RN (offgoing nurse). Report included the following information SBAR, Kardex and MAR.

## 2020-05-30 NOTE — PROGRESS NOTES
4:11 PM  Lauren Davis with [facility] delivered portable concentrator and gave education to Katty Oliver.  is aware and will provide transport.  has also talked with Ericka Berrios who is the [facility] weekend triage nurse and he has assured patient is on the schedule to be seen tomorrow. 2:34 PM  Call received from Lauren Davis who said with their post covid patients they bring a small concentrator for return home and he will come to deliver it to the floor. Plans to call cm when he has arrived in order to assure all needed paperwork is processed. Spoke with on call nurse for [facility] and he is working to call  now and provide support and education as well as schedule first visit. (Liaison had left a vm yesterday but  did not get it.)    1:48 PM  Call received from Dr. Erika Catalan and orders received for oxygen delivery. Medicals sent to [facility] through [employer] and spoke with the office as well. Awaiting a call back from monica to confirm and coordinate delivery to the home. Will forward the 6 minute walk once completed by therapy    Spoke with Dr. Erika Catalan regarding adding nursing and oxygen education to [facility] orders, and he agrees. Medicals updated to [facility] and orders sent through [employer] as well.  is aware of discharge and awaiting call to come pick her up as patient said to him she thought it would be later in the day when all was arranged. Cell for him is [phone]    Care Management Interventions  PCP Verified by CM: Yes  Mode of Transport at Discharge:  Other (see comment)('s car)  Transition of Care Consult (CM Consult): [address]: No  Reason Outside [place]: (husbandn identified a preference that was different)  Discharge Durable Medical Equipment: No  Physical Therapy Consult: Yes  Occupational Therapy Consult: Yes  Current Support Network: Lives with Spouse  Confirm Follow Up Transport: Family  The Patient and/or Patient Representative was Provided with a Choice of Provider and Agrees with the Discharge Plan?: Yes  Name of the Patient Representative Who was Provided with a Choice of Provider and Agrees with the Discharge Plan: reviewed over the phone with both patient and  regarding preference due to isolation   Freedom of Choice List was Provided with Basic Dialogue that Supports the Patient's Individualized Plan of Care/Goals, Treatment Preferences and Shares the Quality Data Associated with the Providers?: Yes  Discharge Location  Discharge Placement: Home with home health

## 2020-06-01 ENCOUNTER — PATIENT OUTREACH (OUTPATIENT)
Dept: INTERNAL MEDICINE CLINIC | Age: 75
End: 2020-06-01

## 2020-06-01 ENCOUNTER — VIRTUAL VISIT (OUTPATIENT)
Dept: FAMILY MEDICINE CLINIC | Age: 75
End: 2020-06-01

## 2020-06-01 ENCOUNTER — NURSE TRIAGE (OUTPATIENT)
Dept: OTHER | Facility: CLINIC | Age: 75
End: 2020-06-01

## 2020-06-01 VITALS — BODY MASS INDEX: 31.41 KG/M2 | WEIGHT: 184 LBS | HEIGHT: 64 IN

## 2020-06-01 DIAGNOSIS — Z79.4 TYPE 2 DIABETES MELLITUS WITHOUT COMPLICATION, WITH LONG-TERM CURRENT USE OF INSULIN (HCC): ICD-10-CM

## 2020-06-01 DIAGNOSIS — E11.9 TYPE 2 DIABETES MELLITUS WITHOUT COMPLICATION, WITH LONG-TERM CURRENT USE OF INSULIN (HCC): ICD-10-CM

## 2020-06-01 DIAGNOSIS — U07.1 COVID-19: Primary | ICD-10-CM

## 2020-06-01 RX ORDER — INSULIN GLARGINE 100 [IU]/ML
34 INJECTION, SOLUTION SUBCUTANEOUS DAILY
Qty: 15 PEN | Refills: 1 | Status: SHIPPED | OUTPATIENT
Start: 2020-06-01 | End: 2020-08-12 | Stop reason: SDUPTHER

## 2020-06-01 RX ORDER — PEN NEEDLE, DIABETIC 30 GX3/16"
NEEDLE, DISPOSABLE MISCELLANEOUS
Qty: 1 PACKAGE | Refills: 11 | Status: SHIPPED | OUTPATIENT
Start: 2020-06-01 | End: 2020-08-12 | Stop reason: SDUPTHER

## 2020-06-01 NOTE — PROGRESS NOTES
Patient contacted regarding COVID-19 diagnosis. Discussed COVID-19 related testing which was available at this time. Test results were positive. Patient informed of results, if available? yes     Care Transition Nurse/ Ambulatory Care Manager contacted the patient by telephone to perform post discharge assessment. Verified name and  with patient as identifiers. Provided introduction to self, and explanation of the CTN/ACM role, and reason for call due to risk factors for infection and/or exposure to COVID-19. Symptoms reviewed with patient who verbalized the following symptoms: no new symptoms and no worsening symptoms. Due to no new or worsening symptoms encounter was not routed to provider for escalation. Patient has following risk factors of: COPD and diabetes. CTN/ACM reviewed discharge instructions, medical action plan and red flags such as increased shortness of breath, increasing fever and signs of decompensation with patient who verbalized understanding. Discussed exposure protocols and quarantine with CDC Guidelines What to do if you are sick with coronavirus disease .  Patient was given an opportunity for questions and concerns. The patient agrees to contact the [facility] exposure line [phone], [facility]  ([phone]) and PCP office for questions related to their healthcare. CTN/ACM provided contact information for future needs. Reviewed and educated patient on any new and changed medications related to discharge diagnosis. Patient/family/caregiver given information for [employer] and agrees to enroll yes  Patient's preferred e-mail:  [email or web address]  Patient's preferred phone number: [phone]  Based on Loop alert triggers, patient will be contacted by nurse care manager for worsening symptoms.     Pt will be further monitored by COVID Loop Team based on severity of symptoms and risk factors. START taking these medications     Details   ascorbic acid, vitamin C, (VITAMIN C) 500 mg tablet Take 1 Tab by mouth two (2) times a day for 14 days. , Print, Disp-28 Tab, R-0       zinc sulfate (ZINCATE) 220 (50) mg capsule Take 1 Cap by mouth daily. , Print, Disp-14 Cap, R-0       insulin glargine (LANTUS,BASAGLAR) 100 unit/mL (3 mL) inpn 34 Units by SubCUTAneous route daily. , Print, Disp-5 Pen, R-0       methylPREDNISolone (MEDROL) 4 mg tab Take 4 tablets twice a day for 2 days, followed by 3 tablets twice a day for 3 days, followed by 2 tablets twice a day for 3 days, followed by 1 tablet twice a day for 3 days, followed by 1 tablet daily for 3 days, Print, Disp-55 Tab, R-0                 CONTINUE these medications which have CHANGED     Details   albuterol (ProAir HFA) 90 mcg/actuation inhaler Take 2 Puffs by inhalation every four (4) hours as needed for Wheezing., Print, Disp-1 Inhaler, R-0       lancets misc Please check you blood glucose before breakfast and before dinner daily, Print, Disp-100 Each, R-11       glucose blood VI test strips (blood glucose test) strip Please check blood glucose before breakfast and before dinner daily, Print, Disp-100 Strip, R-0       Blood-Glucose Meter (Blood Glucose Monitoring) monitoring kit Please check your blood glucose before breakfast and before dinner daily, Normal, Disp-1 Kit, R-0             STOP taking these medications         glipiZIDE SR (GLUCOTROL XL) 10 mg CR tablet Comments:   Reason for Stopping:            folic acid/multivit-min/lutein (CENTRUM SILVER PO) Comments:   Reason for Stopping:            atenolol (TENORMIN) 25 mg tablet Comments:   Reason for Stopping:            montelukast (SINGULAIR) 10 mg tablet Comments:   Reason for Stopping:            CALCIUM CARB/VIT D3/MINERALS (CALCIUM-VITAMIN D PO) Comments:   Reason for Stopping:            Patient has PCP f/u on 6/1/2020. Home health nurse made first visit yesterday. Patient is checking glucose and keeping log to review with MD. Confirmed above meds. Advance Care Planning  People with COVID-19 may have no symptoms, mild symptoms, such as fever, cough, and shortness of breath or they may have more severe illness, developing severe and fatal pneumonia. As a result, Advance Care Planning with attention to naming a health care decision maker (someone you trust to make healthcare decisions for you if you could not speak for yourself) and sharing other health care preferences is important BEFORE a possible health crisis. Please contact your Primary Care Provider to discuss Advance Care Planning. Preventing the Spread of Coronavirus Disease 2019 in Homes and Residential Communities  For the most recent information go to [place].    Prevention steps for People with confirmed or suspected COVID-19 (including persons under investigation) who do not need to be hospitalized  and   People with confirmed COVID-19 who were hospitalized and determined to be medically stable to go home    Your healthcare provider and public health staff will evaluate whether you can be cared for at home. If it is determined that you do not need to be hospitalized and can be isolated at home, you will be monitored by staff from your local or state health department. You should follow the prevention steps below until a healthcare provider or local or state health department says you can return to your normal activities. Stay home except to get medical care  People who are mildly ill with COVID-19 are able to isolate at home during their illness. You should restrict activities outside your home, except for getting medical care. Do not go to work, school, or public areas. Avoid using public transportation, ride-sharing, or taxis.   Separate yourself from other people and animals in your home  People: As much as possible, you should stay in a specific room and away from other people in your home. Also, you should use a separate bathroom, if available. Animals: You should restrict contact with pets and other animals while you are sick with COVID-19, just like you would around other people. Although there have not been reports of pets or other animals becoming sick with COVID-19, it is still recommended that people sick with COVID-19 limit contact with animals until more information is known about the virus. When possible, have another member of your household care for your animals while you are sick. If you are sick with COVID-19, avoid contact with your pet, including petting, snuggling, being kissed or licked, and sharing food. If you must care for your pet or be around animals while you are sick, wash your hands before and after you interact with pets and wear a facemask. Call ahead before visiting your doctor  If you have a medical appointment, call the healthcare provider and tell them that you have or may have COVID-19. This will help the healthcare providers office take steps to keep other people from getting infected or exposed. Wear a facemask  You should wear a facemask when you are around other people (e.g., sharing a room or vehicle) or pets and before you enter a healthcare providers office. If you are not able to wear a facemask (for example, because it causes trouble breathing), then people who live with you should not stay in the same room with you, or they should wear a facemask if they enter your room. Cover your coughs and sneezes  Cover your mouth and nose with a tissue when you cough or sneeze. Throw used tissues in a lined trash can. Immediately wash your hands with soap and water for at least 20 seconds or, if soap and water are not available, clean your hands with an alcohol-based hand  that contains at least 60% alcohol.   Clean your hands often  Wash your hands often with soap and water for at least 20 seconds, especially after blowing your nose, coughing, or sneezing; going to the bathroom; and before eating or preparing food. If soap and water are not readily available, use an alcohol-based hand  with at least 60% alcohol, covering all surfaces of your hands and rubbing them together until they feel dry. Soap and water are the best option if hands are visibly dirty. Avoid touching your eyes, nose, and mouth with unwashed hands. Avoid sharing personal household items  You should not share dishes, drinking glasses, cups, eating utensils, towels, or bedding with other people or pets in your home. After using these items, they should be washed thoroughly with soap and water. Clean all high-touch surfaces everyday  High touch surfaces include counters, tabletops, doorknobs, bathroom fixtures, toilets, phones, keyboards, tablets, and bedside tables. Also, clean any surfaces that may have blood, stool, or body fluids on them. Use a household cleaning spray or wipe, according to the label instructions. Labels contain instructions for safe and effective use of the cleaning product including precautions you should take when applying the product, such as wearing gloves and making sure you have good ventilation during use of the product. Monitor your symptoms  Seek prompt medical attention if your illness is worsening (e.g., difficulty breathing). Before seeking care, call your healthcare provider and tell them that you have, or are being evaluated for, COVID-19. Put on a facemask before you enter the facility. These steps will help the healthcare providers office to keep other people in the office or waiting room from getting infected or exposed. Ask your healthcare provider to call the local or [facility] health department.  Persons who are placed under active monitoring or facilitated self-monitoring should follow instructions provided by their local health department or occupational health professionals, as appropriate. When working with your local health department check their available hours. If you have a medical emergency and need to call 911, notify the dispatch personnel that you have, or are being evaluated for COVID-19. If possible, put on a facemask before emergency medical services arrive. Discontinuing home isolation  Patients with confirmed COVID-19 should remain under home isolation precautions until the risk of secondary transmission to others is thought to be low. The decision to discontinue home isolation precautions should be made on a case-by-case basis, in consultation with healthcare providers and state and local health departments.

## 2020-06-01 NOTE — TELEPHONE ENCOUNTER
Reason for Disposition   General information question, no triage required and triager able to answer question    Protocols used: INFORMATION ONLY CALL-ADULT-    Received call from  at [address] [place] [address]. Patient states she was in hospital for Covid-19, had an ER follow up scheduled for today at 3 PM and not sure if it is Virtual visit or if was cancelled. Call soft transferred back to Lowell Larkin in [facility] scheduling to verify appointment. Please do not respond to the triage nurse through this encounter. Any subsequent communication should be directly with the patient.

## 2020-06-01 NOTE — PROGRESS NOTES
Virtual Video Transitional Care Management Progress Note    Patient: Rowena Leavitt  :   PCP: Mariana Alicia MD    Date of admission: 5/10/20  Date of discharge: 20    Patient was contacted by Transitional Care Management services within two days after her discharge: Yes. This encounter and supporting documentation was reviewed if available. Medication reconciliation was performed today (2020). Consent: Rowena Leavitt, who was seen by synchronous (real-time) audio-video technology, and/or her healthcare decision maker, is aware that this patient-initiated, Telehealth encounter on 2020 is a billable service, with coverage as determined by her insurance carrier. She is aware that she may receive a bill and has provided verbal consent to proceed: Yes. Note patient was unable to complete video connection so entire visit was done by phone with patient and  on phone. Assessment/Plan:   Diagnoses and all orders for this visit:    1. Type 2 diabetes mellitus without complication, with long-term current use of insulin (formerly [facility])  Warned of hypoglycemia  FU if occurs  Stick to diet and meds  Refill of insulin and pen needles sent in.    2. COVID-19  Oxygen use discussed  Gradually increase activity  Quarantine discussed. FU one month in clinic re DM and COVID. Subjective:   Rowena Leavitt is a 69 y.o. female presenting today for follow-up after being discharged from [place]. The discharge summary was reviewed. The main problem requiring admission was COVID. Complications during admission: diabetes, respiratory failure    Interval history/Current status: doing well  Home health has seen her as well as PT,  OT tomorrow  Has home O2, using it PRN  We discussed pulse ox parameters and when she should use it. Will use if dyspneic and having pulse ox under 90%.     Admitting symptoms have: significantly improved      Medications marked \"taking\" at this time:  Home Medications    Medication Sig Start Date End Date Taking? Authorizing Provider   ascorbic acid, vitamin C, (VITAMIN C) 500 mg tablet Take 1 Tab by mouth two (2) times a day for 14 days. 5/30/20 6/13/20 Yes Angela Rebolledo MD   zinc sulfate (ZINCATE) 220 (50) mg capsule Take 1 Cap by mouth daily. 5/31/20  Yes Angela Rebolledo MD   insulin glargine (LANTUS,BASAGLAR) 100 unit/mL (3 mL) inpn 34 Units by SubCUTAneous route daily. 5/30/20  Yes Angela Rebolledo MD   albuterol (ProAir HFA) 90 mcg/actuation inhaler Take 2 Puffs by inhalation every four (4) hours as needed for Wheezing. 5/30/20  Yes Angela Rebolledo MD   lancets misc Please check you blood glucose before breakfast and before dinner daily 5/30/20  Yes Angela Rebolledo MD   glucose blood VI test strips (blood glucose test) strip Please check blood glucose before breakfast and before dinner daily 5/30/20  Yes Angela Rebolledo MD   Blood-Glucose Meter (Blood Glucose Monitoring) monitoring kit Please check your blood glucose before breakfast and before dinner daily 5/30/20  Yes Angela Rebolledo MD   methylPREDNISolone (MEDROL) 4 mg tab Take 4 tablets twice a day for 2 days, followed by 3 tablets twice a day for 3 days, followed by 2 tablets twice a day for 3 days, followed by 1 tablet twice a day for 3 days, followed by 1 tablet daily for 3 days 5/30/20  Yes Angela Rebolledo MD   guaiFENesin ER (Mucinex) 600 mg ER tablet Take 600 mg by mouth two (2) times a day. Yes Provider, Historical   omeprazole (PRILOSEC) 20 mg capsule Take 20 mg by mouth daily. Yes Provider, Historical   calcium-vitamin D (OYSTER SHELL) 500 mg(1,250mg) -200 unit per tablet Take 1 Tab by mouth daily. Yes Provider, Historical   traZODone (DESYREL) 100 mg tablet TAKE 1 TABLET BY MOUTH EVERY DAY AT NIGHT 3/25/20  Yes Ellen Bender, NP   simvastatin (ZOCOR) 10 mg tablet Take 1 Tab by mouth nightly.  10/8/19  Yes Ellen Bender, MAGAN   losartan (COZAAR) 50 mg tablet TAKE 1 TABLET BY MOUTH EVERY DAY. 10/8/19  Yes Naveed Bender NP   levothyroxine (SYNTHROID) 100 mcg tablet Take 1 Tab by mouth Daily (before breakfast). 10/8/19  Yes Naveed Bender NP   sertraline (ZOLOFT) 50 mg tablet TAKE 1 TABLET BY MOUTH EVERY DAY 9/4/19  Yes Naveed Bender NP   aspirin delayed-release 81 mg tablet Take 81 mg by mouth daily. Yes Provider, Historical   fluticasone (FLONASE) 50 mcg/actuation nasal spray 2 Sprays by Both Nostrils route daily as needed for Rhinitis or Allergies. Yes Provider, Historical   Cetirizine (ZYRTEC) 10 mg cap Take 1 Tab by mouth every evening. Yes Provider, Historical   multivitamins-minerals-lutein (CENTRUM SILVER) tab Take 1 Tab by mouth daily. Yes Provider, Historical   glucosamine-chondroit-vit c-mn (GLUCOSAMINE CHONDROITIN MAXSTR) 500-400 mg capsule Take 1 Cap by mouth daily. Yes Other, MD [place]   varicella-zoster recombinant, PF, (SHINGRIX, PF,) 50 mcg/0.5 mL susr injection 0.5mL by IntraMUSCular route once now and then repeat in 2-6 months 10/8/19 6/1/20  Marc Casper NP        Review of Systems:  Negative except occasional dyspnea  Bowels and bladder ok, no chest pain       DM2 currently on 34 units of glargine  FBS this   Eating well. Currently on steroid taper. Advised to watch for hypoglycemia as steroids tapered and to call for advice on lowering insulin dose. Objective:   Ht 5' 4\" (1.626 m)   Wt 184 lb (83.5 kg)   BMI 31.58 kg/m²      General: alert, cooperative, no distress   Mental  status: normal mood, behavior, speech, dress, motor activity, and thought processes, able to follow commands   HENT:    Neck:    Resp:    Neuro:    Skin:    Psychiatric:      Additional exam findings: We discussed the expected course, resolution and complications of the diagnosis(es) in detail. Medication risks, benefits, costs, interactions, and alternatives were discussed as indicated.   I advised her to contact the office if her condition worsens, changes or fails to improve as anticipated. She expressed understanding with the diagnosis(es) and plan. Jud Naranjo is a 69 y.o. female who was evaluated by a video visit encounter for concerns as above. Patient identification was verified prior to start of the visit. A caregiver was present when appropriate. Due to this being a TeleHealth encounter (During [facility] emergency), evaluation of the following organ systems was limited: Vitals/Constitutional/EENT/Resp/CV/GI//MS/Neuro/Skin/Heme-Lymph-Imm. Pursuant to the emergency declaration under the [facility], [facility]5 waiver authority and the [employer] Act, this Virtual  Visit was conducted, with patient's (and/or legal guardian's) consent, to reduce the patient's risk of exposure to COVID-19 and provide necessary medical care. Patient was at home and I was in office for this phone only visit. Viviana Patrick MD

## 2020-06-01 NOTE — PROGRESS NOTES
Jud Naranjo is a 69 y.o. female      Chief Complaint   Patient presents with   [facility] Follow Up     Patient was tested Positive for ANDREE         1. Have you been to the ER, urgent care clinic since your last visit? Hospitalized since your last visit? Yes       2. Have you seen or consulted any other health care providers outside of the [address] [place] [address] since your last visit? Include any pap smears or colon screening.    No

## 2020-06-05 ENCOUNTER — TELEPHONE (OUTPATIENT)
Dept: FAMILY MEDICINE CLINIC | Age: 75
End: 2020-06-05

## 2020-06-05 NOTE — TELEPHONE ENCOUNTER
----- Message from Xuan Malagon sent at 6/5/2020  1:53 PM EDT -----  Regarding: Dr. Milla Marin, Spouse, is stating that he and his wife contracted COVID-19 and a letter for insurance purposes was being written for a flight cancellation due to the illness. Avinash Calvin is requesting the letter sent in mail, instead of via e-mail, due to it still not being received. Best contact number [phone].

## 2020-06-05 NOTE — TELEPHONE ENCOUNTER
Enmanuel doshi called from [place] stating that they received an order for oxygen but Dr. Jelly Li is not PECOS certified so medicare will not cover. Order was done while pt was in hospital. Rebeca Pardo needs to know who to send the forms to so that another provider can sign form. Devan Cosby is requesting for a call back at earliest convenience.

## 2020-06-15 ENCOUNTER — TELEPHONE (OUTPATIENT)
Dept: FAMILY MEDICINE CLINIC | Age: 75
End: 2020-06-15

## 2020-06-15 NOTE — TELEPHONE ENCOUNTER
Does she need to stay on insulin? Yes  Schedule appt to see me. Other covid issues discussed.   Doing well  [address] [facility]

## 2020-06-23 ENCOUNTER — TELEPHONE (OUTPATIENT)
Dept: FAMILY MEDICINE CLINIC | Age: 75
End: 2020-06-23

## 2020-08-12 ENCOUNTER — VIRTUAL VISIT (OUTPATIENT)
Dept: FAMILY MEDICINE CLINIC | Age: 75
End: 2020-08-12
Payer: MEDICARE

## 2020-08-12 DIAGNOSIS — Z79.4 TYPE 2 DIABETES MELLITUS WITHOUT COMPLICATION, WITH LONG-TERM CURRENT USE OF INSULIN (HCC): Primary | ICD-10-CM

## 2020-08-12 DIAGNOSIS — E11.21 TYPE 2 DIABETES WITH NEPHROPATHY (HCC): ICD-10-CM

## 2020-08-12 DIAGNOSIS — E11.9 TYPE 2 DIABETES MELLITUS WITHOUT COMPLICATION, WITH LONG-TERM CURRENT USE OF INSULIN (HCC): Primary | ICD-10-CM

## 2020-08-12 PROCEDURE — 99442 PR PHYS/QHP TELEPHONE EVALUATION 11-20 MIN: CPT | Performed by: FAMILY MEDICINE

## 2020-08-12 RX ORDER — INSULIN GLARGINE 100 [IU]/ML
20 INJECTION, SOLUTION SUBCUTANEOUS 2 TIMES DAILY
Qty: 15 PEN | Refills: 1 | Status: SHIPPED | OUTPATIENT
Start: 2020-08-12 | End: 2020-10-05

## 2020-08-12 RX ORDER — PEN NEEDLE, DIABETIC 30 GX3/16"
NEEDLE, DISPOSABLE MISCELLANEOUS
Qty: 1 PACKAGE | Refills: 11 | Status: SHIPPED | OUTPATIENT
Start: 2020-08-12

## 2020-08-12 NOTE — PROGRESS NOTES
Hetal Atwood is a 69 y.o. female      Chief Complaint   Patient presents with    Follow-up     This is a follow-up positive ANDREE  back in May     Diabetes     Blood suger is running          1. Have you been to the ER, urgent care clinic since your last visit? Hospitalized since your last visit? No      2. Have you seen or consulted any other health care providers outside of the [address] [place] [address] since your last visit? Include any pap smears or colon screening.    No

## 2020-08-12 NOTE — PROGRESS NOTES
Emanuel Carter is a 69 y.o. female evaluated via telephone on 8/12/2020. Consent:  She and/or health care decision maker is aware that she may receive a bill for this telephone service, depending on her insurance coverage, and has provided verbal consent to proceed: Yes      Documentation:  I communicated with the patient and/or health care decision maker about diabetes and COVID follow up  . Details of this discussion including any medical advice provided:     Recent recurrence of cough and slightly worse asthma. Retested for COVID and negative  Now breathing is OK    DM2  Sugars up all of July  Fasting sugars:  206,201,201,197, 226, 230  Lowest sugar for all of July 147  Trying to do well with diet. Eating 3 meals a day. Avoiding sugary drinks and juice. Trying to control carbs and eat protein. Took an old dose of glipizide yesterday (not supposed to be on)      Assessment and Plan:    1. Type 2 diabetes mellitus without complication, with long-term current use of insulin (HCC)  Change to glargine 20 units BID  Increase by 2 units each dose every 3 days until sugars are under 130 consistently. No more glipizide. FU 3 weeks in office. 2. Type 2 diabetes with nephropathy ([facility] [address]  Reordered glargine pens and needles. Follow-up and Dispositions    · Return in about 3 weeks (around 9/2/2020) for Diabetes follow up, Blood pressure follow up. I affirm this is a Patient Initiated Episode with a Patient who has not had a related appointment within my department in the past 7 days or scheduled within the next 24 hours. Total Time: minutes: 11-20 minutes    Note: not billable if this call serves to triage the patient into an appointment for the relevant concern    The patient was at home and I was in office for this phone visit.     Sukhdeep Muniz MD

## 2020-08-28 ENCOUNTER — HOSPITAL ENCOUNTER (OUTPATIENT)
Dept: MRI IMAGING | Age: 75
Discharge: HOME | End: 2020-08-28
Attending: ORTHOPAEDIC SURGERY
Payer: MEDICARE

## 2020-08-28 VITALS — BODY MASS INDEX: 32.79 KG/M2 | WEIGHT: 191 LBS

## 2020-08-28 DIAGNOSIS — M89.9 LESION OF VERTEBRA: ICD-10-CM

## 2020-08-28 DIAGNOSIS — M54.16 LUMBAR RADICULOPATHY: ICD-10-CM

## 2020-08-28 PROCEDURE — 74011250636 HC RX REV CODE- 250/636: Performed by: RADIOLOGY

## 2020-08-28 PROCEDURE — A9575 INJ GADOTERATE MEGLUMI 0.1ML: HCPCS | Performed by: RADIOLOGY

## 2020-08-28 PROCEDURE — 72158 MRI LUMBAR SPINE W/O & W/DYE: CPT

## 2020-08-28 RX ORDER — GADOTERATE MEGLUMINE 376.9 MG/ML
17 INJECTION INTRAVENOUS
Status: COMPLETED | OUTPATIENT
Start: 2020-08-28 | End: 2020-08-28

## 2020-08-28 RX ADMIN — GADOTERATE MEGLUMINE 17 ML: 376.9 INJECTION INTRAVENOUS at 16:23

## 2020-09-04 DIAGNOSIS — I10 ESSENTIAL HYPERTENSION: ICD-10-CM

## 2020-09-04 RX ORDER — LOSARTAN POTASSIUM 50 MG/1
TABLET ORAL
Qty: 90 TAB | Refills: 1 | Status: SHIPPED | OUTPATIENT
Start: 2020-09-04 | End: 2021-02-23

## 2020-09-04 RX ORDER — SERTRALINE HYDROCHLORIDE 50 MG/1
50 TABLET, FILM COATED ORAL DAILY
Qty: 90 TAB | Refills: 1 | Status: SHIPPED | OUTPATIENT
Start: 2020-09-04 | End: 2021-02-23

## 2020-09-14 ENCOUNTER — OFFICE VISIT (OUTPATIENT)
Dept: FAMILY MEDICINE CLINIC | Age: 75
End: 2020-09-14
Payer: MEDICARE

## 2020-09-14 VITALS
WEIGHT: 195 LBS | DIASTOLIC BLOOD PRESSURE: 82 MMHG | RESPIRATION RATE: 16 BRPM | OXYGEN SATURATION: 96 % | TEMPERATURE: 98.9 F | BODY MASS INDEX: 33.29 KG/M2 | HEART RATE: 111 BPM | HEIGHT: 64 IN | SYSTOLIC BLOOD PRESSURE: 126 MMHG

## 2020-09-14 DIAGNOSIS — E11.29 TYPE 2 DIABETES MELLITUS WITH MICROALBUMINURIA, WITH LONG-TERM CURRENT USE OF INSULIN (HCC): ICD-10-CM

## 2020-09-14 DIAGNOSIS — Z79.4 TYPE 2 DIABETES MELLITUS WITH MICROALBUMINURIA, WITH LONG-TERM CURRENT USE OF INSULIN (HCC): ICD-10-CM

## 2020-09-14 DIAGNOSIS — E78.00 HYPERCHOLESTEREMIA: ICD-10-CM

## 2020-09-14 DIAGNOSIS — E11.21 TYPE 2 DIABETES WITH NEPHROPATHY (HCC): Primary | ICD-10-CM

## 2020-09-14 DIAGNOSIS — I10 ESSENTIAL HYPERTENSION: ICD-10-CM

## 2020-09-14 DIAGNOSIS — Z23 ENCOUNTER FOR IMMUNIZATION: ICD-10-CM

## 2020-09-14 DIAGNOSIS — R80.9 TYPE 2 DIABETES MELLITUS WITH MICROALBUMINURIA, WITH LONG-TERM CURRENT USE OF INSULIN (HCC): ICD-10-CM

## 2020-09-14 DIAGNOSIS — J45.40 MODERATE PERSISTENT ASTHMA WITHOUT COMPLICATION: ICD-10-CM

## 2020-09-14 DIAGNOSIS — G47.00 INSOMNIA, UNSPECIFIED TYPE: ICD-10-CM

## 2020-09-14 DIAGNOSIS — N28.1 RENAL CYST, LEFT: ICD-10-CM

## 2020-09-14 LAB
ANION GAP SERPL CALC-SCNC: 11 MMOL/L (ref 5–15)
BASOPHILS # BLD: 0.1 K/UL (ref 0–0.1)
BASOPHILS NFR BLD: 1 % (ref 0–1)
BUN SERPL-MCNC: 20 MG/DL (ref 6–20)
BUN/CREAT SERPL: 21 (ref 12–20)
CALCIUM SERPL-MCNC: 9.5 MG/DL (ref 8.5–10.1)
CHLORIDE SERPL-SCNC: 109 MMOL/L (ref 97–108)
CO2 SERPL-SCNC: 20 MMOL/L (ref 21–32)
CREAT SERPL-MCNC: 0.95 MG/DL (ref 0.55–1.02)
CREAT UR-MCNC: 200 MG/DL
DIFFERENTIAL METHOD BLD: ABNORMAL
EOSINOPHIL # BLD: 0.1 K/UL (ref 0–0.4)
EOSINOPHIL NFR BLD: 1 % (ref 0–7)
ERYTHROCYTE [DISTWIDTH] IN BLOOD BY AUTOMATED COUNT: 13.3 % (ref 11.5–14.5)
EST. AVERAGE GLUCOSE BLD GHB EST-MCNC: 186 MG/DL
GLUCOSE SERPL-MCNC: 99 MG/DL (ref 65–100)
HBA1C MFR BLD: 8.1 % (ref 4–5.6)
HCT VFR BLD AUTO: 46.9 % (ref 35–47)
HGB BLD-MCNC: 14.8 G/DL (ref 11.5–16)
IMM GRANULOCYTES # BLD AUTO: 0 K/UL (ref 0–0.04)
IMM GRANULOCYTES NFR BLD AUTO: 0 % (ref 0–0.5)
LYMPHOCYTES # BLD: 2.6 K/UL (ref 0.8–3.5)
LYMPHOCYTES NFR BLD: 25 % (ref 12–49)
MCH RBC QN AUTO: 28.1 PG (ref 26–34)
MCHC RBC AUTO-ENTMCNC: 31.6 G/DL (ref 30–36.5)
MCV RBC AUTO: 89.2 FL (ref 80–99)
MICROALBUMIN UR-MCNC: 1.27 MG/DL
MICROALBUMIN/CREAT UR-RTO: 6 MG/G (ref 0–30)
MONOCYTES # BLD: 0.7 K/UL (ref 0–1)
MONOCYTES NFR BLD: 7 % (ref 5–13)
NEUTS SEG # BLD: 6.8 K/UL (ref 1.8–8)
NEUTS SEG NFR BLD: 66 % (ref 32–75)
NRBC # BLD: 0 K/UL (ref 0–0.01)
NRBC BLD-RTO: 0 PER 100 WBC
PLATELET # BLD AUTO: 265 K/UL (ref 150–400)
PMV BLD AUTO: 11.7 FL (ref 8.9–12.9)
POTASSIUM SERPL-SCNC: 4.2 MMOL/L (ref 3.5–5.1)
RBC # BLD AUTO: 5.26 M/UL (ref 3.8–5.2)
SODIUM SERPL-SCNC: 140 MMOL/L (ref 136–145)
WBC # BLD AUTO: 10.4 K/UL (ref 3.6–11)

## 2020-09-14 PROCEDURE — 99215 OFFICE O/P EST HI 40 MIN: CPT | Performed by: FAMILY MEDICINE

## 2020-09-14 PROCEDURE — G8427 DOCREV CUR MEDS BY ELIG CLIN: HCPCS | Performed by: FAMILY MEDICINE

## 2020-09-14 PROCEDURE — G9899 SCRN MAM PERF RSLTS DOC: HCPCS | Performed by: FAMILY MEDICINE

## 2020-09-14 PROCEDURE — G8536 NO DOC ELDER MAL SCRN: HCPCS | Performed by: FAMILY MEDICINE

## 2020-09-14 PROCEDURE — G8754 DIAS BP LESS 90: HCPCS | Performed by: FAMILY MEDICINE

## 2020-09-14 PROCEDURE — G8752 SYS BP LESS 140: HCPCS | Performed by: FAMILY MEDICINE

## 2020-09-14 PROCEDURE — G8417 CALC BMI ABV UP PARAM F/U: HCPCS | Performed by: FAMILY MEDICINE

## 2020-09-14 PROCEDURE — G8399 PT W/DXA RESULTS DOCUMENT: HCPCS | Performed by: FAMILY MEDICINE

## 2020-09-14 PROCEDURE — G8510 SCR DEP NEG, NO PLAN REQD: HCPCS | Performed by: FAMILY MEDICINE

## 2020-09-14 RX ORDER — TRAZODONE HYDROCHLORIDE 100 MG/1
TABLET ORAL
Qty: 90 TAB | Refills: 1 | Status: SHIPPED | OUTPATIENT
Start: 2020-09-14 | End: 2020-10-12 | Stop reason: SDUPTHER

## 2020-09-14 RX ORDER — SIMVASTATIN 10 MG/1
10 TABLET, FILM COATED ORAL
Qty: 90 TAB | Refills: 1 | Status: SHIPPED | OUTPATIENT
Start: 2020-09-14 | End: 2021-05-24

## 2020-09-14 NOTE — PROGRESS NOTES
Identified pt with two pt identifiers(name and ). Reviewed record in preparation for visit and have obtained necessary documentation. Chief Complaint   Patient presents with    Annual Wellness Visit    Blood sugar problem        Health Maintenance Due   Topic    Shingrix Vaccine Age 50> (1 of 2)    Pneumococcal 65+ years (1 of 1 - PPSV23)    A1C test (Diabetic or Prediabetic)     Flu Vaccine (1)    Medicare Yearly Exam     Foot Exam Q1        Visit Vitals  Blood Pressure 126/82 (BP 1 Location: Left arm, BP Patient Position: Sitting)   Pulse (Abnormal) 111   Temperature 98.9 °F (37.2 °C) (Oral)   Respiration 16   Height 5' 4\" (1.626 m)   Weight 195 lb (88.5 kg)   Oxygen Saturation 96%   Body Mass Index 33.47 kg/m²         Coordination of Care Questionnaire:  :   1) Have you been to an emergency room, urgent care, or hospitalized since your last visit? NO      2. Have seen or consulted any other health care provider since your last visit? NO        Patient is accompanied by  I have received verbal consent from Kimberly Godoy to discuss any/all medical information while they are present in the room.

## 2020-09-14 NOTE — PROGRESS NOTES
Assessment and Plan    1. Type 2 diabetes with nephropathy ([facility] [address]  Much better now on insulin  Check status  - HEMOGLOBIN A1C WITH EAG; Future  - CBC WITH AUTOMATED DIFF; Future  - simvastatin (ZOCOR) 10 mg tablet; Take 1 Tab by mouth nightly. Dispense: 90 Tab; Refill: 1  -  DIABETES FOOT EXAM  - REFERRAL TO DIABETIC EDUCATION; Standing    2. Encounter for immunization  updated  - pneumococcal 13 jatin conj dip (PREVNAR-13) 0.5 mL syrg injection; 0.5 mL by IntraMUSCular route once for 1 dose. Dispense: 0.5 mL; Refill: 0  - pneumococcal 23-valent (PNEUMOVAX 23) 25 mcg/0.5 mL injection; 0.5 mL by IntraMUSCular route once for 1 dose. Dispense: 0.5 mL; Refill: 0  - varicella-zoster recombinant, PF, (SHINGRIX) 50 mcg/0.5 mL susr injection; 0.5 mL by IntraMUSCular route once for 1 dose. Repeat second dose in 6 months. Dispense: 0.5 mL; Refill: 1    3. Essential hypertension  At goal. Continue losartan  - METABOLIC PANEL, BASIC; Future  - MICROALBUMIN, UR, RAND W/ MICROALB/CREAT RATIO; Future    4. Asthma  Recent covid infection  FU with PULMONARY  She will arrange appt, Dr. Brian Padilla    5. Insomnia, unspecified type  - traZODone (DESYREL) 100 mg tablet; TAKE 1 TABLET BY MOUTH EVERY DAY AT NIGHT  Dispense: 90 Tab; Refill: 1    6. Renal cyst left  Noted on MRI spine. Check US kidneys    7. Hypercholesterolemia  Refill statin. Follow-up and Dispositions    · Return in about 3 months (around 12/14/2020) for Diabetes follow up, Medication follow up. Diagnosis and plan discussed with patient who verbillized understanding. History of present Samira Guillermo is a 70 y.o. female presenting for Annual Wellness Visit and Blood sugar problem    Hospitalized for COVID in summer  [facility] done, quarantine done  Breathing better  Sometimes has chest pressure in her chest.  Feels sometimes she cannot get a deep breath  Gets during the night or if talking on the phone.   No pain or pressure with exertion but back gets tired and gets out of breath. HX of asthma  Should she go back to PULMONARY? Hypertension  Takes meds consistently  Does not check BP at home. DM2  On insulin since in hospital.  Lantus 28 units twice  Went to 30 units BID and had sugar of 77 so backed off  Not on any other blood sugar meds  Has lost some weight. Did have a steroid injection  Eye exam last week  Dr. Chris Chun. No problems with feet. Intolerant of metformin in past GI sx. Renal cyst left  Noted when she had MRI back recently. Review of Systems   Constitutional: Positive for weight loss (two pounds on diabetic diet. ). Negative for chills and fever. HENT: Negative for congestion and sore throat. Respiratory: Positive for shortness of breath and wheezing. Negative for cough. Cardiovascular: Negative for chest pain and leg swelling. Psychiatric/Behavioral: Negative.           Past Medical History:   Diagnosis Date    Anxiety     Arthritis     Asthma     Chronic pain     back,right hip    Depression     Diabetes ([address]     type 2    GERD (gastroesophageal reflux disease)     History of vascular access device 05/18/2020    4 Fr midline, 12 cm L basilic, blood draws    Hypercholesteremia     Hypertension     Hypothyroid     Nausea & vomiting     Reflux     Sleep apnea     wears dental appliance- Somnident    Thyroid disease     Unspecified adverse effect of anesthesia     delayed awakening     Past Surgical History:   Procedure Laterality Date    HX COLONOSCOPY  2012    normal repeat 2022, diverticulosis of sigmoid colon, Dr. Pallavi Dixon    HX HEENT  1/08/14    bilateral cataract surgery1/8/14    HX KNEE REPLACEMENT      right    HX OTHER SURGICAL  2007    cardiac cath- No CAD found    NEUROLOGICAL PROCEDURE UNLISTED  1999    vascular brain surgery     Family History   Problem Relation Age of Onset    Elevated Lipids Mother     Dementia Mother     Osteoporosis Mother     Lung Disease Father         copd    Delayed Awakening Father     Post-op Nausea/Vomiting Father     Hypertension Sister     Diabetes Sister         type 2    Breast Cancer Maternal Aunt      Social History     Socioeconomic History    Marital status:      Spouse name: Not on file    Number of children: Not on file    Years of education: Not on file    Highest education level: Not on file   Occupational History    Not on file   Social Needs    Financial resource strain: Not on file    Food insecurity     Worry: Not on file     Inability: Not on file    Transportation needs     Medical: Not on file     Non-medical: Not on file   Tobacco Use    Smoking status: Never Smoker    Smokeless tobacco: Never Used   Substance and Sexual Activity    Alcohol use: No     Frequency: Never     Comment: 2x year    Drug use: No    Sexual activity: Yes     Partners: Male   Lifestyle    Physical activity     Days per week: Not on file     Minutes per session: Not on file    Stress: Not on file   Relationships    Social connections     Talks on phone: Not on file     Gets together: Not on file     Attends Druze service: Not on file     Active member of club or organization: Not on file     Attends meetings of clubs or organizations: Not on file     Relationship status: Not on file    Intimate partner violence     Fear of current or ex partner: Not on file     Emotionally abused: Not on file     Physically abused: Not on file     Forced sexual activity: Not on file   Other Topics Concern     Service Not Asked    Blood Transfusions Not Asked    Caffeine Concern Not Asked    Occupational Exposure Not Asked   Rolette Radish Hazards Not Asked    Sleep Concern Not Asked    Stress Concern Not Asked    Weight Concern Not Asked    Special Diet Not Asked    Back Care Not Asked    Exercise Not Asked    Bike Helmet Not Asked   [address] Not Asked    Self-Exams Not Asked   Social History Narrative    Not on file         Prior to Admission medications    Medication Sig Start Date End Date Taking? Authorizing Provider   traZODone (DESYREL) 100 mg tablet TAKE 1 TABLET BY MOUTH EVERY DAY AT NIGHT 9/14/20  Yes Gema Purcell MD   simvastatin (ZOCOR) 10 mg tablet Take 1 Tab by mouth nightly. 9/14/20  Yes Gema Purcell MD   pneumococcal 13 jatin conj dip (PREVNAR-13) 0.5 mL syrg injection 0.5 mL by IntraMUSCular route once for 1 dose. 9/14/20 9/14/20 Yes Gema Purcell MD   pneumococcal 23-valent (PNEUMOVAX 23) 25 mcg/0.5 mL injection 0.5 mL by IntraMUSCular route once for 1 dose. 9/14/20 9/14/20 Yes Gema Purcell MD   varicella-zoster recombinant, PF, [facility]) 50 mcg/0.5 mL susr injection 0.5 mL by IntraMUSCular route once for 1 dose. Repeat second dose in 6 months. 9/14/20 9/14/20 Yes Gema Purcell MD   losartan (COZAAR) 50 mg tablet TAKE 1 TABLET BY MOUTH EVERY DAY. 9/4/20  Yes Gema Purcell MD   sertraline (ZOLOFT) 50 mg tablet Take 1 Tab by mouth daily. 9/4/20  Yes Gema Purcell MD   insulin glargine (LANTUS,BASAGLAR) 100 unit/mL (3 mL) inpn 20 Units by SubCUTAneous route two (2) times a day. 8/12/20  Yes Gema Purcell MD   Insulin Needles, Disposable, 31 gauge x 5/16\" ndle Use daily with glargine insulin pen 8/12/20  Yes Gema Purcell MD   albuterol (ProAir HFA) 90 mcg/actuation inhaler Take 2 Puffs by inhalation every four (4) hours as needed for Wheezing.  5/30/20  Yes Marion Smart MD   lancets misc Please check you blood glucose before breakfast and before dinner daily 5/30/20  Yes Marion Smart MD   glucose blood VI test strips (blood glucose test) strip Please check blood glucose before breakfast and before dinner daily 5/30/20  Yes Marion Smart MD   Blood-Glucose Meter (Blood Glucose Monitoring) monitoring kit Please check your blood glucose before breakfast and before dinner daily 5/30/20  Yes Dearbrandin Smart MD   guaiFENesin ER (Mucinex) 600 mg ER tablet Take 600 mg by mouth two (2) times a day. Yes Provider, Historical   omeprazole (PRILOSEC) 20 mg capsule Take 20 mg by mouth daily. Yes Provider, Historical   calcium-vitamin D (OYSTER SHELL) 500 mg(1,250mg) -200 unit per tablet Take 1 Tab by mouth daily. Yes Provider, Historical   levothyroxine (SYNTHROID) 100 mcg tablet Take 1 Tab by mouth Daily (before breakfast). 10/8/19  Yes Vi Bender NP   aspirin delayed-release 81 mg tablet Take 81 mg by mouth daily. Yes Provider, Historical   fluticasone (FLONASE) 50 mcg/actuation nasal spray 2 Sprays by Both Nostrils route daily as needed for Rhinitis or Allergies. Yes Provider, Historical   Cetirizine (ZYRTEC) 10 mg cap Take 1 Tab by mouth every evening. Yes Provider, Historical   multivitamins-minerals-lutein (CENTRUM SILVER) tab Take 1 Tab by mouth daily. Yes Provider, Historical   glucosamine-chondroit-vit c-mn (GLUCOSAMINE CHONDROITIN MAXSTR) 500-400 mg capsule Take 1 Cap by mouth daily. Yes Other, MD [place]   traZODone (DESYREL) 100 mg tablet TAKE 1 TABLET BY MOUTH EVERY DAY AT NIGHT 3/25/20 9/14/20  Vi Bender NP   simvastatin (ZOCOR) 10 mg tablet Take 1 Tab by mouth nightly. 10/8/19 9/14/20  Noe Lantigua NP        Allergies   Allergen Reactions    Latex Rash     Other reaction(s): Bleeding (finding)    Prednisone Anaphylaxis     Throat swelling. Can take methylpredisone po or IV without problems. Other reaction(s): Pharyngeal swelling (finding)    Augmentin [Amoxicillin-Pot Clavulanate] Nausea and Vomiting    Biaxin [Clarithromycin] Nausea and Vomiting    Metformin Diarrhea    Parafon Forte Dsc [Chlorzoxazone] Nausea and Vomiting    Penicillin V Potassium Unable to Obtain       Vitals:    09/14/20 1129   BP: 126/82   Pulse: (!) 111   Resp: 16   Temp: 98.9 °F (37.2 °C)   TempSrc: Oral   SpO2: 96%   Weight: 195 lb (88.5 kg)   Height: 5' 4\" (1.626 m)     Body mass index is 33.47 kg/m².       Objective  Physical Exam  Vitals signs and nursing note reviewed. Constitutional:       Appearance: Normal appearance. She is not toxic-appearing. HENT:      Head: Normocephalic and atraumatic. Neck:      Musculoskeletal: No muscular tenderness. Cardiovascular:      Rate and Rhythm: Normal rate and regular rhythm. Heart sounds: Normal heart sounds. No murmur. No gallop. Pulmonary:      Effort: Pulmonary effort is normal. No respiratory distress. Breath sounds: Normal breath sounds. No wheezing, rhonchi or rales. Lymphadenopathy:      Cervical: No cervical adenopathy. Neurological:      Mental Status: She is alert. Psychiatric:         Mood and Affect: Mood normal.         Behavior: Behavior normal.         Thought Content: Thought content normal.         Judgment: Judgment normal.          Diabetic Foot Exam:  Protective sensation is intact bilaterally. Pedal pulses are 2+ and normal bilaterally.   L foot skin inspection:  normal skin and soft tissue with no gross edema or evidence of acute injury or foot ulcer  R foot skin inspection:  skin and soft tissue appear normal with no significant edema or evidence of acute injury or foot ulcer

## 2020-09-15 NOTE — PROGRESS NOTES
Your diabetic control is much better and nearly where we want it. It should improve further now that you are on the correct insulin dose especially if we get the diet improved. See the dietician as we discussed. Make sure you watch for low sugars and let me know if you are having them. See me in the office in 3-4 months.   [facility]

## 2020-09-18 ENCOUNTER — HOSPITAL ENCOUNTER (OUTPATIENT)
Dept: ULTRASOUND IMAGING | Age: 75
Discharge: HOME | End: 2020-09-18
Attending: FAMILY MEDICINE
Payer: MEDICARE

## 2020-09-18 DIAGNOSIS — N28.1 RENAL CYST, LEFT: ICD-10-CM

## 2020-09-18 PROCEDURE — 76770 US EXAM ABDO BACK WALL COMP: CPT

## 2020-09-18 NOTE — PROGRESS NOTES
Your recent ultrasound is fine. There is nothing in the kidney suggestive of cancer. There are only benign findings.     [facility]

## 2020-09-21 ENCOUNTER — CLINICAL SUPPORT (OUTPATIENT)
Dept: DIABETES SERVICES | Age: 75
End: 2020-09-21
Payer: MEDICARE

## 2020-09-21 DIAGNOSIS — E11.21 TYPE 2 DIABETES WITH NEPHROPATHY (HCC): ICD-10-CM

## 2020-09-21 PROCEDURE — G0108 DIAB MANAGE TRN  PER INDIV: HCPCS

## 2020-09-21 NOTE — PROGRESS NOTES
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  Referral Source: Jami Venegas MD    Metric Patient responses (9/21/2020)   A1c  (Goal: 7%)   Recent value:   Lab Results   Component Value Date/Time    Hemoglobin A1c 8.1 (H) 09/14/2020 12:39 PM        Healthy Eating     24-hour Dietary Recall:  Breakfast: 2 eggs, 1 whole grain toast  Lunch: 1 hot dog, 1 cup pasta salad  Dinner: Fried chicken breast, sweet potato, broccoli, carrots   Snacks: Skinny popcorn  Beverages: Coffee with creamer and splenda, Ice tea with splenda  Alcohol: None  Stage of change: Contemplation     Being Active     Physical Activity Vital Sign:  How many days during the past week have you performed physical activity where your heart beats faster and your breathing is harder than normal for 30 minutes or more? 1 day    How many days in a typical week do you perform activity such as this?  0 days    Stage of change: Contemplation     Monitoring Do you monitor your blood sugar? Yes    How often do you monitor? 2x/day    What are the range of readings?  mg/dL    Do you know your last A1c measurement? No    Do you know the meaning of the A1c? No    Stage of change: Preparation     Taking Medications Medication Management:  Do you understand what your diabetes medications do? Yes    Can you afford your diabetes medications? Yes    How often do you miss doses of your diabetes medications? Never    Current dosing:   Key Antihyperglycemic Medications             insulin glargine (LANTUS,BASAGLAR) 100 unit/mL (3 mL) inpn 20 Units by SubCUTAneous route two (2) times a day. Blood Pressure Management:  Key ACE/ARB Medications             losartan (COZAAR) 50 mg tablet TAKE 1 TABLET BY MOUTH EVERY DAY.           Lipid Management:  Key Antihyperlipidemia Meds             simvastatin (ZOCOR) 10 mg tablet Take 1 Tab by mouth nightly. Clot Prevention:  Key Anti-Platelet Anticoagulant Meds             aspirin delayed-release 81 mg tablet Take 81 mg by mouth daily. Stage of change: Action     Healthy Coping Diabetes Skills, Confidence and Preparedness Index: Total score: 6.3  Skills: 5.8  Confidence: 6.4  Preparedness: 6.9    Stage of change: Preparation     Reducing Risks Vaccines:  Influenza:   Immunization History   Administered Date(s) Administered    Influenza High Dose Vaccine PF 12/04/2018    Influenza Vaccine (Tri) Adjuvanted 10/08/2019       Pneumococcal:   Immunization History   Administered Date(s) Administered    Pneumococcal Polysaccharide (PPSV-23) 02/27/2009        Hepatitis: There is no immunization history for the selected administration types on file for this patient. Examinations:  Diabetic Foot and Eye Exam HM Status   Topic Date Due    Diabetic Foot Care  10/09/2020    Eye Exam  03/27/2021        Dental exam: DUE    Foot exam: Unknown    Heart Protection:  BP Readings from Last 2 Encounters:   09/14/20 126/82   05/30/20 (P) 110/52        Lab Results   Component Value Date/Time    LDL, calculated 89 11/04/2019 10:20 AM        Kidney Protection:  Lab Results   Component Value Date/Time    Microalbumin/Creat ratio (mg/g creat) 6 09/14/2020 12:39 PM    Microalbumin,urine random 1.27 09/14/2020 12:39 PM       Patient-reported diabetes complications:  none    Stage of change: Contemplation     Problem Solving Hypoglycemia Management:  What are signs and symptoms of hypoglycemia that you experience? Weakness, Sleepiness    How do you prevent hypoglycemia? Consistent meals/snack times and Take medications as instructed    How do you treat hypoglycemia? Drink juice    Hyperglycemia Management:  What are signs and symptoms of hyperglycemia that you experience? Pt reported being unaware of s/s of hyperglycemia    How can you prevent hyperglycemia?  Take medication as instructed, Monitor blood glucose, Stay free of illness    Sick Day Management:  What do you do differently on sick days? Pt reported being unaware of self-management on sick days    Pattern Management:  Do you notice blood glucose patterns when you look at the readings in your meter or logbook? Yes    How do you use the blood glucose readings from your meter or logbook? Understand how body responds to meals, Understand how body responds to medications and/or insulin, Adjust meals based on results, Adjust medications and/or insulin based on results    Stage of change: Action   Note: Content derived from the American Association of Diabetes Educators' Diabetes Education Curriculum: A Guide to Successful Self-Management (2nd edition)         Diabetes Educator Recommendations:   - Address diabetes self-care behaviors through education and support using AADE7 Curriculum. Pt to attend weekly group class series on reducing risks, healthy eating, being active, monitoring, taking medications, healthy coping and problem solving.     - Patient intends to focus on these diabetes self-care practices: Healthy eating, Physical activity and Problem solving     Diabetes Self-Management Education Follow-up Visit: Diabetes Education Class #1 on 10/8/20 at 3:00 PM.    --Link Ashraf RN on 9/21/2020 at 2:04 PM    An electronic signature was used to authenticate this note. I was in the office for the appointment and time spent: 45 minutes    Diabetes Skills, Confidence & Preparedness Index (SCPI) ©  Thank you for completing the Skills, Confidence & Preparedness Index! Below are your scores. All scales and questions are out of 7.       Your results have been emailed to [email or web address]    Overall SCPI score: 6.3 Skills Score: 5.8  Low: Reducing Risks(Q9) Confidence Score: 6.4  Low: Healthy Coping(Q2),Healthy Eating(Q4),Being Active(Q5),Problem DOXBOIK(M7) Preparedness Score: 6.9  Low: Healthy Coping(Q3)   Healthy Eating Score: 6.5  Low: Skills(Q1),Confidence(Q4) Taking Medication Score: 6.0  Low: Skills(Q2) Blood Sugar Monitoring Score: 6.6  Low: Skills(Q8) Reducing Risks Score: 6.0  Low: LNKWFP(G8)   Problem Solving Score: 6.0  Low: Skills(Q6) Healthy Coping Score: 6.3  Low: Confidence(Q2),Preparedness(Q3) Being Active Score: 6.5  Low: Confidence(Q5)   Skills/Knowledge Questions   1. I know how to plan meals that have the best balance between carbohydrates, proteins and vegetables. 6   2. I know how my diabetes medications (pills, injectables and/or insulin) work in my body. 5   3. I know when to check my blood sugar if I want to see how my body responded to a meal. 7   4. I know when to check my blood sugars to determine if my medication or insulin doses are correct. 7   5. I know what to do to prevent a low blood sugar when I exercise (either before, during, or after). 7   6. When I am sick, I know what to do differently with my diabetes management. 5   7. I know how stress can affect my diabetes management. 7   8. When I look at my blood sugars over a given week, I can explain what my blood sugar pattern is. 5   9. I know what my target levels are for A1c, blood pressure and cholesterol. 3   Confidence Questions   1. I am confident that I can plan balanced meals and snacks. 7   2. I am confident that I can manage my stress. 6   3. I am confident that I can prevent a low blood sugar during or after exercise. 7   4. I am confident that the next time I eat out, I will be able to choose foods that best keep my blood sugars in target. 6   5. I am confident I can include exercise into my schedule. 6   6. I am confident that I can use my daily blood sugars to adjust my diet, my activity, and/or my insulin. 7   7. When something out of my normal routine happens, I am confident that I can problem-solve and keep my diabetes on track. 6   Preparedness Questions   1. Within the next month, I will begin to eat more balanced meals and snacks. 7   2.  Within the next month, I will choose an exercise activity and I will start fitting it into my schedule. 8   3. Within the next month, I will make a list of stress management options that work for me. 6   4. Within the next month, I will consistently plan ahead to prevent low blood sugars. 7   5. Within the next month, I will start adjusting my insulin doses on my own. 8   6. Within the next month, I will begin making changes to my diabetes management based on my daily blood sugars (eg - eating, activity and/or insulin). 8   7. Within the next month, I will begin making changes to my diabetes management to meet my overall goals (eg - eating, activity and/or insulin).  7

## 2020-10-02 DIAGNOSIS — E11.9 TYPE 2 DIABETES MELLITUS WITHOUT COMPLICATION, WITH LONG-TERM CURRENT USE OF INSULIN (HCC): ICD-10-CM

## 2020-10-02 DIAGNOSIS — Z79.4 TYPE 2 DIABETES MELLITUS WITHOUT COMPLICATION, WITH LONG-TERM CURRENT USE OF INSULIN (HCC): ICD-10-CM

## 2020-10-05 RX ORDER — INSULIN GLARGINE 100 [IU]/ML
INJECTION, SOLUTION SUBCUTANEOUS
Qty: 15 ADJUSTABLE DOSE PRE-FILLED PEN SYRINGE | Refills: 1 | Status: SHIPPED | OUTPATIENT
Start: 2020-10-05 | End: 2020-11-02

## 2020-10-08 ENCOUNTER — CLINICAL SUPPORT (OUTPATIENT)
Dept: DIABETES SERVICES | Age: 75
End: 2020-10-08
Payer: MEDICARE

## 2020-10-08 DIAGNOSIS — E11.21 TYPE 2 DIABETES WITH NEPHROPATHY (HCC): Primary | ICD-10-CM

## 2020-10-08 PROCEDURE — G0109 DIAB MANAGE TRN IND/GROUP: HCPCS | Performed by: DIETITIAN, REGISTERED

## 2020-10-08 NOTE — PROGRESS NOTES
[facility] Program for Diabetes Health  Diabetes Self-Management Education   Education and Goal Plan for Session #1    AADE7 Self-Care Behaviors:  Behavior Education Completed (10/8/2020)   Healthy Eating   - Heart healthy fats  - Reducing sodium     Being Active   Not addressed during this session. Monitoring     - ADA blood glucose targets   Taking Medications Not addressed during this session. Healthy Coping Not addressed during this session. Reducing Risks - Prevention of influenza  - Foot care and foot exam  - Dilated eye exam  - Prevention of pneumonia  - Prevention of hepatitis B  - Dental care and dental exam  - Hgb A1c target  - Long-term complications   Problem Solving - Hyperglycemia signs/symptoms   Note: Content derived from the American Association of Diabetes Educators' Diabetes Education Curriculum: A Guide to Successful Self-Management (2nd edition)         Diabetes Educator Recommendations:     - Continue addressing diabetes self-care behaviors through education and support in AADE7 Curriculum group class series on reducing risks, healthy eating, being active, monitoring, taking medications, healthy coping and problem solving.     - Continue practicing knowledge and skills relating to reducing risks to improve diabetes self-management. - Patient's Identified SMART Goal(s): - no goal set this session     - Pt to follow up with provider on the following: none     Diabetes Self-Management Education Follow-up Visit: 10/15/20      --Robin Lynn RD on 10/8/2020 at 4:07 PM    An electronic signature was used to authenticate this note.  I was in the office for the appointment and time spent: 120 minutes

## 2020-10-12 ENCOUNTER — OFFICE VISIT (OUTPATIENT)
Dept: FAMILY MEDICINE CLINIC | Age: 75
End: 2020-10-12
Payer: MEDICARE

## 2020-10-12 ENCOUNTER — HOSPITAL ENCOUNTER (OUTPATIENT)
Dept: GENERAL RADIOLOGY | Age: 75
Discharge: HOME | End: 2020-10-12
Attending: FAMILY MEDICINE
Payer: MEDICARE

## 2020-10-12 VITALS
DIASTOLIC BLOOD PRESSURE: 84 MMHG | RESPIRATION RATE: 16 BRPM | HEART RATE: 98 BPM | OXYGEN SATURATION: 94 % | SYSTOLIC BLOOD PRESSURE: 117 MMHG | WEIGHT: 195 LBS | TEMPERATURE: 97.3 F | BODY MASS INDEX: 33.29 KG/M2 | HEIGHT: 64 IN

## 2020-10-12 DIAGNOSIS — Z79.4 TYPE 2 DIABETES MELLITUS WITHOUT COMPLICATION, WITH LONG-TERM CURRENT USE OF INSULIN (HCC): ICD-10-CM

## 2020-10-12 DIAGNOSIS — U07.1 COVID-19: ICD-10-CM

## 2020-10-12 DIAGNOSIS — R80.9 TYPE 2 DIABETES MELLITUS WITH MICROALBUMINURIA, WITH LONG-TERM CURRENT USE OF INSULIN (HCC): ICD-10-CM

## 2020-10-12 DIAGNOSIS — G47.00 INSOMNIA, UNSPECIFIED TYPE: ICD-10-CM

## 2020-10-12 DIAGNOSIS — E11.9 TYPE 2 DIABETES MELLITUS WITHOUT COMPLICATION, WITH LONG-TERM CURRENT USE OF INSULIN (HCC): ICD-10-CM

## 2020-10-12 DIAGNOSIS — R06.09 DYSPNEA ON EXERTION: Primary | ICD-10-CM

## 2020-10-12 DIAGNOSIS — E11.29 TYPE 2 DIABETES MELLITUS WITH MICROALBUMINURIA, WITH LONG-TERM CURRENT USE OF INSULIN (HCC): ICD-10-CM

## 2020-10-12 DIAGNOSIS — R06.09 DYSPNEA ON EXERTION: ICD-10-CM

## 2020-10-12 DIAGNOSIS — Z79.4 TYPE 2 DIABETES MELLITUS WITH MICROALBUMINURIA, WITH LONG-TERM CURRENT USE OF INSULIN (HCC): ICD-10-CM

## 2020-10-12 PROCEDURE — G8399 PT W/DXA RESULTS DOCUMENT: HCPCS | Performed by: FAMILY MEDICINE

## 2020-10-12 PROCEDURE — G9899 SCRN MAM PERF RSLTS DOC: HCPCS | Performed by: FAMILY MEDICINE

## 2020-10-12 PROCEDURE — 99215 OFFICE O/P EST HI 40 MIN: CPT | Performed by: FAMILY MEDICINE

## 2020-10-12 PROCEDURE — 71046 X-RAY EXAM CHEST 2 VIEWS: CPT

## 2020-10-12 PROCEDURE — G8427 DOCREV CUR MEDS BY ELIG CLIN: HCPCS | Performed by: FAMILY MEDICINE

## 2020-10-12 PROCEDURE — 1101F PT FALLS ASSESS-DOCD LE1/YR: CPT | Performed by: FAMILY MEDICINE

## 2020-10-12 PROCEDURE — G8417 CALC BMI ABV UP PARAM F/U: HCPCS | Performed by: FAMILY MEDICINE

## 2020-10-12 PROCEDURE — 3017F COLORECTAL CA SCREEN DOC REV: CPT | Performed by: FAMILY MEDICINE

## 2020-10-12 PROCEDURE — 93000 ELECTROCARDIOGRAM COMPLETE: CPT | Performed by: FAMILY MEDICINE

## 2020-10-12 PROCEDURE — G8754 DIAS BP LESS 90: HCPCS | Performed by: FAMILY MEDICINE

## 2020-10-12 PROCEDURE — G8536 NO DOC ELDER MAL SCRN: HCPCS | Performed by: FAMILY MEDICINE

## 2020-10-12 PROCEDURE — 1090F PRES/ABSN URINE INCON ASSESS: CPT | Performed by: FAMILY MEDICINE

## 2020-10-12 PROCEDURE — G8510 SCR DEP NEG, NO PLAN REQD: HCPCS | Performed by: FAMILY MEDICINE

## 2020-10-12 PROCEDURE — 2022F DILAT RTA XM EVC RTNOPTHY: CPT | Performed by: FAMILY MEDICINE

## 2020-10-12 PROCEDURE — G8752 SYS BP LESS 140: HCPCS | Performed by: FAMILY MEDICINE

## 2020-10-12 RX ORDER — TRAZODONE HYDROCHLORIDE 100 MG/1
TABLET ORAL
Qty: 90 TAB | Refills: 1 | Status: SHIPPED | OUTPATIENT
Start: 2020-10-12 | End: 2021-04-02

## 2020-10-12 NOTE — PROGRESS NOTES
1. Have you been to the ER, urgent care clinic since your last visit? Hospitalized since your last visit? No    2. Have you seen or consulted any other health care providers outside of the [address] [place] [address] since your last visit? Include any pap smears or colon screening. No     Chief Complaint   Patient presents with    Concern For COVID-19 (Coronavirus)     Patient reports seconnd test negative. Patient reports current symptoms foggy, loss of concentraion, h/a, fatigue, joint aches, muscle pain, sob, hearting racing hair loss.  Follow-up     Visit Vitals  /84 (BP 1 Location: Left arm, BP Patient Position: Sitting)   Pulse 98   Temp 97.3 °F (36.3 °C) (Oral)   Resp 16   Ht 5' 4\" (1.626 m)   Wt 195 lb (88.5 kg)   SpO2 94%   BMI 33.47 kg/m²     3 most recent PHQ Screens 10/12/2020   Little interest or pleasure in doing things Not at all   Feeling down, depressed, irritable, or hopeless Not at all   Total Score PHQ 2 0     Pharmacy verified  [address]    Patient refuse flu vaccine in office at this time.

## 2020-10-12 NOTE — PROGRESS NOTES
The xray is OK. See the heart doctor as we discussed. Then get back in to see me.   I'll be in touch about the blood work  [facility]

## 2020-10-12 NOTE — PROGRESS NOTES
Assessment and Plan    1. Type 2 diabetes mellitus without complication, with long-term current use of insulin (HCC)  Stable on current insulin dose    2. Dyspnea on exertion  Unclear cause. Likely post COVID  Decrease activity to as tolerated  Check CXR, CBC and BNP  Keep appointment with Pulmonary  Cardiology consult Dr. 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 May need echo and additional cardiac workup  - AMB POC EKG ROUTINE W/ 12 LEADS, INTER & REP  - XR CHEST PA LAT; Future  - CBC WITH AUTOMATED DIFF; Future  - METABOLIC PANEL, BASIC; Future  - NT-PRO BNP; Future  - REFERRAL TO CARDIOLOGY    3. COVID-19  Resolved in MAY    4. Insomnia, unspecified type  Refill.  - traZODone (DESYREL) 100 mg tablet; TAKE 1 TABLET BY MOUTH EVERY DAY AT NIGHT  Dispense: 90 Tab; Refill: 1      Follow-up and Dispositions    · Return in about 1 month (around 11/12/2020) for Review test results. Diagnosis and plan discussed with patient who verbillized understanding. History of present Jose Alberto Casiano is a 70 y.o. female presenting for Concern For COVID-19 (Coronavirus) (Patient reports seconnd test negative. Patient reports current symptoms foggy, loss of concentraion, h/a, fatigue, joint aches, muscle pain, sob, hearting racing hair loss.) and Follow-up    Fatigue  Exhausted easily  Feels even doing simple things makes her out of breath tired, feels heart races  Inhaler does not help  Has checked O2 level when tired and it is in the low 80's. IF she sits, it rises back into the 90's  Dsypneic walking stairs into the building. No chest pain  No swelling in legs. No orthopnea. Snores (has sleep apnea0  Not waking at night with dyspnea  Even activity that is not strenuous wears her out. Feels she is worse than after COVID hospitalization when she was doing home PT. Brain fog, loss of concentration  No muscle or joint aches,   Headaches better    Blood sugars fasting .    No low sugars  Lantus 22 units per day. Not depressed    Review of Systems   Constitutional: Negative for chills and fever. HENT: Negative for congestion and sore throat. Respiratory: Positive for shortness of breath. Negative for cough and wheezing. Cardiovascular: Positive for palpitations (feels tachycardic with exertion). Negative for chest pain. Psychiatric/Behavioral: Negative. Past Medical History:   Diagnosis Date    Anxiety     Arthritis     Asthma     Chronic pain     back,right hip    COVID-19 05/2020    hospitalized 10 days [facility]    Depression     Diabetes ([facility] [address]     type 2 on insulin.     GERD (gastroesophageal reflux disease)     History of vascular access device 05/18/2020    4 Fr midline, 12 cm L basilic, blood draws    Hypercholesteremia     Hypertension     Hypothyroid     Sleep apnea     wears dental appliance- Somnident    Thyroid disease     Unspecified adverse effect of anesthesia     delayed awakening     Past Surgical History:   Procedure Laterality Date    HX COLONOSCOPY  2012    normal repeat 2022, diverticulosis of sigmoid colon, Dr. Irma Ballard    HX HEENT  1/08/14    bilateral cataract surgery1/8/14    HX KNEE REPLACEMENT      right    HX OTHER SURGICAL  2007    cardiac cath- No CAD found    NEUROLOGICAL PROCEDURE UNLISTED  1999    vascular brain surgery     Family History   Problem Relation Age of Onset    Elevated Lipids Mother     Dementia Mother     Osteoporosis Mother     Lung Disease Father         copd    Delayed Awakening Father     Post-op Nausea/Vomiting Father     Hypertension Sister     Diabetes Sister         type 2    Breast Cancer Maternal Aunt      Social History     Socioeconomic History    Marital status:      Spouse name: Not on file    Number of children: Not on file    Years of education: Not on file    Highest education level: Not on file   Occupational History    Not on file   Social Needs    Financial resource strain: Not on file    Food insecurity     Worry: Not on file     Inability: Not on file    Transportation needs     Medical: Not on file     Non-medical: Not on file   Tobacco Use    Smoking status: Never Smoker    Smokeless tobacco: Never Used   Substance and Sexual Activity    Alcohol use: No     Frequency: Never     Comment: 2x year    Drug use: No    Sexual activity: Yes     Partners: Male   Lifestyle    Physical activity     Days per week: Not on file     Minutes per session: Not on file    Stress: Not on file   Relationships    Social connections     Talks on phone: Not on file     Gets together: Not on file     Attends Religion service: Not on file     Active member of club or organization: Not on file     Attends meetings of clubs or organizations: Not on file     Relationship status: Not on file    Intimate partner violence     Fear of current or ex partner: Not on file     Emotionally abused: Not on file     Physically abused: Not on file     Forced sexual activity: Not on file   Other Topics Concern     Service Not Asked    Blood Transfusions Not Asked    Caffeine Concern Not Asked    Occupational Exposure Not Asked   Vernell Battiest Hazards Not Asked    Sleep Concern Not Asked    Stress Concern Not Asked    Weight Concern Not Asked    Special Diet Not Asked    Back Care Not Asked    Exercise Not Asked    Bike Helmet Not Asked   [address] Not Asked    Self-Exams Not Asked   Social History Narrative    Not on file         Prior to Admission medications    Medication Sig Start Date End Date Taking? Authorizing Provider   traZODone (DESYREL) 100 mg tablet TAKE 1 TABLET BY MOUTH EVERY DAY AT NIGHT 10/12/20  Yes MD Keisha Duckworthostar U-100 Insulin 100 unit/mL (3 mL) inpn 20 UNITS BY SUBCUTANEOUS ROUTE TWO (2) TIMES A DAY. 10/5/20  Yes Amanda Hannah MD   simvastatin (ZOCOR) 10 mg tablet Take 1 Tab by mouth nightly.  9/14/20  Yes Amanda Hannah MD losartan (COZAAR) 50 mg tablet TAKE 1 TABLET BY MOUTH EVERY DAY. 9/4/20  Yes Latasha Jones MD   sertraline (ZOLOFT) 50 mg tablet Take 1 Tab by mouth daily. 9/4/20  Yes Latasha Jones MD   albuterol (ProAir HFA) 90 mcg/actuation inhaler Take 2 Puffs by inhalation every four (4) hours as needed for Wheezing. 5/30/20  Yes Petra Jaime MD   guaiFENesin ER (Mucinex) 600 mg ER tablet Take 600 mg by mouth two (2) times a day. Yes Provider, Historical   omeprazole (PRILOSEC) 20 mg capsule Take 20 mg by mouth daily. Yes Provider, Historical   calcium-vitamin D (OYSTER SHELL) 500 mg(1,250mg) -200 unit per tablet Take 1 Tab by mouth daily. Yes Provider, Historical   levothyroxine (SYNTHROID) 100 mcg tablet Take 1 Tab by mouth Daily (before breakfast). 10/8/19  Yes Cally Bender NP   aspirin delayed-release 81 mg tablet Take 81 mg by mouth daily. Yes Provider, Historical   fluticasone (FLONASE) 50 mcg/actuation nasal spray 2 Sprays by Both Nostrils route daily as needed for Rhinitis or Allergies. Yes Provider, Historical   Cetirizine (ZYRTEC) 10 mg cap Take 1 Tab by mouth every evening. Yes Provider, Historical   multivitamins-minerals-lutein (CENTRUM SILVER) tab Take 1 Tab by mouth daily. Yes Provider, Historical   glucosamine-chondroit-vit c-mn (GLUCOSAMINE CHONDROITIN MAXSTR) 500-400 mg capsule Take 1 Cap by mouth daily.    Yes Other, MD [place]   traZODone (DESYREL) 100 mg tablet TAKE 1 TABLET BY MOUTH EVERY DAY AT NIGHT 9/14/20 10/12/20  Latasha Jones MD   Insulin Needles, Disposable, 31 gauge x 5/16\" ndle Use daily with glargine insulin pen 8/12/20   Latasha Jones MD   lancets [facility] Please check you blood glucose before breakfast and before dinner daily 5/30/20   Petra Jaime MD   glucose blood VI test strips (blood glucose test) strip Please check blood glucose before breakfast and before dinner daily 5/30/20   Petra Jaime MD   Blood-Glucose Meter (Blood Glucose Monitoring) monitoring kit Please check your blood glucose before breakfast and before dinner daily 5/30/20   Norma Costa MD        Allergies   Allergen Reactions    Latex Rash     Other reaction(s): Bleeding (finding)    Prednisone Anaphylaxis     Throat swelling. Can take methylpredisone po or IV without problems. Other reaction(s): Pharyngeal swelling (finding)    Augmentin [Amoxicillin-Pot Clavulanate] Nausea and Vomiting    Biaxin [Clarithromycin] Nausea and Vomiting    Metformin Diarrhea    Parafon Forte Dsc [Chlorzoxazone] Nausea and Vomiting    Penicillin V Potassium Unable to Obtain       Vitals:    10/12/20 1302   BP: 117/84   Pulse: 98   Resp: 16   Temp: 97.3 °F (36.3 °C)   TempSrc: Oral   SpO2: 94%   Weight: 195 lb (88.5 kg)   Height: 5' 4\" (1.626 m)     Body mass index is 33.47 kg/m². Objective  Physical Exam  Vitals signs and nursing note reviewed. Constitutional:       Appearance: Normal appearance. She is not toxic-appearing. HENT:      Head: Normocephalic and atraumatic. Neck:      Musculoskeletal: No muscular tenderness. Cardiovascular:      Rate and Rhythm: Normal rate and regular rhythm. Heart sounds: Normal heart sounds. No murmur. No gallop. Pulmonary:      Effort: Pulmonary effort is normal. No respiratory distress. Breath sounds: Normal breath sounds. No wheezing, rhonchi or rales. Lymphadenopathy:      Cervical: No cervical adenopathy. Neurological:      Mental Status: She is alert. Psychiatric:         Mood and Affect: Mood normal.         Behavior: Behavior normal.         Thought Content:  Thought content normal.         Judgment: Judgment normal.

## 2020-10-13 ENCOUNTER — LAB ONLY (OUTPATIENT)
Dept: FAMILY MEDICINE CLINIC | Age: 75
End: 2020-10-13

## 2020-10-13 DIAGNOSIS — R06.09 DYSPNEA ON EXERTION: ICD-10-CM

## 2020-10-13 LAB
ANION GAP SERPL CALC-SCNC: 7 MMOL/L (ref 5–15)
BASOPHILS # BLD: 0.1 K/UL (ref 0–0.1)
BASOPHILS NFR BLD: 1 % (ref 0–1)
BUN SERPL-MCNC: 12 MG/DL (ref 6–20)
BUN/CREAT SERPL: 12 (ref 12–20)
CALCIUM SERPL-MCNC: 9.2 MG/DL (ref 8.5–10.1)
CHLORIDE SERPL-SCNC: 107 MMOL/L (ref 97–108)
CO2 SERPL-SCNC: 23 MMOL/L (ref 21–32)
COMMENT, HOLDF: NORMAL
CREAT SERPL-MCNC: 0.97 MG/DL (ref 0.55–1.02)
DIFFERENTIAL METHOD BLD: NORMAL
EOSINOPHIL # BLD: 0.2 K/UL (ref 0–0.4)
EOSINOPHIL NFR BLD: 3 % (ref 0–7)
ERYTHROCYTE [DISTWIDTH] IN BLOOD BY AUTOMATED COUNT: 13.1 % (ref 11.5–14.5)
GLUCOSE SERPL-MCNC: 228 MG/DL (ref 65–100)
HCT VFR BLD AUTO: 42.4 % (ref 35–47)
HGB BLD-MCNC: 13.7 G/DL (ref 11.5–16)
IMM GRANULOCYTES # BLD AUTO: 0 K/UL (ref 0–0.04)
IMM GRANULOCYTES NFR BLD AUTO: 0 % (ref 0–0.5)
LYMPHOCYTES # BLD: 2.5 K/UL (ref 0.8–3.5)
LYMPHOCYTES NFR BLD: 33 % (ref 12–49)
MCH RBC QN AUTO: 27.8 PG (ref 26–34)
MCHC RBC AUTO-ENTMCNC: 32.3 G/DL (ref 30–36.5)
MCV RBC AUTO: 86.2 FL (ref 80–99)
MONOCYTES # BLD: 0.4 K/UL (ref 0–1)
MONOCYTES NFR BLD: 6 % (ref 5–13)
NEUTS SEG # BLD: 4.5 K/UL (ref 1.8–8)
NEUTS SEG NFR BLD: 57 % (ref 32–75)
NRBC # BLD: 0 K/UL (ref 0–0.01)
NRBC BLD-RTO: 0 PER 100 WBC
PLATELET # BLD AUTO: 247 K/UL (ref 150–400)
PMV BLD AUTO: 12.1 FL (ref 8.9–12.9)
POTASSIUM SERPL-SCNC: 4.1 MMOL/L (ref 3.5–5.1)
RBC # BLD AUTO: 4.92 M/UL (ref 3.8–5.2)
SAMPLES BEING HELD,HOLD: NORMAL
SODIUM SERPL-SCNC: 137 MMOL/L (ref 136–145)
WBC # BLD AUTO: 7.7 K/UL (ref 3.6–11)

## 2020-10-15 ENCOUNTER — CLINICAL SUPPORT (OUTPATIENT)
Dept: DIABETES SERVICES | Age: 75
End: 2020-10-15
Payer: MEDICARE

## 2020-10-15 DIAGNOSIS — E11.21 TYPE 2 DIABETES WITH NEPHROPATHY (HCC): Primary | ICD-10-CM

## 2020-10-15 PROCEDURE — G0109 DIAB MANAGE TRN IND/GROUP: HCPCS | Performed by: DIETITIAN, REGISTERED

## 2020-10-15 NOTE — PROGRESS NOTES
[address] [place] [address] for Diabetes Health  Diabetes Self-Management Education   Education and Goal Plan for Session #2    AADE7 Self-Care Behaviors:  Behavior Education Completed (10/15/2020)   Healthy Eating   - Impact of food on blood glucose levels  - Food sources of carbohydrates  - Meal size and portions  - Reading food labels  - Balanced plate  - Meal and snack timing  - Carbohydrate counting   - Importance of a variety of foods  - Heart healthy fats  - Reducing sodium  - Importance of not skipping meals  - Comparing food choices  - Alcohol and diabetes  - Meal consistency     Being Active   Not addressed during this session. Monitoring     - ADA blood glucose targets  - Frequency of monitoring  - Blood glucose schedule  - A1c interpretation  - Monitoring blood glucose trends per meter  - Glucose monitoring technique  - Disposal of used lancets or needles  - Logging blood glucose results   Taking Medications Not addressed during this session. Healthy Coping Not addressed during this session. Reducing Risks Education delivered in previous session. Problem Solving Not addressed during this session. Note: Content derived from the American Association of Diabetes Educators' Diabetes Education Curriculum: A Guide to Successful Self-Management (2nd edition)         Diabetes Educator Recommendations:     - Continue addressing diabetes self-care behaviors through education and support in AADE7 Curriculum group class series on reducing risks, healthy eating, being active, monitoring, taking medications, healthy coping and problem solving.     - Continue practicing knowledge and skills relating to healthy eating to improve diabetes self-management.      - Patient's Identified SMART Goal(s): - increase exercise to 3 times a week and continue to eat healthy balanced meals daily     - Pt to follow up with provider on the following: none     Diabetes Self-Management Education Follow-up Visit: 10/22/2020      --Nguyen Pepe, MARILYN on 10/15/2020 at 4:03 PM    An electronic signature was used to authenticate this note.  I was in the office for the appointment and time spent: 90 minutes

## 2020-10-16 NOTE — PROGRESS NOTES
Can we check and see what happened to this patient's BNP. Looks like it was done but not in chart.   [facility] [place] [facility]

## 2020-10-20 ENCOUNTER — ANCILLARY PROCEDURE (OUTPATIENT)
Dept: CARDIOLOGY CLINIC | Age: 75
End: 2020-10-20
Payer: MEDICARE

## 2020-10-20 ENCOUNTER — OFFICE VISIT (OUTPATIENT)
Dept: CARDIOLOGY CLINIC | Age: 75
End: 2020-10-20
Payer: MEDICARE

## 2020-10-20 VITALS
HEART RATE: 76 BPM | DIASTOLIC BLOOD PRESSURE: 82 MMHG | HEIGHT: 64 IN | OXYGEN SATURATION: 97 % | WEIGHT: 194.8 LBS | SYSTOLIC BLOOD PRESSURE: 118 MMHG | BODY MASS INDEX: 33.26 KG/M2

## 2020-10-20 VITALS
WEIGHT: 194 LBS | BODY MASS INDEX: 33.12 KG/M2 | DIASTOLIC BLOOD PRESSURE: 82 MMHG | SYSTOLIC BLOOD PRESSURE: 118 MMHG | HEIGHT: 64 IN

## 2020-10-20 DIAGNOSIS — U07.1 COVID-19: ICD-10-CM

## 2020-10-20 DIAGNOSIS — R80.9 TYPE 2 DIABETES MELLITUS WITH MICROALBUMINURIA, WITH LONG-TERM CURRENT USE OF INSULIN (HCC): ICD-10-CM

## 2020-10-20 DIAGNOSIS — Z79.4 TYPE 2 DIABETES MELLITUS WITH MICROALBUMINURIA, WITH LONG-TERM CURRENT USE OF INSULIN (HCC): ICD-10-CM

## 2020-10-20 DIAGNOSIS — R06.00 DYSPNEA, UNSPECIFIED TYPE: ICD-10-CM

## 2020-10-20 DIAGNOSIS — R06.00 DYSPNEA, UNSPECIFIED TYPE: Primary | ICD-10-CM

## 2020-10-20 DIAGNOSIS — E66.01 SEVERE OBESITY (HCC): ICD-10-CM

## 2020-10-20 DIAGNOSIS — J45.909 UNCOMPLICATED ASTHMA, UNSPECIFIED ASTHMA SEVERITY, UNSPECIFIED WHETHER PERSISTENT: ICD-10-CM

## 2020-10-20 DIAGNOSIS — E11.29 TYPE 2 DIABETES MELLITUS WITH MICROALBUMINURIA, WITH LONG-TERM CURRENT USE OF INSULIN (HCC): ICD-10-CM

## 2020-10-20 DIAGNOSIS — E11.21 TYPE 2 DIABETES WITH NEPHROPATHY (HCC): ICD-10-CM

## 2020-10-20 PROCEDURE — 99204 OFFICE O/P NEW MOD 45 MIN: CPT | Performed by: STUDENT IN AN ORGANIZED HEALTH CARE EDUCATION/TRAINING PROGRAM

## 2020-10-20 PROCEDURE — 93306 TTE W/DOPPLER COMPLETE: CPT | Performed by: STUDENT IN AN ORGANIZED HEALTH CARE EDUCATION/TRAINING PROGRAM

## 2020-10-20 PROCEDURE — 3052F HG A1C>EQUAL 8.0%<EQUAL 9.0%: CPT | Performed by: STUDENT IN AN ORGANIZED HEALTH CARE EDUCATION/TRAINING PROGRAM

## 2020-10-20 NOTE — LETTER
10/21/20 Patient: Florence Brown YOB: 1945 Date of Visit: 10/20/2020 Robbie Smith MD 
[address] [facility] [address] [facility] [address] [id number] VIA In Basket Dear Robbie Smith MD, Thank you for referring Ms. Tacho Elena to [facility] for evaluation. My notes for this consultation are attached. If you have questions, please do not hesitate to call me. I look forward to following your patient along with you.  
 
 
Sincerely, 
 
Rani Nichols, DO

## 2020-10-20 NOTE — PROGRESS NOTES
Rupal Moore is a 70 y.o. female    Chief Complaint   Patient presents with    New Patient     ROBISON ref by Dr Yoko Trent     Patient was in the hospital in May 2020 for Covid. Chest pain No    SOB patient has some SOB     Dizziness patient states she has some dizziness result of brain surgery in 7/1999. Swelling No    Refills No    Visit Vitals  /82 (BP 1 Location: Left arm, BP Patient Position: Sitting)   Pulse 76   Ht 5' 4\" (1.626 m)   Wt 194 lb 12.8 oz (88.4 kg)   SpO2 97%   BMI 33.44 kg/m²       1. Have you been to the ER, urgent care clinic since your last visit? Hospitalized since your last visit? No    2. Have you seen or consulted any other health care providers outside of the [address] [place] [address] since your last visit? Include any pap smears or colon screening.   No

## 2020-10-21 ENCOUNTER — TELEPHONE (OUTPATIENT)
Dept: FAMILY MEDICINE CLINIC | Age: 75
End: 2020-10-21

## 2020-10-21 DIAGNOSIS — R06.09 DYSPNEA ON EXERTION: Primary | ICD-10-CM

## 2020-10-21 LAB
ECHO AO ROOT DIAM: 2.99 CM
ECHO AV AREA PEAK VELOCITY: 2.47 CM2
ECHO AV AREA VTI: 2.63 CM2
ECHO AV AREA/BSA PEAK VELOCITY: 1.3 CM2/M2
ECHO AV AREA/BSA VTI: 1.4 CM2/M2
ECHO AV MEAN GRADIENT: 4.1 MMHG
ECHO AV PEAK GRADIENT: 8.77 MMHG
ECHO AV PEAK VELOCITY: 148.04 CM/S
ECHO AV VTI: 28.91 CM
ECHO EST RA PRESSURE: 3 MMHG
ECHO LA AREA 4C: 18.06 CM2
ECHO LA MAJOR AXIS: 3.37 CM
ECHO LA MINOR AXIS: 1.75 CM
ECHO LA VOL 2C: 55.87 ML (ref 22–52)
ECHO LA VOL 4C: 46.76 ML (ref 22–52)
ECHO LA VOL BP: 55.23 ML (ref 22–52)
ECHO LA VOL/BSA BIPLANE: 28.6 ML/M2 (ref 16–28)
ECHO LA VOLUME INDEX A2C: 28.94 ML/M2 (ref 16–28)
ECHO LA VOLUME INDEX A4C: 24.22 ML/M2 (ref 16–28)
ECHO LV E' LATERAL VELOCITY: 7.09 CM/S
ECHO LV E' SEPTAL VELOCITY: 6.66 CM/S
ECHO LV EDV A2C: 97.66 ML
ECHO LV EDV A4C: 70.88 ML
ECHO LV EDV BP: 84.15 ML (ref 56–104)
ECHO LV EDV INDEX A4C: 36.7 ML/M2
ECHO LV EDV INDEX BP: 43.6 ML/M2
ECHO LV EDV NDEX A2C: 50.6 ML/M2
ECHO LV EJECTION FRACTION A2C: 56 PERCENT
ECHO LV EJECTION FRACTION A4C: 57 PERCENT
ECHO LV EJECTION FRACTION BIPLANE: 55 PERCENT (ref 55–100)
ECHO LV ESV A2C: 42.62 ML
ECHO LV ESV A4C: 30.54 ML
ECHO LV ESV BP: 37.85 ML (ref 19–49)
ECHO LV ESV INDEX A2C: 22.1 ML/M2
ECHO LV ESV INDEX A4C: 15.8 ML/M2
ECHO LV ESV INDEX BP: 19.6 ML/M2
ECHO LV INTERNAL DIMENSION DIASTOLIC: 4.73 CM (ref 3.9–5.3)
ECHO LV INTERNAL DIMENSION SYSTOLIC: 3.42 CM
ECHO LV IVSD: 1.05 CM (ref 0.6–0.9)
ECHO LV MASS 2D: 175.3 G (ref 67–162)
ECHO LV MASS INDEX 2D: 90.8 G/M2 (ref 43–95)
ECHO LV POSTERIOR WALL DIASTOLIC: 1.03 CM (ref 0.6–0.9)
ECHO LVOT DIAM: 2.08 CM
ECHO LVOT PEAK GRADIENT: 4.61 MMHG
ECHO LVOT PEAK VELOCITY: 107.31 CM/S
ECHO LVOT SV: 75.9 ML
ECHO LVOT VTI: 22.27 CM
ECHO MV A VELOCITY: 67.89 CM/S
ECHO MV E DECELERATION TIME (DT): 0.18 S
ECHO MV E VELOCITY: 73.53 CM/S
ECHO MV E/A RATIO: 1.08
ECHO MV E/E' LATERAL: 10.37
ECHO MV E/E' RATIO (AVERAGED): 10.71
ECHO MV E/E' SEPTAL: 11.04
ECHO MV MAX VELOCITY: 85.8 CM/S
ECHO MV MEAN GRADIENT: 0.86 MMHG
ECHO MV PEAK GRADIENT: 2.94 MMHG
ECHO MV PRESSURE HALF TIME (PHT): 50 S
ECHO MV VTI: 23.82 CM
ECHO RA AREA 4C: 18.46 CM2
ECHO RIGHT VENTRICULAR SYSTOLIC PRESSURE (RVSP): 23 MMHG
ECHO RV INTERNAL DIMENSION: 3.91 CM
ECHO RV TAPSE: 1.93 CM (ref 1.5–2)
ECHO TV REGURGITANT MAX VELOCITY: 223.79 CM/S
ECHO TV REGURGITANT PEAK GRADIENT: 20.03 MMHG

## 2020-10-21 NOTE — TELEPHONE ENCOUNTER
----- Message from Jeremias Lacy sent at 10/20/2020 11:27 AM EDT -----  Regarding: Dr. Colby Rosa first and last name: N/A  Reason for call: Lab appointment  Callback required yes/no and why: yes  Best contact number(s): [phone]  Details to clarify the request: Pt had blood work done 10/13/20. Lab called and said there was an error for one tests. Pt needs blood work done again.

## 2020-10-21 NOTE — PROGRESS NOTES
[facility] [address], [address] [facility], [address] [place] [address]    Office [phone],QUP [phone]           Michaela Lawson is a 70 y.o. female presents the office today for severe dyspnea. Consult requested by Milagros Lee MD  .      Assessment/Recommendations:    ICD-10-CM ICD-9-CM    1. Dyspnea, unspecified type  R06.00 786.09 ECHO ADULT COMPLETE   2. Type 2 diabetes mellitus with microalbuminuria, with long-term current use of insulin (HCC)  E11.29 250.40     R80.9 791.0     Z79.4 V58.67    3. Severe obesity ([address]  E66.01 278.01    4. Type 2 diabetes with nephropathy (HCC)  E11.21 250.40      583.81    5. COVID-19  U07.1 079.89    6. Uncomplicated asthma, unspecified asthma severity, unspecified whether persistent  J45.909 493.90      Severe dyspneano specific anginal chest pain. Progressive exertional dyspnea over the last several weeks. Performed echocardiogram in the office today due to concerning symptoms of severe dyspnea along with family history of cardiomyopathies. Also was concerned that she could have potentially had cardiomyopathy related to Covid or Covid myocarditis. However her echocardiogram shows normal LV function in the office today. Recommend we proceed with Kenneth Maria for further evaluation of her severe dyspnea given cardiovascular disease risk factors. She is scheduled to follow-up with her pulmonologist Dr. Nano Wilkinson in [place] at the beginning of November. Asthma    Type 2 diabetes  -Continue statin therapy with history of type 2 diabetes  -Continue aspirin due to elevated 10-year cardiovascular risk    Hypertension- stable, management per primary care    History of brain surgery      Primary Care Physician- Milagros Lee MD    Follow-up pending stress test results        Subjective:  70 y.o. presents to the office today for evaluation of dyspnea.   She has a history of type 2 diabetes, hypertension, asthma, brain surgery and obstructive sleep apnea. Unfortunately patient was diagnosed with COVID-19 and admitted to the hospital for 20 days in the spring of this year. She reports over the last several weeks progression in exertional dyspnea. No specific chest pain symptoms. No specific orthopnea or PND. Now can barely walk across her house without exertional dyspnea. She does have a history of asthma followed by Dr. Yamila Aguirre in [place]. She is scheduled to see him in follow-up beginning of next month. Family history is significant for both her children requiring ablation for what appears to be atrial fibrillation. Her sister  of a cardiomyopathy. She does report having a previous heart catheterization that was performed at [facility] many years ago. Past Medical History:   Diagnosis Date    Anxiety     Arthritis     Asthma     Chronic pain     back,right hip    COVID-19 2020    hospitalized 10 days [facility]    Depression     Diabetes ([address]     type 2 on insulin.     GERD (gastroesophageal reflux disease)     History of vascular access device 2020    4 Fr midline, 12 cm L basilic, blood draws    Hypercholesteremia     Hypertension     Hypothyroid     Sleep apnea     wears dental appliance- Somnident    Thyroid disease     Unspecified adverse effect of anesthesia     delayed awakening        Past Surgical History:   Procedure Laterality Date    HX COLONOSCOPY  2012    normal repeat , diverticulosis of sigmoid colon, Dr. Hugo Da Silva HX HEENT  14    bilateral cataract surgery14    HX KNEE REPLACEMENT      right    HX OTHER SURGICAL      cardiac cath- No CAD found    NEUROLOGICAL PROCEDURE UNLISTED      vascular brain surgery         Current Outpatient Medications:     traZODone (DESYREL) 100 mg tablet, TAKE 1 TABLET BY MOUTH EVERY DAY AT NIGHT, Disp: 90 Tab, Rfl: 1    Lantus Solostar U-100 Insulin 100 unit/mL (3 mL) inpn, 20 UNITS BY SUBCUTANEOUS ROUTE TWO (2) TIMES A DAY. (Patient taking differently: 24 Units.), Disp: 15 Adjustable Dose Pre-filled Pen Syringe, Rfl: 1    simvastatin (ZOCOR) 10 mg tablet, Take 1 Tab by mouth nightly., Disp: 90 Tab, Rfl: 1    losartan (COZAAR) 50 mg tablet, TAKE 1 TABLET BY MOUTH EVERY DAY., Disp: 90 Tab, Rfl: 1    sertraline (ZOLOFT) 50 mg tablet, Take 1 Tab by mouth daily. , Disp: 90 Tab, Rfl: 1    Insulin Needles, Disposable, 31 gauge x 5/16\" ndle, Use daily with glargine insulin pen, Disp: 1 Package, Rfl: 11    albuterol (ProAir HFA) 90 mcg/actuation inhaler, Take 2 Puffs by inhalation every four (4) hours as needed for Wheezing., Disp: 1 Inhaler, Rfl: 0    lancets misc, Please check you blood glucose before breakfast and before dinner daily, Disp: 100 Each, Rfl: 11    glucose blood VI test strips (blood glucose test) strip, Please check blood glucose before breakfast and before dinner daily, Disp: 100 Strip, Rfl: 0    Blood-Glucose Meter (Blood Glucose Monitoring) monitoring kit, Please check your blood glucose before breakfast and before dinner daily, Disp: 1 Kit, Rfl: 0    guaiFENesin ER (Mucinex) 600 mg ER tablet, Take 600 mg by mouth two (2) times a day., Disp: , Rfl:     omeprazole (PRILOSEC) 20 mg capsule, Take 20 mg by mouth daily. , Disp: , Rfl:     calcium-vitamin D (OYSTER SHELL) 500 mg(1,250mg) -200 unit per tablet, Take 1 Tab by mouth daily. , Disp: , Rfl:     levothyroxine (SYNTHROID) 100 mcg tablet, Take 1 Tab by mouth Daily (before breakfast). , Disp: 90 Tab, Rfl: 3    aspirin delayed-release 81 mg tablet, Take 81 mg by mouth daily. , Disp: , Rfl:     fluticasone (FLONASE) 50 mcg/actuation nasal spray, 2 Sprays by Both Nostrils route daily as needed for Rhinitis or Allergies. , Disp: , Rfl:     Cetirizine (ZYRTEC) 10 mg cap, Take 1 Tab by mouth every evening., Disp: , Rfl:     multivitamins-minerals-lutein (CENTRUM SILVER) tab, Take 1 Tab by mouth daily. , Disp: , Rfl:    glucosamine-chondroit-vit c-mn (GLUCOSAMINE CHONDROITIN MAXSTR) 500-400 mg capsule, Take 1 Cap by mouth daily. , Disp: , Rfl:     Allergies   Allergen Reactions    Latex Rash     Other reaction(s): Bleeding (finding)    Prednisone Anaphylaxis     Throat swelling. Can take methylpredisone po or IV without problems. Other reaction(s): Pharyngeal swelling (finding)    Augmentin [Amoxicillin-Pot Clavulanate] Nausea and Vomiting    Biaxin [Clarithromycin] Nausea and Vomiting    Metformin Diarrhea    Parafon Forte Dsc [Chlorzoxazone] Nausea and Vomiting    Penicillin V Potassium Unable to Obtain        Family History   Problem Relation Age of Onset    Elevated Lipids Mother     Dementia Mother     Osteoporosis Mother     Lung Disease Father         copd    Delayed Awakening Father     Post-op Nausea/Vomiting Father     Hypertension Sister     Diabetes Sister         type 2    Breast Cancer Maternal Aunt        Social History     Tobacco Use    Smoking status: Never Smoker    Smokeless tobacco: Never Used   Substance Use Topics    Alcohol use: No     Frequency: Never     Comment: 2x year    Drug use: No       Review of Symptoms:  Pertinent Positive: Severe exertional  Pertinent Negative: Lisa no chest pain, orthopnea, PND  All Other systems reviewed and are negative for a Comprehensive ROS (10+)    Physical Exam    Blood pressure 118/82, pulse 76, height 5' 4\" (1.626 m), weight 194 lb 12.8 oz (88.4 kg), SpO2 97 %. Constitutional:  well-developed and well-nourished. No distress. HENT: Normocephalic. Eyes: No scleral icterus. Neck:  Neck supple. No JVD present. Pulmonary/Chest: Effort normal and breath sounds normal. No respiratory distress, wheezes or rales. Cardiovascular: Normal rate, regular rhythm, S1 S2 . Exam reveals no gallop and no friction rub. No murmur heard. No edema. Extremities:  Normal muscle tone  Abdominal:   No abnormal distension.    Neurological:  Moving all extremities, cranial nerves appear grossly intact. Skin: Skin is not cold. Not diaphoretic. No erythema. Psychiatric:  Grossly normal mood and affect. Intact insight. Objective Data:    ECG: personally reviewed and  intrepreted  10/20/2020sinus rhythm inferior and anterior Q waves    10/20/20   ECHO ADULT COMPLETE 10/21/2020 10/21/2020    Narrative · LV: Calculated LVEF is 55%. Biplane method used to measure ejection   fraction. Normal cavity size and systolic function (ejection fraction   normal). Mild concentric hypertrophy. · RV: Mildly reduced systolic function. · PA: Pulmonary arterial systolic pressure is 23 mmHg. Signed by: DO Urvashi Zavala,           ATTENTION:   This medical record was transcribed using an electronic medical records/speech recognition system. Although proofread, it may and can contain electronic, spelling and other errors. Corrections may be executed at a later time. Please feel free to contact us for any clarifications as needed.

## 2020-10-22 ENCOUNTER — CLINICAL SUPPORT (OUTPATIENT)
Dept: DIABETES SERVICES | Age: 75
End: 2020-10-22
Payer: MEDICARE

## 2020-10-22 DIAGNOSIS — E11.21 TYPE 2 DIABETES WITH NEPHROPATHY (HCC): Primary | ICD-10-CM

## 2020-10-22 PROCEDURE — G0109 DIAB MANAGE TRN IND/GROUP: HCPCS

## 2020-10-22 NOTE — PROGRESS NOTES
[facility] Program for Diabetes Health  Diabetes Self-Management Education   Education and Goal Plan for Session #3    AADE7 Self-Care Behaviors:  Behavior Education Completed (10/22/2020)   Healthy Eating   Education delivered in previous session. Being Active   - Effects of exercise on blood glucose  - Physical activity and insulin  - Goals for exercise  - Types of exercise     Monitoring     Education delivered in previous session. Taking Medications - Medication review  - Medication schedule  - Insulin: Rapid-acting and long-acting  - Using pens  - Using vial and syringe  - Insulin storage  - Site rotation  - Insulin expiration   Healthy Coping Not addressed during this session. Reducing Risks Education delivered in previous session. Problem Solving - Hypoglycemia signs/symptoms  - Hypoglycemia prevention  - Hypoglycemia treatment: Rule of 15   Note: Content derived from the American Association of Diabetes Educators' Diabetes Education Curriculum: A Guide to Successful Self-Management (2nd edition)         Diabetes Educator Recommendations:     - Continue addressing diabetes self-care behaviors through education and support in AADE7 Curriculum group class series on reducing risks, healthy eating, being active, monitoring, taking medications, healthy coping and problem solving.     - Continue practicing knowledge and skills relating to being active to improve diabetes self-management. - Patient's Identified SMART Goal(s): - Patient will continue to work on exercising 3 times per week. Patient will also continue to eat healthy meals over the next week. Patient states that she is completing this goal 100% of the time. Diabetes Self-Management Education Follow-up Visit: Class #4 on 10/29/20 at 3:00 PM.    --Mark Blake RN on 10/22/2020 at 3:29 PM    An electronic signature was used to authenticate this note.  I was in the office for the appointment and time spent: 60 minutes

## 2020-10-29 ENCOUNTER — CLINICAL SUPPORT (OUTPATIENT)
Dept: DIABETES SERVICES | Age: 75
End: 2020-10-29
Payer: MEDICARE

## 2020-10-29 DIAGNOSIS — E11.21 TYPE 2 DIABETES WITH NEPHROPATHY (HCC): Primary | ICD-10-CM

## 2020-10-29 PROCEDURE — G0109 DIAB MANAGE TRN IND/GROUP: HCPCS | Performed by: DIETITIAN, REGISTERED

## 2020-10-29 NOTE — PROGRESS NOTES
[address] [place] [address] for Diabetes Health  Diabetes Self-Management Education   Education and Goal Plan for Session #4    AADE7 Self-Care Behaviors:  Behavior Education Completed (10/29/2020)   Healthy Eating   - holiday eating and problem solving for it     Being Active   Education delivered in previous session. Monitoring     Education delivered in previous session. Taking Medications Education delivered in previous session. Healthy Coping - Emotions in response to diagnosis  - Coping skills  - Stress management  - Obtaining support   Reducing Risks Education delivered in previous session. Problem Solving - Hypoglycemia signs/symptoms  - Hypoglycemia prevention  - Hypoglycemia treatment: Rule of 15  - Hyperglycemia signs/symptoms  - Hyperglycemia prevention  - Sick day management  - disaster preparedness   Note: Content derived from the American Association of Diabetes Educators' Diabetes Education Curriculum: A Guide to Successful Self-Management (2nd edition)         Diabetes Educator Recommendations:        - Continue practicing knowledge and skills relating to healthy eating, monitoring and being active to improve diabetes self-management. - Patient's Identified SMART Goal(s): - progress towards goal not assessed at this session          Diabetes Self-Management Education Follow-up Visit: 12/9/20 6 week follow up    --Umang Rosenberg RD on 10/29/2020 at 4:20 PM    An electronic signature was used to authenticate this note.  I was in the office for the appointment and time spent: 70 minutes

## 2020-11-04 ENCOUNTER — OFFICE VISIT (OUTPATIENT)
Dept: FAMILY MEDICINE CLINIC | Age: 75
End: 2020-11-04
Payer: MEDICARE

## 2020-11-04 VITALS
OXYGEN SATURATION: 96 % | RESPIRATION RATE: 16 BRPM | HEART RATE: 100 BPM | BODY MASS INDEX: 33.7 KG/M2 | DIASTOLIC BLOOD PRESSURE: 71 MMHG | WEIGHT: 197.4 LBS | TEMPERATURE: 97.1 F | SYSTOLIC BLOOD PRESSURE: 122 MMHG | HEIGHT: 64 IN

## 2020-11-04 DIAGNOSIS — Z00.00 MEDICARE ANNUAL WELLNESS VISIT, SUBSEQUENT: Primary | ICD-10-CM

## 2020-11-04 DIAGNOSIS — Z79.4 TYPE 2 DIABETES MELLITUS WITHOUT COMPLICATION, WITH LONG-TERM CURRENT USE OF INSULIN (HCC): ICD-10-CM

## 2020-11-04 DIAGNOSIS — E11.9 TYPE 2 DIABETES MELLITUS WITHOUT COMPLICATION, WITH LONG-TERM CURRENT USE OF INSULIN (HCC): ICD-10-CM

## 2020-11-04 PROCEDURE — G0439 PPPS, SUBSEQ VISIT: HCPCS | Performed by: FAMILY MEDICINE

## 2020-11-04 PROCEDURE — G8536 NO DOC ELDER MAL SCRN: HCPCS | Performed by: FAMILY MEDICINE

## 2020-11-04 PROCEDURE — G9899 SCRN MAM PERF RSLTS DOC: HCPCS | Performed by: FAMILY MEDICINE

## 2020-11-04 PROCEDURE — G8399 PT W/DXA RESULTS DOCUMENT: HCPCS | Performed by: FAMILY MEDICINE

## 2020-11-04 PROCEDURE — 3052F HG A1C>EQUAL 8.0%<EQUAL 9.0%: CPT | Performed by: FAMILY MEDICINE

## 2020-11-04 PROCEDURE — 2022F DILAT RTA XM EVC RTNOPTHY: CPT | Performed by: FAMILY MEDICINE

## 2020-11-04 PROCEDURE — G8427 DOCREV CUR MEDS BY ELIG CLIN: HCPCS | Performed by: FAMILY MEDICINE

## 2020-11-04 PROCEDURE — G8432 DEP SCR NOT DOC, RNG: HCPCS | Performed by: FAMILY MEDICINE

## 2020-11-04 PROCEDURE — G8417 CALC BMI ABV UP PARAM F/U: HCPCS | Performed by: FAMILY MEDICINE

## 2020-11-04 PROCEDURE — G8752 SYS BP LESS 140: HCPCS | Performed by: FAMILY MEDICINE

## 2020-11-04 PROCEDURE — 1101F PT FALLS ASSESS-DOCD LE1/YR: CPT | Performed by: FAMILY MEDICINE

## 2020-11-04 PROCEDURE — 3017F COLORECTAL CA SCREEN DOC REV: CPT | Performed by: FAMILY MEDICINE

## 2020-11-04 PROCEDURE — G8754 DIAS BP LESS 90: HCPCS | Performed by: FAMILY MEDICINE

## 2020-11-04 RX ORDER — INSULIN GLARGINE 100 [IU]/ML
26 INJECTION, SOLUTION SUBCUTANEOUS DAILY
Qty: 15 ADJUSTABLE DOSE PRE-FILLED PEN SYRINGE | Refills: 1
Start: 2020-11-04 | End: 2021-01-04 | Stop reason: SDUPTHER

## 2020-11-04 RX ORDER — ZOSTER VACCINE RECOMBINANT, ADJUVANTED 50 MCG/0.5
KIT INTRAMUSCULAR
Qty: 0.5 ML | Refills: 1 | Status: SHIPPED | OUTPATIENT
Start: 2020-11-04 | End: 2021-07-06 | Stop reason: ALTCHOICE

## 2020-11-04 NOTE — PATIENT INSTRUCTIONS
Medicare Wellness Visit, Female The best way to live healthy is to have a lifestyle where you eat a well-balanced diet, exercise regularly, limit alcohol use, and quit all forms of tobacco/nicotine, if applicable. Regular preventive services are another way to keep healthy. Preventive services (vaccines, screening tests, monitoring & exams) can help personalize your care plan, which helps you manage your own care. Screening tests can find health problems at the earliest stages, when they are easiest to treat. [facility] follows the current, evidence-based guidelines published by the [facility] ([facility]) when recommending preventive services for our patients. Because we follow these guidelines, sometimes recommendations change over time as research supports it. (For example, mammograms used to be recommended annually. Even though Medicare will still pay for an annual mammogram, the newer guidelines recommend a mammogram every two years for women of average risk). Of course, you and your doctor may decide to screen more often for some diseases, based on your risk and your co-morbidities (chronic disease you are already diagnosed with). Preventive services for you include: - Medicare offers their members a free annual wellness visit, which is time for you and your primary care provider to discuss and plan for your preventive service needs. Take advantage of this benefit every year! 
-All adults over the age of 72 should receive the recommended pneumonia vaccines. Current USPSTF guidelines recommend a series of two vaccines for the best pneumonia protection.  
-All adults should have a flu vaccine yearly and a tetanus vaccine every 10 years.  
-All adults age 48 and older should receive the shingles vaccines (series of two vaccines). -All adults age 38-68 who are overweight should have a diabetes screening test once every three years. -All adults born between [birth year] and 1963 should be screened once for Hepatitis C. 
-Other screening tests and preventive services for persons with diabetes include: an eye exam to screen for diabetic retinopathy, a kidney function test, a foot exam, and stricter control over your cholesterol.  
-Cardiovascular screening for adults with routine risk involves an electrocardiogram (ECG) at intervals determined by your doctor.  
-Colorectal cancer screenings should be done for adults age 54-65 with no increased risk factors for colorectal cancer. There are a number of acceptable methods of screening for this type of cancer. Each test has its own benefits and drawbacks. Discuss with your doctor what is most appropriate for you during your annual wellness visit. The different tests include: colonoscopy (considered the best screening method), a fecal occult blood test, a fecal DNA test, and sigmoidoscopy. 
 
-A bone mass density test is recommended when a woman turns 65 to screen for osteoporosis. This test is only recommended one time, as a screening. Some providers will use this same test as a disease monitoring tool if you already have osteoporosis. -Breast cancer screenings are recommended every other year for women of normal risk, age 54-69. 
-Cervical cancer screenings for women over age 72 are only recommended with certain risk factors. Here is a list of your current Health Maintenance items (your personalized list of preventive services) with a due date: 
Health Maintenance Due Topic Date Due  Shingles Vaccine (1 of 2) 08/16/1995 Sania Helms Annual Well Visit  10/08/2020  Cholesterol Test   11/04/2020

## 2020-11-04 NOTE — PROGRESS NOTES
Identified pt with two pt identifiers(name and ). Reviewed record in preparation for visit and have obtained necessary documentation. Chief Complaint   Patient presents with    Annual Wellness Visit        Health Maintenance Due   Topic    Shingrix Vaccine Age 49> (1 of 2)    Medicare Yearly Exam     Lipid Screen        Coordination of Care Questionnaire:  :   1) Have you been to an emergency room, urgent care, or hospitalized since your last visit? If yes, where when, and reason for visit? No       2. Have seen or consulted any other health care provider since your last visit? If yes, where when, and reason for visit?    No

## 2020-11-04 NOTE — PROGRESS NOTES
This is the Subsequent Medicare Annual Wellness Exam, performed 12 months or more after the Initial AWV or the last Subsequent AWV    I have reviewed the patient's medical history in detail and updated the computerized patient record. History     Patient Active Problem List   Diagnosis Code    Dry cough R05    Pneumonia, organism unspecified(486) J18.9    Diabetes mellitus ([facility] E11.9    Unspecified essential hypertension I10    Fatigue R53.83    Hypothyroidism E03.9    Obstructive sleep apnea (adult) (pediatric) G47.33    Esophageal reflux K21.9    Severe obesity ([facility] E66.01    Type 2 diabetes with nephropathy ([facility] E11.21    Respiratory failure with hypoxia ([facility]) J96.91    COVID-19 U07.1     Past Medical History:   Diagnosis Date    Anxiety     Arthritis     Asthma     Chronic pain     back,right hip    COVID-19 05/2020    hospitalized 10 days [place]    Depression     Diabetes ([facility]     type 2 on insulin.  GERD (gastroesophageal reflux disease)     History of vascular access device 05/18/2020    4 Fr midline, 12 cm L basilic, blood draws    Hypercholesteremia     Hypertension     Hypothyroid     Sleep apnea     wears dental appliance- Somnident    Thyroid disease     Unspecified adverse effect of anesthesia     delayed awakening      Past Surgical History:   Procedure Laterality Date    HX COLONOSCOPY  2012    normal repeat 2022, diverticulosis of sigmoid colon, Dr. Melly Latif HX HEENT  1/08/14    bilateral cataract surgery1/8/14    HX KNEE REPLACEMENT      right    HX OTHER SURGICAL  2007    cardiac cath- No CAD found    NEUROLOGICAL PROCEDURE UNLISTED  1999    vascular brain surgery     Current Outpatient Medications   Medication Sig Dispense Refill    insulin glargine (Lantus Solostar U-100 Insulin) 100 unit/mL (3 mL) inpn 26 Units by SubCUTAneous route daily.  15 Adjustable Dose Pre-filled Pen Syringe 1    varicella-zoster recombinant, PF, (Shingrix, PF,) 50 mcg/0.5 mL susr injection 0.5mL by IntraMUSCular route once now and then repeat in 2-6 months 0.5 mL 1    traZODone (DESYREL) 100 mg tablet TAKE 1 TABLET BY MOUTH EVERY DAY AT NIGHT 90 Tab 1    simvastatin (ZOCOR) 10 mg tablet Take 1 Tab by mouth nightly. 90 Tab 1    losartan (COZAAR) 50 mg tablet TAKE 1 TABLET BY MOUTH EVERY DAY. 90 Tab 1    sertraline (ZOLOFT) 50 mg tablet Take 1 Tab by mouth daily. 90 Tab 1    Insulin Needles, Disposable, 31 gauge x 5/16\" ndle Use daily with glargine insulin pen 1 Package 11    albuterol (ProAir HFA) 90 mcg/actuation inhaler Take 2 Puffs by inhalation every four (4) hours as needed for Wheezing. 1 Inhaler 0    lancets misc Please check you blood glucose before breakfast and before dinner daily 100 Each 11    glucose blood VI test strips (blood glucose test) strip Please check blood glucose before breakfast and before dinner daily 100 Strip 0    Blood-Glucose Meter (Blood Glucose Monitoring) monitoring kit Please check your blood glucose before breakfast and before dinner daily 1 Kit 0    guaiFENesin ER (Mucinex) 600 mg ER tablet Take 600 mg by mouth two (2) times a day.  omeprazole (PRILOSEC) 20 mg capsule Take 20 mg by mouth daily.  calcium-vitamin D (OYSTER SHELL) 500 mg(1,250mg) -200 unit per tablet Take 1 Tab by mouth daily.  levothyroxine (SYNTHROID) 100 mcg tablet Take 1 Tab by mouth Daily (before breakfast). 90 Tab 3    aspirin delayed-release 81 mg tablet Take 81 mg by mouth daily.  fluticasone (FLONASE) 50 mcg/actuation nasal spray 2 Sprays by Both Nostrils route daily as needed for Rhinitis or Allergies.  Cetirizine (ZYRTEC) 10 mg cap Take 1 Tab by mouth every evening.  multivitamins-minerals-lutein (CENTRUM SILVER) tab Take 1 Tab by mouth daily.  glucosamine-chondroit-vit c-mn (GLUCOSAMINE CHONDROITIN MAXSTR) 500-400 mg capsule Take 1 Cap by mouth daily.        Allergies   Allergen Reactions    Latex Rash     Other reaction(s): Bleeding (finding)  Other reaction(s): Bleeding, blisters  Other reaction(s): Bleeding (finding)    Prednisone Anaphylaxis     Throat swelling. Can take methylpredisone po or IV without problems. Other reaction(s): Pharyngeal swelling (finding)  Throat swelling. Can take methylpredisone po or IV without problems. Other reaction(s): Swelling of throat  Throat swelling. Can take methylpredisone po or IV without problems. Other reaction(s): Pharyngeal swelling (finding)    Augmentin [Amoxicillin-Pot Clavulanate] Nausea and Vomiting    Biaxin [Clarithromycin] Nausea and Vomiting    Metformin Diarrhea    Parafon Forte Dsc [Chlorzoxazone] Nausea and Vomiting    Penicillin V Unable to Obtain     Other reaction(s): Unable to Obtain    Penicillin V Potassium Unable to Obtain       Family History   Problem Relation Age of Onset    Elevated Lipids Mother     Dementia Mother     Osteoporosis Mother     Lung Disease Father         copd    Delayed Awakening Father     Post-op Nausea/Vomiting Father     Hypertension Sister     Diabetes Sister         type 2    Breast Cancer Maternal Aunt      Social History     Tobacco Use    Smoking status: Never Smoker    Smokeless tobacco: Never Used   Substance Use Topics    Alcohol use: No     Frequency: Never     Comment: 2x year       Depression Risk Factor Screening:     3 most recent PHQ Screens 10/12/2020   Little interest or pleasure in doing things Not at all   Feeling down, depressed, irritable, or hopeless Not at all   Total Score PHQ 2 0       Alcohol Risk Screen   Do you average more than 1 drink per night or more than 7 drinks a week:  No    On any one occasion in the past three months have you have had more than 3 drinks containing alcohol:  No        Functional Ability and Level of Safety:   Hearing: Hearing is good. Vision:  Dr. Chiqui Kirby 9/2020  Dentist:  Saw last week    Activities of Daily Living: The home contains: no safety equipment.   Patient does total self care     Ambulation: with no difficulty     Fall Risk:  Fall Risk Assessment, last 12 mths 10/12/2020   Able to walk? Yes   Fall in past 12 months? No     Abuse Screen:  Patient is not abused       Cognitive Screening   Has your family/caregiver stated any concerns about your memory: no     Cognitive Screening: interviwe. Some post COVID [facility]    Patient Care Team   Patient Care Team:  Margo Tellez MD as PCP - General (Family Medicine)  Margo Tellez MD as PCP - [facility]    Assessment/Plan   Education and counseling provided:  Are appropriate based on today's review and evaluation  End-of-Life planning (with patient's consent)    Blood sugar control not as good since she went up on carbs  To reduce to 30 grams per meal and FU as scheduled with me later this month    ROS still has ROBISON. Has GXT scheduled with Dr. Nancy Beltran  Has FU with PULM scheduled. General Well appearing, A&O X 4  Neck without nodes normal thyroid  Lungs clear to ausculation  CV RRR, No M, R, or G. No edema. Neuro Normal Speech  Psych Oriented to person place and time with normal affect and mood. No hallucinations or abnormal thought    Diagnoses and all orders for this visit:    1. Medicare annual wellness visit, subsequent  -     varicella-zoster recombinant, PF, (Shingrix, PF,) 50 mcg/0.5 mL susr injection; 0.5mL by IntraMUSCular route once now and then repeat in 2-6 months    2. Type 2 diabetes mellitus without complication, with long-term current use of insulin (HCC)  -     insulin glargine (Lantus Solostar U-100 Insulin) 100 unit/mL (3 mL) inpn; 26 Units by SubCUTAneous route daily.         Health Maintenance Due   Topic Date Due    Shingrix Vaccine Age 49> (1 of 2) 08/16/1997    Medicare Yearly Exam  10/08/2020    Lipid Screen  11/04/2020

## 2020-11-12 ENCOUNTER — ANCILLARY PROCEDURE (OUTPATIENT)
Dept: CARDIOLOGY CLINIC | Age: 75
End: 2020-11-12
Payer: MEDICARE

## 2020-11-12 VITALS — BODY MASS INDEX: 33.63 KG/M2 | WEIGHT: 197 LBS | HEIGHT: 64 IN

## 2020-11-12 DIAGNOSIS — R06.00 DYSPNEA, UNSPECIFIED TYPE: ICD-10-CM

## 2020-11-12 DIAGNOSIS — R53.83 FATIGUE, UNSPECIFIED TYPE: ICD-10-CM

## 2020-11-12 PROCEDURE — 78452 HT MUSCLE IMAGE SPECT MULT: CPT | Performed by: STUDENT IN AN ORGANIZED HEALTH CARE EDUCATION/TRAINING PROGRAM

## 2020-11-12 PROCEDURE — 93015 CV STRESS TEST SUPVJ I&R: CPT | Performed by: STUDENT IN AN ORGANIZED HEALTH CARE EDUCATION/TRAINING PROGRAM

## 2020-11-12 PROCEDURE — A9500 TC99M SESTAMIBI: HCPCS | Performed by: STUDENT IN AN ORGANIZED HEALTH CARE EDUCATION/TRAINING PROGRAM

## 2020-11-12 RX ORDER — TETRAKIS(2-METHOXYISOBUTYLISOCYANIDE)COPPER(I) TETRAFLUOROBORATE 1 MG/ML
24.9 INJECTION, POWDER, LYOPHILIZED, FOR SOLUTION INTRAVENOUS ONCE
Status: COMPLETED | OUTPATIENT
Start: 2020-11-12 | End: 2020-11-12

## 2020-11-12 RX ORDER — TETRAKIS(2-METHOXYISOBUTYLISOCYANIDE)COPPER(I) TETRAFLUOROBORATE 1 MG/ML
8.6 INJECTION, POWDER, LYOPHILIZED, FOR SOLUTION INTRAVENOUS ONCE
Status: COMPLETED | OUTPATIENT
Start: 2020-11-12 | End: 2020-11-12

## 2020-11-12 RX ADMIN — TETRAKIS(2-METHOXYISOBUTYLISOCYANIDE)COPPER(I) TETRAFLUOROBORATE 8.6 MILLICURIE: 1 INJECTION, POWDER, LYOPHILIZED, FOR SOLUTION INTRAVENOUS at 13:15

## 2020-11-12 RX ADMIN — TETRAKIS(2-METHOXYISOBUTYLISOCYANIDE)COPPER(I) TETRAFLUOROBORATE 24.9 MILLICURIE: 1 INJECTION, POWDER, LYOPHILIZED, FOR SOLUTION INTRAVENOUS at 14:20

## 2020-11-16 LAB
STRESS BASELINE DIAS BP: 72 MMHG
STRESS BASELINE HR: 63 BPM
STRESS BASELINE SYS BP: 126 MMHG
STRESS O2 SAT PEAK: 98 %
STRESS O2 SAT REST: 98 %
STRESS PEAK DIAS BP: 82 MMHG
STRESS PEAK SYS BP: 134 MMHG
STRESS PERCENT HR ACHIEVED: 68 %
STRESS POST PEAK HR: 100 BPM
STRESS RATE PRESSURE PRODUCT: NORMAL BPM*MMHG
STRESS ST DEPRESSION: 0 MM
STRESS ST ELEVATION: 0 MM
STRESS TARGET HR: 147 BPM

## 2020-11-16 RX ORDER — IBUPROFEN 200 MG
CAPSULE ORAL
Qty: 100 STRIP | Refills: 0 | Status: SHIPPED | OUTPATIENT
Start: 2020-11-16 | End: 2021-02-11 | Stop reason: SDUPTHER

## 2020-11-17 ENCOUNTER — OFFICE VISIT (OUTPATIENT)
Dept: CARDIOLOGY CLINIC | Age: 75
End: 2020-11-17
Payer: MEDICARE

## 2020-11-17 VITALS
OXYGEN SATURATION: 97 % | BODY MASS INDEX: 32.95 KG/M2 | HEART RATE: 73 BPM | DIASTOLIC BLOOD PRESSURE: 79 MMHG | HEIGHT: 64 IN | SYSTOLIC BLOOD PRESSURE: 118 MMHG | WEIGHT: 193 LBS

## 2020-11-17 DIAGNOSIS — R94.39 ABNORMAL STRESS TEST: ICD-10-CM

## 2020-11-17 DIAGNOSIS — U07.1 COVID-19: ICD-10-CM

## 2020-11-17 DIAGNOSIS — Z79.4 TYPE 2 DIABETES MELLITUS WITH MICROALBUMINURIA, WITH LONG-TERM CURRENT USE OF INSULIN (HCC): ICD-10-CM

## 2020-11-17 DIAGNOSIS — E66.01 SEVERE OBESITY (HCC): ICD-10-CM

## 2020-11-17 DIAGNOSIS — I10 ESSENTIAL HYPERTENSION: ICD-10-CM

## 2020-11-17 DIAGNOSIS — E11.29 TYPE 2 DIABETES MELLITUS WITH MICROALBUMINURIA, WITH LONG-TERM CURRENT USE OF INSULIN (HCC): ICD-10-CM

## 2020-11-17 DIAGNOSIS — J45.909 UNCOMPLICATED ASTHMA, UNSPECIFIED ASTHMA SEVERITY, UNSPECIFIED WHETHER PERSISTENT: ICD-10-CM

## 2020-11-17 DIAGNOSIS — R80.9 TYPE 2 DIABETES MELLITUS WITH MICROALBUMINURIA, WITH LONG-TERM CURRENT USE OF INSULIN (HCC): ICD-10-CM

## 2020-11-17 DIAGNOSIS — R06.00 DYSPNEA, UNSPECIFIED TYPE: Primary | ICD-10-CM

## 2020-11-17 PROCEDURE — 99214 OFFICE O/P EST MOD 30 MIN: CPT | Performed by: STUDENT IN AN ORGANIZED HEALTH CARE EDUCATION/TRAINING PROGRAM

## 2020-11-17 PROCEDURE — 3052F HG A1C>EQUAL 8.0%<EQUAL 9.0%: CPT | Performed by: STUDENT IN AN ORGANIZED HEALTH CARE EDUCATION/TRAINING PROGRAM

## 2020-11-17 RX ORDER — ATENOLOL 25 MG/1
25 TABLET ORAL DAILY
Qty: 3 TAB | Refills: 0 | Status: SHIPPED | OUTPATIENT
Start: 2020-11-17 | End: 2020-11-23

## 2020-11-17 NOTE — LETTER
11/17/20 Patient: Josiah Miller YOB: 1945 Date of Visit: 11/17/2020 Song Marcelo MD 
[address] [facility] [address] [facility] [id number] VIA In Basket Dear Song Marcelo MD, Thank you for referring Ms. Joel Haley to [facility] for evaluation. My notes for this consultation are attached. If you have questions, please do not hesitate to call me. I look forward to following your patient along with you.  
 
 
Sincerely, 
 
Laura Astorga, DO

## 2020-11-17 NOTE — PROGRESS NOTES
Chief Complaint   Patient presents with    Breathing Problem    Diabetes    Follow-up     4-6 weeks     Visit Vitals  Ht 5' 4\" (1.626 m)   BMI 33.81 kg/m²     Chest pain denied   SOB getting much better  Dizziness denied  Swelling in hands/feet denied   Recent hospital stays denied     Hx covid + in May

## 2020-11-17 NOTE — PROGRESS NOTES
[facility] [address], [address] [facility], [address] [place] [address]    Office [phone],[facility] [phone]           Ashleigh Carpio is a 70 y.o. female presents the office today for f/up visit. Assessment/Recommendations:    Severe dyspnea significantly improved with steroid and augmentin therapy Rx by pulmonologist.  No ongoing chest pain. abnromal stress test- suspect artifact, however due to coronary risk factors recommend to proceed with coronary CTA    Asthma- followed by Dr. Ellen Chavez in [place]. Type 2 diabetes  -Continue statin therapy with history of type 2 diabetes  -Continue aspirin due to elevated 10-year cardiovascular risk    Hypertension- stable, management per primary care    History of brain surgery    LORENZO        Primary Care Physician- Caryle Fredericks, MD    Follow-up 3 months        Subjective:  70 y.o. presents to the office today for f/up evaluation of dyspnea. Unfortunately patient was diagnosed with COVID-19 and admitted to the hospital for 20 days in the spring of this year. Previously seen due to severe exertional dyspnea. Echocardiogram with normal lv function. Seen by pulmonologist, started on steroids and augmentin with significant improvement in dyspnea. Near baseline in her breathing. No anginal chest pain symptoms. Her sister has a hx cardiomyopathy s/p ICD. She does report having a previous heart catheterization that was performed at [facility] many years ago. Past Medical History:   Diagnosis Date    Anxiety     Arthritis     Asthma     Chronic pain     back,right hip    COVID-19 05/2020    hospitalized 10 days [facility]    Depression     Diabetes ([address]     type 2 on insulin.     GERD (gastroesophageal reflux disease)     History of vascular access device 05/18/2020    4 Fr midline, 12 cm L basilic, blood draws    Hypercholesteremia     Hypertension     Hypothyroid     Sleep apnea wears dental appliance- Somnident    Thyroid disease     Unspecified adverse effect of anesthesia     delayed awakening        Past Surgical History:   Procedure Laterality Date    HX COLONOSCOPY  2012    normal repeat 2022, diverticulosis of sigmoid colon, Dr. Dea Mcconnell HX HEENT  1/08/14    bilateral cataract surgery1/8/14    HX KNEE REPLACEMENT      right    HX OTHER SURGICAL  2007    cardiac cath- No CAD found    NEUROLOGICAL PROCEDURE UNLISTED  1999    vascular brain surgery         Current Outpatient Medications:     glucose blood VI test strips (blood glucose test) strip, Please check blood glucose before breakfast and before dinner daily, Disp: 100 Strip, Rfl: 0    insulin glargine (Lantus Solostar U-100 Insulin) 100 unit/mL (3 mL) inpn, 26 Units by SubCUTAneous route daily. , Disp: 15 Adjustable Dose Pre-filled Pen Syringe, Rfl: 1    varicella-zoster recombinant, PF, (Shingrix, PF,) 50 mcg/0.5 mL susr injection, 0.5mL by IntraMUSCular route once now and then repeat in 2-6 months, Disp: 0.5 mL, Rfl: 1    traZODone (DESYREL) 100 mg tablet, TAKE 1 TABLET BY MOUTH EVERY DAY AT NIGHT, Disp: 90 Tab, Rfl: 1    simvastatin (ZOCOR) 10 mg tablet, Take 1 Tab by mouth nightly., Disp: 90 Tab, Rfl: 1    losartan (COZAAR) 50 mg tablet, TAKE 1 TABLET BY MOUTH EVERY DAY., Disp: 90 Tab, Rfl: 1    sertraline (ZOLOFT) 50 mg tablet, Take 1 Tab by mouth daily. , Disp: 90 Tab, Rfl: 1    Insulin Needles, Disposable, 31 gauge x 5/16\" ndle, Use daily with glargine insulin pen, Disp: 1 Package, Rfl: 11    albuterol (ProAir HFA) 90 mcg/actuation inhaler, Take 2 Puffs by inhalation every four (4) hours as needed for Wheezing., Disp: 1 Inhaler, Rfl: 0    lancets misc, Please check you blood glucose before breakfast and before dinner daily, Disp: 100 Each, Rfl: 11    Blood-Glucose Meter (Blood Glucose Monitoring) monitoring kit, Please check your blood glucose before breakfast and before dinner daily, Disp: 1 Kit, Rfl: 0    guaiFENesin ER (Mucinex) 600 mg ER tablet, Take 600 mg by mouth two (2) times a day., Disp: , Rfl:     omeprazole (PRILOSEC) 20 mg capsule, Take 20 mg by mouth daily. , Disp: , Rfl:     calcium-vitamin D (OYSTER SHELL) 500 mg(1,250mg) -200 unit per tablet, Take 1 Tab by mouth daily. , Disp: , Rfl:     levothyroxine (SYNTHROID) 100 mcg tablet, Take 1 Tab by mouth Daily (before breakfast). , Disp: 90 Tab, Rfl: 3    aspirin delayed-release 81 mg tablet, Take 81 mg by mouth daily. , Disp: , Rfl:     fluticasone (FLONASE) 50 mcg/actuation nasal spray, 2 Sprays by Both Nostrils route daily as needed for Rhinitis or Allergies. , Disp: , Rfl:     Cetirizine (ZYRTEC) 10 mg cap, Take 1 Tab by mouth every evening., Disp: , Rfl:     multivitamins-minerals-lutein (CENTRUM SILVER) tab, Take 1 Tab by mouth daily. , Disp: , Rfl:     glucosamine-chondroit-vit c-mn (GLUCOSAMINE CHONDROITIN MAXSTR) 500-400 mg capsule, Take 1 Cap by mouth daily. , Disp: , Rfl:     Allergies   Allergen Reactions    Latex Rash     Other reaction(s): Bleeding (finding)  Other reaction(s): Bleeding, blisters  Other reaction(s): Bleeding (finding)    Prednisone Anaphylaxis     Throat swelling. Can take methylpredisone po or IV without problems. Other reaction(s): Pharyngeal swelling (finding)  Throat swelling. Can take methylpredisone po or IV without problems. Other reaction(s): Swelling of throat  Throat swelling. Can take methylpredisone po or IV without problems.   Other reaction(s): Pharyngeal swelling (finding)    Augmentin [Amoxicillin-Pot Clavulanate] Nausea and Vomiting    Biaxin [Clarithromycin] Nausea and Vomiting    Metformin Diarrhea    Parafon Forte Dsc [Chlorzoxazone] Nausea and Vomiting    Penicillin V Unable to Obtain     Other reaction(s): Unable to Obtain    Penicillin V Potassium Unable to Obtain        Family History   Problem Relation Age of Onset    Elevated Lipids Mother     Dementia Mother     Osteoporosis Mother  Lung Disease Father         copd    Delayed Awakening Father     Post-op Nausea/Vomiting Father     Hypertension Sister     Diabetes Sister         type 2    Breast Cancer Maternal Aunt        Social History     Tobacco Use    Smoking status: Never Smoker    Smokeless tobacco: Never Used   Substance Use Topics    Alcohol use: No     Frequency: Never     Comment: 2x year    Drug use: No       Review of Symptoms:  Pertinent Positive: Severe exertional  Pertinent Negative: Lisa no chest pain, orthopnea, PND  All Other systems reviewed and are negative for a Comprehensive ROS (10+)    Physical Exam    Blood pressure 118/79, pulse 73, height 5' 4\" (1.626 m), weight 193 lb (87.5 kg), SpO2 97 %. Constitutional:  well-developed and well-nourished. No distress. HENT: Normocephalic. Eyes: No scleral icterus. Neck:  Neck supple. No JVD present. Pulmonary/Chest: Effort normal and breath sounds normal. No respiratory distress, wheezes or rales. Cardiovascular: Normal rate, regular rhythm, S1 S2 . Exam reveals no gallop and no friction rub. No murmur heard. No edema. Extremities:  Normal muscle tone  Abdominal:   No abnormal distension. Neurological:  Moving all extremities, cranial nerves appear grossly intact. Skin: Skin is not cold. Not diaphoretic. No erythema. Psychiatric:  Grossly normal mood and affect. Intact insight. Objective Data:    ECG: personally reviewed and  intrepreted  10/20/2020sinus rhythm inferior and anterior Q waves    10/20/20   ECHO ADULT COMPLETE 10/21/2020 10/21/2020    Narrative · LV: Calculated LVEF is 55%. Biplane method used to measure ejection   fraction. Normal cavity size and systolic function (ejection fraction   normal). Mild concentric hypertrophy. · RV: Mildly reduced systolic function. · PA: Pulmonary arterial systolic pressure is 23 mmHg.         Signed by: Tommy Goncalves DO       11/12/20   NUCLEAR CARDIAC STRESS TEST 11/16/2020 11/16/2020    Narrative · Gated SPECT: Left ventricular function post-stress was normal.   Calculated ejection fraction is 58%. · Myocardial perfusion imaging defect 1: caused by breast attenuation. Prone imaging acquired. · Baseline ECG: Normal sinus rhythm, rare PVCs, myocardial infarction. The   infarction is located in the anterolateral regions. .  · No significant ecg changes with lexiscan. · Myocardial perfusion imaging defect 1: There is a defect that is small   in size with a mild reduction in uptake present in the mid-apical   mid-apical and anterior location(s) that is reversible. There is normal   wall motion in the defect area. The possibility of ischemia cannot be   excluded. · Myocardial perfusion imaging defect 2: There is a defect that is small   in size with a mild reduction in uptake affecting the mid-apical and   inferior location(s) that is reversible. There is normal wall motion in   the defect area. The possibility of ischemia cannot be excluded. · Left ventricular perfusion is equivocal.  · Myocardial perfusion imaging supports a low risk stress test.     Small sized, low grade anterior wall defect on supine imaing, along with   small sized, low grade inferior wall defect on prone imaging. Normal   myocardial wall thickening. Low risk myocardial SPECT imaging. Signed by: DO Darlene Busch DO          ATTENTION:   This medical record was transcribed using an electronic medical records/speech recognition system. Although proofread, it may and can contain electronic, spelling and other errors. Corrections may be executed at a later time. Please feel free to contact us for any clarifications as needed.

## 2020-11-18 ENCOUNTER — OFFICE VISIT (OUTPATIENT)
Dept: FAMILY MEDICINE CLINIC | Age: 75
End: 2020-11-18
Payer: MEDICARE

## 2020-11-18 VITALS
TEMPERATURE: 97.2 F | RESPIRATION RATE: 16 BRPM | DIASTOLIC BLOOD PRESSURE: 78 MMHG | BODY MASS INDEX: 32.27 KG/M2 | WEIGHT: 189 LBS | HEIGHT: 64 IN | SYSTOLIC BLOOD PRESSURE: 131 MMHG | HEART RATE: 86 BPM | OXYGEN SATURATION: 96 %

## 2020-11-18 DIAGNOSIS — E11.21 TYPE 2 DIABETES WITH NEPHROPATHY (HCC): ICD-10-CM

## 2020-11-18 DIAGNOSIS — I10 ESSENTIAL HYPERTENSION: ICD-10-CM

## 2020-11-18 DIAGNOSIS — R06.09 DYSPNEA ON EXERTION: Primary | ICD-10-CM

## 2020-11-18 PROCEDURE — G8417 CALC BMI ABV UP PARAM F/U: HCPCS | Performed by: FAMILY MEDICINE

## 2020-11-18 PROCEDURE — 3017F COLORECTAL CA SCREEN DOC REV: CPT | Performed by: FAMILY MEDICINE

## 2020-11-18 PROCEDURE — G8427 DOCREV CUR MEDS BY ELIG CLIN: HCPCS | Performed by: FAMILY MEDICINE

## 2020-11-18 PROCEDURE — 1101F PT FALLS ASSESS-DOCD LE1/YR: CPT | Performed by: FAMILY MEDICINE

## 2020-11-18 PROCEDURE — 99214 OFFICE O/P EST MOD 30 MIN: CPT | Performed by: FAMILY MEDICINE

## 2020-11-18 PROCEDURE — G9899 SCRN MAM PERF RSLTS DOC: HCPCS | Performed by: FAMILY MEDICINE

## 2020-11-18 PROCEDURE — 1090F PRES/ABSN URINE INCON ASSESS: CPT | Performed by: FAMILY MEDICINE

## 2020-11-18 PROCEDURE — G8752 SYS BP LESS 140: HCPCS | Performed by: FAMILY MEDICINE

## 2020-11-18 PROCEDURE — 2022F DILAT RTA XM EVC RTNOPTHY: CPT | Performed by: FAMILY MEDICINE

## 2020-11-18 PROCEDURE — G8754 DIAS BP LESS 90: HCPCS | Performed by: FAMILY MEDICINE

## 2020-11-18 PROCEDURE — G8536 NO DOC ELDER MAL SCRN: HCPCS | Performed by: FAMILY MEDICINE

## 2020-11-18 PROCEDURE — 3052F HG A1C>EQUAL 8.0%<EQUAL 9.0%: CPT | Performed by: FAMILY MEDICINE

## 2020-11-18 PROCEDURE — G8399 PT W/DXA RESULTS DOCUMENT: HCPCS | Performed by: FAMILY MEDICINE

## 2020-11-18 PROCEDURE — G8510 SCR DEP NEG, NO PLAN REQD: HCPCS | Performed by: FAMILY MEDICINE

## 2020-11-18 NOTE — PROGRESS NOTES
Assessment and Plan    1. Dyspnea on exertion  Negative cardiac workup and better after steroids and augmentin from PULM. Possibly related to past COVID or her asthma. Never was wheezing. 2. Essential hypertension  At goal    3. Type 2 diabetes with nephropathy ([facility] [address]  Has altered her own insulin regimen. Discussed that her glargine is basal insulin and dose should stay the same  Glucose goals discussed. For now glargine only. FU if now at her goals. Follow-up and Dispositions    · Return in about 3 months (around 2/18/2021) for Medication follow up, Diabetes follow up, Blood pressure follow up. Diagnosis and plan discussed with patient who verbillized understanding. History of present Sujatha Miller is a 70 y.o. female presenting for Follow-up    Dyspnea on exertion  Treated by Pulmonary with steroids and augmentin  Much better and breathing is near normal  Saw Dr. Anton Jenkins from Cardiology and he found normal EF  Had stress test with abnormalities thought to be artifact. DM2  Sugars up with steroids  She increased glargine to 28 units per day  Sticking to diet better  Backed off of carbs. Had talked to sister about sliding scale and thought she could too. Review of Systems   Constitutional: Negative for chills and fever. HENT: Negative for congestion and sore throat. Respiratory: Negative for cough and shortness of breath. Cardiovascular: Negative for chest pain and palpitations. Psychiatric/Behavioral: Negative. Past Medical History:   Diagnosis Date    Anxiety     Arthritis     Asthma     Chronic pain     back,right hip    COVID-19 05/2020    hospitalized 10 days [facility]    Depression     Diabetes ([facility] [address]     type 2 on insulin.     GERD (gastroesophageal reflux disease)     History of vascular access device 05/18/2020    4 Fr midline, 12 cm L basilic, blood draws    Hypercholesteremia     Hypertension     Hypothyroid     Sleep apnea     wears dental appliance- Somnident    Thyroid disease     Unspecified adverse effect of anesthesia     delayed awakening     Past Surgical History:   Procedure Laterality Date    HX COLONOSCOPY  2012    normal repeat 2022, diverticulosis of sigmoid colon, Dr. Jaci Zavala HX HEENT  1/08/14    bilateral cataract surgery1/8/14    HX KNEE REPLACEMENT      right    HX OTHER SURGICAL  2007    cardiac cath- No CAD found    NEUROLOGICAL PROCEDURE UNLISTED  1999    vascular brain surgery     Family History   Problem Relation Age of Onset    Elevated Lipids Mother     Dementia Mother     Osteoporosis Mother     Lung Disease Father         copd    Delayed Awakening Father     Post-op Nausea/Vomiting Father     Hypertension Sister     Diabetes Sister         type 2    Breast Cancer Maternal Aunt      Social History     Socioeconomic History    Marital status:      Spouse name: Not on file    Number of children: Not on file    Years of education: Not on file    Highest education level: Not on file   Occupational History    Not on file   Social Needs    Financial resource strain: Not on file    Food insecurity     Worry: Not on file     Inability: Not on file    Transportation needs     Medical: Not on file     Non-medical: Not on file   Tobacco Use    Smoking status: Never Smoker    Smokeless tobacco: Never Used   Substance and Sexual Activity    Alcohol use: No     Frequency: Never     Comment: 2x year    Drug use: No    Sexual activity: Yes     Partners: Male   Lifestyle    Physical activity     Days per week: Not on file     Minutes per session: Not on file    Stress: Not on file   Relationships    Social connections     Talks on phone: Not on file     Gets together: Not on file     Attends [employer] service: Not on file     Active member of club or organization: Not on file     Attends meetings of clubs or organizations: Not on file     Relationship status: Not on file    Intimate partner violence     Fear of current or ex partner: Not on file     Emotionally abused: Not on file     Physically abused: Not on file     Forced sexual activity: Not on file   Other Topics Concern     Service Not Asked    Blood Transfusions Not Asked    Caffeine Concern Not Asked    Occupational Exposure Not Asked    Hobby Hazards Not Asked    Sleep Concern Not Asked    Stress Concern Not Asked    Weight Concern Not Asked    Special Diet Not Asked    Back Care Not Asked    Exercise Not Asked    Bike Helmet Not Asked   [address] Not Asked    Self-Exams Not Asked   Social History Narrative    Not on file         Prior to Admission medications    Medication Sig Start Date End Date Taking? Authorizing Provider   atenoloL (TENORMIN) 25 mg tablet Take 1 Tab by mouth daily. 11/17/20   Gianna Barreto, DO   glucose blood VI test strips (blood glucose test) strip Please check blood glucose before breakfast and before dinner daily 11/16/20   Deonna Hannon MD   insulin glargine (Lantus Solostar U-100 Insulin) 100 unit/mL (3 mL) inpn 26 Units by SubCUTAneous route daily. 11/4/20   Caryle Fredericks, MD   varicella-zoster recombinant, PF, (Shingrix, PF,) 50 mcg/0.5 mL susr injection 0.5mL by IntraMUSCular route once now and then repeat in 2-6 months 11/4/20   Caryle Fredericks, MD   traZODone (DESYREL) 100 mg tablet TAKE 1 TABLET BY MOUTH EVERY DAY AT NIGHT 10/12/20   Caryle Fredericks, MD   simvastatin (ZOCOR) 10 mg tablet Take 1 Tab by mouth nightly. 9/14/20   Caryle Fredericks, MD   losartan (COZAAR) 50 mg tablet TAKE 1 TABLET BY MOUTH EVERY DAY. 9/4/20   Caryle Fredericks, MD   sertraline (ZOLOFT) 50 mg tablet Take 1 Tab by mouth daily.  9/4/20   Caryle Fredericks, MD   Insulin Needles, Disposable, 31 gauge x 5/16\" ndle Use daily with glargine insulin pen 8/12/20   Caryle Fredericks, MD   albuterol (ProAir HFA) 90 mcg/actuation inhaler Take 2 Puffs by inhalation every four (4) hours as needed for Wheezing. 5/30/20   Joaquim Brown MD   lancets misc Please check you blood glucose before breakfast and before dinner daily 5/30/20   Joaquim Brown MD   Blood-Glucose Meter (Blood Glucose Monitoring) monitoring kit Please check your blood glucose before breakfast and before dinner daily 5/30/20   Joaquim Brown MD   guaiFENesin ER (Mucinex) 600 mg ER tablet Take 600 mg by mouth two (2) times a day. Provider, Historical   omeprazole (PRILOSEC) 20 mg capsule Take 20 mg by mouth daily. Provider, Historical   calcium-vitamin D (OYSTER SHELL) 500 mg(1,250mg) -200 unit per tablet Take 1 Tab by mouth daily. Provider, Historical   levothyroxine (SYNTHROID) 100 mcg tablet Take 1 Tab by mouth Daily (before breakfast). 10/8/19   Rebekah Bender, NP   aspirin delayed-release 81 mg tablet Take 81 mg by mouth daily. Provider, Historical   fluticasone (FLONASE) 50 mcg/actuation nasal spray 2 Sprays by Both Nostrils route daily as needed for Rhinitis or Allergies. Provider, Historical   Cetirizine (ZYRTEC) 10 mg cap Take 1 Tab by mouth every evening. Provider, Historical   multivitamins-minerals-lutein (CENTRUM SILVER) tab Take 1 Tab by mouth daily. Provider, Historical   glucosamine-chondroit-vit c-mn (GLUCOSAMINE CHONDROITIN MAXSTR) 500-400 mg capsule Take 1 Cap by mouth daily. Other, MD [place]        Allergies   Allergen Reactions    Latex Rash     Other reaction(s): Bleeding (finding)  Other reaction(s): Bleeding, blisters  Other reaction(s): Bleeding (finding)    Prednisone Anaphylaxis     Throat swelling. Can take methylpredisone po or IV without problems. Other reaction(s): Pharyngeal swelling (finding)  Throat swelling. Can take methylpredisone po or IV without problems. Other reaction(s): Swelling of throat  Throat swelling. Can take methylpredisone po or IV without problems.   Other reaction(s): Pharyngeal swelling (finding)    Augmentin [Amoxicillin-Pot Clavulanate] Nausea and Vomiting  Biaxin [Clarithromycin] Nausea and Vomiting    Metformin Diarrhea    Parafon Forte Dsc [Chlorzoxazone] Nausea and Vomiting    Penicillin V Unable to Obtain     Other reaction(s): Unable to Obtain    Penicillin V Potassium Unable to Obtain       Vitals:    11/18/20 1345   BP: 131/78   Pulse: 86   Resp: 16   Temp: 97.2 °F (36.2 °C)   TempSrc: Oral   SpO2: 96%   Weight: 189 lb (85.7 kg)   Height: 5' 4\" (1.626 m)     Body mass index is 32.44 kg/m². Objective  Physical Exam  Vitals signs and nursing note reviewed. Constitutional:       Appearance: Normal appearance. She is not toxic-appearing. HENT:      Head: Normocephalic and atraumatic. Neck:      Musculoskeletal: No muscular tenderness. Cardiovascular:      Rate and Rhythm: Normal rate and regular rhythm. Heart sounds: Normal heart sounds. No murmur. No gallop. Pulmonary:      Effort: Pulmonary effort is normal. No respiratory distress. Breath sounds: Normal breath sounds. No wheezing, rhonchi or rales. Comments: NO wheezing  Musculoskeletal:      Right lower leg: No edema. Left lower leg: No edema. Lymphadenopathy:      Cervical: No cervical adenopathy. Neurological:      Mental Status: She is alert. Psychiatric:         Mood and Affect: Mood normal.         Behavior: Behavior normal.         Thought Content:  Thought content normal.         Judgment: Judgment normal.

## 2020-11-18 NOTE — PROGRESS NOTES
Identified pt with two pt identifiers(name and ). Reviewed record in preparation for visit and have obtained necessary documentation. Chief Complaint   Patient presents with    Follow-up        Health Maintenance Due   Topic    Shingrix Vaccine Age 50> (1 of 2)    Lipid Screen        Visit Vitals  Blood Pressure 131/78 (BP 1 Location: Left arm, BP Patient Position: Sitting)   Pulse 86   Temperature 97.2 °F (36.2 °C) (Oral)   Respiration 16   Height 5' 4\" (1.626 m)   Weight 189 lb (85.7 kg)   Oxygen Saturation 96%   Body Mass Index 32.44 kg/m²         Coordination of Care Questionnaire:  :   1) Have you been to an emergency room, urgent care, or hospitalized since your last visit? NO      2. Have seen or consulted any other health care provider since your last visit? NO          Patient is accompanied by  I have received verbal consent from Jessie Hutchinson to discuss any/all medical information while they are present in the room.

## 2020-11-23 ENCOUNTER — TELEPHONE (OUTPATIENT)
Dept: CARDIOLOGY CLINIC | Age: 75
End: 2020-11-23

## 2020-11-23 DIAGNOSIS — R94.39 ABNORMAL STRESS TEST: Primary | ICD-10-CM

## 2020-11-23 NOTE — TELEPHONE ENCOUNTER
Pt states she has to get a CTA test and Ana Luisa Castro prescribed atenolol; pt inquiring about the instructions for the medication.  Please advise      [facility]:[phone]

## 2020-11-27 NOTE — TELEPHONE ENCOUNTER
Returned patient's call after speaking with Dr Todd Keen he would like for patient to get    \"A Coronary CTA - abnormal stress test- suspect artifact, however due to coronary risk factors recommend to proceed with coronary CTA. \"    I advised patient to take Atenolol 25 mg (2) tabs the night before and 25 mg (1) tab the morning of the test.    Patient wanted to inform us she use to take Atenolol 25 mg daily but it was d/c when she was in the hospital she has not taken it since May.     Verbalized understanding.

## 2020-12-02 ENCOUNTER — TELEPHONE (OUTPATIENT)
Dept: CARDIOLOGY CLINIC | Age: 75
End: 2020-12-02

## 2020-12-02 NOTE — TELEPHONE ENCOUNTER
Kathy andrews/ Coordination of Care stated that she will need a new non-abn triggering order for CT coronary artery that Medicare will cover.      Phone #: [phone]  Thanks

## 2020-12-10 ENCOUNTER — TRANSCRIBE ORDER (OUTPATIENT)
Dept: SCHEDULING | Age: 75
End: 2020-12-10

## 2020-12-10 DIAGNOSIS — R94.39 ABNORMAL STRESS TEST: Primary | ICD-10-CM

## 2020-12-10 DIAGNOSIS — I10 ESSENTIAL HYPERTENSION, BENIGN: ICD-10-CM

## 2020-12-10 DIAGNOSIS — R06.00 DYSPNEA, UNSPECIFIED TYPE: ICD-10-CM

## 2020-12-10 DIAGNOSIS — U07.1 CLINICAL DIAGNOSIS OF COVID-19: ICD-10-CM

## 2020-12-10 NOTE — TELEPHONE ENCOUNTER
Amadeo Canela with Coordination of care is following up on previous request. Please advise.     Phone: [phone]

## 2021-01-04 DIAGNOSIS — E11.9 TYPE 2 DIABETES MELLITUS WITHOUT COMPLICATION, WITH LONG-TERM CURRENT USE OF INSULIN (HCC): ICD-10-CM

## 2021-01-04 DIAGNOSIS — Z79.4 TYPE 2 DIABETES MELLITUS WITHOUT COMPLICATION, WITH LONG-TERM CURRENT USE OF INSULIN (HCC): ICD-10-CM

## 2021-01-04 RX ORDER — INSULIN GLARGINE 100 [IU]/ML
26 INJECTION, SOLUTION SUBCUTANEOUS DAILY
Qty: 15 ADJUSTABLE DOSE PRE-FILLED PEN SYRINGE | Refills: 1 | Status: SHIPPED | OUTPATIENT
Start: 2021-01-04 | End: 2021-01-07 | Stop reason: SDUPTHER

## 2021-01-04 NOTE — TELEPHONE ENCOUNTER
Hi Dr. Verenice Bloom,    MsAna Luisa Harris sent this note:  Refills have been requested for the following medications:     insulin glargine (Lantus Solostar U-100 Insulin) 100 unit/mL (3 mL) inpn Kandace Truong MD]     Patient Comment: I am taking 26 units 2 times a day. The last prescription says 22 units daily.      Preferred pharmacy: [facility] #[id number]- [place], [address]

## 2021-01-06 ENCOUNTER — TELEPHONE (OUTPATIENT)
Dept: FAMILY MEDICINE CLINIC | Age: 76
End: 2021-01-06

## 2021-01-06 DIAGNOSIS — Z79.4 TYPE 2 DIABETES MELLITUS WITHOUT COMPLICATION, WITH LONG-TERM CURRENT USE OF INSULIN (HCC): Primary | ICD-10-CM

## 2021-01-06 DIAGNOSIS — E11.9 TYPE 2 DIABETES MELLITUS WITHOUT COMPLICATION, WITH LONG-TERM CURRENT USE OF INSULIN (HCC): Primary | ICD-10-CM

## 2021-01-06 RX ORDER — LANCETS
EACH MISCELLANEOUS
Qty: 200 EACH | Refills: 11 | Status: SHIPPED | OUTPATIENT
Start: 2021-01-06

## 2021-01-06 NOTE — TELEPHONE ENCOUNTER
Please advise       ----- Message from Sean Small sent at 1/5/2021 11:27 AM EST -----  Regarding: Dr. Eulogio Song first and last name: N/A  Reason for call: incorrect prescription was sent to pharmacy; pt is supposed to have enough lancets for twice a day instead of once a day   Callback required yes/no and why:  Best contact number(s): [phone]  Details to clarify the request: pharmacy is submitting request for correct prescription; pt wants to confirm the req from pharmacy was received; insurance will not cover without correct prescription

## 2021-01-07 ENCOUNTER — TELEPHONE (OUTPATIENT)
Dept: FAMILY MEDICINE CLINIC | Age: 76
End: 2021-01-07

## 2021-01-07 DIAGNOSIS — E11.9 TYPE 2 DIABETES MELLITUS WITHOUT COMPLICATION, WITH LONG-TERM CURRENT USE OF INSULIN (HCC): ICD-10-CM

## 2021-01-07 DIAGNOSIS — Z79.4 TYPE 2 DIABETES MELLITUS WITHOUT COMPLICATION, WITH LONG-TERM CURRENT USE OF INSULIN (HCC): ICD-10-CM

## 2021-01-07 RX ORDER — INSULIN GLARGINE 100 [IU]/ML
26 INJECTION, SOLUTION SUBCUTANEOUS 2 TIMES DAILY
Qty: 15 ADJUSTABLE DOSE PRE-FILLED PEN SYRINGE | Refills: 1 | Status: SHIPPED | OUTPATIENT
Start: 2021-01-07 | End: 2021-03-02 | Stop reason: SDUPTHER

## 2021-01-07 NOTE — TELEPHONE ENCOUNTER
01/07/21  3:30 PM    1. Type 2 diabetes mellitus without complication, with long-term current use of insulin (HCC)    - insulin glargine (Lantus Solostar U-100 Insulin) 100 unit/mL (3 mL) inpn; 26 Units by SubCUTAneous route two (2) times a day. Dispense: 15 Adjustable Dose Pre-filled Pen Syringe;  Refill: 1      Nona Mckeon MD

## 2021-02-19 ENCOUNTER — TELEPHONE (OUTPATIENT)
Dept: CARDIOLOGY CLINIC | Age: 76
End: 2021-02-19

## 2021-02-19 NOTE — TELEPHONE ENCOUNTER
Patient calling in regards to paperwork for procedure that had to be rescheduled.     Phone: [phone]

## 2021-02-23 DIAGNOSIS — I10 ESSENTIAL HYPERTENSION: ICD-10-CM

## 2021-02-23 RX ORDER — LOSARTAN POTASSIUM 50 MG/1
TABLET ORAL
Qty: 90 TAB | Refills: 1 | Status: SHIPPED | OUTPATIENT
Start: 2021-02-23 | End: 2021-07-06 | Stop reason: SDUPTHER

## 2021-02-23 RX ORDER — SERTRALINE HYDROCHLORIDE 50 MG/1
TABLET, FILM COATED ORAL
Qty: 90 TAB | Refills: 1 | Status: SHIPPED | OUTPATIENT
Start: 2021-02-23 | End: 2021-07-06 | Stop reason: SDUPTHER

## 2021-02-25 NOTE — TELEPHONE ENCOUNTER
Patient is calling to follow up on being rescheduled for her Ct scan. Please advise.     Phone: [phone]

## 2021-02-26 ENCOUNTER — TELEPHONE (OUTPATIENT)
Dept: CARDIOLOGY CLINIC | Age: 76
End: 2021-02-26

## 2021-02-26 DIAGNOSIS — R06.00 DYSPNEA, UNSPECIFIED TYPE: ICD-10-CM

## 2021-02-26 DIAGNOSIS — I10 HYPERTENSION, UNSPECIFIED TYPE: ICD-10-CM

## 2021-02-26 DIAGNOSIS — R94.39 ABNORMAL STRESS TEST: Primary | ICD-10-CM

## 2021-02-26 DIAGNOSIS — J45.909 UNCOMPLICATED ASTHMA, UNSPECIFIED ASTHMA SEVERITY, UNSPECIFIED WHETHER PERSISTENT: ICD-10-CM

## 2021-02-26 NOTE — TELEPHONE ENCOUNTER
Arik Messina with [facility] is calling as the patient was rescheduled her Coronary Ct and it is pulling a trigger and they are needing a new diagnosis code submitted with the order. Please advise.     Phone: [phone]

## 2021-02-26 NOTE — TELEPHONE ENCOUNTER
Spoke with -[phone] Dr Samy Burrows did the peer to peer for Coronary CTA. Approval # [id number] by Dr Nay Sparrow. Called patient informed her and had been approved and provided her with the phone number to scheduling [phone]    Patient verbalized understanding.

## 2021-04-02 DIAGNOSIS — G47.00 INSOMNIA, UNSPECIFIED TYPE: ICD-10-CM

## 2021-04-02 RX ORDER — TRAZODONE HYDROCHLORIDE 100 MG/1
TABLET ORAL
Qty: 90 TAB | Refills: 1 | Status: SHIPPED | OUTPATIENT
Start: 2021-04-02 | End: 2021-08-11

## 2021-04-12 ENCOUNTER — TELEPHONE (OUTPATIENT)
Dept: CARDIOLOGY CLINIC | Age: 76
End: 2021-04-12

## 2021-04-12 ENCOUNTER — HOSPITAL ENCOUNTER (OUTPATIENT)
Dept: CT IMAGING | Age: 76
Discharge: HOME | End: 2021-04-12
Attending: STUDENT IN AN ORGANIZED HEALTH CARE EDUCATION/TRAINING PROGRAM

## 2021-04-12 ENCOUNTER — TRANSCRIBE ORDER (OUTPATIENT)
Dept: SCHEDULING | Age: 76
End: 2021-04-12

## 2021-04-12 DIAGNOSIS — R06.00 DYSPNEA AND RESPIRATORY ABNORMALITY: ICD-10-CM

## 2021-04-12 DIAGNOSIS — R94.39 ABNORMAL STRESS TEST: ICD-10-CM

## 2021-04-12 DIAGNOSIS — R06.00 DYSPNEA, UNSPECIFIED TYPE: ICD-10-CM

## 2021-04-12 DIAGNOSIS — I10 ESSENTIAL HYPERTENSION, MALIGNANT: ICD-10-CM

## 2021-04-12 DIAGNOSIS — R94.30: ICD-10-CM

## 2021-04-12 DIAGNOSIS — R06.89 DYSPNEA AND RESPIRATORY ABNORMALITY: ICD-10-CM

## 2021-04-12 DIAGNOSIS — U07.1 CLINICAL DIAGNOSIS OF COVID-19: ICD-10-CM

## 2021-04-12 DIAGNOSIS — R94.39 ABNORMAL BALLISTOCARDIOGRAM: Primary | ICD-10-CM

## 2021-04-12 DIAGNOSIS — I10 ESSENTIAL HYPERTENSION, BENIGN: ICD-10-CM

## 2021-04-12 DIAGNOSIS — R94.30 NONSPECIFIC ABNORMAL FUNCTION STUDY, CARDIOVASCULAR: Primary | ICD-10-CM

## 2021-04-12 RX ORDER — NITROGLYCERIN 0.4 MG/1
0.4 TABLET SUBLINGUAL AS NEEDED
Status: DISCONTINUED | OUTPATIENT
Start: 2021-04-12 | End: 2021-05-13 | Stop reason: HOSPADM

## 2021-04-12 RX ORDER — ATENOLOL 25 MG/1
TABLET ORAL
Qty: 3 TAB | Refills: 0 | Status: SHIPPED | OUTPATIENT
Start: 2021-04-12 | End: 2021-04-27 | Stop reason: ALTCHOICE

## 2021-04-12 NOTE — TELEPHONE ENCOUNTER
Patient will let me know if she would like to reschedule the Coronary CTA.     Requested Prescriptions     Pending Prescriptions Disp Refills    atenoloL (TENORMIN) 25 mg tablet 3 Tab 0     Sig: Take 2 TABS the night before and 1 TAB on the morning of the test

## 2021-04-12 NOTE — TELEPHONE ENCOUNTER
Patient's , Timoteo Pierre, is requesting to speak to Jaciel Chambers in regards to the patient's upcoming test. He is requesting a call at your earliest convenience. Please advise.     Phone #: [phone]  Thanks

## 2021-04-12 NOTE — TELEPHONE ENCOUNTER
----- Message from Alla Parker DO sent at 3/30/2021  2:40 PM EDT -----  Yes she can take atneolol  ----- Message -----  From: Shine Woodard LPN  Sent: 8/79/1381  11:38 AM EDT  To: Alla Parker DO    Patient stated she was taken off Atenolol last May 2020 when she was in the hospital for [facility] and has never been instructed to start it back. She is scheduled for a Coronary CTA on 4/12/21. Is it okay for her to take the Atenolol prior to having the test.    Please advise.     Tangela Still

## 2021-04-12 NOTE — TELEPHONE ENCOUNTER
Returned call spoke with patient she stated her  spoke with Erwin Hill at this time she had no further questions.

## 2021-04-13 ENCOUNTER — HOSPITAL ENCOUNTER (OUTPATIENT)
Dept: CT IMAGING | Age: 76
Discharge: HOME | End: 2021-04-13
Attending: STUDENT IN AN ORGANIZED HEALTH CARE EDUCATION/TRAINING PROGRAM
Payer: MEDICARE

## 2021-04-13 DIAGNOSIS — R94.39 ABNORMAL STRESS TEST: ICD-10-CM

## 2021-04-13 DIAGNOSIS — R94.30 NONSPECIFIC ABNORMAL FUNCTION STUDY, CARDIOVASCULAR: ICD-10-CM

## 2021-04-13 DIAGNOSIS — I10 ESSENTIAL HYPERTENSION, BENIGN: ICD-10-CM

## 2021-04-13 DIAGNOSIS — R06.00 DYSPNEA, UNSPECIFIED TYPE: ICD-10-CM

## 2021-04-13 LAB — CREAT BLD-MCNC: 0.9 MG/DL (ref 0.6–1.3)

## 2021-04-13 PROCEDURE — 75574 CT ANGIO HRT W/3D IMAGE: CPT

## 2021-04-13 PROCEDURE — 74011000636 HC RX REV CODE- 636: Performed by: STUDENT IN AN ORGANIZED HEALTH CARE EDUCATION/TRAINING PROGRAM

## 2021-04-13 PROCEDURE — 82565 ASSAY OF CREATININE: CPT

## 2021-04-13 PROCEDURE — 2709999900 HC NON-CHARGEABLE SUPPLY

## 2021-04-13 PROCEDURE — C1892 INTRO/SHEATH,FIXED,PEEL-AWAY: HCPCS

## 2021-04-13 RX ORDER — METOPROLOL TARTRATE 5 MG/5ML
5 INJECTION INTRAVENOUS ONCE
Status: DISCONTINUED | OUTPATIENT
Start: 2021-04-13 | End: 2021-04-14 | Stop reason: HOSPADM

## 2021-04-13 RX ORDER — NITROGLYCERIN 0.4 MG/1
0.8 TABLET SUBLINGUAL ONCE
Status: DISCONTINUED | OUTPATIENT
Start: 2021-04-13 | End: 2021-04-14 | Stop reason: HOSPADM

## 2021-04-13 RX ADMIN — IOPAMIDOL 100 ML: 755 INJECTION, SOLUTION INTRAVENOUS at 16:27

## 2021-04-13 NOTE — PROCEDURES
Interventional Radiology  Procedure Note        4/13/2021 3:43 PM    Patient: Angelita Omer     Informed consent obtained    Diagnosis: Abnormal stress test    Procedure(s): Ultrasound guided access of right basilic vein    Specimens removed:  none    Complications: None    Primary Physician: River Bush MD    Recommendations: OK to use for IV access    Discharge Disposition: to CT    Full dictated report to follow    River Bush MD  Interventional Radiology  [facility], [facility].  3:43 PM, 4/13/2021

## 2021-04-17 NOTE — PROGRESS NOTES
Problem: Falls - Risk of  Goal: *Absence of Falls  Description: Document Branden Rashid Fall Risk and appropriate interventions in the flowsheet. Outcome: Progressing Towards Goal  Note: Fall Risk Interventions:  Mobility Interventions: Bed/chair exit alarm, Patient to call before getting OOB         Medication Interventions: Patient to call before getting OOB, Teach patient to arise slowly, Bed/chair exit alarm    Elimination Interventions: Bed/chair exit alarm, Call light in reach, Stay With Me (per policy)              Problem: Patient Education: Go to Patient Education Activity  Goal: Patient/Family Education  Outcome: Progressing Towards Goal     Problem: Patient Education: Go to Patient Education Activity  Goal: Patient/Family Education  Outcome: Progressing Towards Goal     Problem: Diabetes Self-Management  Goal: *Disease process and treatment process  Description: Define diabetes and identify own type of diabetes; list 3 options for treating diabetes. Outcome: Progressing Towards Goal  Goal: *Incorporating nutritional management into lifestyle  Description: Describe effect of type, amount and timing of food on blood glucose; list 3 methods for planning meals. Outcome: Progressing Towards Goal  Goal: *Incorporating physical activity into lifestyle  Description: State effect of exercise on blood glucose levels. Outcome: Progressing Towards Goal  Goal: *Developing strategies to promote health/change behavior  Description: Define the ABC's of diabetes; identify appropriate screenings, schedule and personal plan for screenings. Outcome: Progressing Towards Goal  Goal: *Using medications safely  Description: State effect of diabetes medications on diabetes; name diabetes medication taking, action and side effects. Outcome: Progressing Towards Goal  Goal: *Monitoring blood glucose, interpreting and using results  Description: Identify recommended blood glucose targets  and personal targets.   Outcome: Progressing Towards Goal  Note: Pt understands symptoms of low BG and is able to call for help when experiencing these symptoms  Goal: *Prevention, detection, treatment of acute complications  Description: List symptoms of hyper- and hypoglycemia; describe how to treat low blood sugar and actions for lowering  high blood glucose level. Outcome: Progressing Towards Goal  Goal: *Prevention, detection and treatment of chronic complications  Description: Define the natural course of diabetes and describe the relationship of blood glucose levels to long term complications of diabetes. Outcome: Progressing Towards Goal  Goal: *Developing strategies to address psychosocial issues  Description: Describe feelings about living with diabetes; identify support needed and support network  Outcome: Progressing Towards Goal     Problem: Patient Education: Go to Patient Education Activity  Goal: Patient/Family Education  Outcome: Progressing Towards Goal     Problem: Risk for Spread of Infection  Goal: Prevent transmission of infectious organism to others  Description: Prevent the transmission of infectious organisms to other patients, staff members, and visitors. Outcome: Progressing Towards Goal  Note: Pt always wears mask before anyone goes in the room     Problem: Patient Education:  Go to Education Activity  Goal: Patient/Family Education  Outcome: Progressing Towards Goal     Problem: Pressure Injury - Risk of  Goal: *Prevention of pressure injury  Description: Document Anoop Scale and appropriate interventions in the flowsheet. Outcome: Progressing Towards Goal  Note: Pressure Injury Interventions:       Moisture Interventions: Apply protective barrier, creams and emollients, Check for incontinence Q2 hours and as needed, Offer toileting Q_hr    Activity Interventions: Increase time out of bed    Mobility Interventions: Turn and reposition approx.  every two hours(pillow and wedges), HOB 30 degrees or less    Nutrition Interventions: Document food/fluid/supplement intake, Offer support with meals,snacks and hydration    Friction and Shear Interventions: Minimize layers, HOB 30 degrees or less, Apply protective barrier, creams and emollients, Sit at 90-degree angle                Problem: Patient Education: Go to Patient Education Activity  Goal: Patient/Family Education  Outcome: Progressing Towards Goal     Problem: Patient Education: Go to Patient Education Activity  Goal: Patient/Family Education  Outcome: Progressing Towards Goal severe

## 2021-04-27 ENCOUNTER — OFFICE VISIT (OUTPATIENT)
Dept: CARDIOLOGY CLINIC | Age: 76
End: 2021-04-27
Payer: MEDICARE

## 2021-04-27 VITALS
HEIGHT: 64 IN | DIASTOLIC BLOOD PRESSURE: 80 MMHG | SYSTOLIC BLOOD PRESSURE: 124 MMHG | OXYGEN SATURATION: 95 % | HEART RATE: 50 BPM | WEIGHT: 196.2 LBS | BODY MASS INDEX: 33.49 KG/M2

## 2021-04-27 DIAGNOSIS — R06.00 DYSPNEA, UNSPECIFIED TYPE: Primary | ICD-10-CM

## 2021-04-27 DIAGNOSIS — Z79.4 TYPE 2 DIABETES MELLITUS WITH MICROALBUMINURIA, WITH LONG-TERM CURRENT USE OF INSULIN (HCC): ICD-10-CM

## 2021-04-27 DIAGNOSIS — R80.9 TYPE 2 DIABETES MELLITUS WITH MICROALBUMINURIA, WITH LONG-TERM CURRENT USE OF INSULIN (HCC): ICD-10-CM

## 2021-04-27 DIAGNOSIS — E66.01 SEVERE OBESITY (HCC): ICD-10-CM

## 2021-04-27 DIAGNOSIS — E11.29 TYPE 2 DIABETES MELLITUS WITH MICROALBUMINURIA, WITH LONG-TERM CURRENT USE OF INSULIN (HCC): ICD-10-CM

## 2021-04-27 DIAGNOSIS — I10 ESSENTIAL HYPERTENSION: ICD-10-CM

## 2021-04-27 PROCEDURE — 99214 OFFICE O/P EST MOD 30 MIN: CPT | Performed by: STUDENT IN AN ORGANIZED HEALTH CARE EDUCATION/TRAINING PROGRAM

## 2021-04-27 NOTE — PROGRESS NOTES
Chief Complaint   Patient presents with    Follow-up    Breathing Problem     Dyspnea    Hypertension     Patient had a Coronary CTA done on 4/13/21    Visit Vitals  /80 (BP 1 Location: Left upper arm, BP Patient Position: Sitting)   Pulse (!) 50   Ht 5' 4\" (1.626 m)   Wt 196 lb 3.2 oz (89 kg)   SpO2 95%   BMI 33.68 kg/m²       Chest pain denied  SOB - with activity  Dizziness denied  Swelling/Edema - denied  Recent hospital visit denied  Refills denied

## 2021-04-27 NOTE — PROGRESS NOTES
[facility] [address], [address] [facility], [address] [place] [address]    Office [phone],[facility] [phone]           Zahraa Davis is a 70 y.o. female presents the office today for f/up visit. Assessment/Recommendations:    Severe dyspnea No ongoing chest pain. Abnormal stress test 11/20 with subsequent coronary cta 4/21 w/o any cad. 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 Followed by pulmonology, Dr. Liang Gordillo in [place]. abnromal stress test- suspect artifact, coronary CTA w/o disease    Asthma- followed by Dr. Liang Gordillo in [place]. Type 2 diabetes  -Continue statin therapy with history of type 2 diabetes  -Continue aspirin due to elevated 10-year cardiovascular risk    Hypertension- stable, management per primary care    History of brain surgery    LORENZO- folloed by Dr. Huan Galaviz. Mgmt with dental appliance        Primary Care Physician- Donavan Woodard MD    Follow-up 1 year, sooner as needed        Subjective:  70 y.o. presents to the office today for f/up evaluation of dyspnea. Unfortunately patient was diagnosed with COVID-19 and admitted to the hospital for 20 days in the spring of 2020. Previously seen due to severe exertional dyspnea. Echocardiogram with normal lv function. Seen by pulmonologist, started on steroids and augmentin with significant improvement in dyspnea. Abnormal stress 11/20, likely artifact. Subsequent coronary cta w/o disease. Recently seen by Dr. Huan Galaviz and received dental appliance for mgmt of lorenzo. No chest pain,  orthopnea, PND. Persistent exertional dyspnea. Her sister has a hx cardiomyopathy s/p ICD. Past Medical History:   Diagnosis Date    Anxiety     Arthritis     Asthma     Chronic pain     back,right hip    COVID-19 05/2020    hospitalized [facility] days [facility]    Depression     Diabetes ([facility] [address]     type 2 on insulin.     GERD (gastroesophageal reflux disease)     History of vascular access device 05/18/2020    4 Fr midline, 12 cm L basilic, blood draws    Hypercholesteremia     Hypertension     Hypothyroid     Sleep apnea     wears dental appliance- Somnident    Thyroid disease     Unspecified adverse effect of anesthesia     delayed awakening        Past Surgical History:   Procedure Laterality Date    HX COLONOSCOPY  2012    normal repeat 2022, diverticulosis of sigmoid colon, Dr. Chetna Lawton HX HEENT  1/08/14    bilateral cataract surgery1/8/14    HX KNEE REPLACEMENT      right    HX OTHER SURGICAL  2007    cardiac cath- No CAD found    NEUROLOGICAL PROCEDURE UNLISTED  1999    vascular brain surgery         Current Outpatient Medications:     traZODone (DESYREL) 100 mg tablet, TAKE 1 TABLET BY MOUTH EVERY DAY AT NIGHT, Disp: 90 Tab, Rfl: 1    insulin glargine (Lantus Solostar U-100 Insulin) 100 unit/mL (3 mL) inpn, 26 Units by SubCUTAneous route two (2) times a day., Disp: 15 Adjustable Dose Pre-filled Pen Syringe, Rfl: 1    sertraline (ZOLOFT) 50 mg tablet, TAKE 1 TABLET BY MOUTH EVERY DAY, Disp: 90 Tab, Rfl: 1    losartan (COZAAR) 50 mg tablet, TAKE 1 TABLET BY MOUTH EVERY DAY, Disp: 90 Tab, Rfl: 1    glucose blood VI test strips (blood glucose test) strip, Please check blood glucose before breakfast and before dinner daily E11.9 DM2 on insulin., Disp: 100 Strip, Rfl: 5    lancets misc, Please check you blood glucose before breakfast and before dinner daily and if low sugar symptoms, Disp: 200 Each, Rfl: 11    levothyroxine (SYNTHROID) 100 mcg tablet, TAKE 1 TABLET BY MOUTH EVERY DAY BEFORE BREAKFAST, Disp: 90 Tab, Rfl: 1    atenoloL (TENORMIN) 25 mg tablet, TAKE 1 TABLET BY MOUTH EVERY DAY AT NIGHT, Disp: 90 Tab, Rfl: 1    varicella-zoster recombinant, PF, (Shingrix, PF,) 50 mcg/0.5 mL susr injection, 0.5mL by IntraMUSCular route once now and then repeat in 2-6 months, Disp: 0.5 mL, Rfl: 1    simvastatin (ZOCOR) 10 mg tablet, Take 1 Tab by mouth nightly., Disp: 90 Tab, Rfl: 1    Insulin Needles, Disposable, 31 gauge x 5/16\" ndle, Use daily with glargine insulin pen, Disp: 1 Package, Rfl: 11    albuterol (ProAir HFA) 90 mcg/actuation inhaler, Take 2 Puffs by inhalation every four (4) hours as needed for Wheezing., Disp: 1 Inhaler, Rfl: 0    Blood-Glucose Meter (Blood Glucose Monitoring) monitoring kit, Please check your blood glucose before breakfast and before dinner daily, Disp: 1 Kit, Rfl: 0    guaiFENesin ER (Mucinex) 600 mg ER tablet, Take 600 mg by mouth two (2) times a day., Disp: , Rfl:     omeprazole (PRILOSEC) 20 mg capsule, Take 20 mg by mouth daily. , Disp: , Rfl:     calcium-vitamin D (OYSTER SHELL) 500 mg(1,250mg) -200 unit per tablet, Take 1 Tab by mouth daily. , Disp: , Rfl:     aspirin delayed-release 81 mg tablet, Take 81 mg by mouth daily. , Disp: , Rfl:     fluticasone (FLONASE) 50 mcg/actuation nasal spray, 2 Sprays by Both Nostrils route daily as needed for Rhinitis or Allergies. , Disp: , Rfl:     Cetirizine (ZYRTEC) 10 mg cap, Take 1 Tab by mouth every evening., Disp: , Rfl:     multivitamins-minerals-lutein (CENTRUM SILVER) tab, Take 1 Tab by mouth daily. , Disp: , Rfl:     glucosamine-chondroit-vit c-mn (GLUCOSAMINE CHONDROITIN MAXSTR) 500-400 mg capsule, Take 1 Cap by mouth daily. , Disp: , Rfl:   No current facility-administered medications for this visit. Facility-Administered Medications Ordered in Other Visits:     nitroglycerin (NITROSTAT) tablet 0.4 mg, 0.4 mg, SubLINGual, PRN, Juan Adame MD    Allergies   Allergen Reactions    Latex Rash     Other reaction(s): Bleeding (finding)  Other reaction(s): Bleeding, blisters  Other reaction(s): Bleeding (finding)    Prednisone Anaphylaxis     Throat swelling. Can take methylpredisone po or IV without problems. Other reaction(s): Pharyngeal swelling (finding)  Throat swelling.  Can take methylpredisone po or IV without problems. Other reaction(s): Swelling of throat  Throat swelling. Can take methylpredisone po or IV without problems. Other reaction(s): Pharyngeal swelling (finding)    Augmentin [Amoxicillin-Pot Clavulanate] Nausea and Vomiting    Biaxin [Clarithromycin] Nausea and Vomiting    Metformin Diarrhea    Parafon Forte Dsc [Chlorzoxazone] Nausea and Vomiting    Penicillin V Unable to Obtain     Other reaction(s): Unable to Obtain    Penicillin V Potassium Unable to Obtain        Family History   Problem Relation Age of Onset    Elevated Lipids Mother     Dementia Mother     Osteoporosis Mother     Lung Disease Father         copd    Delayed Awakening Father     Post-op Nausea/Vomiting Father     Hypertension Sister     Diabetes Sister         type 2    Breast Cancer Maternal Aunt        Social History     Tobacco Use    Smoking status: Never Smoker    Smokeless tobacco: Never Used   Substance Use Topics    Alcohol use: No     Frequency: Never     Comment: 2x year    Drug use: No       Review of Symptoms:  Pertinent Positive: Severe exertional  Pertinent Negative: Lisa no chest pain, orthopnea, PND  All Other systems reviewed and are negative for a Comprehensive ROS (10+)    Physical Exam    Blood pressure 124/80, pulse (!) 50, height 5' 4\" (1.626 m), weight 196 lb 3.2 oz (89 kg), SpO2 95 %. Constitutional:  well-developed and well-nourished. No distress. HENT: Normocephalic. Eyes: No scleral icterus. Neck:  Neck supple. No JVD present. Pulmonary/Chest: Effort normal and breath sounds normal. No respiratory distress, wheezes or rales. Cardiovascular: Normal rate, regular rhythm, S1 S2 . Exam reveals no gallop and no friction rub. No murmur heard. No edema. Extremities:  Normal muscle tone  Abdominal:   No abnormal distension. Neurological:  Moving all extremities, cranial nerves appear grossly intact. Skin: Skin is not cold. Not diaphoretic. No erythema.    Psychiatric:  Grossly normal mood and affect. Intact insight. Objective Data:    ECG: personally reviewed and  intrepreted  10/20/2020sinus rhythm inferior and anterior Q waves    10/20/20   ECHO ADULT COMPLETE 10/21/2020 10/21/2020    Narrative · LV: Calculated LVEF is 55%. Biplane method used to measure ejection   fraction. Normal cavity size and systolic function (ejection fraction   normal). Mild concentric hypertrophy. · RV: Mildly reduced systolic function. · PA: Pulmonary arterial systolic pressure is 23 mmHg. Signed by: Jamel Bolaños DO       11/12/20   NUCLEAR CARDIAC STRESS TEST 11/16/2020 11/16/2020    Narrative · Gated SPECT: Left ventricular function post-stress was normal.   Calculated ejection fraction is 58%. · Myocardial perfusion imaging defect 1: caused by breast attenuation. Prone imaging acquired. · Baseline ECG: Normal sinus rhythm, rare PVCs, myocardial infarction. The   infarction is located in the anterolateral regions. .  · No significant ecg changes with lexiscan. · Myocardial perfusion imaging defect 1: There is a defect that is small   in size with a mild reduction in uptake present in the mid-apical   mid-apical and anterior location(s) that is reversible. There is normal   wall motion in the defect area. The possibility of ischemia cannot be   excluded. · Myocardial perfusion imaging defect 2: There is a defect that is small   in size with a mild reduction in uptake affecting the mid-apical and   inferior location(s) that is reversible. There is normal wall motion in   the defect area. The possibility of ischemia cannot be excluded. · Left ventricular perfusion is equivocal.  · Myocardial perfusion imaging supports a low risk stress test.     Small sized, low grade anterior wall defect on supine imaing, along with   small sized, low grade inferior wall defect on prone imaging. Normal   myocardial wall thickening. Low risk myocardial SPECT imaging.        Signed by: Jamel Bolaños DO Coronary CTA 4/21  1. Left main originate normally from left coronary cusp and is normal size  without any significant atherosclerotic plaque or calcification. 2. The LAD is normal size without significant disease or calcification. In the  distal portion near the apex the LAD becomes very small caliber vessel but does  not demonstrate any significant disease. The D1 and D2 diagonal branches were  seen without any significant lesion or calcification. There is no myocardial  bridging seen. 3. The left circumflex is normal size vessel without any significant  calcification or disease. The OM1 branch is without any significant disease or  calcification. 4. The RCA is medium-size vessel and originate normally from the right coronary  cusp. There is no significant disease in the RCA, PDA or PLC branches.          Venancio Varela, DO          ATTENTION:   This medical record was transcribed using an electronic medical records/speech recognition system. Although proofread, it may and can contain electronic, spelling and other errors. Corrections may be executed at a later time. Please feel free to contact us for any clarifications as needed.

## 2021-04-27 NOTE — LETTER
5/2/2021 Patient: Jad Tobin YOB: 1945 Date of Visit: 4/27/2021 Blessing Shepherd MD 
[address] [place] [address] [address] [facility] [address] [id number] [address] [employer] Dear Blessing Shepherd MD, Thank you for referring Ms. Maria Laurent to [facility] for evaluation. My notes for this consultation are attached. If you have questions, please do not hesitate to call me. I look forward to following your patient along with you.  
 
 
Sincerely, 
 
Lowell Bang, DO

## 2021-06-02 DIAGNOSIS — Z79.4 TYPE 2 DIABETES MELLITUS WITHOUT COMPLICATION, WITH LONG-TERM CURRENT USE OF INSULIN (HCC): ICD-10-CM

## 2021-06-02 DIAGNOSIS — E11.9 TYPE 2 DIABETES MELLITUS WITHOUT COMPLICATION, WITH LONG-TERM CURRENT USE OF INSULIN (HCC): ICD-10-CM

## 2021-06-02 RX ORDER — PEN NEEDLE, DIABETIC 31 GX3/16"
NEEDLE, DISPOSABLE MISCELLANEOUS
Qty: 100 PEN NEEDLE | Refills: 11 | Status: SHIPPED | OUTPATIENT
Start: 2021-06-02

## 2021-07-06 ENCOUNTER — OFFICE VISIT (OUTPATIENT)
Dept: FAMILY MEDICINE CLINIC | Age: 76
End: 2021-07-06
Payer: MEDICARE

## 2021-07-06 VITALS
DIASTOLIC BLOOD PRESSURE: 62 MMHG | HEIGHT: 64 IN | OXYGEN SATURATION: 95 % | BODY MASS INDEX: 32.98 KG/M2 | RESPIRATION RATE: 16 BRPM | TEMPERATURE: 98.3 F | WEIGHT: 193.2 LBS | SYSTOLIC BLOOD PRESSURE: 93 MMHG | HEART RATE: 63 BPM

## 2021-07-06 DIAGNOSIS — E11.9 TYPE 2 DIABETES MELLITUS WITHOUT COMPLICATION, WITH LONG-TERM CURRENT USE OF INSULIN (HCC): Primary | ICD-10-CM

## 2021-07-06 DIAGNOSIS — Z79.4 TYPE 2 DIABETES MELLITUS WITHOUT COMPLICATION, WITH LONG-TERM CURRENT USE OF INSULIN (HCC): Primary | ICD-10-CM

## 2021-07-06 DIAGNOSIS — F32.A DEPRESSION, UNSPECIFIED DEPRESSION TYPE: ICD-10-CM

## 2021-07-06 DIAGNOSIS — E78.00 HYPERCHOLESTEREMIA: ICD-10-CM

## 2021-07-06 DIAGNOSIS — I10 ESSENTIAL HYPERTENSION: ICD-10-CM

## 2021-07-06 DIAGNOSIS — E03.9 ACQUIRED HYPOTHYROIDISM: ICD-10-CM

## 2021-07-06 LAB
ALBUMIN SERPL-MCNC: 4.1 G/DL (ref 3.5–5)
ALBUMIN/GLOB SERPL: 1.3 {RATIO} (ref 1.1–2.2)
ALP SERPL-CCNC: 102 U/L (ref 45–117)
ALT SERPL-CCNC: 67 U/L (ref 12–78)
ANION GAP SERPL CALC-SCNC: 4 MMOL/L (ref 5–15)
AST SERPL-CCNC: 34 U/L (ref 15–37)
BASOPHILS # BLD: 0.1 K/UL (ref 0–0.1)
BASOPHILS NFR BLD: 1 % (ref 0–1)
BILIRUB SERPL-MCNC: 0.6 MG/DL (ref 0.2–1)
BUN SERPL-MCNC: 14 MG/DL (ref 6–20)
BUN/CREAT SERPL: 16 (ref 12–20)
CALCIUM SERPL-MCNC: 10.8 MG/DL (ref 8.5–10.1)
CHLORIDE SERPL-SCNC: 105 MMOL/L (ref 97–108)
CHOLEST SERPL-MCNC: 187 MG/DL
CO2 SERPL-SCNC: 27 MMOL/L (ref 21–32)
CREAT SERPL-MCNC: 0.88 MG/DL (ref 0.55–1.02)
DIFFERENTIAL METHOD BLD: ABNORMAL
EOSINOPHIL # BLD: 0.3 K/UL (ref 0–0.4)
EOSINOPHIL NFR BLD: 3 % (ref 0–7)
ERYTHROCYTE [DISTWIDTH] IN BLOOD BY AUTOMATED COUNT: 12.7 % (ref 11.5–14.5)
EST. AVERAGE GLUCOSE BLD GHB EST-MCNC: 237 MG/DL
GLOBULIN SER CALC-MCNC: 3.2 G/DL (ref 2–4)
GLUCOSE SERPL-MCNC: 247 MG/DL (ref 65–100)
HBA1C MFR BLD: 9.9 % (ref 4–5.6)
HCT VFR BLD AUTO: 44.7 % (ref 35–47)
HDLC SERPL-MCNC: 53 MG/DL
HDLC SERPL: 3.5 {RATIO} (ref 0–5)
HGB BLD-MCNC: 14.9 G/DL (ref 11.5–16)
IMM GRANULOCYTES # BLD AUTO: 0 K/UL (ref 0–0.04)
IMM GRANULOCYTES NFR BLD AUTO: 0 % (ref 0–0.5)
LDLC SERPL CALC-MCNC: 103.2 MG/DL (ref 0–100)
LYMPHOCYTES # BLD: 2.6 K/UL (ref 0.8–3.5)
LYMPHOCYTES NFR BLD: 28 % (ref 12–49)
MCH RBC QN AUTO: 28.5 PG (ref 26–34)
MCHC RBC AUTO-ENTMCNC: 33.3 G/DL (ref 30–36.5)
MCV RBC AUTO: 85.6 FL (ref 80–99)
MONOCYTES # BLD: 0.6 K/UL (ref 0–1)
MONOCYTES NFR BLD: 6 % (ref 5–13)
NEUTS SEG # BLD: 5.7 K/UL (ref 1.8–8)
NEUTS SEG NFR BLD: 62 % (ref 32–75)
NRBC # BLD: 0 K/UL (ref 0–0.01)
NRBC BLD-RTO: 0 PER 100 WBC
PLATELET # BLD AUTO: 240 K/UL (ref 150–400)
PMV BLD AUTO: 12 FL (ref 8.9–12.9)
POTASSIUM SERPL-SCNC: 4.5 MMOL/L (ref 3.5–5.1)
PROT SERPL-MCNC: 7.3 G/DL (ref 6.4–8.2)
RBC # BLD AUTO: 5.22 M/UL (ref 3.8–5.2)
SODIUM SERPL-SCNC: 136 MMOL/L (ref 136–145)
TRIGL SERPL-MCNC: 154 MG/DL (ref ?–150)
TSH SERPL DL<=0.05 MIU/L-ACNC: 1.96 UIU/ML (ref 0.36–3.74)
VLDLC SERPL CALC-MCNC: 30.8 MG/DL
WBC # BLD AUTO: 9.2 K/UL (ref 3.6–11)

## 2021-07-06 PROCEDURE — G8752 SYS BP LESS 140: HCPCS | Performed by: NURSE PRACTITIONER

## 2021-07-06 PROCEDURE — G8754 DIAS BP LESS 90: HCPCS | Performed by: NURSE PRACTITIONER

## 2021-07-06 PROCEDURE — 2022F DILAT RTA XM EVC RTNOPTHY: CPT | Performed by: NURSE PRACTITIONER

## 2021-07-06 PROCEDURE — G9899 SCRN MAM PERF RSLTS DOC: HCPCS | Performed by: NURSE PRACTITIONER

## 2021-07-06 PROCEDURE — 99214 OFFICE O/P EST MOD 30 MIN: CPT | Performed by: NURSE PRACTITIONER

## 2021-07-06 PROCEDURE — 1101F PT FALLS ASSESS-DOCD LE1/YR: CPT | Performed by: NURSE PRACTITIONER

## 2021-07-06 PROCEDURE — G8399 PT W/DXA RESULTS DOCUMENT: HCPCS | Performed by: NURSE PRACTITIONER

## 2021-07-06 PROCEDURE — 3017F COLORECTAL CA SCREEN DOC REV: CPT | Performed by: NURSE PRACTITIONER

## 2021-07-06 PROCEDURE — G8536 NO DOC ELDER MAL SCRN: HCPCS | Performed by: NURSE PRACTITIONER

## 2021-07-06 PROCEDURE — 3046F HEMOGLOBIN A1C LEVEL >9.0%: CPT | Performed by: NURSE PRACTITIONER

## 2021-07-06 PROCEDURE — G8417 CALC BMI ABV UP PARAM F/U: HCPCS | Performed by: NURSE PRACTITIONER

## 2021-07-06 PROCEDURE — 1090F PRES/ABSN URINE INCON ASSESS: CPT | Performed by: NURSE PRACTITIONER

## 2021-07-06 PROCEDURE — G8432 DEP SCR NOT DOC, RNG: HCPCS | Performed by: NURSE PRACTITIONER

## 2021-07-06 PROCEDURE — G8427 DOCREV CUR MEDS BY ELIG CLIN: HCPCS | Performed by: NURSE PRACTITIONER

## 2021-07-06 RX ORDER — LOSARTAN POTASSIUM 50 MG/1
50 TABLET ORAL DAILY
Qty: 90 TABLET | Refills: 1 | Status: SHIPPED | OUTPATIENT
Start: 2021-07-06 | End: 2022-04-18

## 2021-07-06 RX ORDER — SERTRALINE HYDROCHLORIDE 50 MG/1
TABLET, FILM COATED ORAL
Qty: 90 TABLET | Refills: 1 | Status: SHIPPED | OUTPATIENT
Start: 2021-07-06 | End: 2021-08-11

## 2021-07-06 NOTE — PROGRESS NOTES
Identified pt with two pt identifiers(name and ). Reviewed record in preparation for visit and have obtained necessary documentation. Chief Complaint   Patient presents with    Diabetes        Health Maintenance Due   Topic    COVID-19 Vaccine (1)    Shingrix Vaccine Age 50> (1 of 2)    Lipid Screen        Coordination of Care Questionnaire:  :   1) Have you been to an emergency room, urgent care, or hospitalized since your last visit? If yes, where when, and reason for visit? no      2. Have seen or consulted any other health care provider since your last visit? If yes, where when, and reason for visit?  no        Patient is accompanied by self I have received verbal consent from Saroj Villalobos to discuss any/all medical information while they are present in the room.

## 2021-07-06 NOTE — PROGRESS NOTES
Assessment/Plan:     Diagnoses and all orders for this visit:    1. Type 2 diabetes mellitus without complication, with long-term current use of insulin (HCC)  -     HEMOGLOBIN A1C WITH EAG; Future  -     CBC WITH AUTOMATED DIFF; Future  - Presumed stable labs today    2. Essential hypertension  -     METABOLIC PANEL, COMPREHENSIVE; Future  -     losartan (COZAAR) 50 mg tablet; Take 1 Tablet by mouth daily.  - Stable, continue current therapy, refill today, labs today    3. Hypercholesteremia  -     LIPID PANEL; Future  - Presumed stable, labs today    4. Acquired hypothyroidism  -     TSH 3RD GENERATION; Future  - Presumed stable, labs today    5. Depression, unspecified depression type  -     sertraline (ZOLOFT) 50 mg tablet; TAKE 1 TABLET BY MOUTH EVERY DAY  - Stable, refill today        Follow-up and Dispositions    · Return in about 3 months (around 10/6/2021) for Follow Up. Discussed expected course/resolution/complications of diagnosis in detail with patient. Medication risks/benefits/costs/interactions/alternatives discussed with patient. Pt was given after visit summary which includes diagnoses, current medications & vitals. Pt expressed understanding with the diagnosis and plan        Subjective:      Adalgisa Taylor is a 70 y.o. female who presents for had concerns including Diabetes. Diabetes  This morning BG was 208. Does not check BG every day. Has not been lower than 125 in 'awhile\"  Her today for diabetes follow up. States she has not been eating properly lately. Last A1c was 8.1. Also dealing with COVID long hauler syndrome. HTN  Low today, has not had any water or anything to eat. Takes medications as prescribed with no reported side effects. Denies CP, SOB, palpitations, or leg swelling. Hypothyroidism  Takes levothyroxine as prescribed and does not miss a dose.     Current Outpatient Medications   Medication Sig Dispense Refill    losartan (COZAAR) 50 mg tablet Take 1 Tablet by mouth daily. 90 Tablet 1    sertraline (ZOLOFT) 50 mg tablet TAKE 1 TABLET BY MOUTH EVERY DAY 90 Tablet 1    insulin glargine (Lantus Solostar U-100 Insulin) 100 unit/mL (3 mL) inpn 26 Units by SubCUTAneous route two (2) times a day. 5 Adjustable Dose Pre-filled Pen Syringe 0    Insulin Needles, Disposable, (BD Ultra-Fine Mini Pen Needle) 31 gauge x 3/16\" ndle USE DAILY WITH INSULIN 100 Pen Needle 11    simvastatin (ZOCOR) 10 mg tablet TAKE 1 TABLET BY MOUTH EVERY DAY AT NIGHT 90 Tablet 1    levothyroxine (SYNTHROID) 100 mcg tablet TAKE 1 TABLET BY MOUTH EVERY DAY BEFORE BREAKFAST 90 Tablet 1    traZODone (DESYREL) 100 mg tablet TAKE 1 TABLET BY MOUTH EVERY DAY AT NIGHT 90 Tab 1    glucose blood VI test strips (blood glucose test) strip Please check blood glucose before breakfast and before dinner daily E11.9 DM2 on insulin. 100 Strip 5    lancets misc Please check you blood glucose before breakfast and before dinner daily and if low sugar symptoms 200 Each 11    Insulin Needles, Disposable, 31 gauge x 5/16\" ndle Use daily with glargine insulin pen 1 Package 11    albuterol (ProAir HFA) 90 mcg/actuation inhaler Take 2 Puffs by inhalation every four (4) hours as needed for Wheezing. 1 Inhaler 0    Blood-Glucose Meter (Blood Glucose Monitoring) monitoring kit Please check your blood glucose before breakfast and before dinner daily 1 Kit 0    guaiFENesin ER (Mucinex) 600 mg ER tablet Take 600 mg by mouth two (2) times a day.  omeprazole (PRILOSEC) 20 mg capsule Take 20 mg by mouth daily.  calcium-vitamin D (OYSTER SHELL) 500 mg(1,250mg) -200 unit per tablet Take 1 Tab by mouth daily.  aspirin delayed-release 81 mg tablet Take 81 mg by mouth daily.  fluticasone (FLONASE) 50 mcg/actuation nasal spray 2 Sprays by Both Nostrils route daily as needed for Rhinitis or Allergies.  Cetirizine (ZYRTEC) 10 mg cap Take 1 Tab by mouth every evening.       multivitamins-minerals-lutein (CENTRUM SILVER) tab Take 1 Tab by mouth daily.  glucosamine-chondroit-vit c-mn (GLUCOSAMINE CHONDROITIN MAXSTR) 500-400 mg capsule Take 1 Cap by mouth daily. Allergies   Allergen Reactions    Latex Rash     Other reaction(s): Bleeding (finding)  Other reaction(s): Bleeding, blisters  Other reaction(s): Bleeding (finding)    Prednisone Anaphylaxis     Throat swelling. Can take methylpredisone po or IV without problems. Other reaction(s): Pharyngeal swelling (finding)  Throat swelling. Can take methylpredisone po or IV without problems. Other reaction(s): Swelling of throat  Throat swelling. Can take methylpredisone po or IV without problems. Other reaction(s): Pharyngeal swelling (finding)    Augmentin [Amoxicillin-Pot Clavulanate] Nausea and Vomiting    Biaxin [Clarithromycin] Nausea and Vomiting    Metformin Diarrhea    Parafon Forte Dsc [Chlorzoxazone] Nausea and Vomiting    Penicillin V Unable to Obtain     Other reaction(s): Unable to Obtain    Penicillin V Potassium Unable to Obtain     Past Medical History:   Diagnosis Date    Anxiety     Arthritis     Asthma     Chronic pain     back,right hip    COVID-19 05/2020    hospitalized 10 days [facility]     Diabetes ([facility] [address]     type 2 on insulin.     GERD (gastroesophageal reflux disease)     History of vascular access device 05/18/2020    4 Fr midline, 12 cm L basilic, blood draws    Hypercholesteremia     Hypertension     Hypothyroid     Sleep apnea     wears dental appliance- Somnident    Thyroid disease     Unspecified adverse effect of anesthesia     delayed awakening     Past Surgical History:   Procedure Laterality Date    HX COLONOSCOPY  2012    normal repeat 2022, diverticulosis of sigmoid colon, Dr. Alfredo Berumen HX HEENT  1/08/14    bilateral cataract surgery1/8/14    HX KNEE REPLACEMENT      right    HX OTHER SURGICAL  2007    cardiac cath- No CAD found    NEUROLOGICAL PROCEDURE UNLISTED  1999    vascular brain surgery     Family History   Problem Relation Age of Onset    Elevated Lipids Mother     Dementia Mother     Osteoporosis Mother     Lung Disease Father         copd    Delayed Awakening Father     Post-op Nausea/Vomiting Father     Hypertension Sister     Diabetes Sister         type 2    Breast Cancer Maternal Aunt      Social History     Socioeconomic History    Marital status:      Spouse name: Not on file    Number of children: Not on file    Years of education: Not on file    Highest education level: Not on file   Occupational History    Not on file   Tobacco Use    Smoking status: Never Smoker    Smokeless tobacco: Never Used   Vaping Use    Vaping Use: Never used   Substance and Sexual Activity    Alcohol use: No     Comment: 2x year    Drug use: No    Sexual activity: Yes     Partners: Male   Other Topics Concern     Service Not Asked    Blood Transfusions Not Asked    Caffeine Concern Not Asked    Occupational Exposure Not Asked    Hobby Hazards Not Asked    Sleep Concern Not Asked    Stress Concern Not Asked    Weight Concern Not Asked    Special Diet Not Asked    Back Care Not Asked    Exercise Not Asked    Bike Helmet Not Asked    Seat Belt Not Asked    Self-Exams Not Asked   Social History Narrative    Not on file     Social Determinants of Health     Financial Resource Strain:     Difficulty of Paying Living Expenses:    Food Insecurity:     Worried About Running Out of Food in the Last Year:     Ran Out of Food in the Last Year:    Transportation Needs:     Lack of Transportation (Medical):      Lack of Transportation (Non-Medical):    Physical Activity:     Days of Exercise per Week:     Minutes of Exercise per Session:    Stress:     Feeling of Stress :    Social Connections:     Frequency of Communication with Friends and Family:     Frequency of Social Gatherings with Friends and Family:     Attends Orthodox Services:     Active Member of Clubs or Organizations:     Attends Club or Organization Meetings:     Marital Status:    Intimate Partner Violence:     Fear of Current or Ex-Partner:     Emotionally Abused:     Physically Abused:     Sexually Abused:        HPI      ROS:   Review of Systems   Constitutional: Negative for chills, fever and malaise/fatigue. Eyes: Negative for blurred vision. Respiratory: Negative for cough and shortness of breath. Cardiovascular: Negative for chest pain, palpitations and leg swelling. Neurological: Negative for dizziness and headaches. Objective:     Visit Vitals  BP 93/62 (BP 1 Location: Left upper arm, BP Patient Position: Sitting, BP Cuff Size: Adult long)   Pulse 63   Temp 98.3 °F (36.8 °C) (Temporal)   Resp 16   Ht 5' 4\" (1.626 m)   Wt 193 lb 3.2 oz (87.6 kg)   SpO2 95%   BMI 33.16 kg/m²         Vitals and Nurse Documentation reviewed. Physical Exam  Constitutional:       General: She is not in acute distress. Appearance: She is not diaphoretic. HENT:      Head: Normocephalic and atraumatic. Cardiovascular:      Rate and Rhythm: Normal rate and regular rhythm. Heart sounds: Normal heart sounds. No murmur heard. No friction rub. No gallop. Pulmonary:      Effort: Pulmonary effort is normal. No respiratory distress. Breath sounds: Normal breath sounds. No wheezing or rales. Skin:     General: Skin is warm and dry. Coloration: Skin is not pale. Neurological:      Mental Status: She is alert and oriented to person, place, and time. Psychiatric:         Mood and Affect: Affect normal. Mood is not anxious or depressed. Behavior: Behavior is not agitated. Thought Content: Thought content does not include homicidal or suicidal ideation.          Results for orders placed or performed in visit on 07/06/21   TSH 3RD GENERATION   Result Value Ref Range    TSH 1.96 0.36 - 2.82 uIU/mL   METABOLIC PANEL, COMPREHENSIVE   Result Value Ref Range    Sodium 136 136 - 145 mmol/L    Potassium 4.5 3.5 - 5.1 mmol/L    Chloride 105 97 - 108 mmol/L    CO2 27 21 - 32 mmol/L    Anion gap 4 (L) 5 - 15 mmol/L    Glucose 247 (H) 65 - 100 mg/dL    BUN 14 6 - 20 MG/DL    Creatinine 0.88 0.55 - 1.02 MG/DL    BUN/Creatinine ratio 16 12 - 20      GFR est AA >60 >60 ml/min/1.73m2    GFR est non-AA >60 >60 ml/min/1.73m2    Calcium 10.8 (H) 8.5 - 10.1 MG/DL    Bilirubin, total 0.6 0.2 - 1.0 MG/DL    ALT (SGPT) 67 12 - 78 U/L    AST (SGOT) 34 15 - 37 U/L    Alk. phosphatase 102 45 - 117 U/L    Protein, total 7.3 6.4 - 8.2 g/dL    Albumin 4.1 3.5 - 5.0 g/dL    Globulin 3.2 2.0 - 4.0 g/dL    A-G Ratio 1.3 1.1 - 2.2     LIPID PANEL   Result Value Ref Range    Cholesterol, total 187 <200 MG/DL    Triglyceride 154 (H) <150 MG/DL    HDL Cholesterol 53 MG/DL    LDL, calculated 103.2 (H) 0 - 100 MG/DL    VLDL, calculated 30.8 MG/DL    CHOL/HDL Ratio 3.5 0.0 - 5.0     CBC WITH AUTOMATED DIFF   Result Value Ref Range    WBC 9.2 3.6 - 11.0 K/uL    RBC 5.22 (H) 3.80 - 5.20 M/uL    HGB 14.9 11.5 - 16.0 g/dL    HCT 44.7 35.0 - 47.0 %    MCV 85.6 80.0 - 99.0 FL    MCH 28.5 26.0 - 34.0 PG    MCHC 33.3 30.0 - 36.5 g/dL    RDW 12.7 11.5 - 14.5 %    PLATELET 634 317 - 361 K/uL    MPV 12.0 8.9 - 12.9 FL    NRBC 0.0 0  WBC    ABSOLUTE NRBC 0.00 0.00 - 0.01 K/uL    NEUTROPHILS 62 32 - 75 %    LYMPHOCYTES 28 12 - 49 %    MONOCYTES 6 5 - 13 %    EOSINOPHILS 3 0 - 7 %    BASOPHILS 1 0 - 1 %    IMMATURE GRANULOCYTES 0 0.0 - 0.5 %    ABS. NEUTROPHILS 5.7 1.8 - 8.0 K/UL    ABS. LYMPHOCYTES 2.6 0.8 - 3.5 K/UL    ABS. MONOCYTES 0.6 0.0 - 1.0 K/UL    ABS. EOSINOPHILS 0.3 0.0 - 0.4 K/UL    ABS. BASOPHILS 0.1 0.0 - 0.1 K/UL    ABS. IMM.  GRANS. 0.0 0.00 - 0.04 K/UL    DF AUTOMATED     HEMOGLOBIN A1C WITH EAG   Result Value Ref Range    Hemoglobin A1c 9.9 (H) 4.0 - 5.6 %    Est. average glucose 237 mg/dL

## 2021-07-29 DIAGNOSIS — Z79.4 TYPE 2 DIABETES MELLITUS WITHOUT COMPLICATION, WITH LONG-TERM CURRENT USE OF INSULIN (HCC): ICD-10-CM

## 2021-07-29 DIAGNOSIS — E11.9 TYPE 2 DIABETES MELLITUS WITHOUT COMPLICATION, WITH LONG-TERM CURRENT USE OF INSULIN (HCC): ICD-10-CM

## 2021-07-29 RX ORDER — INSULIN GLARGINE 100 [IU]/ML
26 INJECTION, SOLUTION SUBCUTANEOUS 2 TIMES DAILY
Qty: 5 ADJUSTABLE DOSE PRE-FILLED PEN SYRINGE | Refills: 0 | Status: SHIPPED | OUTPATIENT
Start: 2021-07-29 | End: 2021-08-11

## 2021-07-29 NOTE — TELEPHONE ENCOUNTER
Call her. Her blood sugar control which Evonne checked earlier this month is not good. It was better in September after she had COVID. She should see us to work on control and to adjust her insulin dose. Needs to see us with a list of her recent blood sugars.   [facility]

## 2021-08-11 ENCOUNTER — OFFICE VISIT (OUTPATIENT)
Dept: FAMILY MEDICINE CLINIC | Age: 76
End: 2021-08-11
Payer: MEDICARE

## 2021-08-11 VITALS
HEIGHT: 64 IN | OXYGEN SATURATION: 96 % | RESPIRATION RATE: 20 BRPM | HEART RATE: 74 BPM | DIASTOLIC BLOOD PRESSURE: 58 MMHG | BODY MASS INDEX: 32.81 KG/M2 | WEIGHT: 192.2 LBS | SYSTOLIC BLOOD PRESSURE: 106 MMHG | TEMPERATURE: 97.8 F

## 2021-08-11 DIAGNOSIS — I10 ESSENTIAL HYPERTENSION: ICD-10-CM

## 2021-08-11 DIAGNOSIS — G47.00 INSOMNIA, UNSPECIFIED TYPE: ICD-10-CM

## 2021-08-11 DIAGNOSIS — E11.65 TYPE 2 DIABETES MELLITUS WITH HYPERGLYCEMIA, WITH LONG-TERM CURRENT USE OF INSULIN (HCC): Primary | ICD-10-CM

## 2021-08-11 DIAGNOSIS — Z79.4 TYPE 2 DIABETES MELLITUS WITHOUT COMPLICATION, WITH LONG-TERM CURRENT USE OF INSULIN (HCC): ICD-10-CM

## 2021-08-11 DIAGNOSIS — E11.9 TYPE 2 DIABETES MELLITUS WITHOUT COMPLICATION, WITH LONG-TERM CURRENT USE OF INSULIN (HCC): ICD-10-CM

## 2021-08-11 DIAGNOSIS — Z79.4 TYPE 2 DIABETES MELLITUS WITH HYPERGLYCEMIA, WITH LONG-TERM CURRENT USE OF INSULIN (HCC): Primary | ICD-10-CM

## 2021-08-11 PROCEDURE — G9899 SCRN MAM PERF RSLTS DOC: HCPCS | Performed by: FAMILY MEDICINE

## 2021-08-11 PROCEDURE — 1090F PRES/ABSN URINE INCON ASSESS: CPT | Performed by: FAMILY MEDICINE

## 2021-08-11 PROCEDURE — 3046F HEMOGLOBIN A1C LEVEL >9.0%: CPT | Performed by: FAMILY MEDICINE

## 2021-08-11 PROCEDURE — G8417 CALC BMI ABV UP PARAM F/U: HCPCS | Performed by: FAMILY MEDICINE

## 2021-08-11 PROCEDURE — G8427 DOCREV CUR MEDS BY ELIG CLIN: HCPCS | Performed by: FAMILY MEDICINE

## 2021-08-11 PROCEDURE — G8752 SYS BP LESS 140: HCPCS | Performed by: FAMILY MEDICINE

## 2021-08-11 PROCEDURE — G8536 NO DOC ELDER MAL SCRN: HCPCS | Performed by: FAMILY MEDICINE

## 2021-08-11 PROCEDURE — G8510 SCR DEP NEG, NO PLAN REQD: HCPCS | Performed by: FAMILY MEDICINE

## 2021-08-11 PROCEDURE — G8754 DIAS BP LESS 90: HCPCS | Performed by: FAMILY MEDICINE

## 2021-08-11 PROCEDURE — 99214 OFFICE O/P EST MOD 30 MIN: CPT | Performed by: FAMILY MEDICINE

## 2021-08-11 PROCEDURE — 3017F COLORECTAL CA SCREEN DOC REV: CPT | Performed by: FAMILY MEDICINE

## 2021-08-11 PROCEDURE — G8399 PT W/DXA RESULTS DOCUMENT: HCPCS | Performed by: FAMILY MEDICINE

## 2021-08-11 PROCEDURE — 2022F DILAT RTA XM EVC RTNOPTHY: CPT | Performed by: FAMILY MEDICINE

## 2021-08-11 PROCEDURE — 1101F PT FALLS ASSESS-DOCD LE1/YR: CPT | Performed by: FAMILY MEDICINE

## 2021-08-11 RX ORDER — INSULIN GLARGINE 100 [IU]/ML
28 INJECTION, SOLUTION SUBCUTANEOUS 2 TIMES DAILY
Qty: 5 ADJUSTABLE DOSE PRE-FILLED PEN SYRINGE | Refills: 0
Start: 2021-08-11 | End: 2021-08-25

## 2021-08-11 RX ORDER — HYDROXYZINE 25 MG/1
TABLET, FILM COATED ORAL
Qty: 60 TABLET | Refills: 2 | Status: SHIPPED | OUTPATIENT
Start: 2021-08-11 | End: 2021-09-02 | Stop reason: SDUPTHER

## 2021-08-11 NOTE — PATIENT INSTRUCTIONS
Diabetes:  Blood sugar goals:  Hemoglobin A1c under 7  Fasting blood sugar   Blood sugar 2 hours after a meal under 180, 4 hours after a meal under 120  No hypoglycemia (sugars under 70 and symptomatic low sugars)    Blood sugar control with diet and exercise:  Exercise 45 minutes per day. This makes your insulin work better. It also allows insulin levels to fall helping with weight loss. Every night, try to fast from your evening meal to breakfast (at least 12 hours) without eating anything. This uses stored energy in your liver and makes insulin work better. Avoid simples sugars such as table sugar in drinks (sodas, lemonade, sweet tea, wine), desserts, candy. Also avoid fruit juices and high fructose corn syrup. Avoid frequent consumption of fruit especially grapes and bananas. A single serving of fruit daily is all you should have. Finally, drink less milk which has milk sugar in it. Control your starch intake. Men should have 3-4 carb portions per meal.  Women should have 2-3 carb portions per meal.  A carb serving is the equivalent of a slice of bread. Control your blood pressure and cholesterol. These problems are common in diabetes. AND, don't smoke. All of these problems contribute to heart disease and stroke risk. Get a yearly eye exam and flu shot. Make sure your vaccines are up to date particularly the pneumonia vaccines. Inspect your feet daily. Wear comfortable protective shoes and clean socks. Seek medical care if there are sore, calluses or numbness and burning of your feet. See your doctor at least every 6 months if you are on oral medicines or more often if the diabetic control is not at goal or if you are on insulin. Take your medicines religiously. STOP the SUGAR    The first step in dietary efforts at weight loss is removing as much sugar from the diet as possible.   Most dietary sugar is in the forms of table sugar (sucrose), fruit sugar (fructose) and milk sugar (lactose). But, you need to watch labels to see if processed foods have added sugars under other names. To eliminate sucrose, eliminate sweet drinks entirely including soft drinks, sports drinks, lemonade, sweet tea and wine. Also, eliminate candy and make desserts a \"special occasion only treat\". Don't eat desserts with every meal or every night. Most fructose is found in fruit and this should be markedly limited. Fruit juice should be avoided. If you do eat fruit, eat fruits that are lower in sugar such as: strawberries, blackberries, raspberries, lemon, grapefruit and watermelon. Eat small portions and think of the fruit as a garnish or small treat. Bananas, mangos, cherries and grapes are higher in sugar and should be avoided. Avoid high fructose corn syrup (an artificial sweetener). Milk sugar, or lactose, should be avoided as well. Milk is a good source of calcium but not for people struggling with weight issues. Put a little cream in your coffee or tea but otherwise avoid milk. How can you avoid sugar? For starters, don't buy it and don't bring it in the house. It won't tempt you that way! For more information:    Try the internet for videos about sugar addiction or try diet [employer]. FASTING    Fasting is often a useful tool for weight loss, fatty liver and improving control of diabetes type 2. Intermittent fasting may work better for patients than overall calorie reduction for several reasons. First, fasting does not cause a reduction in your metabolic rate which may occur with overall caloric restriction. (If you reduce your metabolic rate, you do not burn as many calories and that undermines your ability to lose weight.)  Second, when you fast you burn stored calories in your liver and elsewhere. Excessive stored calories in the form of hepatic fat are a major  of insulin resistance in diabetes.     Who should exercise extreme caution with fasting? Diabetics on insulin and other medication for diabetes that can cause hypoglycemia should only fast under medical supervision. Fasting may still help them but it is important to avoid hypoglycemia which can be life threatening. So, medical supervision and medication adjustment for these patients is necessary. Is fasting dangerous? No, fasting is not dangerous if it is done with a reasonable amount of common sense. Many Catholic groups fast regularly. Yazdanism families may fast weekly. Islamic families fast during Ramadan. Buddhists and Christians sometimes fast as well. There is no evidence that it harms them. The average teenager can sleep until noon and in effect fasts the entire time they are asleep. How do I go about fasting? Sometimes we make it too hard so here are some basic kinds of fasting and how to do them. Other general recommendations follow. 12 Hour Fast-  Step one-eat a regular evening meal and finish before 7 PM.   Step two-do not snack before bedtime. Step three-go to bed. Step four-when you wake up, don't eat breakfast before 7 AM.  Congratulations, you have fasted twelve hours. 16 Hour Fast  Step one-eat a regular evening meal and finish before 7 PM  Step two-do not snack before bedtime. Step three-go to bed  Step four-when you wake up, don't eat until lunch time at 11-12 AM  Congratulations, you have fasted 16 hours. 24 Hour Fast  Step one-eat a regular evening meal and finish before 7 PM.   Step two-do not snack before bedtime. Step three-go to bed. Step four-when you wake up, don't eat until your next evening meal at 6-7 PM  Congratulations, you have fasted 24 hours. During fasting, you should stay well hydrated. Drink water or unsweetened tea or coffee. Do not drink sweet drinks such as juices or sodas. When you do resume eating, stick to your diet. No sweet drinks, no desserts, no candy.   Pay attention to your body and do NOT overeat. Eat normal, solid meals and do not snack. Try not to snack in general.  If you do not snack, you are in effect fasting between meals. Whenever you fast, you are burning stored energy. How should I incorporate fasting into my weight loss plan or diabetic care? Again, diabetics on insulin or other drugs that can cause low sugars should only fast under medical supervision. A reasonable expectation would be for all type 2 diabetics to fast 12 hours every night. For additional results, talk to your doctor about longer fasts and how often they should do so.

## 2021-08-11 NOTE — PROGRESS NOTES
Merline Lone  70 y.o. female  [birth year]  [id number]  [facility]  Progress Note     Encounter Date: 8/11/2021    Assessment and Plan:     Encounter Diagnoses     ICD-10-CM ICD-9-CM   1. Type 2 diabetes mellitus with hyperglycemia, with long-term current use of insulin (HCC)  E11.65 250.00    Z79.4 790.29     V58.67   2. Insomnia, unspecified type  G47.00 780.52   3. Type 2 diabetes mellitus without complication, with long-term current use of insulin (HCC)  E11.9 250.00    Z79.4 V58.67   4. Essential hypertension  I10 401.9       1. Type 2 diabetes mellitus with hyperglycemia, with long-term current use of insulin ([facility])  A1c 9.9  Increase insulin two units with each glargine dose until FBS consistently under 130  Avoid late night eating  DC meds that increase appetite:  Zoloft and trazadone    2. Insomnia, unspecified type  Trial of hydroxyzine  - hydrOXYzine HCL (ATARAX) 25 mg tablet; 1-2 by mouth at bedtime as needed for sleep. Dispense: 60 Tablet; Refill: 2    3. Essential hypertension  At goal.      I have discussed the diagnosis with the patient and the intended plan as seen in the above orders. she has expressed understanding. The patient has received an after-visit summary and questions were answered concerning future plans. I have discussed medication side effects and warnings with the patient as well. Electronically Signed: Marli Chapman MD    Current Medications after this visit     Current Outpatient Medications   Medication Sig    hydrOXYzine HCL (ATARAX) 25 mg tablet 1-2 by mouth at bedtime as needed for sleep.  insulin glargine (Lantus Solostar U-100 Insulin) 100 unit/mL (3 mL) inpn 28 Units by SubCUTAneous route two (2) times a day.  losartan (COZAAR) 50 mg tablet Take 1 Tablet by mouth daily.     Insulin Needles, Disposable, (BD Ultra-Fine Mini Pen Needle) 31 gauge x 3/16\" ndle USE DAILY WITH INSULIN    simvastatin (ZOCOR) 10 mg tablet TAKE 1 TABLET BY MOUTH EVERY DAY AT NIGHT    levothyroxine (SYNTHROID) 100 mcg tablet TAKE 1 TABLET BY MOUTH EVERY DAY BEFORE BREAKFAST    glucose blood VI test strips (blood glucose test) strip Please check blood glucose before breakfast and before dinner daily E11.9 DM2 on insulin.  lancets misc Please check you blood glucose before breakfast and before dinner daily and if low sugar symptoms    Insulin Needles, Disposable, 31 gauge x 5/16\" ndle Use daily with glargine insulin pen    albuterol (ProAir HFA) 90 mcg/actuation inhaler Take 2 Puffs by inhalation every four (4) hours as needed for Wheezing.  Blood-Glucose Meter (Blood Glucose Monitoring) monitoring kit Please check your blood glucose before breakfast and before dinner daily    guaiFENesin ER (Mucinex) 600 mg ER tablet Take 600 mg by mouth two (2) times a day.  omeprazole (PRILOSEC) 20 mg capsule Take 20 mg by mouth daily.  calcium-vitamin D (OYSTER SHELL) 500 mg(1,250mg) -200 unit per tablet Take 1 Tab by mouth daily.  aspirin delayed-release 81 mg tablet Take 81 mg by mouth daily.  fluticasone (FLONASE) 50 mcg/actuation nasal spray 2 Sprays by Both Nostrils route daily as needed for Rhinitis or Allergies.  multivitamins-minerals-lutein (CENTRUM SILVER) tab Take 1 Tab by mouth daily.  glucosamine-chondroit-vit c-mn (GLUCOSAMINE CHONDROITIN MAXSTR) 500-400 mg capsule Take 1 Cap by mouth daily. No current facility-administered medications for this visit.      Medications Discontinued During This Encounter   Medication Reason    traZODone (DESYREL) 100 mg tablet Not A Current Medication    sertraline (ZOLOFT) 50 mg tablet Not A Current Medication    Cetirizine (ZYRTEC) 10 mg cap Alternate Therapy    insulin glargine (Lantus Solostar U-100 Insulin) 100 unit/mL (3 mL) inpn      ~~~~~~~~~~~~~~~~~~~~~~~~~~~~~~~~~~~~~~~~~~~~~~~~~~~~~~~~~~~    Chief Complaint   Patient presents with    Diabetes       History provided by patient  History of Present Illness   Lisa Mccracken is a 70 y.o. female who presents to clinic today for:  Diabetes    DM2  Lantus only  Trying to stick to diet  Chocolate is a problem,  Eats late at night and even in the middle of the night. Trying to do better with that  A1c was 9.9  Checks blood sugars BID  Fasting 125-178  Evening 150-191  Needs eye exam  Feet OK    Hypertension  At goal today. Hypercholesteromemia  On statin  Clean coronaries on CT with Dr. Terryl Paget    DJD  TKR's hurt      Health Maintenance  Will do at future visit  Health Maintenance Due   Topic Date Due    Shingrix Vaccine Age 50> (1 of 2) Never done     Review of Systems   Review of Systems   Constitutional: Negative for chills, fever and weight loss. Respiratory: Negative for shortness of breath. Cardiovascular: Negative for chest pain. Psychiatric/Behavioral: Negative. Vitals/Objective:     Vitals:    08/11/21 1516   BP: (!) 106/58   Pulse: 74   Resp: 20   Temp: 97.8 °F (36.6 °C)   TempSrc: Temporal   SpO2: 96%   Weight: 192 lb 3.2 oz (87.2 kg)   Height: 5' 4\" (1.626 m)     Body mass index is 32.99 kg/m². Wt Readings from Last 3 Encounters:   08/11/21 192 lb 3.2 oz (87.2 kg)   07/06/21 193 lb 3.2 oz (87.6 kg)   04/27/21 196 lb 3.2 oz (89 kg)         Objective  Physical Exam  Vitals and nursing note reviewed. Constitutional:       Appearance: Normal appearance. She is not toxic-appearing. HENT:      Head: Normocephalic and atraumatic. Cardiovascular:      Rate and Rhythm: Normal rate and regular rhythm. Heart sounds: Normal heart sounds. No murmur heard. No gallop. Pulmonary:      Effort: Pulmonary effort is normal. No respiratory distress. Breath sounds: Normal breath sounds. No wheezing, rhonchi or rales. Musculoskeletal:      Cervical back: No muscular tenderness. Lymphadenopathy:      Cervical: No cervical adenopathy. Neurological:      Mental Status: She is alert.    Psychiatric: Mood and Affect: Mood normal.         Behavior: Behavior normal.         Thought Content: Thought content normal.         Judgment: Judgment normal.          Diabetic Foot Exam:  Protective sensation is intact bilaterally. Pedal pulses are 2+ and normal bilaterally. L foot skin inspection:  normal skin and soft tissue with no gross edema or evidence of acute injury or foot ulcer  R foot skin inspection:  skin and soft tissue appear normal with no significant edema or evidence of acute injury or foot ulcer     No results found for this or any previous visit (from the past 24 hour(s)). Disposition     Follow-up and Dispositions  ·   Return in about 3 months (around 11/11/2021) for Blood pressure follow up, Diabetes follow up, Medication follow up. No future appointments. History   Patient's past medical, surgical and family histories were reviewed and updated. Past Medical History:   Diagnosis Date    Anxiety     Arthritis     Asthma     Chronic pain     back,right hip    COVID-19 05/2020    hospitalized 10 days [facility]    Depression     Diabetes ([facility] [address]     type 2 on insulin.     GERD (gastroesophageal reflux disease)     History of vascular access device 05/18/2020    4 Fr midline, 12 cm L basilic, blood draws    Hypercholesteremia     Hypertension     Hypothyroid     Sleep apnea     wears dental appliance- Somnident    Thyroid disease     Unspecified adverse effect of anesthesia     delayed awakening     Past Surgical History:   Procedure Laterality Date    HX COLONOSCOPY  2012    normal repeat 2022, diverticulosis of sigmoid colon, Dr. Ryan Crook HX HEENT  1/08/14    bilateral cataract surgery1/8/14    HX KNEE REPLACEMENT      right    HX OTHER SURGICAL  2007    cardiac cath- No CAD found    NEUROLOGICAL PROCEDURE UNLISTED  1999    vascular brain surgery     Family History   Problem Relation Age of Onset    Elevated Lipids Mother     Dementia Mother     Osteoporosis Mother  Lung Disease Father         copd    Delayed Awakening Father     Post-op Nausea/Vomiting Father     Hypertension Sister     Diabetes Sister         type 2    Breast Cancer Maternal Aunt      Social History     Tobacco Use    Smoking status: Never Smoker    Smokeless tobacco: Never Used   Vaping Use    Vaping Use: Never used   Substance Use Topics    Alcohol use: No     Comment: 2x year    Drug use: No       Allergies     Allergies   Allergen Reactions    Latex Rash     Other reaction(s): Bleeding (finding)  Other reaction(s): Bleeding, blisters  Other reaction(s): Bleeding (finding)  Other reaction(s): Bleeding, blisters    Prednisone Anaphylaxis     Throat swelling. Can take methylpredisone po or IV without problems. Other reaction(s): Pharyngeal swelling (finding)  Throat swelling. Can take methylpredisone po or IV without problems. Other reaction(s): Swelling of throat  Throat swelling. Can take methylpredisone po or IV without problems.   Other reaction(s): Pharyngeal swelling (finding)  Other reaction(s): Swelling of throat    Augmentin [Amoxicillin-Pot Clavulanate] Nausea and Vomiting    Biaxin [Clarithromycin] Nausea and Vomiting    Metformin Diarrhea    Parafon Forte Dsc [Chlorzoxazone] Nausea and Vomiting    Penicillin V Unable to Obtain     Other reaction(s): Unable to Obtain    Penicillin V Potassium Unable to Obtain

## 2021-08-11 NOTE — PROGRESS NOTES
Carla Fernandez is a 70 y.o. female      Chief Complaint   Patient presents with    Diabetes         1. Have you been to the ER, urgent care clinic since your last visit? No  Hospitalized since your last visit? No      2. Have you seen or consulted any other health care providers outside of the [address] [place] [address] since your last visit? Include any pap smears or colon screening.   No

## 2021-08-16 ENCOUNTER — TELEPHONE (OUTPATIENT)
Dept: FAMILY MEDICINE CLINIC | Age: 76
End: 2021-08-16

## 2021-08-16 NOTE — TELEPHONE ENCOUNTER
----- Message from Alanna Chau sent at 8/16/2021 11:07 AM EDT -----  Regarding: MD Bri/Telephone  Level 1/Escalated Issue      Caller's first and last name and relationship (if not the patient): Self      Best contact number(s): [phone]      What are the symptoms: Side effects from being off Rx's : headaches, trembling, nausea, no sleep, dizziness, light-headed. Transfer successful - yes/no (include outcome): No      Transfer declined - yes/no (include reason): NA      Was caller advised to seek appropriate level of care - yes/no: Yes      Details to clarify the request: Pt states she should not have stopped the Rx's cold turkey as she was instructed to. Said ever since last week she has had constant headaches, trembling, nausea, no sleep, dizziness and light-headedness. Offered appt today or tomorrow, said she does not feel up to getting dressed today. Asking to hear from nurse or pcp.         Alanna Chau

## 2021-08-16 NOTE — TELEPHONE ENCOUNTER
Side effect when stopped trazadone and zoloft. Has resumed trazadone which gave her relief. To continue that med and NOT hydroxyzine or zoloft. FU if continued sx.   [facility]

## 2021-08-25 DIAGNOSIS — Z79.4 TYPE 2 DIABETES MELLITUS WITHOUT COMPLICATION, WITH LONG-TERM CURRENT USE OF INSULIN (HCC): ICD-10-CM

## 2021-08-25 DIAGNOSIS — G47.00 INSOMNIA, UNSPECIFIED TYPE: ICD-10-CM

## 2021-08-25 DIAGNOSIS — E11.9 TYPE 2 DIABETES MELLITUS WITHOUT COMPLICATION, WITH LONG-TERM CURRENT USE OF INSULIN (HCC): ICD-10-CM

## 2021-08-25 RX ORDER — INSULIN GLARGINE 100 [IU]/ML
28 INJECTION, SOLUTION SUBCUTANEOUS 2 TIMES DAILY
Qty: 5 ADJUSTABLE DOSE PRE-FILLED PEN SYRINGE | Refills: 0 | OUTPATIENT
Start: 2021-08-25

## 2021-08-25 RX ORDER — INSULIN GLARGINE 100 [IU]/ML
INJECTION, SOLUTION SUBCUTANEOUS
Qty: 15 ADJUSTABLE DOSE PRE-FILLED PEN SYRINGE | Refills: 1 | Status: SHIPPED | OUTPATIENT
Start: 2021-08-25 | End: 2021-09-17 | Stop reason: SDUPTHER

## 2021-09-02 RX ORDER — HYDROXYZINE 25 MG/1
TABLET, FILM COATED ORAL
Qty: 60 TABLET | Refills: 2 | Status: SHIPPED | OUTPATIENT
Start: 2021-09-02

## 2021-09-02 NOTE — TELEPHONE ENCOUNTER
Need a 90 day supply   PCP: Sandra Aguirre MD    Last appt: 2021  No future appointments. Requested Prescriptions     Pending Prescriptions Disp Refills    hydrOXYzine HCL (ATARAX) 25 mg tablet 60 Tablet 2     Si-2 by mouth at bedtime as needed for sleep. Refused Prescriptions Disp Refills    insulin glargine (Lantus Solostar U-100 Insulin) 100 unit/mL (3 mL) inpn 5 Adjustable Dose Pre-filled Pen Syringe 0     Si Units by SubCUTAneous route two (2) times a day.      Refused By: Tauna Counter     Reason for Refusal: other       Prior labs and Blood pressures:  BP Readings from Last 3 Encounters:   21 (!) 106/58   21 93/62   21 124/80     Lab Results   Component Value Date/Time    Sodium 136 2021 12:00 PM    Potassium 4.5 2021 12:00 PM    Chloride 105 2021 12:00 PM    CO2 27 2021 12:00 PM    Anion gap 4 (L) 2021 12:00 PM    Glucose 247 (H) 2021 12:00 PM    BUN 14 2021 12:00 PM    Creatinine 0.88 2021 12:00 PM    BUN/Creatinine ratio 16 2021 12:00 PM    GFR est AA >60 2021 12:00 PM    GFR est non-AA >60 2021 12:00 PM    Calcium 10.8 (H) 2021 12:00 PM     Lab Results   Component Value Date/Time    Hemoglobin A1c 9.9 (H) 2021 12:00 PM     Lab Results   Component Value Date/Time    Cholesterol, total 187 2021 12:00 PM    HDL Cholesterol 53 2021 12:00 PM    LDL, calculated 103.2 (H) 2021 12:00 PM    VLDL, calculated 30.8 2021 12:00 PM    Triglyceride 154 (H) 2021 12:00 PM    CHOL/HDL Ratio 3.5 2021 12:00 PM     No results found for: RASHEED Ramirez    Lab Results   Component Value Date/Time    TSH 1.96 2021 12:00 PM

## 2021-09-17 DIAGNOSIS — E11.9 TYPE 2 DIABETES MELLITUS WITHOUT COMPLICATION, WITH LONG-TERM CURRENT USE OF INSULIN (HCC): ICD-10-CM

## 2021-09-17 DIAGNOSIS — Z79.4 TYPE 2 DIABETES MELLITUS WITHOUT COMPLICATION, WITH LONG-TERM CURRENT USE OF INSULIN (HCC): ICD-10-CM

## 2021-09-20 RX ORDER — INSULIN GLARGINE 100 [IU]/ML
INJECTION, SOLUTION SUBCUTANEOUS
Qty: 15 ADJUSTABLE DOSE PRE-FILLED PEN SYRINGE | Refills: 1 | Status: SHIPPED | OUTPATIENT
Start: 2021-09-20

## 2022-03-19 PROBLEM — J96.91 RESPIRATORY FAILURE WITH HYPOXIA (HCC): Status: ACTIVE | Noted: 2020-05-10

## 2022-03-19 PROBLEM — E11.21 TYPE 2 DIABETES WITH NEPHROPATHY (HCC): Status: ACTIVE | Noted: 2019-10-08

## 2022-03-19 PROBLEM — E66.01 SEVERE OBESITY (HCC): Status: ACTIVE | Noted: 2019-02-13

## 2022-03-19 PROBLEM — U07.1 COVID-19: Status: ACTIVE | Noted: 2020-05-16

## 2022-03-25 RX ORDER — BLOOD SUGAR DIAGNOSTIC
STRIP MISCELLANEOUS
Qty: 100 STRIP | Refills: 5 | Status: SHIPPED | OUTPATIENT
Start: 2022-03-25

## 2022-04-16 DIAGNOSIS — I10 ESSENTIAL HYPERTENSION: ICD-10-CM

## 2022-04-18 RX ORDER — LOSARTAN POTASSIUM 50 MG/1
TABLET ORAL
Qty: 90 TABLET | Refills: 0 | Status: SHIPPED | OUTPATIENT
Start: 2022-04-18

## 2022-08-15 ENCOUNTER — TELEPHONE (OUTPATIENT)
Dept: CARDIOLOGY CLINIC | Age: 77
End: 2022-08-15

## 2022-08-15 NOTE — TELEPHONE ENCOUNTER
Patient is calling because her heart rate was at  123 and it was jumping all around last night after midnight. Patient said she left in a recliner because it felt better with her head elevated. Patient is concerned about the Tachycardia.     [phone]

## 2022-08-15 NOTE — TELEPHONE ENCOUNTER
Patient called stating she has been experiencing elevated blood pressure. Pulmonology visit (08/11/2022) bp was 132/69.     Please advise            YRIRIMAMARIS:[phone]

## 2022-08-16 ENCOUNTER — TELEPHONE (OUTPATIENT)
Dept: CARDIOLOGY CLINIC | Age: 77
End: 2022-08-16

## 2022-08-16 NOTE — TELEPHONE ENCOUNTER
Patient calling in regards to episodes of bradycardia and tachycardia on yesterday. Informed the patient that a return call was placed on yesterday afternoon. States that she believes that it was a result of the prednisone prescribed by her pulmonologist(see Mychart message). States that she would still like to speak with a nurse. Can be reached at [phone].     Thanks,  Parth Kumar

## 2022-08-16 NOTE — TELEPHONE ENCOUNTER
Dr Nicanor Arita sent patient a [employer] message in regards to the below message. His response to patient was. Recommend to continue methylprednisolone taper. Minimize albuterol therapy     Let us know if irregular heart beat continues, we will have you wear a heart monitor if it continues.

## 2022-08-30 ENCOUNTER — HOSPITAL ENCOUNTER (OUTPATIENT)
Dept: GENERAL RADIOLOGY | Age: 77
Discharge: HOME | End: 2022-08-30
Attending: INTERNAL MEDICINE
Payer: MEDICARE

## 2022-08-30 ENCOUNTER — TRANSCRIBE ORDER (OUTPATIENT)
Dept: GENERAL RADIOLOGY | Age: 77
End: 2022-08-30

## 2022-08-30 DIAGNOSIS — U09.9 POST-COVID SYNDROME: Primary | ICD-10-CM

## 2022-08-30 DIAGNOSIS — U09.9 POST-COVID SYNDROME: ICD-10-CM

## 2022-08-30 PROCEDURE — 71046 X-RAY EXAM CHEST 2 VIEWS: CPT

## 2022-09-14 ENCOUNTER — OFFICE VISIT (OUTPATIENT)
Dept: CARDIOLOGY CLINIC | Age: 77
End: 2022-09-14
Payer: MEDICARE

## 2022-09-14 VITALS
BODY MASS INDEX: 32.78 KG/M2 | SYSTOLIC BLOOD PRESSURE: 130 MMHG | HEIGHT: 64 IN | DIASTOLIC BLOOD PRESSURE: 72 MMHG | WEIGHT: 192 LBS | HEART RATE: 70 BPM | OXYGEN SATURATION: 96 %

## 2022-09-14 DIAGNOSIS — E66.01 SEVERE OBESITY (HCC): ICD-10-CM

## 2022-09-14 DIAGNOSIS — I10 ESSENTIAL HYPERTENSION: Primary | ICD-10-CM

## 2022-09-14 DIAGNOSIS — R06.00 DYSPNEA, UNSPECIFIED TYPE: ICD-10-CM

## 2022-09-14 PROCEDURE — 99214 OFFICE O/P EST MOD 30 MIN: CPT | Performed by: STUDENT IN AN ORGANIZED HEALTH CARE EDUCATION/TRAINING PROGRAM

## 2022-09-14 PROCEDURE — 1123F ACP DISCUSS/DSCN MKR DOCD: CPT | Performed by: STUDENT IN AN ORGANIZED HEALTH CARE EDUCATION/TRAINING PROGRAM

## 2022-09-14 PROCEDURE — G8427 DOCREV CUR MEDS BY ELIG CLIN: HCPCS | Performed by: STUDENT IN AN ORGANIZED HEALTH CARE EDUCATION/TRAINING PROGRAM

## 2022-09-14 PROCEDURE — 93000 ELECTROCARDIOGRAM COMPLETE: CPT | Performed by: STUDENT IN AN ORGANIZED HEALTH CARE EDUCATION/TRAINING PROGRAM

## 2022-09-14 PROCEDURE — G8752 SYS BP LESS 140: HCPCS | Performed by: STUDENT IN AN ORGANIZED HEALTH CARE EDUCATION/TRAINING PROGRAM

## 2022-09-14 PROCEDURE — G8417 CALC BMI ABV UP PARAM F/U: HCPCS | Performed by: STUDENT IN AN ORGANIZED HEALTH CARE EDUCATION/TRAINING PROGRAM

## 2022-09-14 PROCEDURE — 3017F COLORECTAL CA SCREEN DOC REV: CPT | Performed by: STUDENT IN AN ORGANIZED HEALTH CARE EDUCATION/TRAINING PROGRAM

## 2022-09-14 PROCEDURE — G8399 PT W/DXA RESULTS DOCUMENT: HCPCS | Performed by: STUDENT IN AN ORGANIZED HEALTH CARE EDUCATION/TRAINING PROGRAM

## 2022-09-14 PROCEDURE — 1101F PT FALLS ASSESS-DOCD LE1/YR: CPT | Performed by: STUDENT IN AN ORGANIZED HEALTH CARE EDUCATION/TRAINING PROGRAM

## 2022-09-14 PROCEDURE — 1090F PRES/ABSN URINE INCON ASSESS: CPT | Performed by: STUDENT IN AN ORGANIZED HEALTH CARE EDUCATION/TRAINING PROGRAM

## 2022-09-14 PROCEDURE — G8536 NO DOC ELDER MAL SCRN: HCPCS | Performed by: STUDENT IN AN ORGANIZED HEALTH CARE EDUCATION/TRAINING PROGRAM

## 2022-09-14 PROCEDURE — G8754 DIAS BP LESS 90: HCPCS | Performed by: STUDENT IN AN ORGANIZED HEALTH CARE EDUCATION/TRAINING PROGRAM

## 2022-09-14 PROCEDURE — G8432 DEP SCR NOT DOC, RNG: HCPCS | Performed by: STUDENT IN AN ORGANIZED HEALTH CARE EDUCATION/TRAINING PROGRAM

## 2022-09-14 NOTE — PROGRESS NOTES
Migel Burleson is a 78 y.o. female    Chief Complaint   Patient presents with    Follow-up     Annual, dyspnea    Hypertension    Diabetes     Looking for an endocrinologist     Chest pain no    SOB some slight SOB     Dizziness patient states has some dizziness at times     Swelling patient had some swelling yesterday     Refills no    Visit Vitals  /72 (BP 1 Location: Left upper arm, BP Patient Position: Sitting)   Pulse 70   Ht 5' 4\" (1.626 m)   Wt 192 lb (87.1 kg)   SpO2 96%   BMI 32.96 kg/m²       1. Have you been to the ER, urgent care clinic since your last visit? Hospitalized since your last visit? No    2. Have you seen or consulted any other health care providers outside of the [address] [place] [address] since your last visit? Include any pap smears or colon screening.  No

## 2022-09-14 NOTE — PROGRESS NOTES
[facility] [address], [address] [facility], [address] [place] [address]    Office [phone],BYT [phone]           Jovon Diaz is a 78 y.o. female presents the office today for f/up visit. Assessment/Recommendations:    Severe dyspnea- No ongoing chest pain. Abnormal stress test 11/20 with subsequent coronary cta 4/21 w/o any cad. Currently established with PAR. notes improved    abnromal stress test- suspect artifact, coronary CTA w/o disease    Asthma-was with new pulmonology at pulmonary [facility]. Reports significant improvement    Type 2 diabetes  -Continue statin therapy with history of type 2 diabetes  -Continue aspirin due to elevated 10-year cardiovascular risk    Hypertension- stable, management per primary care    History of brain surgery    LORENZO- Mgmt with dental appliance        Primary Care Physician- Maryellen Celis, DO    Follow-up 1 year, sooner as needed        Subjective:  78 y.o. presents to the office today for f/up evaluation of dyspnea. Ports that she started with a new pulmonologist with pulmonary [facility] with significant improvement in her dyspnea. No ongoing chest pain or chest pressure symptoms. Her sister has a hx cardiomyopathy s/p ICD. Past Medical History:   Diagnosis Date    Anxiety     Arthritis     Asthma     Chronic pain     back,right hip    COVID-19 05/2020    hospitalized 10 days [place]    Depression     Diabetes ([address]     type 2 on insulin.     GERD (gastroesophageal reflux disease)     History of vascular access device 05/18/2020    4 Fr midline, 12 cm L basilic, blood draws    Hypercholesteremia     Hypertension     Hypothyroid     Sleep apnea     wears dental appliance- Somnident    Thyroid disease     Unspecified adverse effect of anesthesia     delayed awakening        Past Surgical History:   Procedure Laterality Date    HX COLONOSCOPY  2012    normal repeat 2022, diverticulosis of sigmoid Dr. Garry fernandez  1/08/14    bilateral cataract surgery1/8/14    HX KNEE REPLACEMENT      right    HX OTHER SURGICAL  2007    cardiac cath- No CAD found    NEUROLOGICAL PROCEDURE UNLISTED  1999    vascular brain surgery         Current Outpatient Medications:     losartan (COZAAR) 50 mg tablet, TAKE 1 TABLET BY MOUTH EVERY DAY, Disp: 90 Tablet, Rfl: 0    glucose blood VI test strips (Contour Next Test Strips) strip, PLEASE CHECK BLOOD GLUCOSE BEFORE BREAKFAST AND BEFORE DINNER DAILY E11.9 DM2 ON INSULIN., Disp: 100 Strip, Rfl: 5    insulin glargine (Lantus Solostar U-100 Insulin) 100 unit/mL (3 mL) inpn, 26 units subcutaneously every 12 hours or as instructed.  (Patient taking differently: 35 units subcutaneously every 12 hours or as instructed.), Disp: 15 Adjustable Dose Pre-filled Pen Syringe, Rfl: 1    hydrOXYzine HCL (ATARAX) 25 mg tablet, 1-2 by mouth at bedtime as needed for sleep., Disp: 60 Tablet, Rfl: 2    Insulin Needles, Disposable, (BD Ultra-Fine Mini Pen Needle) 31 gauge x 3/16\" ndle, USE DAILY WITH INSULIN, Disp: 100 Pen Needle, Rfl: 11    simvastatin (ZOCOR) 10 mg tablet, TAKE 1 TABLET BY MOUTH EVERY DAY AT NIGHT, Disp: 90 Tablet, Rfl: 1    levothyroxine (SYNTHROID) 100 mcg tablet, TAKE 1 TABLET BY MOUTH EVERY DAY BEFORE BREAKFAST, Disp: 90 Tablet, Rfl: 1    lancets misc, Please check you blood glucose before breakfast and before dinner daily and if low sugar symptoms, Disp: 200 Each, Rfl: 11    Insulin Needles, Disposable, 31 gauge x 5/16\" ndle, Use daily with glargine insulin pen, Disp: 1 Package, Rfl: 11    albuterol (ProAir HFA) 90 mcg/actuation inhaler, Take 2 Puffs by inhalation every four (4) hours as needed for Wheezing., Disp: 1 Inhaler, Rfl: 0    Blood-Glucose Meter (Blood Glucose Monitoring) monitoring kit, Please check your blood glucose before breakfast and before dinner daily, Disp: 1 Kit, Rfl: 0    guaiFENesin ER (MUCINEX) 600 mg ER tablet, Take 600 mg by mouth two (2) times a day., Disp: , Rfl:     omeprazole (PRILOSEC) 20 mg capsule, Take 20 mg by mouth daily. , Disp: , Rfl:     calcium-vitamin D (OYSTER SHELL) 500 mg(1,250mg) -200 unit per tablet, Take 1 Tab by mouth daily. , Disp: , Rfl:     aspirin delayed-release 81 mg tablet, Take 81 mg by mouth daily. , Disp: , Rfl:     fluticasone (FLONASE) 50 mcg/actuation nasal spray, 2 Sprays by Both Nostrils route daily as needed for Rhinitis or Allergies. , Disp: , Rfl:     multivitamins-minerals-lutein (MEN'S CENTRUM SILVER WITH LUTEIN) tab tablet, Take 1 Tab by mouth daily. , Disp: , Rfl:     glucosamine-chondroit-vit c-mn 500-400 mg capsule, Take 1 Cap by mouth daily. , Disp: , Rfl:     Allergies   Allergen Reactions    Latex Rash     Other reaction(s): Bleeding (finding)  Other reaction(s): Bleeding, blisters  Other reaction(s): Bleeding (finding)  Other reaction(s): Bleeding, blisters    Prednisone Anaphylaxis     Throat swelling. Can take methylpredisone po or IV without problems. Other reaction(s): Pharyngeal swelling (finding)  Throat swelling. Can take methylpredisone po or IV without problems. Other reaction(s): Swelling of throat  Throat swelling. Can take methylpredisone po or IV without problems.   Other reaction(s): Pharyngeal swelling (finding)  Other reaction(s): Swelling of throat    Augmentin [Amoxicillin-Pot Clavulanate] Nausea and Vomiting    Biaxin [Clarithromycin] Nausea and Vomiting    Metformin Diarrhea    Parafon Forte Dsc [Chlorzoxazone] Nausea and Vomiting    Penicillin V Unable to Obtain     Other reaction(s): Unable to Obtain    Penicillin V Potassium Unable to Obtain        Family History   Problem Relation Age of Onset    Elevated Lipids Mother     Dementia Mother     Osteoporosis Mother     Lung Disease Father         copd    Delayed Awakening Father     Post-op Nausea/Vomiting Father     Hypertension Sister     Diabetes Sister         type 2    Breast Cancer Maternal Aunt     Heart Surgery Daughter     Heart Surgery Son        Social History     Tobacco Use    Smoking status: Never    Smokeless tobacco: Never   Vaping Use    Vaping Use: Never used   Substance Use Topics    Alcohol use: No     Comment: 2x year    Drug use: No       Review of Symptoms:  Pertinent Positive: Severe exertional  Pertinent Negative: Lisa no chest pain, orthopnea, PND  All Other systems reviewed and are negative for a Comprehensive ROS (10+)    Physical Exam    Blood pressure 130/72, pulse 70, height 5' 4\" (1.626 m), weight 192 lb (87.1 kg), SpO2 96 %. Constitutional:  well-developed and well-nourished. No distress. HENT: Normocephalic. Eyes: No scleral icterus. Neck:  Neck supple. No JVD present. Pulmonary/Chest: Effort normal and breath sounds normal. No respiratory distress, wheezes or rales. Cardiovascular: Normal rate, regular rhythm, S1 S2 . Exam reveals no gallop and no friction rub. No murmur heard. No edema. Extremities:  Normal muscle tone  Abdominal:   No abnormal distension. Neurological:  Moving all extremities, cranial nerves appear grossly intact. Skin: Skin is not cold. Not diaphoretic. No erythema. Psychiatric:  Grossly normal mood and affect. Intact insight. Objective Data:    ECG: personally reviewed and  intrepreted  10/20/2020-sinus rhythm inferior and anterior Q waves    10/20/20   ECHO ADULT COMPLETE 10/21/2020 10/21/2020    Narrative · LV: Calculated LVEF is 55%. Biplane method used to measure ejection   fraction. Normal cavity size and systolic function (ejection fraction   normal). Mild concentric hypertrophy. · RV: Mildly reduced systolic function. · PA: Pulmonary arterial systolic pressure is 23 mmHg. Signed by: Leana Johnson DO       11/12/20   NUCLEAR CARDIAC STRESS TEST 11/16/2020 11/16/2020    Narrative · Gated SPECT: Left ventricular function post-stress was normal.   Calculated ejection fraction is 58%. · Myocardial perfusion imaging defect 1: caused by breast attenuation. Prone imaging acquired. · Baseline ECG: Normal sinus rhythm, rare PVCs, myocardial infarction. The   infarction is located in the anterolateral regions. .  · No significant ecg changes with lexiscan. · Myocardial perfusion imaging defect 1: There is a defect that is small   in size with a mild reduction in uptake present in the mid-apical   mid-apical and anterior location(s) that is reversible. There is normal   wall motion in the defect area. The possibility of ischemia cannot be   excluded. · Myocardial perfusion imaging defect 2: There is a defect that is small   in size with a mild reduction in uptake affecting the mid-apical and   inferior location(s) that is reversible. There is normal wall motion in   the defect area. The possibility of ischemia cannot be excluded. · Left ventricular perfusion is equivocal.  · Myocardial perfusion imaging supports a low risk stress test.     Small sized, low grade anterior wall defect on supine imaing, along with   small sized, low grade inferior wall defect on prone imaging. Normal   myocardial wall thickening. Low risk myocardial SPECT imaging. Signed by: Jovon Engle DO     Coronary CTA 4/21  1. Left main originate normally from left coronary cusp and is normal size  without any significant atherosclerotic plaque or calcification. 2. The LAD is normal size without significant disease or calcification. In the  distal portion near the apex the LAD becomes very small caliber vessel but does  not demonstrate any significant disease. The D1 and D2 diagonal branches were  seen without any significant lesion or calcification. There is no myocardial  bridging seen. 3. The left circumflex is normal size vessel without any significant  calcification or disease. The OM1 branch is without any significant disease or  calcification. 4. The RCA is medium-size vessel and originate normally from the right coronary  cusp.  There is no significant disease in the RCA, PDA or PLC branches. Lynda Culp, DO          ATTENTION:   This medical record was transcribed using an electronic medical records/speech recognition system. Although proofread, it may and can contain electronic, spelling and other errors. Corrections may be executed at a later time. Please feel free to contact us for any clarifications as needed.

## 2022-10-13 NOTE — ED TRIAGE NOTES
Known COVID+ patient. (tested on Monday, positive results came back Tuesday). Worsening symptoms this week. Today O2 sats haven been in the high 70s, low 80s and was referred to come to ED. C/o increasing shortness of breath. BACK PAIN/HEADACHE/PAIN

## 2023-12-14 ENCOUNTER — OFFICE VISIT (OUTPATIENT)
Age: 78
End: 2023-12-14
Payer: MEDICARE

## 2023-12-14 VITALS
SYSTOLIC BLOOD PRESSURE: 120 MMHG | DIASTOLIC BLOOD PRESSURE: 80 MMHG | HEART RATE: 66 BPM | HEIGHT: 64 IN | WEIGHT: 191 LBS | OXYGEN SATURATION: 97 % | BODY MASS INDEX: 32.61 KG/M2

## 2023-12-14 DIAGNOSIS — I10 ESSENTIAL (PRIMARY) HYPERTENSION: Primary | ICD-10-CM

## 2023-12-14 PROCEDURE — 99213 OFFICE O/P EST LOW 20 MIN: CPT | Performed by: STUDENT IN AN ORGANIZED HEALTH CARE EDUCATION/TRAINING PROGRAM

## 2023-12-14 PROCEDURE — 3079F DIAST BP 80-89 MM HG: CPT | Performed by: STUDENT IN AN ORGANIZED HEALTH CARE EDUCATION/TRAINING PROGRAM

## 2023-12-14 PROCEDURE — 93005 ELECTROCARDIOGRAM TRACING: CPT | Performed by: STUDENT IN AN ORGANIZED HEALTH CARE EDUCATION/TRAINING PROGRAM

## 2023-12-14 PROCEDURE — 93010 ELECTROCARDIOGRAM REPORT: CPT | Performed by: STUDENT IN AN ORGANIZED HEALTH CARE EDUCATION/TRAINING PROGRAM

## 2023-12-14 PROCEDURE — 3074F SYST BP LT 130 MM HG: CPT | Performed by: STUDENT IN AN ORGANIZED HEALTH CARE EDUCATION/TRAINING PROGRAM

## 2023-12-14 PROCEDURE — 1123F ACP DISCUSS/DSCN MKR DOCD: CPT | Performed by: STUDENT IN AN ORGANIZED HEALTH CARE EDUCATION/TRAINING PROGRAM

## 2023-12-14 RX ORDER — MULTIVIT WITH MINERALS/LUTEIN
1 TABLET ORAL DAILY
COMMUNITY

## 2023-12-14 RX ORDER — TRAZODONE HYDROCHLORIDE 100 MG/1
100 TABLET ORAL DAILY
COMMUNITY

## 2023-12-14 NOTE — PROGRESS NOTES
Tanna Addison is a 70 y.o. female    had concerns including Hypertension.     Vitals:    12/14/23 1537   BP: 120/80   Site: Right Upper Arm   Position: Sitting   Pulse: 66   SpO2: 97%   Weight: 86.6 kg (191 lb)   Height: 1.626 m (5' 4\")        Chest pain NO
throat  Throat swelling. Can take methylpredisone po or IV without problems. Other reaction(s): Pharyngeal swelling (finding)  Other reaction(s): Swelling of throat    Augmentin [Amoxicillin-Pot Clavulanate] Nausea and Vomiting    Biaxin [Clarithromycin] Nausea and Vomiting    Metformin Diarrhea    Parafon Forte Dsc [Chlorzoxazone] Nausea and Vomiting    Penicillin V Unable to Obtain     Other reaction(s): Unable to Obtain    Penicillin V Potassium Unable to Obtain        Family History   Problem Relation Age of Onset    Elevated Lipids Mother     Dementia Mother     Osteoporosis Mother     Lung Disease Father         copd    Delayed Awakening Father     Post-op Nausea/Vomiting Father     Hypertension Sister     Diabetes Sister         type 2    Breast Cancer Maternal Aunt     Heart Surgery Daughter     Heart Surgery Son        Social History     Tobacco Use    Smoking status: Never    Smokeless tobacco: Never   Vaping Use    Vaping Use: Never used   Substance Use Topics    Alcohol use: No     Comment: 2x year    Drug use: No       Review of Symptoms:  Pertinent Positive: Severe exertional  Pertinent Negative: Karen no chest pain, orthopnea, PND  All Other systems reviewed and are negative for a Comprehensive ROS (10+)    Physical Exam  Vitals:    12/14/23 1537   BP: 120/80   Site: Right Upper Arm   Position: Sitting   Pulse: 66   SpO2: 97%   Weight: 86.6 kg (191 lb)   Height: 1.626 m (5' 4\")       Constitutional:  well-developed and well-nourished. No distress. HENT: Normocephalic. Eyes: No scleral icterus. Neck:  Neck supple. No JVD present. Pulmonary/Chest: Effort normal and breath sounds normal. No respiratory distress, wheezes or rales. Cardiovascular: Normal rate, regular rhythm, S1 S2 . Exam reveals no gallop and no friction rub. No murmur heard. No edema. Extremities:  Normal muscle tone  Abdominal:   No abnormal distension.    Neurological:  Moving all extremities, cranial nerves appear grossly

## 2023-12-30 NOTE — PROGRESS NOTES
Encounter Date: 12/30/2023       History     Chief Complaint   Patient presents with    URI     Complains of coughing and body aches. Reports his family had the flu for xmas     Cough congestion   54-year-old comes in with cough and body aches states that his family had the flu no chronic history contributing to today's episode patient is the history source        Review of patient's allergies indicates:   Allergen Reactions    Crab Swelling     Allergic to dungenous crab which causes lips to swell., no allergy to contrast media.    Imdur [isosorbide mononitrate] Other (See Comments)     Severe headaches     Past Medical History:   Diagnosis Date    Chronic systolic heart failure     Digestive disorder     acid reflux    Fatigue     Lightheadedness     NICM (nonischemic cardiomyopathy)     Palpitations     Paroxysmal atrial fibrillation     Personal history of colonic polyps 12/21/2021    Sleep apnea      Past Surgical History:   Procedure Laterality Date    ABLATION N/A 9/21/2023    Procedure: Ablation;  Surgeon: Andres Townsend MD;  Location: [facility] CATH LAB;  Service: Cardiology;  Laterality: N/A;  EPS + AF CATH ABLATION +/- CRYOBALLON ABLATION W/ DARBY    CARDIAC BIOPSY      COLONOSCOPY  12/21/2021    ECHOCARDIOGRAM,TRANSESOPHAGEAL N/A 9/21/2023    Procedure: Transesophageal echo (DARBY) intra-procedure log documentation;  Surgeon: Provider, [facility];  Location: [facility] CATH LAB;  Service: Cardiology;  Laterality: N/A;    INSERTION OF BIVENTRICULAR IMPLANTABLE CARDIOVERTER-DEFIBRILLATOR (ICD)      TRANSESOPHAGEAL ECHOCARDIOGRAPHY       Family History   Problem Relation Age of Onset    Valvular heart disease Mother     Coronary artery disease Brother         CABG     Social History     Tobacco Use    Smoking status: Never    Smokeless tobacco: Never   Substance Use Topics    Alcohol use: Never    Drug use: Never     Review of Systems   Constitutional:  Negative for fever.   HENT:  Negative for sore throat.   RT NOTE:    Pulse Nitric changed at 1645.   Pt on 2L Spo2 96%  S/N [id number]  Cannula lot #[id number]  Cartridge identifier [id number]  Cartridge Lot #[id number]   Respiratory:  Positive for cough. Negative for shortness of breath.    Cardiovascular:  Negative for chest pain.   Gastrointestinal:  Negative for nausea.   Genitourinary:  Negative for dysuria.   Musculoskeletal:  Negative for back pain.   Skin:  Negative for rash.   Neurological:  Negative for weakness.   Hematological:  Does not bruise/bleed easily.   All other systems reviewed and are negative.      Physical Exam     Initial Vitals [12/30/23 1428]   BP Pulse Resp Temp SpO2   115/81 76 20 98.6 °F (37 °C) 98 %      MAP       --         Physical Exam    Nursing note and vitals reviewed.  Constitutional: He appears well-developed and well-nourished. He is not diaphoretic. No distress.   HENT:   Head: Normocephalic and atraumatic.   Right Ear: External ear normal.   Left Ear: External ear normal.   Nose: Nose normal.   Mouth/Throat: Oropharynx is clear and moist. No oropharyngeal exudate.   Eyes: Conjunctivae and EOM are normal. Pupils are equal, round, and reactive to light. Right eye exhibits no discharge. Left eye exhibits no discharge.   Neck: Neck supple. No thyromegaly present. No tracheal deviation present. No JVD present.   Normal range of motion.  Cardiovascular:  Normal rate, regular rhythm, normal heart sounds and intact distal pulses.     Exam reveals no gallop and no friction rub.       No murmur heard.  Pulmonary/Chest: Breath sounds normal. No stridor. No respiratory distress. He has no wheezes. He has no rhonchi. He has no rales. He exhibits no tenderness.   Abdominal: Abdomen is soft. Bowel sounds are normal. He exhibits no distension. There is no abdominal tenderness. There is no rebound and no guarding.   Musculoskeletal:         General: No tenderness or edema. Normal range of motion.      Cervical back: Normal range of motion and neck supple.     Lymphadenopathy:     He has no cervical adenopathy.   Neurological: He is alert and oriented to person, place, and time. He has normal strength and  normal reflexes. No cranial nerve deficit or sensory deficit. GCS score is 15. GCS eye subscore is 4. GCS verbal subscore is 5. GCS motor subscore is 6.   Skin: Skin is warm and dry. No rash and no abscess noted. No erythema.   Psychiatric: He has a normal mood and affect. His behavior is normal. Judgment and thought content normal.         ED Course   Procedures  Labs Reviewed   COVID/FLU A&B PCR - Normal    Narrative:     The Xpert Xpress SARS-CoV-2/FLU/RSV plus is a rapid, multiplexed real-time PCR test intended for the simultaneous qualitative detection and differentiation of SARS-CoV-2, Influenza A, Influenza B, and respiratory syncytial virus (RSV) viral RNA in either nasopharyngeal swab or nasal swab specimens.                Imaging Results    None          Medications - No data to display  Medical Decision Making  COVID flu pneumonia bronchitis sinusitis                                      Clinical Impression:  Final diagnoses:  [J40] Bronchitis (Primary)          ED Disposition Condition    Discharge Stable          ED Prescriptions       Medication Sig Dispense Start Date End Date Auth. Provider    azithromycin (Z-SADE) 250 MG tablet Take 1 tablet (250 mg total) by mouth once daily. Take first 2 tablets together, then 1 every day until finished. 6 tablet 12/30/2023 -- Abraham Alamo MD          Follow-up Information       Follow up With Specialties Details Why Contact Info    Germania Fairbanks, [facility]   [facility] [facility] [id number]  [phone]               Abraham Alamo MD  12/30/23 8447

## 2024-01-03 NOTE — CONSULTS
[facility]  Pulmonary, Critical Care, and Sleep Medicine    Initial Patient Consult    Name: Rubén Samuel MRN: [id number]   : 1945 Hospital: [address] [facility]   Date: 2020        IMPRESSION:   · COVID-19 pneumonia with respiratory failure  · Mild intermittent asthma  · Chronic back pain  · DM  · HTN      RECOMMENDATIONS:   · Cont w supplemental oxygen. Adjust as needed - she has required ~5-7 lpm  · Start pulsed Caitlin  · Will check BNP and give one dose of lasix today  · Follow inflammatory markers; at risk for cytokine storm. IL-6 pending  · Encouraged pt to lie prone at least 4 hours per day  · Has completed hydroxychloroquine/azithro  · Agree with empiric abx, may be able to de-escalate in a few days  · Scheduled combivent MDI    Discussed the use of pulsed Caitlin with pt. She understands that this is an experimental therapy with possible benefit in COVID-19 but that its efficacy is unknown. She is agreeable to proceed with the device. Subjective: This patient has been seen and evaluated at the request of Dr. Israel Fleming for [place] 19 pneumonia and resp failure. Patient is a 69 y.o. female diagnosed one week ago with Covid 19. Pt with fevers, diarrhea, cough, and sob. She has been isolating at home but has had work done within the home and believes that she may have gotten an infection from one of the workers there.  has also become infected. Pt states that her symptoms have remained stable to slightly improved over the past few days but has noted that her oxygen sats have been falling and presented to ED for evaluation. Fevers seem to have resolved. Main symptoms are now diarrhea, sob, and cough. Pt w h/o asthma which by description appears to be mild intermittent and is followed by Dr Barbara Singh in [facility]. Controlled with montelukast and (rare) albuterol MDI.     Never smoker    Currently on 6 lpm w sats of 90%    Interval history:  Tm 102 overnight. Overall feeling about the same with malaise and occasional cough. Taking po fairly well. Past Medical History:   Diagnosis Date    Anxiety     Arthritis     Asthma     Chronic pain     back,right hip    Depression     Diabetes ([address]     type 2    GERD (gastroesophageal reflux disease)     Hypercholesteremia     Hypertension     Hypothyroid     Nausea & vomiting     Reflux     Sleep apnea     wears dental appliance- Somnident    Thyroid disease     Unspecified adverse effect of anesthesia     delayed awakening      Past Surgical History:   Procedure Laterality Date    HX COLONOSCOPY  2012    normal repeat 2022, diverticulosis of sigmoid colon, Dr. Silva Reyes HX HEENT  1/08/14    bilateral cataract surgery1/8/14    HX KNEE REPLACEMENT      right    HX OTHER SURGICAL  2007    cardiac cath- No CAD found    NEUROLOGICAL PROCEDURE UNLISTED  1999    vascular brain surgery      Prior to Admission medications    Medication Sig Start Date End Date Taking? Authorizing Provider   guaiFENesin ER (Mucinex) 600 mg ER tablet Take 600 mg by mouth two (2) times a day. Yes Provider, Historical   omeprazole (PRILOSEC) 20 mg capsule Take 20 mg by mouth daily. Yes Provider, Historical   calcium-vitamin D (OYSTER SHELL) 500 mg(1,250mg) -200 unit per tablet Take 1 Tab by mouth daily. Yes Provider, Historical   traZODone (DESYREL) 100 mg tablet TAKE 1 TABLET BY MOUTH EVERY DAY AT NIGHT 3/25/20  Yes Solange Bender NP   glipiZIDE SR (GLUCOTROL XL) 10 mg CR tablet TAKE 1 TABLET BY MOUTH TWICE A DAY 2/15/20  Yes Solange Bender NP   simvastatin (ZOCOR) 10 mg tablet Take 1 Tab by mouth nightly. 10/8/19  Yes Solange Bender NP   losartan (COZAAR) 50 mg tablet TAKE 1 TABLET BY MOUTH EVERY DAY. 10/8/19  Yes Solange Bender NP   levothyroxine (SYNTHROID) 100 mcg tablet Take 1 Tab by mouth Daily (before breakfast).  10/8/19  Yes Solange Bender NP   atenolol (TENORMIN) 25 mg tablet Take 1 Tab by mouth nightly. 10/8/19  Yes Ramona Bender NP   sertraline (ZOLOFT) 50 mg tablet TAKE 1 TABLET BY MOUTH EVERY DAY 9/4/19  Yes Ramona Bender NP   aspirin delayed-release 81 mg tablet Take 81 mg by mouth daily. Yes Provider, Historical   fluticasone (FLONASE) 50 mcg/actuation nasal spray 2 Sprays by Both Nostrils route daily as needed for Rhinitis or Allergies. Yes Provider, Historical   albuterol (PROAIR HFA) 90 mcg/actuation inhaler Take 2 Puffs by inhalation every four (4) hours as needed for Wheezing. Yes Provider, Historical   Cetirizine (ZYRTEC) 10 mg cap Take 1 Tab by mouth every evening. Yes Provider, Historical   multivitamins-minerals-lutein (CENTRUM SILVER) tab Take 1 Tab by mouth daily. Yes Provider, Historical   glucosamine-chondroit-vit c-mn (GLUCOSAMINE CHONDROITIN MAXSTR) 500-400 mg capsule Take 1 Cap by mouth daily. Yes Other, MD [place]   varicella-zoster recombinant, PF, (SHINGRIX, PF,) 50 mcg/0.5 mL susr injection 0.5mL by IntraMUSCular route once now and then repeat in 2-6 months 10/8/19   Henry MAGAN Cannon     Allergies   Allergen Reactions    Latex Rash    Prednisone Anaphylaxis     Throat swelling. Can take methylpredisone po or IV without problems.     Augmentin [Amoxicillin-Pot Clavulanate] Nausea and Vomiting    Biaxin [Clarithromycin] Nausea and Vomiting    Metformin Diarrhea    Parafon Forte Dsc [Chlorzoxazone] Nausea and Vomiting      Social History     Tobacco Use    Smoking status: Never Smoker    Smokeless tobacco: Never Used   Substance Use Topics    Alcohol use: No     Frequency: Never     Comment: 2x year      Family History   Problem Relation Age of Onset    Elevated Lipids Mother     Dementia Mother     Osteoporosis Mother     Lung Disease Father         copd    Delayed Awakening Father     Post-op Nausea/Vomiting Father     Hypertension Sister     Diabetes Sister         type 2    Breast Cancer Maternal Aunt         Current Facility-Administered Medications   Medication Dose Route Frequency    insulin glargine (LANTUS) injection 12 Units  12 Units SubCUTAneous DAILY    furosemide (LASIX) injection 20 mg  20 mg IntraVENous ONCE    sodium chloride (NS) flush 5-40 mL  5-40 mL IntraVENous Q8H    0.9% sodium chloride infusion  75 mL/hr IntraVENous CONTINUOUS    enoxaparin (LOVENOX) injection 40 mg  40 mg SubCUTAneous Q24H    zinc sulfate (ZINCATE) 220 (50) mg capsule 1 Cap  1 Cap Oral DAILY    ascorbic acid (vitamin C) (VITAMIN C) tablet 500 mg  500 mg Oral BID    aspirin delayed-release tablet 81 mg  81 mg Oral DAILY    atenoloL (TENORMIN) tablet 25 mg  25 mg Oral QHS    calcium-vitamin D (OS-TRISTEN) 500 mg-200 unit tablet  1 Tab Oral DAILY    cetirizine (ZYRTEC) tablet 10 mg  10 mg Oral DAILY    therapeutic multivitamin (THERAGRAN) tablet 1 Tab  1 Tab Oral DAILY    levothyroxine (SYNTHROID) tablet 100 mcg  100 mcg Oral ACB    losartan (COZAAR) tablet 50 mg  50 mg Oral DAILY    montelukast (SINGULAIR) tablet 10 mg  10 mg Oral QHS    sertraline (ZOLOFT) tablet 50 mg  50 mg Oral DAILY    traZODone (DESYREL) tablet 100 mg  100 mg Oral QHS    insulin lispro (HUMALOG) injection 4 Units  4 Units SubCUTAneous TIDAC    atorvastatin (LIPITOR) tablet 10 mg  10 mg Oral DAILY    ipratropium-albuterol (COMBIVENT RESPIMAT) 20 mcg-100 mcg inhalation spray  1 Puff Inhalation Q6H RT    fluticasone-vilanterol (BREO ELLIPTA) 200mcg-25mcg/puff  1 Puff Inhalation DAILY    insulin lispro (HUMALOG) injection   SubCUTAneous AC&HS    pantoprazole (PROTONIX) tablet 40 mg  40 mg Oral ACB       Review of Systems:  A comprehensive review of systems was negative except for that written in the HPI.     Objective:   Vital Signs:    Visit Vitals  /50 (BP 1 Location: Right arm, BP Patient Position: At rest)   Pulse 79   Temp 100 °F (37.8 °C)   Resp (!) 33   Ht 5' 4\" (1.626 m)   Wt 86.7 kg (191 lb 2.2 oz)   SpO2 92%   BMI 32.81 kg/m²       O2 [FreeTextEntry2] : left knee  Device: Nasal cannula   O2 Flow Rate (L/min): 6 l/min   Temp (24hrs), Av.2 °F (37.9 °C), Min:99 °F (37.2 °C), Max:102 °F (38.9 °C)       Intake/Output:   Last shift:      No intake/output data recorded. Last 3 shifts: 05/10 1901 -  0700  In: 2727.5 [P.O.:970; I.V.:1757.5]  Out: -     Intake/Output Summary (Last 24 hours) at 2020 0851  Last data filed at 2020 1900  Gross per 24 hour   Intake 1675 ml   Output    Net 1675 ml      Physical Exam:   General:  Alert, cooperative, mild distress, appears stated age. Head:  Normocephalic, without obvious abnormality, atraumatic. Eyes:  Conjunctivae/corneas clear. PERRL, EOMs intact. Nose: Nares normal. Septum midline. Mucosa normal. No drainage or sinus tenderness. Throat: Lips, mucosa, and tongue normal. Teeth and gums normal.   Neck: Supple, symmetrical, trachea midline, no adenopathy, thyroid: no enlargment/tenderness/nodules, no carotid bruit and no JVD. Back:   Symmetric, no curvature. ROM normal.   Lungs:   Clear anteriorly, post basilar crackles   Chest wall:  No tenderness or deformity. Heart:  Regular rate and rhythm, S1, S2 normal, no murmur, click, rub or gallop. Abdomen:   Soft, non-tender. Bowel sounds normal. No masses,  No organomegaly. Extremities: Extremities normal, atraumatic, no cyanosis or edema. Pulses: 2+ and symmetric all extremities.    Skin: Skin color, texture, turgor normal. No rashes or lesions   Lymph nodes: Cervical, supraclavicular, and axillary nodes normal.   Neurologic: Grossly nonfocal     Data review:     Recent Results (from the past 24 hour(s))   GLUCOSE, POC    Collection Time: 20 11:21 AM   Result Value Ref Range    Glucose (POC) 216 (H) 65 - 100 mg/dL    Performed by Michael Waddell, POC    Collection Time: 20  4:15 PM   Result Value Ref Range    Glucose (POC) 174 (H) 65 - 100 mg/dL    Performed by Michael Waddell, POC    Collection Time: 20  9:10 PM Result Value Ref Range    Glucose (POC) 196 (H) 65 - 100 mg/dL    Performed by Jordi Stockton    METABOLIC PANEL, COMPREHENSIVE    Collection Time: 05/12/20  3:26 AM   Result Value Ref Range    Sodium 141 136 - 145 mmol/L    Potassium 3.4 (L) 3.5 - 5.1 mmol/L    Chloride 112 (H) 97 - 108 mmol/L    CO2 20 (L) 21 - 32 mmol/L    Anion gap 9 5 - 15 mmol/L    Glucose 227 (H) 65 - 100 mg/dL    BUN 8 6 - 20 MG/DL    Creatinine 0.76 0.55 - 1.02 MG/DL    BUN/Creatinine ratio 11 (L) 12 - 20      GFR est AA >60 >60 ml/min/1.73m2    GFR est non-AA >60 >60 ml/min/1.73m2    Calcium 8.0 (L) 8.5 - 10.1 MG/DL    Bilirubin, total 0.6 0.2 - 1.0 MG/DL    ALT (SGPT) 29 12 - 78 U/L    AST (SGOT) 29 15 - 37 U/L    Alk.  phosphatase 71 45 - 117 U/L    Protein, total 5.7 (L) 6.4 - 8.2 g/dL    Albumin 2.1 (L) 3.5 - 5.0 g/dL    Globulin 3.6 2.0 - 4.0 g/dL    A-G Ratio 0.6 (L) 1.1 - 2.2     FERRITIN    Collection Time: 05/12/20  3:26 AM   Result Value Ref Range    Ferritin 282 26 - 388 NG/ML   C REACTIVE PROTEIN, QT    Collection Time: 05/12/20  3:26 AM   Result Value Ref Range    C-Reactive protein 19.60 (H) 0.00 - 0.60 mg/dL   LD    Collection Time: 05/12/20  3:26 AM   Result Value Ref Range     (H) 81 - 246 U/L   D DIMER    Collection Time: 05/12/20  4:31 AM   Result Value Ref Range    D-dimer 1.81 (H) 0.00 - 0.65 mg/L FEU   PROTHROMBIN TIME + INR    Collection Time: 05/12/20  4:31 AM   Result Value Ref Range    INR 1.1 0.9 - 1.1      Prothrombin time 11.4 (H) 9.0 - 11.1 sec   CBC WITH AUTOMATED DIFF    Collection Time: 05/12/20  4:31 AM   Result Value Ref Range    WBC 6.6 3.6 - 11.0 K/uL    RBC 4.14 3.80 - 5.20 M/uL    HGB 11.8 11.5 - 16.0 g/dL    HCT 36.0 35.0 - 47.0 %    MCV 87.0 80.0 - 99.0 FL    MCH 28.5 26.0 - 34.0 PG    MCHC 32.8 30.0 - 36.5 g/dL    RDW 12.6 11.5 - 14.5 %    PLATELET 884 207 - 530 K/uL    MPV 10.6 8.9 - 12.9 FL    NRBC 0.0 0  WBC    ABSOLUTE NRBC 0.00 0.00 - 0.01 K/uL    NEUTROPHILS 79 (H) 32 - 75 % LYMPHOCYTES 12 12 - 49 %    MONOCYTES 7 5 - 13 %    EOSINOPHILS 1 0 - 7 %    BASOPHILS 0 0 - 1 %    IMMATURE GRANULOCYTES 1 (H) 0.0 - 0.5 %    ABS. NEUTROPHILS 5.1 1.8 - 8.0 K/UL    ABS. LYMPHOCYTES 0.8 0.8 - 3.5 K/UL    ABS. MONOCYTES 0.5 0.0 - 1.0 K/UL    ABS. EOSINOPHILS 0.1 0.0 - 0.4 K/UL    ABS. BASOPHILS 0.0 0.0 - 0.1 K/UL    ABS. IMM.  GRANS. 0.1 (H) 0.00 - 0.04 K/UL    DF SMEAR SCANNED      RBC COMMENTS NORMOCYTIC, NORMOCHROMIC     GLUCOSE, POC    Collection Time: 05/12/20  8:05 AM   Result Value Ref Range    Glucose (POC) 149 (H) 65 - 100 mg/dL    Performed by Glorious Ellsworth        Imaging:  I have personally reviewed the patients radiographs and have reviewed the reports:  Mild patchy infiltrates in lung bases        Jennifer Edward MD

## 2024-03-02 NOTE — PROGRESS NOTES
[address] [facility], [address] [facility], [address] [place] [address]  Dr. Christy Mahan. Norma Yost  Phone:  [phone]  Fax:  [phone]    Progress Note    Name:  Leah Barron  Age:  72 y.o.  :  1945  Encounter Date:  2018    Primary Care Provider:  John Paul Hanks MD    Chief Complaint   Patient presents with    Follow-up     diabetes     HPI:  Patient here to follow-up diabetes and determine medication refill and adjustments and appropriate lab evaluation. Patient is compliant with medications and no new side effects.        Past Medical History:   Diagnosis Date    Anxiety     Arthritis     Asthma     Chronic pain     back,right hip    Depression     Diabetes (HCC)     type 2    GERD (gastroesophageal reflux disease)     Hypercholesteremia     Hypertension     Hypothyroid     Nausea & vomiting     Reflux     Sleep apnea     wears dental appliance- Somnident    Thyroid disease     Unspecified adverse effect of anesthesia     delayed awakening     Past Surgical History:   Procedure Laterality Date    HX COLONOSCOPY      normal repeat , diverticulosis of sigmoid colon, Dr. Maida Garland    HX HEENT  14    bilateral cataract surgery14    HX KNEE REPLACEMENT      right    HX OTHER SURGICAL      cardiac cath- No CAD found    NEUROLOGICAL PROCEDURE UNLISTED      vascular brain surgery     Family History   Problem Relation Age of Onset    Elevated Lipids Mother     Dementia Mother     Osteoporosis Mother     Lung Disease Father         copd    Delayed Awakening Father     Post-op Nausea/Vomiting Father     Hypertension Sister     Diabetes Sister         type 2    Breast Cancer Maternal Aunt      Social History     Socioeconomic History    Marital status:      Spouse name: Not on file    Number of children: Not on file    Years of education: Not on file    Highest education level: Not on file   Tobacco Use    Smoking status: Never Smoker    Smokeless tobacco: Never Used   Substance and Sexual Activity    Alcohol use: Yes     Comment: 2x year    Drug use: No    Sexual activity: Yes     Partners: Male       Current Outpatient Medications   Medication Sig Dispense Refill    aspirin delayed-release 81 mg tablet Take  by mouth daily.  losartan (COZAAR) 50 mg tablet TAKE 1 TABLET BY MOUTH EVERY DAY. 90 Tab 3    levothyroxine (SYNTHROID) 100 mcg tablet Take 1 Tab by mouth Daily (before breakfast). 90 Tab 3    glipiZIDE (GLUCOTROL) 5 mg tablet Take 1 Tab by mouth two (2) times a day. 180 Tab 3    traZODone (DESYREL) 100 mg tablet Take 1 Tab by mouth nightly. 90 Tab 3    sertraline (ZOLOFT) 50 mg tablet Take 1 Tab by mouth nightly. 90 Tab 3    atenolol (TENORMIN) 25 mg tablet Take 1 Tab by mouth nightly. 90 Tab 3    simvastatin (ZOCOR) 10 mg tablet Take 1 Tab by mouth nightly. 90 Tab 3    mometasone-formoterol (DULERA) 200-5 mcg/actuation HFA inhaler Take 2 Puffs by inhalation two (2) times a day.  fluticasone (FLONASE) 50 mcg/actuation nasal spray 2 Sprays by Both Nostrils route daily.  albuterol (PROAIR HFA) 90 mcg/actuation inhaler Take 2 Puffs by inhalation every four (4) hours as needed for Wheezing.  Cetirizine (ZYRTEC) 10 mg cap Take 1 Tab by mouth daily.  multivitamins-minerals-lutein (CENTRUM SILVER) tab Take 2 Tabs by mouth daily.  montelukast (SINGULAIR) 10 mg tablet Take 10 mg by mouth nightly.  GUAIFENESIN (MUCINEX PO) Take 1 Tab by mouth two (2) times a day.  glucosamine-chondroit-vit c-mn (GLUCOSAMINE CHONDROITIN MAXSTR) 500-400 mg capsule Take 1 Cap by mouth daily.  OMEPRAZOLE MAGNESIUM (PRILOSEC OTC PO) Take 1 Tab by mouth daily.  CALCIUM CARB/VIT D3/MINERALS (CALCIUM-VITAMIN D PO) Take 1 Tab by mouth daily.  rivaroxaban (XARELTO) 10 mg tablet Take 1 Tab by mouth daily.  30 Tab 0    promethazine (PHENERGAN) 25 mg tablet Take 1 Tab by mouth every six (6) hours as needed for Nausea. 20 Tab 0     Allergies   Allergen Reactions    Latex Rash    Prednisone Anaphylaxis     Throat swelling. Can take methylpredisone po or IV without problems.  Augmentin [Amoxicillin-Pot Clavulanate] Nausea and Vomiting    Biaxin [Clarithromycin] Nausea and Vomiting    Parafon Forte Dsc [Chlorzoxazone] Nausea and Vomiting     Patient Active Problem List   Diagnosis Code    Dry cough R05    Pneumonia, organism unspecified(486) J18.9    Diabetes mellitus ([facility] [address] E11.9    Unspecified essential hypertension I10    Fatigue R53.83    Unspecified hypothyroidism E03.9    Obstructive sleep apnea (adult) (pediatric) G47.33    Esophageal reflux K21.9       Review of Systems   Constitutional: Negative for fever. Respiratory: Negative for shortness of breath. Cardiovascular: Negative for chest pain, orthopnea and PND. Gastrointestinal: Negative for abdominal pain, nausea and vomiting. Visit Vitals  /60 (BP 1 Location: Left arm, BP Patient Position: Sitting)   Pulse 60   Temp 98.7 °F (37.1 °C) (Oral)   Resp 16   Ht 5' 2\" (1.575 m)   Wt 199 lb (90.3 kg)   SpO2 96%   BMI 36.40 kg/m²     Physical Exam   Constitutional: She is oriented to person, place, and time and well-developed, well-nourished, and in no distress. Cardiovascular: Normal rate, regular rhythm and normal heart sounds. Pulmonary/Chest: Effort normal and breath sounds normal. No respiratory distress. She has no wheezes. Neurological: She is alert and oriented to person, place, and time. Skin: Skin is warm and dry. Labs/Radiology Reviewed    Assessment/Plan:    ICD-10-CM ICD-9-CM    1.  Type 2 diabetes mellitus without complication, without long-term current use of insulin ([facility]) A84.5 961.88 METABOLIC PANEL, BASIC      HEMOGLOBIN A1C WITH EAG       Orders Placed This Encounter    METABOLIC PANEL, BASIC    HEMOGLOBIN A1C WITH EAG    losartan (COZAAR) 50 mg tablet    levothyroxine (SYNTHROID) 100 mcg tablet    glipiZIDE (GLUCOTROL) 5 mg tablet    traZODone (DESYREL) 100 mg tablet    sertraline (ZOLOFT) 50 mg tablet    atenolol (TENORMIN) 25 mg tablet    simvastatin (ZOCOR) 10 mg tablet       Follow-up Disposition:  Return in about 5 months (around 5/26/2019) for annual exam with lab work.       Latisha Ernst MD  12/26/2018 86.2

## 2024-12-13 NOTE — TELEPHONE ENCOUNTER
Abby from [facility] states that they will have to stop therapy due to pt insurance denying PT, OT    Pt is going to appeal but until than they will not be able to continue. Awake/Alert

## 2025-04-20 NOTE — PROGRESS NOTES
[address] [place] [address]   Office [phone]  Fax [phone]          Assessment and Plan     Patricia Dejesus is a 69 y.o. female HTN, type 2DM, GERD, hypothyroidism, depression, asthma, and hyperlipidemia who is admitted for AHRF 2/2 COVID 23.    24 Hour Events: No acute events. AHRF 2/2 COVID 19: COVID+ on 5/4, worsening SOB on admission and new O2 requirement (RA baseline). POA CXR with opacification in lower lobes b/l L>R. S/p OP azithro + plaquenil ending 5/10  - COVID inflammatory markers: every other day  - Bumex 1mg daily, Solumedrol Q6H - per Pulm   - Bcx NGTD  - s/p Doxy 7/7 days   - s/p Actemra 5/15  - Neg Quant-gold, HIV, hepatitis panel  - Zinc, Vit C, Lipitor, Mucinex  - Zyrtec, Breo-Ellipta, Combivent Q6H, Singulair  - Supplemental O2 for sats >88% - to wean- now on 40L high flow   - Pulm following: will appreciate further recs  - Palliative following      Sinus Arrhythmia- Intermittent on tele, many PACs, leading to bradycardia. POA EKG with Left anterior fascicular block, Left ventricular hypertrophy.  - Discontinued beta blocker  - Monitor Mag and K, Replete prn     DM2: HgA1C 10.4 in 11/2019. This admission A1C 13.1. Pt very sensitivity to solumedrol- linked order with NPH.  - Hold home glipizide  - Lantus 36U + Lispro 12U with meals with SSI ACHS- Resistant scale  - NPH 28 BID     Asthma  - Home Singulair 10mg qhs     HTN  - Home losartan 50mg daily  - HOLDING atenolol 25mg qhs     HLD: Lipid panel 11/19 [TChol 172, , HDL 59, LDL 89]  - Lipitor 40mg daily for additional anti-inflammatory benefit     Hypothyroid: TSH 3.570 in 11/2019  - Continue home synthroid 100mcg daily     Depression  - Home Zoloft 50mg dialy + trazadone 100mg qhs for sleep     GERD  - Will substitute home prilosec for protonix daily     Hypokalemia: RESOLVED.  Likely 2/2 diarrhea prior to arrival and diuretics inpatient. K 2.7 on admission.   - Repletion prn        I appreciate the opportunity to participate in the care of this patient,  Miriam Milton MD  Family Medicine Resident         Subjective / Objective     Subjective No acute complaints. States she feels and is breathing better. No fever/chills/CP/palp. Temp (24hrs), Av.3 °F (37.4 °C), Min:98.6 °F (37 °C), Max:100 °F (37.8 °C)     Objective  Respiratory: O2 Flow Rate (L/min): 40 l/min O2 Device: Hi flow nasal cannula     Visit Vitals  /48 (BP 1 Location: Right arm, BP Patient Position: At rest)   Pulse 70   Temp 99.1 °F (37.3 °C)   Resp 18   Ht 5' 4\" (1.626 m)   Wt 188 lb (85.3 kg)   SpO2 95%   BMI 32.27 kg/m²       Physical Exam: Pt is currently a COVID infected pt. I did not go in the room to decrease exposure and conserve PPE. The attending physician will exam in the pt. Talked on the phone. I/O:  Date 20 07 - 20 0659 20 07 - 20 0659   Shift 6576-2606 0603-0738 24 Hour Total 9779-2021 1722-9017 24 Hour Total   INTAKE   P.O. 8890 479 9745        P.O. 9591 141 3839      I. V.(mL/kg/hr)  20(0) 20(0)        I.V.  20 20      Shift Total(mL/kg) 1440(16.9) 270(3.2) 1710(20.1)      OUTPUT   Urine(mL/kg/hr) 1150(1.1) 600(0.6) 1750(0.9)        Urine Voided [phone]      Stool           Stool Occurrence(s) 1 x  1 x      Shift Total(mL/kg) 1150(13.5) 600(7) 1750(20.5)       -330 -40      Weight (kg) 85.3 85.3 85.3 85.3 85.3 85.3       Inpatient Medications  Current Facility-Administered Medications   Medication Dose Route Frequency    insulin NPH (NOVOLIN N, HUMULIN N) injection 28 Units  28 Units SubCUTAneous Q12H    And    methylPREDNISolone (PF) (SOLU-MEDROL) injection 30 mg  30 mg IntraVENous Q6H    dextrose (D50W) injection syrg 12.5-25 g  12.5-25 g IntraVENous PRN    insulin lispro (HUMALOG) injection 12 Units  12 Units SubCUTAneous TIDAC    insulin glargine (LANTUS) injection 36 Units  36 Units SubCUTAneous DAILY    glucose chewable tablet 16 g  4 Tab Oral PRN    glucagon (GLUCAGEN) injection 1 mg  1 mg IntraMUSCular PRN    insulin lispro (HUMALOG) injection   SubCUTAneous AC&HS    bumetanide (BUMEX) injection 1 mg  1 mg IntraVENous DAILY    guaiFENesin ER (MUCINEX) tablet 1,200 mg  1,200 mg Oral BID    famotidine (PEPCID) tablet 20 mg  20 mg Oral BID    atorvastatin (LIPITOR) tablet 40 mg  40 mg Oral DAILY    zinc sulfate (ZINCATE) 220 (50) mg capsule 1 Cap  1 Cap Oral DAILY    diphenhydrAMINE (BENADRYL) injection 50 mg  50 mg IntraVENous Q4H PRN    acetaminophen (TYLENOL) tablet 650 mg  650 mg Oral Q6H PRN    sodium chloride (NS) flush 5-40 mL  5-40 mL IntraVENous Q8H    sodium chloride (NS) flush 5-40 mL  5-40 mL IntraVENous PRN    ondansetron (ZOFRAN) injection 4 mg  4 mg IntraVENous Q4H PRN    enoxaparin (LOVENOX) injection 40 mg  40 mg SubCUTAneous Q24H    ascorbic acid (vitamin C) (VITAMIN C) tablet 500 mg  500 mg Oral BID    aspirin delayed-release tablet 81 mg  81 mg Oral DAILY    calcium-vitamin D (OS-TRISTEN) 500 mg-200 unit tablet  1 Tab Oral DAILY    cetirizine (ZYRTEC) tablet 10 mg  10 mg Oral DAILY    fluticasone propionate (FLONASE) 50 mcg/actuation nasal spray 2 Spray  2 Spray Both Nostrils DAILY PRN    therapeutic multivitamin (THERAGRAN) tablet 1 Tab  1 Tab Oral DAILY    levothyroxine (SYNTHROID) tablet 100 mcg  100 mcg Oral ACB    losartan (COZAAR) tablet 50 mg  50 mg Oral DAILY    montelukast (SINGULAIR) tablet 10 mg  10 mg Oral QHS    sertraline (ZOLOFT) tablet 50 mg  50 mg Oral DAILY    traZODone (DESYREL) tablet 100 mg  100 mg Oral QHS    ipratropium-albuterol (COMBIVENT RESPIMAT) 20 mcg-100 mcg inhalation spray  1 Puff Inhalation Q6H RT    fluticasone-vilanterol (BREO ELLIPTA) 200mcg-25mcg/puff  1 Puff Inhalation DAILY    albuterol (PROVENTIL HFA, VENTOLIN HFA, PROAIR HFA) inhaler 2 Puff  2 Puff Inhalation Q4H PRN    pantoprazole (PROTONIX) tablet 40 mg  40 mg Oral ACB    sodium chloride (NS) flush 5-10 mL  5-10 mL IntraVENous PRN Allergies  Allergies   Allergen Reactions    Latex Rash    Prednisone Anaphylaxis     Throat swelling. Can take methylpredisone po or IV without problems.     Augmentin [Amoxicillin-Pot Clavulanate] Nausea and Vomiting    Biaxin [Clarithromycin] Nausea and Vomiting    Metformin Diarrhea    Parafon Forte Dsc [Chlorzoxazone] Nausea and Vomiting         CBC:  Recent Labs     05/23/20  0339 05/22/20  0439 05/21/20  0354   WBC 17.0* 14.7* 18.3*   HGB 11.3* 11.9 11.8   HCT 34.5* 36.2 35.1    311 898       Metabolic Panel:  Recent Labs     05/23/20  0339 05/22/20  0439 05/21/20  0354   * 136 134*   K 4.5 4.2 3.9    103 99   CO2 29 30 31   BUN 27* 21* 30*   CREA 0.85 0.84 0.91   * 171* 255*   CA 8.3* 8.3* 8.4*   MG 2.1 2.0 1.9   ALB 2.5* 2.6* 2.5*   SGOT 30 61* 49*   ALT 80* 99* 87*            For Billing    Chief Complaint   Patient presents with    O2/Oxygen     low    [facility]       Hospital Problems  Date Reviewed: 5/10/2020          Codes Class Noted POA    * (Principal) COVID-19 ICD-10-CM: U07.1  ICD-9-CM: 079.89  5/16/2020 Unknown        Respiratory failure with hypoxia ([facility] [address] ICD-10-CM: J96.91  ICD-9-CM: 518.81  5/10/2020 Yes Her/She

## 2025-05-29 NOTE — PROGRESS NOTES
[facility] RESIDENCY PROGRAM  PROGRESS NOTE     5/25/2020  PCP: Namita Collins MD       Assessment/Plan:     Joycelyn Alvarez is a 69 y.o. female with history of HTN, type 2DM, GERD, hypothyroidism, depression, asthma, and hyperlipidemia who is admitted for AHRF 2/2 COVID 19.     Overnight events: NAEO      AHRF 2/2 COVID 23: COVID+ on 5/4, worsening SOB on admission and new O2 requirement (RA baseline). POA CXR with opacification in lower lobes b/l L>R, ABG pH 7.41, pCO2 31, pO2 64, O2 sat 93, bicarb 19, LA 1.8, S/p outpatient tx of azithro and plaquenil ending 5/10  - COVID inflammatory markers have normalized, no longer following      - Bcx NGTD  - Doxy 7 day course completed  - Pulmonology following, appreciate rec's-        -s/p Actemra 5/15. (Quant-gold, HIV, hepatitis panel) Negative       -Bumex 1mg daily, Will continue to monitor sodium       -Solumedrol 30mg weaned to Q12H  - Proning  - Zinc, Vit C, Lipitor, Mucinex  - Zyrtec, Breo-Ellipta, Combivent Q6H, Singulair  - Supplemental O2 as needed- on 12L high flow       Sinus Arrhythmia- Intermittent on tele, many PACs, leading to bradycardia. POA EKG with Left anterior fascicular block, Left ventricular hypertrophy.  - Discontinued beta blocker  - Monitor Mag and K, Replete prn    Type 2 DM: HgA1C 10.4 in 11/2019. This admission A1C 13.1. Pt very sensitivity to solumedrol.  - Hold home glipizide  - Lantus 48U and Lispro 18U with meals with SSI ACHS- Resistant scale  - Decreasing NPH as steroids are lowered     Asthma  - Continue Singulair 10mg qhs     HTN  - Continue home BP med losartan 50mg daily  - HOLDING atenolol 25mg qhs     Hypothyroid: TSH 3.570 in 11/2019  - Continue home synthroid 100mcg daily     Depression  - Continue Zoloft 50mg daily and trazadone 100mg qhs to help with sleep     GERD  - Will substitute home prilosec for protonix daily     Hypokalemia: RESOLVED. Likely 2/2 diarrhea prior to arrival and diuretics inpatient.  K Incoming (mail or fax):  fax  Received from:  [facility]  Documentation given to:  Triage incoming    Response to previous communication   2.7 on admission.   - Repletion prn     Hyperlipidemia: Lipid panel , , HDL 59, LDL 89 in 2019  - Lipitor 40mg daily for additional anti-inflammatory benefit      Breonna Fox discussed with Dr. Kelechi Butcher. Subjective:   Pt states doing well this am, no acute concerns. Has been up and walking across the room without desats. She feels like her strength has improved as she does not need anyone to help her get up. No concerns overnight. Denies cp, n, v, c, d. Objective:   Physical examination  Patient Vitals for the past 24 hrs:   Temp Pulse Resp BP SpO2   20 1557     95 %   20 1427  80      20 1208     96 %   20 1157 99.5 °F (37.5 °C) 78 15 113/63 97 %   20 0844 98.8 °F (37.1 °C) 70 13 120/62 96 %   20 0829     92 %   20 0827     93 %   20 0713  62      20 0351 98.6 °F (37 °C) (!) 57 18 92/53 94 %   20 0200  65 25 103/58 91 %   20 0000  64 20 (!) 102/37 96 %   20 2314 99.3 °F (37.4 °C) 71 18 115/49 97 %   20 2200  73 14 134/62 96 %   20 99.2 °F (37.3 °C) 71 16 115/55 96 %   20  71 16 115/55 96 %   20 1800  71 14 104/46 96 %      Temp (24hrs), Av.1 °F (37.3 °C), Min:98.6 °F (37 °C), Max:99.5 °F (37.5 °C)     O2 Flow Rate (L/min): 15 l/min   O2 Device: Hi flow nasal cannula(MIDLFOW)    Date 20 - 20 - 20 0659   Shift 9292-9738 1603-1644 24 Hour Total 8805-9217 2820-3317 24 Hour Total   INTAKE   P.O. 4881 407 1669 720  720     P. O. 6712 789 2697 720  720   Shift Total(mL/kg) 1440(16.1) 300(3.4) 1740(19.5) 720(8.1)  720(8.1)   OUTPUT   Urine(mL/kg/hr) 2500(2.3) 1000(0.9) 3500(1.6) 1100  1100     Urine Voided 1150 1000 2150 1100  1100     Urine Occurrence(s) 1 x  1 x        Urine Output (mL) (External Female Catheter 20) 1350  1350      Stool           Stool Occurrence(s) 1 x  1 x 1 x  1 x   Shift Total(mL/kg) 3662(43) 1000(11.2) 3500(39.2) 1100(12.3)  1100(12.3)   NET -1060 -700 -1760 -380  -380   Weight (kg) 89.2 89.2 89.2 89.2 89.2 89.2     Pt is currently a COVID infected pt. I did not go in the room to decrease exposure and conserve PPE. The attending physician will exam in the pt. Talked on the phone. General:   Alert, cooperative, speaking in full sentences     Data Review:     CBC:  Recent Labs     05/25/20  0334 05/24/20  0356 05/23/20 0339   WBC 17.5* 17.5* 17.0*   HGB 11.7 11.9 11.3*   HCT 35.4 35.8 34.5*    556 152     Metabolic Panel:  Recent Labs     05/25/20 0334 05/24/20 0356 05/23/20 0339   * 133* 132*   K 4.3 4.5 4.5   CL 99 98 100   CO2 29 29 29   BUN 31* 26* 27*   CREA 0.85 0.79 0.85   * 202* 299*   CA 8.2* 8.8 8.3*   MG 2.2 2.2 2.1   ALB 2.6* 2.8* 2.5*   TBILI 0.5 0.6 0.5   SGOT 29 28 30   ALT 71 75 80*     Micro:  Lab Results   Component Value Date/Time    Culture result: NO GROWTH 6 DAYS 05/13/2020 04:29 AM    Culture result:  05/11/2020 12:35 AM     MRSA NOT PRESENT. Apparent Staphylococus aureus (not MRSA noted). Culture result:  05/11/2020 12:35 AM         Screening of patient nares for MRSA is for surveillance purposes and, if positive, to facilitate isolation considerations in high risk settings. It is not intended for automatic decolonization interventions per se as regimens are not sufficiently effective to warrant routine use. Imaging:  Xr Chest Port    Result Date: 5/10/2020  EXAM: XR CHEST PORT INDICATION: sob, +COVID COMPARISON: 2013 FINDINGS: A portable AP radiograph of the chest was obtained at 2145 hours. The patient is on a cardiac monitor. There is opacification in the lower lobes bilaterally, left greater than right. The cardiac and mediastinal contours and pulmonary vascularity are normal.  The bones and soft tissues are grossly within normal limits. IMPRESSION: Opacification in the lower lobes bilaterally, left greater than right.       CXR AP Port: 5/15/2020    FINDINGS:  AP portable upright view of the chest demonstrates a stable  cardiopericardial  silhouette. The lungs are adequately expanded. Increased peripheral parenchymal  opacities greater on the left than on the right. Increased interstitial opacity  as well. . The osseous structures are unremarkable. Patient is on a cardiac  monitor.      IMPRESSION  IMPRESSION:  Interval progression of extensive bilateral parenchymal disease greater on the  left than on the right.     Medications reviewed  Current Facility-Administered Medications   Medication Dose Route Frequency    insulin lispro (HUMALOG) injection 22 Units  22 Units SubCUTAneous TIDAC    insulin glargine (LANTUS) injection 48 Units  48 Units SubCUTAneous DAILY    methylPREDNISolone (PF) (SOLU-MEDROL) injection 30 mg  30 mg IntraVENous Q12H    And    insulin NPH (NOVOLIN N, HUMULIN N) injection 22 Units  22 Units SubCUTAneous Q12H    dextrose (D50W) injection syrg 12.5-25 g  12.5-25 g IntraVENous PRN    glucose chewable tablet 16 g  4 Tab Oral PRN    glucagon (GLUCAGEN) injection 1 mg  1 mg IntraMUSCular PRN    insulin lispro (HUMALOG) injection   SubCUTAneous AC&HS    bumetanide (BUMEX) injection 1 mg  1 mg IntraVENous DAILY    guaiFENesin ER (MUCINEX) tablet 1,200 mg  1,200 mg Oral BID    famotidine (PEPCID) tablet 20 mg  20 mg Oral BID    atorvastatin (LIPITOR) tablet 40 mg  40 mg Oral DAILY    zinc sulfate (ZINCATE) 220 (50) mg capsule 1 Cap  1 Cap Oral DAILY    diphenhydrAMINE (BENADRYL) injection 50 mg  50 mg IntraVENous Q4H PRN    acetaminophen (TYLENOL) tablet 650 mg  650 mg Oral Q6H PRN    sodium chloride (NS) flush 5-40 mL  5-40 mL IntraVENous Q8H    sodium chloride (NS) flush 5-40 mL  5-40 mL IntraVENous PRN    ondansetron (ZOFRAN) injection 4 mg  4 mg IntraVENous Q4H PRN    enoxaparin (LOVENOX) injection 40 mg  40 mg SubCUTAneous Q24H    ascorbic acid (vitamin C) (VITAMIN C) tablet 500 mg  500 mg Oral BID    aspirin delayed-release tablet 81 mg  81 mg Oral DAILY    calcium-vitamin D (OS-TRISTEN) 500 mg-200 unit tablet  1 Tab Oral DAILY    cetirizine (ZYRTEC) tablet 10 mg  10 mg Oral DAILY    fluticasone propionate (FLONASE) 50 mcg/actuation nasal spray 2 Spray  2 Spray Both Nostrils DAILY PRN    therapeutic multivitamin (THERAGRAN) tablet 1 Tab  1 Tab Oral DAILY    levothyroxine (SYNTHROID) tablet 100 mcg  100 mcg Oral ACB    losartan (COZAAR) tablet 50 mg  50 mg Oral DAILY    montelukast (SINGULAIR) tablet 10 mg  10 mg Oral QHS    sertraline (ZOLOFT) tablet 50 mg  50 mg Oral DAILY    traZODone (DESYREL) tablet 100 mg  100 mg Oral QHS    ipratropium-albuterol (COMBIVENT RESPIMAT) 20 mcg-100 mcg inhalation spray  1 Puff Inhalation Q6H RT    fluticasone-vilanterol (BREO ELLIPTA) 200mcg-25mcg/puff  1 Puff Inhalation DAILY    albuterol (PROVENTIL HFA, VENTOLIN HFA, PROAIR HFA) inhaler 2 Puff  2 Puff Inhalation Q4H PRN    pantoprazole (PROTONIX) tablet 40 mg  40 mg Oral ACB    sodium chloride (NS) flush 5-10 mL  5-10 mL IntraVENous PRN         Signed:   Gt Jacobsen MD   Resident, Family Medicine      Attending note: Attending note to follow. ..